# Patient Record
Sex: FEMALE | Race: WHITE | NOT HISPANIC OR LATINO | Employment: OTHER | URBAN - METROPOLITAN AREA
[De-identification: names, ages, dates, MRNs, and addresses within clinical notes are randomized per-mention and may not be internally consistent; named-entity substitution may affect disease eponyms.]

---

## 2017-01-04 DIAGNOSIS — M50.90 CERVICAL DISC DISEASE: ICD-10-CM

## 2017-01-04 DIAGNOSIS — F17.200 NICOTINE DEPENDENCE: ICD-10-CM

## 2017-01-04 RX ORDER — NICOTINE 21 MG/24H
PATCH, EXTENDED RELEASE TRANSDERMAL
Qty: 14 PATCH | Refills: 0 | Status: SHIPPED | OUTPATIENT
Start: 2017-01-04 | End: 2017-02-07 | Stop reason: SDUPTHER

## 2017-01-04 RX ORDER — DIAZEPAM 10 MG/1
TABLET ORAL
Qty: 60 TABLET | Refills: 0 | Status: SHIPPED | OUTPATIENT
Start: 2017-01-04 | End: 2017-02-07 | Stop reason: SDUPTHER

## 2017-01-20 ENCOUNTER — TELEPHONE (OUTPATIENT)
Dept: FAMILY MEDICINE | Facility: CLINIC | Age: 62
End: 2017-01-20

## 2017-01-23 ENCOUNTER — TELEPHONE (OUTPATIENT)
Dept: FAMILY MEDICINE | Facility: CLINIC | Age: 62
End: 2017-01-23

## 2017-01-23 ENCOUNTER — OFFICE VISIT (OUTPATIENT)
Dept: FAMILY MEDICINE | Facility: CLINIC | Age: 62
End: 2017-01-23
Payer: MEDICAID

## 2017-01-23 VITALS
OXYGEN SATURATION: 96 % | BODY MASS INDEX: 24.39 KG/M2 | HEART RATE: 101 BPM | TEMPERATURE: 98 F | RESPIRATION RATE: 18 BRPM | DIASTOLIC BLOOD PRESSURE: 78 MMHG | WEIGHT: 129.19 LBS | SYSTOLIC BLOOD PRESSURE: 92 MMHG | HEIGHT: 61 IN

## 2017-01-23 DIAGNOSIS — M50.30 DISC DISEASE, DEGENERATIVE, CERVICAL: ICD-10-CM

## 2017-01-23 DIAGNOSIS — F11.90 CHRONIC, CONTINUOUS USE OF OPIOIDS: ICD-10-CM

## 2017-01-23 DIAGNOSIS — M50.30 DISC DISEASE, DEGENERATIVE, CERVICAL: Primary | ICD-10-CM

## 2017-01-23 PROCEDURE — 99213 OFFICE O/P EST LOW 20 MIN: CPT | Mod: PBBFAC,PN | Performed by: INTERNAL MEDICINE

## 2017-01-23 PROCEDURE — 99999 PR PBB SHADOW E&M-EST. PATIENT-LVL III: CPT | Mod: PBBFAC,,, | Performed by: INTERNAL MEDICINE

## 2017-01-23 PROCEDURE — 99214 OFFICE O/P EST MOD 30 MIN: CPT | Mod: S$PBB,,, | Performed by: INTERNAL MEDICINE

## 2017-01-23 RX ORDER — VENLAFAXINE 75 MG/1
75 TABLET ORAL 2 TIMES DAILY
Qty: 60 TABLET | Refills: 3 | Status: SHIPPED | OUTPATIENT
Start: 2017-01-23 | End: 2017-08-31 | Stop reason: SDUPTHER

## 2017-01-23 RX ORDER — OXYCODONE AND ACETAMINOPHEN 5; 325 MG/1; MG/1
1 TABLET ORAL EVERY 4 HOURS PRN
Qty: 120 TABLET | Refills: 0 | Status: SHIPPED | OUTPATIENT
Start: 2017-02-23 | End: 2017-03-21 | Stop reason: SDUPTHER

## 2017-01-23 RX ORDER — OXYCODONE AND ACETAMINOPHEN 5; 325 MG/1; MG/1
1 TABLET ORAL EVERY 4 HOURS PRN
Qty: 120 TABLET | Refills: 0 | Status: SHIPPED | OUTPATIENT
Start: 2017-01-23 | End: 2017-01-23 | Stop reason: SDUPTHER

## 2017-01-23 RX ORDER — OXYCODONE HYDROCHLORIDE 5 MG/1
5 CAPSULE ORAL EVERY 4 HOURS PRN
Qty: 120 CAPSULE | Refills: 0 | Status: SHIPPED | OUTPATIENT
Start: 2017-02-23 | End: 2017-01-24 | Stop reason: CLARIF

## 2017-01-23 RX ORDER — OXYCODONE HYDROCHLORIDE 5 MG/1
5 CAPSULE ORAL EVERY 4 HOURS PRN
Qty: 120 CAPSULE | Refills: 0 | Status: SHIPPED | OUTPATIENT
Start: 2017-01-23 | End: 2017-01-23 | Stop reason: SDUPTHER

## 2017-01-23 NOTE — PROGRESS NOTES
"Subjective:       Patient ID: Rossi Mcneal is a 61 y.o. female.    Chief Complaint: Hypertension; Follow-up; and Medication Management (discuss Effexor )    HPI Comments: F/u chronic pain    HPI: 60 y/o with history of scoliosis surgery > 40 years ago with chronic neck and lower back pain had spine meylogram at [facility] Oct 2016 showing cervical neuronal foraminal stenosis C4-C6. Needs to schedule follow up with interventional radiology at [facility] for treatment recommendations. Primary mid neck pain no radiation to extremities. Good control with opioid four to five times per day. Moving bowels daily without straining    Review of Systems   Constitutional: Negative for activity change, appetite change, fatigue, fever and unexpected weight change.   HENT: Negative for ear pain, rhinorrhea and sore throat.    Eyes: Negative for discharge and visual disturbance.   Respiratory: Negative for chest tightness, shortness of breath and wheezing.    Cardiovascular: Negative for chest pain, palpitations and leg swelling.   Gastrointestinal: Negative for abdominal pain, constipation and diarrhea.   Endocrine: Negative for cold intolerance and heat intolerance.   Genitourinary: Negative for dysuria and hematuria.   Musculoskeletal: Negative for joint swelling and neck stiffness.   Skin: Negative for rash.   Neurological: Negative for dizziness, syncope, weakness and headaches.   Psychiatric/Behavioral: Negative for suicidal ideas.       Objective:     Vitals:    01/23/17 1521   BP: 92/78   BP Location: Left arm   Patient Position: Sitting   BP Method: Manual   Pulse: 101   Resp: 18   Temp: 98.1 °F (36.7 °C)   TempSrc: Oral   SpO2: 96%   Weight: 58.6 kg (129 lb 3 oz)   Height: 5' 1" (1.549 m)          Physical Exam   Constitutional: She is oriented to person, place, and time. She appears well-developed and well-nourished.   HENT:   Head: Normocephalic and atraumatic.   Eyes: Conjunctivae are normal. Pupils are equal, round, and reactive " to light.   Neck: Normal range of motion.   Cardiovascular: Normal rate and regular rhythm.  Exam reveals no gallop and no friction rub.    No murmur heard.  Pulmonary/Chest: Effort normal and breath sounds normal. She has no wheezes. She has no rales.   Abdominal: Soft. Bowel sounds are normal. There is no tenderness. There is no rebound and no guarding.   Musculoskeletal: Normal range of motion. She exhibits no edema or tenderness.   Neurological: She is alert and oriented to person, place, and time. No cranial nerve deficit.   Skin: Skin is warm and dry.   Psychiatric: She has a normal mood and affect.       Assessment:       1. Disc disease, degenerative, cervical    2. Chronic, continuous use of opioids        Plan:    1/2. Stable continue current medications needs follow up with [facility] neurosurgery she will call to scheduled

## 2017-01-24 RX ORDER — OXYCODONE HYDROCHLORIDE 5 MG/1
5 TABLET ORAL EVERY 4 HOURS PRN
Qty: 120 TABLET | Refills: 0 | Status: SHIPPED | OUTPATIENT
Start: 2017-01-24 | End: 2017-02-23

## 2017-01-24 RX ORDER — OXYCODONE HYDROCHLORIDE 5 MG/1
5 TABLET ORAL EVERY 4 HOURS PRN
Qty: 120 TABLET | Refills: 0 | Status: SHIPPED | OUTPATIENT
Start: 2017-02-24 | End: 2017-03-21 | Stop reason: SDUPTHER

## 2017-01-24 NOTE — TELEPHONE ENCOUNTER
Spoke with Sheela with [facility]'s pharmacy and [facility] states that patient usually gets Oxycodone (OXY-IR) 5 mg, tablet not capsule.     Patient is requesting tablets. Please resend prescription

## 2017-01-24 NOTE — TELEPHONE ENCOUNTER
----- Message from Justine Duarte sent at 1/23/2017  4:20 PM CST -----  Contact: Sheela @[facility] [phone]  Pharmacy is requesting clarification on the prescription for oxycodone (OXY-IR) 5 mg Cap Please call  at your earliest convenience.  Thanks !

## 2017-02-07 DIAGNOSIS — F11.90 CHRONIC, CONTINUOUS USE OF OPIOIDS: ICD-10-CM

## 2017-02-07 DIAGNOSIS — M50.30 DISC DISEASE, DEGENERATIVE, CERVICAL: ICD-10-CM

## 2017-02-07 DIAGNOSIS — J45.20 MILD INTERMITTENT ASTHMA WITHOUT COMPLICATION: ICD-10-CM

## 2017-02-07 DIAGNOSIS — F17.200 NICOTINE DEPENDENCE: ICD-10-CM

## 2017-02-07 DIAGNOSIS — M50.90 CERVICAL DISC DISEASE: ICD-10-CM

## 2017-02-08 RX ORDER — IBUPROFEN 200 MG
TABLET ORAL
Qty: 14 PATCH | Refills: 1 | Status: SHIPPED | OUTPATIENT
Start: 2017-02-08 | End: 2017-07-21

## 2017-02-08 RX ORDER — DIAZEPAM 10 MG/1
TABLET ORAL
Qty: 60 TABLET | Refills: 0 | Status: SHIPPED | OUTPATIENT
Start: 2017-02-08 | End: 2017-03-17 | Stop reason: SDUPTHER

## 2017-02-08 RX ORDER — OXYCODONE HYDROCHLORIDE 5 MG/1
TABLET ORAL
Qty: 120 TABLET | Refills: 0 | Status: SHIPPED | OUTPATIENT
Start: 2017-02-22 | End: 2017-03-21 | Stop reason: SDUPTHER

## 2017-02-08 RX ORDER — ALBUTEROL SULFATE 90 UG/1
AEROSOL, METERED RESPIRATORY (INHALATION)
Qty: 18 G | Refills: 2 | Status: SHIPPED | OUTPATIENT
Start: 2017-02-08 | End: 2017-05-30 | Stop reason: SDUPTHER

## 2017-02-08 RX ORDER — OXYCODONE AND ACETAMINOPHEN 5; 325 MG/1; MG/1
TABLET ORAL
Qty: 120 TABLET | Refills: 0 | Status: SHIPPED | OUTPATIENT
Start: 2017-02-22 | End: 2017-03-21 | Stop reason: SDUPTHER

## 2017-02-08 NOTE — TELEPHONE ENCOUNTER
Refill request received for percocet and oxycodone  reviewed filled 1/23 not due until 2/22. These scripts have been pre dated

## 2017-02-20 ENCOUNTER — TELEPHONE (OUTPATIENT)
Dept: FAMILY MEDICINE | Facility: CLINIC | Age: 62
End: 2017-02-20

## 2017-02-20 NOTE — TELEPHONE ENCOUNTER
----- Message from Angela Seay sent at 2/20/2017  2:50 PM CST -----  Contact: self  Refill request for Oxycodone 5-325 & Oxycodone HCL 5 MG tablets to be called in to [facility] # [phone]    LL

## 2017-03-17 DIAGNOSIS — M50.90 CERVICAL DISC DISEASE: ICD-10-CM

## 2017-03-17 RX ORDER — DIAZEPAM 10 MG/1
TABLET ORAL
Qty: 60 TABLET | Refills: 0 | Status: SHIPPED | OUTPATIENT
Start: 2017-03-17 | End: 2017-04-21 | Stop reason: SDUPTHER

## 2017-03-21 ENCOUNTER — APPOINTMENT (OUTPATIENT)
Dept: RADIOLOGY | Facility: HOSPITAL | Age: 62
End: 2017-03-21
Attending: INTERNAL MEDICINE
Payer: MEDICAID

## 2017-03-21 ENCOUNTER — TELEPHONE (OUTPATIENT)
Dept: FAMILY MEDICINE | Facility: CLINIC | Age: 62
End: 2017-03-21

## 2017-03-21 ENCOUNTER — OFFICE VISIT (OUTPATIENT)
Dept: FAMILY MEDICINE | Facility: CLINIC | Age: 62
End: 2017-03-21
Payer: MEDICAID

## 2017-03-21 VITALS
SYSTOLIC BLOOD PRESSURE: 130 MMHG | TEMPERATURE: 98 F | DIASTOLIC BLOOD PRESSURE: 84 MMHG | HEIGHT: 61 IN | WEIGHT: 131.38 LBS | HEART RATE: 88 BPM | BODY MASS INDEX: 24.8 KG/M2 | OXYGEN SATURATION: 94 % | RESPIRATION RATE: 17 BRPM

## 2017-03-21 DIAGNOSIS — F11.90 CHRONIC, CONTINUOUS USE OF OPIOIDS: ICD-10-CM

## 2017-03-21 DIAGNOSIS — M50.30 DISC DISEASE, DEGENERATIVE, CERVICAL: ICD-10-CM

## 2017-03-21 DIAGNOSIS — R09.81 NASAL CONGESTION: ICD-10-CM

## 2017-03-21 DIAGNOSIS — R13.14 PHARYNGOESOPHAGEAL DYSPHAGIA: ICD-10-CM

## 2017-03-21 DIAGNOSIS — R13.14 PHARYNGOESOPHAGEAL DYSPHAGIA: Primary | ICD-10-CM

## 2017-03-21 PROCEDURE — 99999 PR PBB SHADOW E&M-EST. PATIENT-LVL III: CPT | Mod: PBBFAC,,, | Performed by: INTERNAL MEDICINE

## 2017-03-21 PROCEDURE — 99214 OFFICE O/P EST MOD 30 MIN: CPT | Mod: S$PBB,,, | Performed by: INTERNAL MEDICINE

## 2017-03-21 PROCEDURE — 71020 XR CHEST PA AND LATERAL: CPT | Mod: 26,,, | Performed by: RADIOLOGY

## 2017-03-21 PROCEDURE — 99213 OFFICE O/P EST LOW 20 MIN: CPT | Mod: PBBFAC,PN | Performed by: INTERNAL MEDICINE

## 2017-03-21 PROCEDURE — 71020 XR CHEST PA AND LATERAL: CPT | Mod: TC,PN

## 2017-03-21 RX ORDER — OXYCODONE AND ACETAMINOPHEN 5; 325 MG/1; MG/1
1 TABLET ORAL EVERY 4 HOURS PRN
Qty: 120 TABLET | Refills: 0 | Status: SHIPPED | OUTPATIENT
Start: 2017-03-22 | End: 2017-04-21 | Stop reason: SDUPTHER

## 2017-03-21 RX ORDER — OXYCODONE HYDROCHLORIDE 5 MG/1
5 TABLET ORAL EVERY 4 HOURS PRN
Qty: 120 TABLET | Refills: 0 | Status: SHIPPED | OUTPATIENT
Start: 2017-03-22 | End: 2017-04-21 | Stop reason: SDUPTHER

## 2017-03-21 RX ORDER — FLUTICASONE PROPIONATE 50 MCG
1 SPRAY, SUSPENSION (ML) NASAL 2 TIMES DAILY
Qty: 1 BOTTLE | Refills: 1 | Status: SHIPPED | OUTPATIENT
Start: 2017-03-21 | End: 2017-06-21 | Stop reason: SDUPTHER

## 2017-03-21 NOTE — TELEPHONE ENCOUNTER
Ms. Richey was informed scripts were sent to pharmacy, but won't be filled until tomorrow.     Patient understood.

## 2017-03-21 NOTE — PROGRESS NOTES
"Subjective:       Patient ID: Rossi Mcneal is a 61 y.o. female.    Chief Complaint: Dysphagia    HPI Comments: Dysphagia    HPI: 62 y/o with chronic cervical disc disease and tobacco dependence presents with one month of solid food dysphagia. Had episode approximately two weeks ago were a piece fo meat was stuck in her throat to point she had to cough it back up. No difficulty with liquids no post pradnial coughing no nausea or vomitting weight is unchanged no fevers/chills no history of EGD or reflux in past. She does have significant cervical disc disease has not yet followed up with neurosurgery at [facility] after myelogram in Nov 2016.     Review of Systems   Constitutional: Negative for activity change, appetite change, fatigue, fever and unexpected weight change.   HENT: Negative for ear pain, rhinorrhea and sore throat.    Eyes: Negative for discharge and visual disturbance.   Respiratory: Negative for chest tightness, shortness of breath and wheezing.    Cardiovascular: Negative for chest pain, palpitations and leg swelling.   Gastrointestinal: Negative for abdominal distention, abdominal pain, anal bleeding, blood in stool, constipation and diarrhea.   Endocrine: Negative for cold intolerance and heat intolerance.   Genitourinary: Negative for dysuria and hematuria.   Musculoskeletal: Positive for back pain. Negative for joint swelling and neck stiffness.   Skin: Negative for rash.   Neurological: Negative for dizziness, syncope, weakness and headaches.   Psychiatric/Behavioral: Negative for suicidal ideas.       Objective:     Vitals:    03/21/17 1118   BP: 130/84   BP Location: Left arm   Patient Position: Sitting   BP Method: Manual   Pulse: 88   Resp: 17   Temp: 98.2 °F (36.8 °C)   TempSrc: Oral   SpO2: (!) 94%   Weight: 59.6 kg (131 lb 6.3 oz)   Height: 5' 1" (1.549 m)          Physical Exam   Constitutional: She is oriented to person, place, and time. She appears well-developed and well-nourished.   HENT: "   Head: Normocephalic and atraumatic.   Eyes: Conjunctivae are normal. Pupils are equal, round, and reactive to light.   Neck: Normal range of motion. Neck supple. No JVD present. No thyromegaly present.   Cardiovascular: Normal rate and regular rhythm.  Exam reveals no gallop and no friction rub.    No murmur heard.  Pulmonary/Chest: Effort normal and breath sounds normal. No stridor. She has no wheezes. She has no rales.   No axillary LAD   Abdominal: Soft. Bowel sounds are normal. There is no tenderness. There is no rebound and no guarding.   Non palpable spleen   Musculoskeletal: Normal range of motion. She exhibits no edema or tenderness.   Lymphadenopathy:     She has no cervical adenopathy.   Neurological: She is alert and oriented to person, place, and time. No cranial nerve deficit.   Skin: Skin is warm and dry.   Psychiatric: She has a normal mood and affect.       Assessment:       1. Pharyngoesophageal dysphagia    2. Nasal congestion    3. Chronic, continuous use of opioids    4. Disc disease, degenerative, cervical        Plan:    1. Check CXR, referral for EGD if unrevealing suspect relatedt o cervical osteophytes    2. Nasal steroid BID    3/4. Refill pain medicaitons to schedule [facility]w [facility]

## 2017-03-21 NOTE — TELEPHONE ENCOUNTER
----- Message from Angela Seay sent at 3/21/2017 12:35 PM CDT -----  Contact: self  Patient states all of her prescriptions were not sent to the pharmacy today  . It was 2 prescriptions for pain .     [phone]    LL

## 2017-04-04 ENCOUNTER — ANESTHESIA EVENT (OUTPATIENT)
Dept: ENDOSCOPY | Facility: HOSPITAL | Age: 62
End: 2017-04-04
Payer: MEDICAID

## 2017-04-05 ENCOUNTER — ANESTHESIA (OUTPATIENT)
Dept: ENDOSCOPY | Facility: HOSPITAL | Age: 62
End: 2017-04-05
Payer: MEDICAID

## 2017-04-17 ENCOUNTER — HOSPITAL ENCOUNTER (OUTPATIENT)
Facility: HOSPITAL | Age: 62
Discharge: HOME | End: 2017-04-17
Attending: INTERNAL MEDICINE | Admitting: INTERNAL MEDICINE
Payer: MEDICAID

## 2017-04-17 VITALS
RESPIRATION RATE: 18 BRPM | SYSTOLIC BLOOD PRESSURE: 113 MMHG | BODY MASS INDEX: 24.55 KG/M2 | TEMPERATURE: 98 F | HEART RATE: 68 BPM | DIASTOLIC BLOOD PRESSURE: 77 MMHG | HEIGHT: 61 IN | WEIGHT: 130 LBS | OXYGEN SATURATION: 95 %

## 2017-04-17 DIAGNOSIS — R13.14 PHARYNGOESOPHAGEAL DYSPHAGIA: ICD-10-CM

## 2017-04-17 PROCEDURE — 25000003 PHARM REV CODE 250

## 2017-04-17 PROCEDURE — 25000003 PHARM REV CODE 250: Performed by: ANESTHESIOLOGY

## 2017-04-17 PROCEDURE — 27201012 HC FORCEPS, HOT/COLD, DISP: Performed by: INTERNAL MEDICINE

## 2017-04-17 PROCEDURE — D9220A PRA ANESTHESIA: Mod: CRNA,,, | Performed by: REGISTERED NURSE

## 2017-04-17 PROCEDURE — 37000008 HC ANESTHESIA 1ST 15 MINUTES: Performed by: INTERNAL MEDICINE

## 2017-04-17 PROCEDURE — 88305 TISSUE EXAM BY PATHOLOGIST: CPT | Performed by: PATHOLOGY

## 2017-04-17 PROCEDURE — D9220A PRA ANESTHESIA: Mod: ANES,,, | Performed by: ANESTHESIOLOGY

## 2017-04-17 PROCEDURE — 37000009 HC ANESTHESIA EA ADD 15 MINS: Performed by: INTERNAL MEDICINE

## 2017-04-17 PROCEDURE — 63600175 PHARM REV CODE 636 W HCPCS

## 2017-04-17 PROCEDURE — 88305 TISSUE EXAM BY PATHOLOGIST: CPT | Mod: 26,,, | Performed by: PATHOLOGY

## 2017-04-17 PROCEDURE — 43239 EGD BIOPSY SINGLE/MULTIPLE: CPT | Performed by: INTERNAL MEDICINE

## 2017-04-17 RX ORDER — LIDOCAINE HYDROCHLORIDE 10 MG/ML
1 INJECTION, SOLUTION EPIDURAL; INFILTRATION; INTRACAUDAL; PERINEURAL ONCE AS NEEDED
Status: DISCONTINUED | OUTPATIENT
Start: 2017-04-18 | End: 2017-04-17 | Stop reason: HOSPADM

## 2017-04-17 RX ORDER — SODIUM CHLORIDE 9 MG/ML
INJECTION, SOLUTION INTRAVENOUS CONTINUOUS
Status: DISCONTINUED | OUTPATIENT
Start: 2017-04-17 | End: 2017-04-17 | Stop reason: HOSPADM

## 2017-04-17 RX ORDER — LIDOCAINE HYDROCHLORIDE 10 MG/ML
1 INJECTION, SOLUTION EPIDURAL; INFILTRATION; INTRACAUDAL; PERINEURAL ONCE
Status: DISCONTINUED | OUTPATIENT
Start: 2017-04-17 | End: 2017-04-17 | Stop reason: SDUPTHER

## 2017-04-17 RX ORDER — PROPOFOL 10 MG/ML
VIAL (ML) INTRAVENOUS
Status: COMPLETED
Start: 2017-04-17 | End: 2017-04-17

## 2017-04-17 RX ORDER — SODIUM CHLORIDE 9 MG/ML
INJECTION, SOLUTION INTRAVENOUS CONTINUOUS
Status: DISCONTINUED | OUTPATIENT
Start: 2017-04-17 | End: 2017-04-17 | Stop reason: SDUPTHER

## 2017-04-17 RX ORDER — LIDOCAINE HYDROCHLORIDE 20 MG/ML
INJECTION, SOLUTION EPIDURAL; INFILTRATION; INTRACAUDAL; PERINEURAL
Status: COMPLETED
Start: 2017-04-17 | End: 2017-04-17

## 2017-04-17 RX ADMIN — SODIUM CHLORIDE: 0.9 INJECTION, SOLUTION INTRAVENOUS at 10:04

## 2017-04-17 RX ADMIN — PROPOFOL 20 MG: 10 INJECTION, EMULSION INTRAVENOUS at 11:04

## 2017-04-17 RX ADMIN — LIDOCAINE HYDROCHLORIDE 100 MG: 20 INJECTION, SOLUTION INTRAVENOUS at 11:04

## 2017-04-17 RX ADMIN — PROPOFOL 70 MG: 10 INJECTION, EMULSION INTRAVENOUS at 11:04

## 2017-04-17 NOTE — TRANSFER OF CARE
"Anesthesia Transfer of Care Note    Patient: Rossi Mcneal    Procedure(s) Performed: Procedure(s) (LRB):  ESOPHAGOGASTRODUODENOSCOPY (EGD) (N/A)    Patient location: GI    Anesthesia Type: general    Transport from OR: Transported from OR on room air with adequate spontaneous ventilation    Post pain: adequate analgesia    Post assessment: no apparent anesthetic complications and tolerated procedure well    Post vital signs: stable    Level of consciousness: responds to stimulation    Nausea/Vomiting: no nausea/vomiting    Complications: none          Last vitals:   Visit Vitals    /70    Pulse 67    Temp 36.4 °C (97.5 °F) (Oral)    Resp 14    Ht 5' 1" (1.549 m)    Wt 59 kg (130 lb)    SpO2 (!) 93%    Breastfeeding No    BMI 24.56 kg/m2     "

## 2017-04-17 NOTE — IP AVS SNAPSHOT
[place] [address] [place] [address]  Phone: [phone]           Patient Discharge Instructions   Our goal is to set you up for success. This packet includes information on your condition, medications, and your home care.  It will help you care for yourself to prevent having to return to the hospital.     Please ask your nurse if you have any questions.      There are many details to remember when preparing to leave the hospital. Here is what you will need to do:    1. Take your medicine. If you are prescribed medications, review your Medication List on the following pages. You may have new medications to  at the pharmacy and others that you'll need to stop taking. Review the instructions for how and when to take your medications. Talk with your doctor or nurses if you are unsure of what to do.     2. Go to your follow-up appointments. Specific follow-up information is listed in the following pages. Your may be contacted by a nurse or clinical provider about future appointments. Be sure we have all of the phone numbers to reach you. Please contact your provider's office if you are unable to make an appointment.     3. Watch for warning signs. Your doctor or nurse will give you detailed warning signs to watch for and when to call for assistance. These instructions may also include educational information about your condition. If you experience any of warning signs to your health, call your doctor.               ** Verify the list of medication(s) below is accurate and up to date. Carry this with you in case of emergency. If your medications have changed, please notify your healthcare provider.             Medication List      CONTINUE taking these medications        Additional Info                      diazePAM 10 MG Tab   Commonly known as:  VALIUM   Quantity:  60 tablet   Refills:  0    Instructions:  TAKE ONE TABLET BY MOUTH THREE TIMES DAILY AS NEEDED     Begin Date     AM    Noon    PM    Bedtime       fluticasone 50 mcg/actuation nasal spray   Commonly known as:  FLONASE   Quantity:  1 Bottle   Refills:  1   Dose:  1 spray    Instructions:  1 spray by Each Nare route 2 (two) times daily.     Begin Date    AM    Noon    PM    Bedtime       lisinopril 10 MG tablet   Quantity:  90 tablet   Refills:  3   Dose:  10 mg    Instructions:  Take 1 tablet (10 mg total) by mouth once daily.     Begin Date    AM    Noon    PM    Bedtime       nicotine 21 mg/24 hr   Commonly known as:  NICODERM CQ   Quantity:  14 patch   Refills:  1    Instructions:  PLACE ONE PATCH ONTO SKIN EVERY DAY     Begin Date    AM    Noon    PM    Bedtime       oxycodone 5 MG immediate release tablet   Commonly known as:  ROXICODONE   Quantity:  120 tablet   Refills:  0   Dose:  5 mg   Indications:  cancel order for oxycodone kae    Instructions:  Take 1 tablet (5 mg total) by mouth every 4 (four) hours as needed for Pain.     Begin Date    AM    Noon    PM    Bedtime       oxycodone-acetaminophen 5-325 mg per tablet   Commonly known as:  PERCOCET   Quantity:  120 tablet   Refills:  0   Dose:  1 tablet    Instructions:  Take 1 tablet by mouth every 4 (four) hours as needed for Pain.     Begin Date    AM    Noon    PM    Bedtime       venlafaxine 75 MG tablet   Commonly known as:  EFFEXOR   Quantity:  60 tablet   Refills:  3   Dose:  75 mg    Instructions:  Take 1 tablet (75 mg total) by mouth 2 (two) times daily.     Begin Date    AM    Noon    PM    Bedtime       VENTOLIN HFA 90 mcg/actuation inhaler   Quantity:  18 g   Refills:  2   Generic drug:  albuterol    Instructions:  Inhale 2 puffs into the lungs every 6 hours as needed for Wheezing.     Begin Date    AM    Noon    PM    Bedtime                  Please bring to all follow up appointments:    1. A copy of your discharge instructions.  2. All medicines you are currently taking in their original bottles.  3. Identification and insurance card.    Please  "arrive 15 minutes ahead of scheduled appointment time.    Please call 24 hours in advance if you must reschedule your appointment and/or time.        Your Scheduled Appointments     Apr 21, 2017  9:40 AM CDT   Established Patient Visit with Leobardo Corcoran MD   [facility] ([facility])    [address] [address]   [phone]              Follow-up Information     Follow up with Leobardo Corcoran MD.    Specialty:  Internal Medicine    Why:  As needed    Contact information:    [address] [place] [address]  [phone]          Discharge Instructions     Future Orders    Diet general     Questions:    Total calories:      Fat restriction, if any:      Protein restriction, if any:      Na restriction, if any:      Fluid restriction:      Additional restrictions:          Admission Information     Date & Time Provider Department CSN    4/17/2017 10:14 AM Hesham Frazier MD [facility] [id number]      Care Providers     Provider Role Specialty Primary office phone    Hesham Frazier MD Attending Provider Gastroenterology [phone]    Hesham Frazier MD Surgeon  Gastroenterology [phone]      Your Vitals Were     BP Pulse Temp Resp Height Weight    106/70 67 97.5 °F (36.4 °C) (Oral) 14 5' 1" (1.549 m) 59 kg (130 lb)    SpO2 BMI             93% 24.56 kg/m2         Recent Lab Values     No lab values to display.      Pending Labs     Order Current Status    Specimen to Pathology - Surgery Collected (04/17/17 1130)      Allergies as of 4/17/2017     No Known Allergies      [facility]     [facility] Nurse Care Line - 24/7 Assistance  Unless otherwise directed by your provider, please contact [facility], our nurse care line that is available for 24/7 assistance.     Registered nurses in the [facility] provide clinical advisement, health education, appointment booking, and other advisory services.  Call for this free " service at [phone].        Advance Directives     An advance directive is a document which, in the event you are no longer able to make decisions for yourself, tells your healthcare team what kind of treatment you do or do not want to receive, or who you would like to make those decisions for you.  If you do not currently have an advance directive, [facility] encourages you to create one.  For more information call:  [phone]), [phone] ([phone]),  or log on to [email or web address].        Smoking Cessation     If you would like to quit smoking:   You may be eligible for free services if you are a [place] resident and started smoking cigarettes before September 1, 1988.  Call the Smoking Cessation Trust (SCT) toll free at (941) 628-0841 or (487) 232-6324.   Call 6-103-QUIT-NOW if you do not meet the above criteria.   Contact us via email: [email or web address]   View our website for more information: [email or web address]        Language Assistance Services     ATTENTION: Language assistance services are available, free of charge. Please call [phone].      ATENCIÓN: Si kaylahla kiran, tiene a farrar disposición servicios gratuitos de asistencia lingüística. Llame al [phone].     CHÚ Ý: N?u b?n nói Ti?ng Vi?t, có các d?ch v? h? tr? ngôn ng? mi?n phí dành cho b?n. G?i s? [phone].        [facility] Sign-Up     Activating your [facility] account is as easy as 1-2-3!     1) Visit [email or web address], select Sign Up Now, enter this activation code and your date of birth, then select Next.  Activation code not generated  Current Patient Portal Status: Account disabled      2) Create a username and password to use when you visit [facility] in the future and select a security question in case you lose your password and select Next.    3) Enter your e-mail address and click Sign Up!    Additional Information  If you have questions, please e-mail [email or web address] or  call [phone] to talk to our [facility] staff. Remember, [facility] is NOT to be used for urgent needs. For medical emergencies, dial 911.          [facility] complies with applicable Federal civil rights laws and does not discriminate on the basis of race, color, national origin, age, disability, or sex.

## 2017-04-17 NOTE — DISCHARGE SUMMARY
[facility]  Discharge Summary      Admit Date: 4/17/2017    Discharge Date and Time:  04/17/2017 11:31 AM    Attending Physician: Hesham Frazier MD     Reason for Admission: Dysphagia    Procedures Performed: Procedure(s) (LRB):  ESOPHAGOGASTRODUODENOSCOPY (EGD) (N/A)    Hospital Course (synopsis of major diagnoses, care, treatment, and services provided during the course of the hospital stay): Outpatient EGD     Consults: none    Significant Diagnostic Studies: EGD    Final Diagnoses:    Principal Problem: <principal problem not specified>   Secondary Diagnoses: EGD for dysphagia    Discharged Condition: good    Disposition: Home or Self Care    Follow Up/Patient Instructions: Follow-up with referring physician             Resume previous diet and activity.    Medications:  Reconciled Home Medications:   Current Discharge Medication List      CONTINUE these medications which have NOT CHANGED    Details   diazePAM (VALIUM) 10 MG Tab TAKE ONE TABLET BY MOUTH THREE TIMES DAILY AS NEEDED  Qty: 60 tablet, Refills: 0    Associated Diagnoses: Cervical disc disease      fluticasone (FLONASE) 50 mcg/actuation nasal spray 1 spray by Each Nare route 2 (two) times daily.  Qty: 1 Bottle, Refills: 1    Associated Diagnoses: Nasal congestion      lisinopril 10 MG tablet Take 1 tablet (10 mg total) by mouth once daily.  Qty: 90 tablet, Refills: 3    Associated Diagnoses: Hypertension, essential      nicotine (NICODERM CQ) 21 mg/24 hr PLACE ONE PATCH ONTO SKIN EVERY DAY  Qty: 14 patch, Refills: 1    Associated Diagnoses: Nicotine dependence      oxycodone (ROXICODONE) 5 MG immediate release tablet Take 1 tablet (5 mg total) by mouth every 4 (four) hours as needed for Pain.  Qty: 120 tablet, Refills: 0    Associated Diagnoses: Disc disease, degenerative, cervical; Chronic, continuous use of opioids      venlafaxine (EFFEXOR) 75 MG tablet Take 1 tablet (75 mg total) by mouth 2 (two) times daily.  Qty: 60 tablet,  Refills: 3    Associated Diagnoses: Disc disease, degenerative, cervical      VENTOLIN HFA 90 mcg/actuation inhaler Inhale 2 puffs into the lungs every 6 hours as needed for Wheezing.  Qty: 18 g, Refills: 2    Associated Diagnoses: Mild intermittent asthma without complication      oxycodone-acetaminophen (PERCOCET) 5-325 mg per tablet Take 1 tablet by mouth every 4 (four) hours as needed for Pain.  Qty: 120 tablet, Refills: 0    Associated Diagnoses: Disc disease, degenerative, cervical; Chronic, continuous use of opioids           No discharge procedures on file.

## 2017-04-17 NOTE — ANESTHESIA PREPROCEDURE EVALUATION
04/17/2017  Rossi Mcneal is a 61 y.o., female.    Anesthesia Evaluation    I have reviewed the Patient Summary Reports.     I have reviewed the Medications.     Review of Systems  Anesthesia Hx:  History of prior surgery of interest to airway management or planning:  Denies Personal Hx of Anesthesia complications.   Social:  Smoker    Hematology/Oncology:  Hematology Normal        Cardiovascular:   Hypertension    Pulmonary:   COPD, moderate    Hepatic/GI:   Hepatitis, C    Musculoskeletal:   Arthritis   Spine Disorders: cervical Disc disease    Neurological:   Neuromuscular Disease,        Physical Exam  General:  Well nourished    Airway/Jaw/Neck:  Airway Findings: Mouth Opening: Normal Tongue: Normal  General Airway Assessment: Adult  Mallampati: II  TM Distance: 4 - 6 cm  Jaw/Neck Findings:  Neck ROM: Normal ROM      Dental:  Dental Findings: In tact   Chest/Lungs:  Chest/Lungs Findings: Normal Respiratory Rate     Heart/Vascular:  Heart Findings: Rate: Normal        Mental Status:  Mental Status Findings:  Cooperative         Anesthesia Plan  Type of Anesthesia, risks & benefits discussed:  Anesthesia Type:  general  Patient's Preference:   Intra-op Monitoring Plan: standard ASA monitors  Intra-op Monitoring Plan Comments:   Post Op Pain Control Plan:   Post Op Pain Control Plan Comments:   Induction:   IV  Beta Blocker:  Patient is not currently on a Beta-Blocker (No further documentation required).       Informed Consent: Patient understands risks and agrees with Anesthesia plan.  Questions answered. Anesthesia consent signed with patient.  ASA Score: 3     Day of Surgery Review of History & Physical:    H&P update referred to the provider.         Ready For Surgery From Anesthesia Perspective.

## 2017-04-17 NOTE — ANESTHESIA POSTPROCEDURE EVALUATION
"Anesthesia Post Evaluation    Patient: Rossi Mcneal    Procedure(s) Performed: Procedure(s) (LRB):  ESOPHAGOGASTRODUODENOSCOPY (EGD) (N/A)    Final Anesthesia Type: general  Patient location during evaluation: GI PACU  Patient participation: Yes- Able to Participate  Level of consciousness: awake  Post-procedure vital signs: reviewed and stable  Pain management: adequate  Airway patency: patent  PONV status at discharge: No PONV  Anesthetic complications: no      Cardiovascular status: stable  Respiratory status: unassisted  Hydration status: euvolemic  Follow-up not needed.        Visit Vitals    /79 (BP Location: Left arm, Patient Position: Lying, BP Method: Automatic)    Pulse 91    Temp 36.7 °C (98.1 °F) (Oral)    Resp 18    Ht 5' 1" (1.549 m)    Wt 59 kg (130 lb)    SpO2 (!) 92%    Breastfeeding No    BMI 24.56 kg/m2       Pain/Chip Score: Pain Assessment Performed: Yes (4/17/2017 10:26 AM)  Presence of Pain: denies (4/17/2017 10:26 AM)      "

## 2017-04-17 NOTE — H&P
"Chief Complaint:  "I need an upper scope."    HPI:  The patient is a 61 year old woman with a history of cervical osteophytes presenting for an EGD.  She has solid food dysphagia and felt food get stuck in her oropharyngeal region to where she needed to cough it up.  The patient denies any abdominal pain, weight loss, nausea, emesis, diarrhea, constipation, melena, or hematochezia.  The patient also denies a family history of colon cancer.    Past Medical History:   Diagnosis Date    Emphysema     Hypertension     Neuromuscular disorder      Past Surgical History:   Procedure Laterality Date    NECK SURGERY  1990    PARTIAL HYSTERECTOMY  1995     Family History   Problem Relation Age of Onset    Hypertension Mother     Cancer Father     Parkinsonism Father      Social History     Social History    Marital status:      Spouse name: N/A    Number of children: N/A    Years of education: N/A     Occupational History    Not on file.     Social History Main Topics    Smoking status: Current Every Day Smoker    Smokeless tobacco: Not on file    Alcohol use Not on file    Drug use: Not on file    Sexual activity: Not on file     Other Topics Concern    Not on file     Social History Narrative        Review of patient's allergies indicates:  No Known Allergies    ROS:  No chest pain or dyspnea.  No dysuria.  No heartburn or dysphagia.  Otherwise as stated above.  Ten other systems negative.    Vitals:    04/17/17 1035   BP: 110/79   BP Location: Left arm   Patient Position: Lying   BP Method: Automatic   Pulse: 91   Resp: 18   Temp: 98.1 °F (36.7 °C)   TempSrc: Oral   SpO2: (!) 92%   Weight: 59 kg (130 lb)   Height: 5' 1" (1.549 m)     P.E.:  GEN: A x O x 3, NAD  SKIN: No jaundice  HEENT: EOMI, PERRL, anicteric sclera  CV: RRR, no M/R/G  Chest: CTA B  Abdomen: soft, NTND, normoactive BS  Ext: No C/C/E.  2+ dorsalis pedis pulses B  Neuro: No asterixes or tremors.  CN II-XII intact  Musculoskeletal: " 5/5 strength bilaterally    Labs:  Lab Results   Component Value Date    WBC 7.51 11/16/2015    HGB 13.3 11/16/2015    HCT 39.4 11/16/2015    MCV 93 11/16/2015     11/16/2015     CMP  Sodium   Date Value Ref Range Status   10/20/2016 137 136 - 145 mmol/L Final     Potassium   Date Value Ref Range Status   10/20/2016 3.9 3.5 - 5.1 mmol/L Final     Chloride   Date Value Ref Range Status   10/20/2016 104 95 - 110 mmol/L Final     CO2   Date Value Ref Range Status   10/20/2016 22 (L) 23 - 29 mmol/L Final     Glucose   Date Value Ref Range Status   10/20/2016 124 (H) 70 - 110 mg/dL Final     BUN, Bld   Date Value Ref Range Status   10/20/2016 15 8 - 23 mg/dL Final     Creatinine   Date Value Ref Range Status   10/20/2016 1.1 0.5 - 1.4 mg/dL Final     Calcium   Date Value Ref Range Status   10/20/2016 9.2 8.7 - 10.5 mg/dL Final     Total Protein   Date Value Ref Range Status   10/20/2016 7.5 6.0 - 8.4 g/dL Final     Albumin   Date Value Ref Range Status   10/20/2016 3.8 3.5 - 5.2 g/dL Final     Total Bilirubin   Date Value Ref Range Status   10/20/2016 0.4 0.1 - 1.0 mg/dL Final     Comment:     For infants and newborns, interpretation of results should be based  on gestational age, weight and in agreement with clinical  observations.  Premature Infant recommended reference ranges:  Up to 24 hours.............<8.0 mg/dL  Up to 48 hours............<12.0 mg/dL  3-5 days..................<15.0 mg/dL  6-29 days.................<15.0 mg/dL       Alkaline Phosphatase   Date Value Ref Range Status   10/20/2016 63 55 - 135 U/L Final     AST   Date Value Ref Range Status   10/20/2016 58 (H) 10 - 40 U/L Final     ALT   Date Value Ref Range Status   10/20/2016 43 10 - 44 U/L Final     Anion Gap   Date Value Ref Range Status   10/20/2016 11 8 - 16 mmol/L Final     eGFR if    Date Value Ref Range Status   10/20/2016 >60 >60 mL/min/1.73 m^2 Final     eGFR if non    Date Value Ref Range Status    10/20/2016 54 (A) >60 mL/min/1.73 m^2 Final     Comment:     Calculation used to obtain the estimated glomerular filtration  rate (eGFR) is the CKD-EPI equation. Since race is unknown   in our information system, the eGFR values for   -American and Non--American patients are given   for each creatinine result.         No results for input(s): INR, APTT in the last 24 hours.    Invalid input(s): PT    A/P:  The patient is a 61 year old woman presenting for an EGD.  1.  EGD - she can undergo an EGD.  I have explained the risks, benefits, and alternatives of the procedure in detail.  The patient voices understanding and all questions have been answered.  The patient agrees to proceed as planned.

## 2017-04-21 ENCOUNTER — OFFICE VISIT (OUTPATIENT)
Dept: FAMILY MEDICINE | Facility: CLINIC | Age: 62
End: 2017-04-21
Payer: MEDICAID

## 2017-04-21 VITALS
HEART RATE: 101 BPM | WEIGHT: 130.06 LBS | HEIGHT: 61 IN | BODY MASS INDEX: 24.55 KG/M2 | TEMPERATURE: 99 F | OXYGEN SATURATION: 96 % | SYSTOLIC BLOOD PRESSURE: 110 MMHG | DIASTOLIC BLOOD PRESSURE: 78 MMHG | RESPIRATION RATE: 17 BRPM

## 2017-04-21 DIAGNOSIS — M50.30 DISC DISEASE, DEGENERATIVE, CERVICAL: Primary | ICD-10-CM

## 2017-04-21 DIAGNOSIS — F11.90 CHRONIC, CONTINUOUS USE OF OPIOIDS: ICD-10-CM

## 2017-04-21 DIAGNOSIS — I10 HYPERTENSION, ESSENTIAL: ICD-10-CM

## 2017-04-21 DIAGNOSIS — M50.90 CERVICAL DISC DISEASE: ICD-10-CM

## 2017-04-21 PROCEDURE — 99214 OFFICE O/P EST MOD 30 MIN: CPT | Mod: S$PBB,,, | Performed by: INTERNAL MEDICINE

## 2017-04-21 PROCEDURE — 99213 OFFICE O/P EST LOW 20 MIN: CPT | Mod: PBBFAC,PN | Performed by: INTERNAL MEDICINE

## 2017-04-21 PROCEDURE — 99999 PR PBB SHADOW E&M-EST. PATIENT-LVL III: CPT | Mod: PBBFAC,,, | Performed by: INTERNAL MEDICINE

## 2017-04-21 RX ORDER — DIAZEPAM 10 MG/1
10 TABLET ORAL 3 TIMES DAILY PRN
Qty: 60 TABLET | Refills: 0 | Status: SHIPPED | OUTPATIENT
Start: 2017-04-21 | End: 2017-05-18 | Stop reason: SDUPTHER

## 2017-04-21 RX ORDER — OXYCODONE HYDROCHLORIDE 5 MG/1
5 TABLET ORAL EVERY 4 HOURS PRN
Qty: 120 TABLET | Refills: 0 | Status: SHIPPED | OUTPATIENT
Start: 2017-04-21 | End: 2017-05-18 | Stop reason: SDUPTHER

## 2017-04-21 RX ORDER — OXYCODONE AND ACETAMINOPHEN 5; 325 MG/1; MG/1
1 TABLET ORAL EVERY 4 HOURS PRN
Qty: 120 TABLET | Refills: 0 | Status: SHIPPED | OUTPATIENT
Start: 2017-04-21 | End: 2017-05-18 | Stop reason: SDUPTHER

## 2017-04-21 NOTE — PROGRESS NOTES
"Subjective:       Patient ID: Rossi Mcneal is a 61 y.o. female.    Chief Complaint: Hypertension and Medication Refill    HPI Comments: F/u dysphagia and chronic pain    HPI: 60 y/o with multilevel disc disease s/p both cervical and lumbar fusion surgeries on chronic opioid therapy was evaluated for solid food dysphagi recently with EGD. She reports no further episodes of dysphagia no abdominal pain no change in bowel habits. Using pain medication as prescribed with good controll.  reviewed shows not outside prescribers.    Results of EGD review along with pathology report no evidence of stricture, inflammation or esophagitis no evidence of inflammatory cells in biopsy    Review of Systems   Constitutional: Negative for activity change, appetite change, fatigue, fever and unexpected weight change.   HENT: Negative for ear pain, rhinorrhea and sore throat.    Eyes: Negative for discharge and visual disturbance.   Respiratory: Negative for chest tightness, shortness of breath and wheezing.    Cardiovascular: Negative for chest pain, palpitations and leg swelling.   Gastrointestinal: Negative for abdominal pain, constipation and diarrhea.   Endocrine: Negative for cold intolerance and heat intolerance.   Genitourinary: Negative for dysuria and hematuria.   Musculoskeletal: Positive for back pain, neck pain and neck stiffness. Negative for joint swelling.   Skin: Negative for rash.   Neurological: Negative for dizziness, syncope, weakness and headaches.   Psychiatric/Behavioral: Negative for suicidal ideas.       Objective:     Vitals:    04/21/17 0937   BP: 110/78   BP Location: Right arm   Patient Position: Sitting   BP Method: Manual   Pulse: 101   Resp: 17   Temp: 98.5 °F (36.9 °C)   TempSrc: Oral   SpO2: 96%   Weight: 59 kg (130 lb 1.1 oz)   Height: 5' 1" (1.549 m)          Physical Exam   Constitutional: She is oriented to person, place, and time. She appears well-developed and well-nourished.   HENT:   Head: " Normocephalic and atraumatic.   Eyes: Conjunctivae are normal. Pupils are equal, round, and reactive to light.   Neck: Normal range of motion.   Cardiovascular: Normal rate and regular rhythm.  Exam reveals no gallop and no friction rub.    No murmur heard.  Pulmonary/Chest: Effort normal and breath sounds normal. She has no wheezes. She has no rales.   Abdominal: Soft. Bowel sounds are normal. There is no tenderness. There is no rebound and no guarding.   Musculoskeletal: Normal range of motion. She exhibits no edema or tenderness.   Neurological: She is alert and oriented to person, place, and time. No cranial nerve deficit.   Skin: Skin is warm and dry.   Psychiatric: She has a normal mood and affect.       Assessment:       1. Disc disease, degenerative, cervical    2. Chronic, continuous use of opioids    3. Cervical disc disease    4. Hypertension, essential        Plan:    1/2/3. Continue current medications, [facility] for intervential recommendatiosn    4. At goal continue lisinopril    F/u three months

## 2017-05-03 ENCOUNTER — CLINICAL SUPPORT (OUTPATIENT)
Dept: SMOKING CESSATION | Facility: CLINIC | Age: 62
End: 2017-05-03
Payer: COMMERCIAL

## 2017-05-03 DIAGNOSIS — F17.200 NICOTINE DEPENDENCE: Primary | ICD-10-CM

## 2017-05-03 PROCEDURE — 99407 BEHAV CHNG SMOKING > 10 MIN: CPT | Mod: S$GLB,,, | Performed by: INTERNAL MEDICINE

## 2017-05-18 DIAGNOSIS — M50.90 CERVICAL DISC DISEASE: ICD-10-CM

## 2017-05-18 DIAGNOSIS — F11.90 CHRONIC, CONTINUOUS USE OF OPIOIDS: ICD-10-CM

## 2017-05-18 DIAGNOSIS — M50.30 DISC DISEASE, DEGENERATIVE, CERVICAL: ICD-10-CM

## 2017-05-18 RX ORDER — OXYCODONE AND ACETAMINOPHEN 5; 325 MG/1; MG/1
1 TABLET ORAL EVERY 4 HOURS PRN
Qty: 120 TABLET | Refills: 0 | Status: SHIPPED | OUTPATIENT
Start: 2017-05-19 | End: 2017-06-19 | Stop reason: SDUPTHER

## 2017-05-18 RX ORDER — OXYCODONE HYDROCHLORIDE 5 MG/1
5 TABLET ORAL EVERY 4 HOURS PRN
Qty: 120 TABLET | Refills: 0 | Status: SHIPPED | OUTPATIENT
Start: 2017-05-19 | End: 2017-06-19 | Stop reason: SDUPTHER

## 2017-05-18 RX ORDER — DIAZEPAM 10 MG/1
10 TABLET ORAL 3 TIMES DAILY PRN
Qty: 60 TABLET | Refills: 0 | Status: SHIPPED | OUTPATIENT
Start: 2017-05-19 | End: 2017-06-19 | Stop reason: SDUPTHER

## 2017-05-18 NOTE — TELEPHONE ENCOUNTER
----- Message from Kaye Bourne sent at 5/18/2017 11:43 AM CDT -----  Contact: self  Pt is requesting refills for oxycodone (ROXICODONE) 5 MG immediate release tablet; oxycodone-acetaminophen (PERCOCET) 5-325 mg per tablet; diazePAM (VALIUM) 10 MG Tab. Please call pt upon approval [phone]        Thanks

## 2017-05-30 DIAGNOSIS — J45.20 MILD INTERMITTENT ASTHMA WITHOUT COMPLICATION: ICD-10-CM

## 2017-05-30 RX ORDER — ALBUTEROL SULFATE 90 UG/1
AEROSOL, METERED RESPIRATORY (INHALATION)
Qty: 18 G | Refills: 2 | Status: SHIPPED | OUTPATIENT
Start: 2017-05-30 | End: 2017-12-13 | Stop reason: SDUPTHER

## 2017-06-19 DIAGNOSIS — M50.90 CERVICAL DISC DISEASE: ICD-10-CM

## 2017-06-19 DIAGNOSIS — M50.30 DISC DISEASE, DEGENERATIVE, CERVICAL: ICD-10-CM

## 2017-06-19 DIAGNOSIS — F11.90 CHRONIC, CONTINUOUS USE OF OPIOIDS: ICD-10-CM

## 2017-06-19 RX ORDER — DIAZEPAM 10 MG/1
10 TABLET ORAL 3 TIMES DAILY PRN
Qty: 60 TABLET | Refills: 0 | Status: SHIPPED | OUTPATIENT
Start: 2017-06-19 | End: 2017-07-17 | Stop reason: SDUPTHER

## 2017-06-19 RX ORDER — OXYCODONE HYDROCHLORIDE 5 MG/1
5 TABLET ORAL EVERY 4 HOURS PRN
Qty: 120 TABLET | Refills: 0 | Status: SHIPPED | OUTPATIENT
Start: 2017-06-19 | End: 2017-07-17 | Stop reason: SDUPTHER

## 2017-06-19 RX ORDER — OXYCODONE AND ACETAMINOPHEN 5; 325 MG/1; MG/1
1 TABLET ORAL EVERY 4 HOURS PRN
Qty: 120 TABLET | Refills: 0 | Status: SHIPPED | OUTPATIENT
Start: 2017-06-19 | End: 2017-07-17 | Stop reason: SDUPTHER

## 2017-06-19 NOTE — TELEPHONE ENCOUNTER
----- Message from Stephani Way sent at 6/19/2017  9:09 AM CDT -----  Refill: diazePAM (VALIUM) 10 MG Tab  Refill: oxycodone (ROXICODONE) 5 MG immediate release tablet   Refill: oxycodone-acetaminophen (PERCOCET) 5-325 mg per tablet    Please call patient at [phone] when done. Thank you!

## 2017-06-21 ENCOUNTER — OFFICE VISIT (OUTPATIENT)
Dept: FAMILY MEDICINE | Facility: CLINIC | Age: 62
End: 2017-06-21
Payer: MEDICAID

## 2017-06-21 VITALS
RESPIRATION RATE: 17 BRPM | DIASTOLIC BLOOD PRESSURE: 70 MMHG | BODY MASS INDEX: 26.06 KG/M2 | TEMPERATURE: 98 F | HEIGHT: 61 IN | SYSTOLIC BLOOD PRESSURE: 110 MMHG | HEART RATE: 76 BPM | OXYGEN SATURATION: 95 % | WEIGHT: 138 LBS

## 2017-06-21 DIAGNOSIS — R05.9 COUGH: Primary | ICD-10-CM

## 2017-06-21 DIAGNOSIS — J30.89 NON-SEASONAL ALLERGIC RHINITIS, UNSPECIFIED ALLERGIC RHINITIS TRIGGER: ICD-10-CM

## 2017-06-21 PROCEDURE — 99999 PR PBB SHADOW E&M-EST. PATIENT-LVL III: CPT | Mod: PBBFAC,,, | Performed by: INTERNAL MEDICINE

## 2017-06-21 PROCEDURE — 99214 OFFICE O/P EST MOD 30 MIN: CPT | Mod: S$PBB,,, | Performed by: INTERNAL MEDICINE

## 2017-06-21 PROCEDURE — 99213 OFFICE O/P EST LOW 20 MIN: CPT | Mod: PBBFAC,PN | Performed by: INTERNAL MEDICINE

## 2017-06-21 RX ORDER — METHYLPREDNISOLONE 4 MG/1
TABLET ORAL
Qty: 1 PACKAGE | Refills: 0 | Status: SHIPPED | OUTPATIENT
Start: 2017-06-21 | End: 2017-07-21

## 2017-06-21 RX ORDER — BENZONATATE 200 MG/1
200 CAPSULE ORAL 3 TIMES DAILY PRN
Qty: 30 CAPSULE | Refills: 0 | Status: SHIPPED | OUTPATIENT
Start: 2017-06-21 | End: 2017-07-01

## 2017-06-21 RX ORDER — FLUTICASONE PROPIONATE 50 MCG
1 SPRAY, SUSPENSION (ML) NASAL 2 TIMES DAILY
Qty: 16 G | Refills: 3 | Status: SHIPPED | OUTPATIENT
Start: 2017-06-21 | End: 2018-01-30 | Stop reason: SDUPTHER

## 2017-06-21 NOTE — PROGRESS NOTES
"Subjective:       Patient ID: Rossi Mcneal is a 61 y.o. female.    Chief Complaint: Cough    Cough x two weeks    HPI: 60 y/o with chronic cervical and lumbar back pain presents with two weeks of productive cough nasal congestion. No fevers chills. Not using any medications for symptoms (no nose sprays) endorse itchy watery eyes mild rhinorrhea no wheezing or SOB, no LE swelling. Reports seeing in urgent care for similar symptoms one month ago and prescribed zpack with relief until two weeks ago      Review of Systems   Constitutional: Negative for activity change, appetite change, fatigue, fever and unexpected weight change.   HENT: Positive for congestion, postnasal drip and rhinorrhea. Negative for ear pain and sore throat.    Eyes: Negative for discharge and visual disturbance.   Respiratory: Positive for cough. Negative for chest tightness, shortness of breath and wheezing.    Cardiovascular: Negative for chest pain, palpitations and leg swelling.   Gastrointestinal: Negative for abdominal pain, constipation and diarrhea.   Endocrine: Negative for cold intolerance and heat intolerance.   Genitourinary: Negative for dysuria and hematuria.   Musculoskeletal: Negative for joint swelling and neck stiffness.   Skin: Negative for rash.   Neurological: Negative for dizziness, syncope, weakness and headaches.   Psychiatric/Behavioral: Negative for suicidal ideas.       Objective:     Vitals:    06/21/17 0923   BP: 110/70   BP Location: Left arm   Patient Position: Sitting   BP Method: Manual   Pulse: 76   Resp: 17   Temp: 97.7 °F (36.5 °C)   TempSrc: Oral   SpO2: 95%   Weight: 62.6 kg (138 lb 0.1 oz)   Height: 5' 1" (1.549 m)          Physical Exam   Constitutional: She is oriented to person, place, and time. She appears well-developed and well-nourished.   HENT:   Head: Normocephalic and atraumatic.   Right Ear: Tympanic membrane normal.   Left Ear: Tympanic membrane normal.   Nose: Mucosal edema present. Right " sinus exhibits no maxillary sinus tenderness and no frontal sinus tenderness. Left sinus exhibits no maxillary sinus tenderness and no frontal sinus tenderness.   Mouth/Throat: Uvula is midline and mucous membranes are normal. Posterior oropharyngeal erythema present. No oropharyngeal exudate.   Eyes: Conjunctivae are normal. Pupils are equal, round, and reactive to light.   Neck: Normal range of motion.   Cardiovascular: Normal rate and regular rhythm.  Exam reveals no gallop and no friction rub.    No murmur heard.  Pulmonary/Chest: Effort normal and breath sounds normal. She has no wheezes. She has no rales.   Abdominal: Soft. Bowel sounds are normal. There is no tenderness. There is no rebound and no guarding.   Musculoskeletal: Normal range of motion. She exhibits no edema or tenderness.   Neurological: She is alert and oriented to person, place, and time. No cranial nerve deficit.   Skin: Skin is warm and dry.   Psychiatric: She has a normal mood and affect.       Assessment:       1. Cough    2. Non-seasonal allergic rhinitis, unspecified allergic rhinitis trigger        Plan:    1/2. No evidence of bacterial nidus, treat symptomatically with nasal steroid, nasal saline, antihistamine/decongestant, NSAID's and tylenol. councilled to return if developing worsening productive cough, persistant fevers or symptoms do not resolve after one week.   Systemic steroid for anti inflammatory effect tessalon for cough relief

## 2017-07-14 DIAGNOSIS — I10 HYPERTENSION, ESSENTIAL: ICD-10-CM

## 2017-07-14 RX ORDER — LISINOPRIL 10 MG/1
TABLET ORAL
Qty: 90 TABLET | Refills: 3 | Status: SHIPPED | OUTPATIENT
Start: 2017-07-14 | End: 2018-03-26 | Stop reason: SDUPTHER

## 2017-07-17 DIAGNOSIS — M50.90 CERVICAL DISC DISEASE: ICD-10-CM

## 2017-07-17 DIAGNOSIS — F11.90 CHRONIC, CONTINUOUS USE OF OPIOIDS: ICD-10-CM

## 2017-07-17 DIAGNOSIS — M50.30 DISC DISEASE, DEGENERATIVE, CERVICAL: ICD-10-CM

## 2017-07-17 RX ORDER — OXYCODONE AND ACETAMINOPHEN 5; 325 MG/1; MG/1
1 TABLET ORAL EVERY 4 HOURS PRN
Qty: 120 TABLET | Refills: 0 | Status: SHIPPED | OUTPATIENT
Start: 2017-07-17 | End: 2017-08-14 | Stop reason: SDUPTHER

## 2017-07-17 RX ORDER — OXYCODONE HYDROCHLORIDE 5 MG/1
5 TABLET ORAL EVERY 4 HOURS PRN
Qty: 120 TABLET | Refills: 0 | Status: SHIPPED | OUTPATIENT
Start: 2017-07-17 | End: 2017-08-14 | Stop reason: SDUPTHER

## 2017-07-17 RX ORDER — DIAZEPAM 10 MG/1
10 TABLET ORAL 3 TIMES DAILY PRN
Qty: 60 TABLET | Refills: 0 | Status: SHIPPED | OUTPATIENT
Start: 2017-07-17 | End: 2017-08-14 | Stop reason: SDUPTHER

## 2017-07-17 NOTE — TELEPHONE ENCOUNTER
----- Message from Louis Serna sent at 7/17/2017  1:52 PM CDT -----  Contact: Self/[phone]  Refill/PA:  diazePAM (VALIUM) 10 MG Tab                   oxycodone (ROXICODONE) 5 MG immediate release tablet                    oxycodone-acetaminophen (PERCOCET) 5-325 mg per tablet    Thank you.

## 2017-07-21 ENCOUNTER — OFFICE VISIT (OUTPATIENT)
Dept: FAMILY MEDICINE | Facility: CLINIC | Age: 62
End: 2017-07-21
Payer: MEDICAID

## 2017-07-21 ENCOUNTER — LAB VISIT (OUTPATIENT)
Dept: LAB | Facility: HOSPITAL | Age: 62
End: 2017-07-21
Attending: INTERNAL MEDICINE
Payer: MEDICAID

## 2017-07-21 VITALS
HEIGHT: 61 IN | WEIGHT: 132.25 LBS | SYSTOLIC BLOOD PRESSURE: 110 MMHG | DIASTOLIC BLOOD PRESSURE: 70 MMHG | HEART RATE: 83 BPM | BODY MASS INDEX: 24.97 KG/M2 | OXYGEN SATURATION: 95 % | TEMPERATURE: 98 F | RESPIRATION RATE: 17 BRPM

## 2017-07-21 DIAGNOSIS — R73.09 ELEVATED RANDOM BLOOD GLUCOSE LEVEL: ICD-10-CM

## 2017-07-21 DIAGNOSIS — I10 HYPERTENSION, ESSENTIAL: ICD-10-CM

## 2017-07-21 DIAGNOSIS — R76.8 HEPATITIS C ANTIBODY TEST POSITIVE: ICD-10-CM

## 2017-07-21 DIAGNOSIS — F11.90 CHRONIC, CONTINUOUS USE OF OPIOIDS: Primary | ICD-10-CM

## 2017-07-21 DIAGNOSIS — M50.30 DISC DISEASE, DEGENERATIVE, CERVICAL: ICD-10-CM

## 2017-07-21 DIAGNOSIS — F11.90 CHRONIC, CONTINUOUS USE OF OPIOIDS: ICD-10-CM

## 2017-07-21 LAB
ALBUMIN SERPL BCP-MCNC: 3.8 G/DL
ALP SERPL-CCNC: 76 U/L
ALT SERPL W/O P-5'-P-CCNC: 36 U/L
ANION GAP SERPL CALC-SCNC: 10 MMOL/L
AST SERPL-CCNC: 42 U/L
BASOPHILS # BLD AUTO: 0.04 K/UL
BASOPHILS NFR BLD: 0.4 %
BILIRUB SERPL-MCNC: 0.3 MG/DL
BUN SERPL-MCNC: 18 MG/DL
CALCIUM SERPL-MCNC: 9.1 MG/DL
CHLORIDE SERPL-SCNC: 102 MMOL/L
CO2 SERPL-SCNC: 25 MMOL/L
CREAT SERPL-MCNC: 1 MG/DL
DIFFERENTIAL METHOD: ABNORMAL
EOSINOPHIL # BLD AUTO: 0.3 K/UL
EOSINOPHIL NFR BLD: 3 %
ERYTHROCYTE [DISTWIDTH] IN BLOOD BY AUTOMATED COUNT: 13.4 %
EST. GFR  (AFRICAN AMERICAN): >60 ML/MIN/1.73 M^2
EST. GFR  (NON AFRICAN AMERICAN): >60 ML/MIN/1.73 M^2
GLUCOSE SERPL-MCNC: 72 MG/DL
HCT VFR BLD AUTO: 39.1 %
HGB BLD-MCNC: 12.9 G/DL
LYMPHOCYTES # BLD AUTO: 2.8 K/UL
LYMPHOCYTES NFR BLD: 30.9 %
MCH RBC QN AUTO: 28.9 PG
MCHC RBC AUTO-ENTMCNC: 33 G/DL
MCV RBC AUTO: 88 FL
MONOCYTES # BLD AUTO: 1 K/UL
MONOCYTES NFR BLD: 10.9 %
NEUTROPHILS # BLD AUTO: 4.9 K/UL
NEUTROPHILS NFR BLD: 54.6 %
PLATELET # BLD AUTO: 378 K/UL
PMV BLD AUTO: 10.2 FL
POTASSIUM SERPL-SCNC: 3.6 MMOL/L
PROT SERPL-MCNC: 7.8 G/DL
RBC # BLD AUTO: 4.46 M/UL
SODIUM SERPL-SCNC: 137 MMOL/L
WBC # BLD AUTO: 8.97 K/UL

## 2017-07-21 PROCEDURE — 87522 HEPATITIS C REVRS TRNSCRPJ: CPT

## 2017-07-21 PROCEDURE — 80053 COMPREHEN METABOLIC PANEL: CPT

## 2017-07-21 PROCEDURE — 83036 HEMOGLOBIN GLYCOSYLATED A1C: CPT

## 2017-07-21 PROCEDURE — 36415 COLL VENOUS BLD VENIPUNCTURE: CPT | Mod: PN

## 2017-07-21 PROCEDURE — 99999 PR PBB SHADOW E&M-EST. PATIENT-LVL III: CPT | Mod: PBBFAC,,, | Performed by: INTERNAL MEDICINE

## 2017-07-21 PROCEDURE — 99214 OFFICE O/P EST MOD 30 MIN: CPT | Mod: S$PBB,,, | Performed by: INTERNAL MEDICINE

## 2017-07-21 PROCEDURE — 85025 COMPLETE CBC W/AUTO DIFF WBC: CPT

## 2017-07-21 NOTE — PROGRESS NOTES
"Subjective:       Patient ID: Rossi Mcneal is a 61 y.o. female.    Chief Complaint: Back Pain; Hypertension; and Follow-up    F/u chronic pain    HPI: 62 y/o w/ cervical disc and lumbar disc disease s/p hardware presents for scheduled follow up. Feels well. Taking opioid pain medication three times per day regualr bowel movements. She is scheduled to see [facility] neurosurgery next month to discuss other treatment options. She has history of hep c antibody positivity never treated for hep c. Denies abodminal pain or yellowing of skin eyes      Review of Systems   Constitutional: Negative for activity change, appetite change, fatigue, fever and unexpected weight change.   HENT: Negative for ear pain, rhinorrhea and sore throat.    Eyes: Negative for discharge and visual disturbance.   Respiratory: Negative for chest tightness, shortness of breath and wheezing.    Cardiovascular: Negative for chest pain, palpitations and leg swelling.   Gastrointestinal: Negative for abdominal distention, abdominal pain, blood in stool, constipation and diarrhea.   Endocrine: Negative for cold intolerance and heat intolerance.   Genitourinary: Negative for dysuria and hematuria.   Musculoskeletal: Positive for back pain and neck pain. Negative for joint swelling and neck stiffness.   Skin: Negative for rash.   Neurological: Negative for dizziness, syncope, weakness and headaches.   Psychiatric/Behavioral: Negative for suicidal ideas.       Objective:     Vitals:    07/21/17 1004   BP: 110/70   BP Location: Left arm   Patient Position: Sitting   BP Method: Manual   Pulse: 83   Resp: 17   Temp: 97.7 °F (36.5 °C)   TempSrc: Oral   SpO2: 95%   Weight: 60 kg (132 lb 4.4 oz)   Height: 5' 1" (1.549 m)          Physical Exam   Constitutional: She is oriented to person, place, and time. She appears well-developed and well-nourished.   HENT:   Head: Normocephalic and atraumatic.   Eyes: Conjunctivae are normal. Pupils are equal, round, and reactive " to light. No scleral icterus.   Neck: Normal range of motion.   Cardiovascular: Normal rate and regular rhythm.  Exam reveals no gallop and no friction rub.    No murmur heard.  Pulmonary/Chest: Effort normal and breath sounds normal. She has no wheezes. She has no rales.   Abdominal: Soft. Bowel sounds are normal. There is no tenderness. There is no rebound and no guarding.   Musculoskeletal: Normal range of motion. She exhibits no edema or tenderness.   Neurological: She is alert and oriented to person, place, and time. No cranial nerve deficit.   Skin: Skin is warm and dry.   Psychiatric: She has a normal mood and affect.       Assessment:       1. Chronic, continuous use of opioids    2. Hypertension, essential    3. Disc disease, degenerative, cervical    4. Hepatitis C antibody test positive    5. Elevated random blood glucose level        Plan:    1/3. Stable continue current medications follow up with [facility] neurosurgery, check LFT's in light of APAP use and #4  2. At goal continue current medications check renal function and electrolytes    4. Hep c pcr if virus present needs hepatology referral, check RUQ ultrasound for HCC screenign    5. a1c today

## 2017-07-22 LAB
ESTIMATED AVG GLUCOSE: 108 MG/DL
HBA1C MFR BLD HPLC: 5.4 %

## 2017-07-25 ENCOUNTER — TELEPHONE (OUTPATIENT)
Dept: FAMILY MEDICINE | Facility: CLINIC | Age: 62
End: 2017-07-25

## 2017-07-25 DIAGNOSIS — B18.2 CHRONIC HEPATITIS C WITHOUT HEPATIC COMA: Primary | ICD-10-CM

## 2017-07-25 LAB
HCV LOG: 5.69 LOG (10) IU/ML
HCV RNA QUANT PCR: ABNORMAL IU/ML
HCV, QUALITATIVE: DETECTED IU/ML

## 2017-07-25 NOTE — TELEPHONE ENCOUNTER
lvm for patient to return call to clinic about recent tests.    Hep c pcr + needs to see hepatology to discuss therapy. Referral sent. Should keep appointment for ultrasound as scheduled Aug 2

## 2017-07-26 ENCOUNTER — DOCUMENTATION ONLY (OUTPATIENT)
Dept: TRANSPLANT | Facility: CLINIC | Age: 62
End: 2017-07-26

## 2017-07-26 ENCOUNTER — TELEPHONE (OUTPATIENT)
Dept: HEPATOLOGY | Facility: CLINIC | Age: 62
End: 2017-07-26

## 2017-07-26 DIAGNOSIS — B18.2 CHRONIC HEPATITIS C WITHOUT HEPATIC COMA: Primary | ICD-10-CM

## 2017-07-26 NOTE — LETTER
July 26, 2017    Leobardo Corcoran MD  [address] [facility] [address]      Dear Dr. Corcoran    Patient: Rossi Mcneal   MR Number: [id number]   YOB: 1955     Thank you for the referral of Rossi Mcneal to the [facility]. An initial appointment will be scheduled for your patient with one of our Hepatologists.      Thank you again for your trust in our program.  If there is anything we can do for you or your staff, please feel free to contact us.        Sincerely,        [facility]  [address] [place] [id number]  [phone]

## 2017-07-26 NOTE — LETTER
July 26, 2017    Rossi Mcneal  [address] [address] [address]  [place] [address]      Dear Rossi Mcneal:    Your doctor has referred you to the [facility]. You will be contacted by our office and an initial appointment will then be scheduled for you.    We look forward to seeing you soon. If you have any further questions, please contact us at [phone].       Sincerely,        [facility]   [facility] [place] [address]  [phone]

## 2017-07-26 NOTE — NURSING
Pt records reviewed.   Pt will be referred to Hepatitis C clinic    Initial referral received  from the [facility]que.   Referring Provider/diagnosis   Leobardo Corcoran MD   Diagnosis: Chronic hepatitis C without hepatic coma       Referral letter sent to provider and patient.

## 2017-07-28 ENCOUNTER — TELEPHONE (OUTPATIENT)
Dept: FAMILY MEDICINE | Facility: CLINIC | Age: 62
End: 2017-07-28

## 2017-07-28 NOTE — TELEPHONE ENCOUNTER
----- Message from Justine Duarte sent at 7/26/2017 11:31 AM CDT -----  Contact: [phone]  Pt has a question she wants to ask the nurse Please call pt at your earliest convenience.  Thanks !

## 2017-08-02 ENCOUNTER — HOSPITAL ENCOUNTER (OUTPATIENT)
Dept: RADIOLOGY | Facility: HOSPITAL | Age: 62
Discharge: HOME | End: 2017-08-02
Attending: INTERNAL MEDICINE
Payer: MEDICAID

## 2017-08-02 DIAGNOSIS — R76.8 HEPATITIS C ANTIBODY TEST POSITIVE: ICD-10-CM

## 2017-08-02 PROCEDURE — 76705 ECHO EXAM OF ABDOMEN: CPT | Mod: 26,,, | Performed by: INTERNAL MEDICINE

## 2017-08-02 PROCEDURE — 76705 ECHO EXAM OF ABDOMEN: CPT | Mod: TC

## 2017-08-08 ENCOUNTER — TELEPHONE (OUTPATIENT)
Dept: HEPATOLOGY | Facility: CLINIC | Age: 62
End: 2017-08-08

## 2017-08-08 NOTE — TELEPHONE ENCOUNTER
Please call pt.   Genotype 1 hep C confirmed   pls proceed w/ clinic and imaging appts   Thanks

## 2017-08-10 ENCOUNTER — TELEPHONE (OUTPATIENT)
Dept: HEPATOLOGY | Facility: CLINIC | Age: 62
End: 2017-08-10

## 2017-08-10 ENCOUNTER — LAB VISIT (OUTPATIENT)
Dept: LAB | Facility: HOSPITAL | Age: 62
End: 2017-08-10
Payer: MEDICAID

## 2017-08-10 ENCOUNTER — OFFICE VISIT (OUTPATIENT)
Dept: HEPATOLOGY | Facility: CLINIC | Age: 62
End: 2017-08-10
Payer: MEDICAID

## 2017-08-10 ENCOUNTER — PROCEDURE VISIT (OUTPATIENT)
Dept: HEPATOLOGY | Facility: CLINIC | Age: 62
End: 2017-08-10
Attending: INTERNAL MEDICINE
Payer: MEDICAID

## 2017-08-10 ENCOUNTER — EPISODE CHANGES (OUTPATIENT)
Dept: HEPATOLOGY | Facility: CLINIC | Age: 62
End: 2017-08-10

## 2017-08-10 VITALS
RESPIRATION RATE: 18 BRPM | HEART RATE: 84 BPM | SYSTOLIC BLOOD PRESSURE: 105 MMHG | OXYGEN SATURATION: 93 % | DIASTOLIC BLOOD PRESSURE: 63 MMHG | TEMPERATURE: 99 F | HEIGHT: 62 IN | BODY MASS INDEX: 23.37 KG/M2 | WEIGHT: 127 LBS

## 2017-08-10 DIAGNOSIS — B18.2 CHRONIC HEPATITIS C WITHOUT HEPATIC COMA: Primary | ICD-10-CM

## 2017-08-10 DIAGNOSIS — Z71.89 ENCOUNTER FOR MEDICATION COUNSELING: ICD-10-CM

## 2017-08-10 DIAGNOSIS — B18.2 CHRONIC HEPATITIS C WITHOUT HEPATIC COMA: ICD-10-CM

## 2017-08-10 DIAGNOSIS — K76.0 HEPATIC STEATOSIS: ICD-10-CM

## 2017-08-10 DIAGNOSIS — R93.5 ABNORMAL FINDINGS ON DIAGNOSTIC IMAGING OF ABDOMEN: ICD-10-CM

## 2017-08-10 LAB
HBV CORE AB SERPL QL IA: POSITIVE
HBV CORE AB SERPL QL IA: POSITIVE
HBV SURFACE AB SER-ACNC: POSITIVE M[IU]/ML
HBV SURFACE AB SER-ACNC: POSITIVE M[IU]/ML
HBV SURFACE AG SERPL QL IA: NEGATIVE
HBV SURFACE AG SERPL QL IA: NEGATIVE
HEPATITIS A ANTIBODY, IGG: NEGATIVE
HEPATITIS A ANTIBODY, IGG: NEGATIVE
HIV 1+2 AB+HIV1 P24 AG SERPL QL IA: NEGATIVE
HIV 1+2 AB+HIV1 P24 AG SERPL QL IA: NEGATIVE

## 2017-08-10 PROCEDURE — 99999 PR PBB SHADOW E&M-EST. PATIENT-LVL IV: CPT | Mod: PBBFAC,,, | Performed by: PHYSICIAN ASSISTANT

## 2017-08-10 PROCEDURE — 86706 HEP B SURFACE ANTIBODY: CPT

## 2017-08-10 PROCEDURE — 86703 HIV-1/HIV-2 1 RESULT ANTBDY: CPT

## 2017-08-10 PROCEDURE — 99214 OFFICE O/P EST MOD 30 MIN: CPT | Mod: S$PBB,,, | Performed by: PHYSICIAN ASSISTANT

## 2017-08-10 PROCEDURE — 3078F DIAST BP <80 MM HG: CPT | Mod: ,,, | Performed by: PHYSICIAN ASSISTANT

## 2017-08-10 PROCEDURE — 80307 DRUG TEST PRSMV CHEM ANLYZR: CPT

## 2017-08-10 PROCEDURE — 36415 COLL VENOUS BLD VENIPUNCTURE: CPT

## 2017-08-10 PROCEDURE — 3074F SYST BP LT 130 MM HG: CPT | Mod: ,,, | Performed by: PHYSICIAN ASSISTANT

## 2017-08-10 PROCEDURE — 87340 HEPATITIS B SURFACE AG IA: CPT

## 2017-08-10 PROCEDURE — 86704 HEP B CORE ANTIBODY TOTAL: CPT

## 2017-08-10 PROCEDURE — 86790 VIRUS ANTIBODY NOS: CPT

## 2017-08-10 PROCEDURE — 3008F BODY MASS INDEX DOCD: CPT | Mod: ,,, | Performed by: PHYSICIAN ASSISTANT

## 2017-08-10 PROCEDURE — 91200 LIVER ELASTOGRAPHY: CPT | Mod: 26,S$PBB,, | Performed by: PHYSICIAN ASSISTANT

## 2017-08-10 NOTE — PROCEDURES
Procedures Fibroscan Procedure     Name: Rossi Mcneal  Date of Procedure : 08/10/2017   :: Brittany Lemon PA-C  Diagnosis: HCV    Probe: M    Fibroscan readin.9 KPa    Fibrosis:F2     CAP readin dB/m    Steatosis: :S1

## 2017-08-10 NOTE — PROGRESS NOTES
HEPATOLOGY CLINIC VISIT NOTE    REFERRING PROVIDER:  Leobardo Corcoran MD    CHIEF COMPLAINT: Hepatitis C    HISTORY: Patient is a 61 y.o. WF who is new to the hepatology clinic, referred from Dr. Corcoran  for  hepatitis C.  Hepatitis C  was originally picked up about 2 years ago.  Preclinic labs confirm Genotype 1b Chronic hepatitis C w/ viral load of 1.4million IU/mL.  Positive HCV Ab noted . Recent labs show AST > ALT elevations otherwise very normal synthetic liver function. Platelets 378, INR = 1.0 & hemoglobin 12.9. Recalls icteric illness 10-20yrs.  Hepatitis C likely acquired with blood transfusions she had in  & .  Fibroscan done today suggests mild scarring at F2 w/ CAP = 342 (S1)       Abdominal Ultrasound = There is no gallstone.  The common duct is enlarged, 8 mm.  Minimal prominence of left intrahepatic ducts. Dilated common duct measuring 8 mm. Hepatomegaly with fatty infiltration of the liver.    Pt reports + extreme fatigue, + joint pains --neck, legs, shoulders.   Low appetite, some nausea ( x 1 month) no abd pain,   denies fevers, weight change,  lymphadenopathy,  rashes, dark urine, abdominal fluid accumulation, yellowing/jaundice of skin or eyes, vomiting of blood, black stool, confusion or  tremors.      Spouse , he did have HCV     Past Medical History:   Diagnosis Date    Emphysema     Hypertension     Neuromuscular disorder      Past Surgical History:   Procedure Laterality Date    NECK SURGERY      PARTIAL HYSTERECTOMY       FHX:   Father had alcoholic cirrhosis.   No fhx of liver cancer or other liver disease.     ALLERGIES AND MEDICATIONS: reviewed in epic    ROS:   General: denies fever, chills, weight change, fatigue   Skin: denies rash, itching, sores  HEENT: some mild  Headaches  hematemesis,  denies icteric illness, abdominal swelling, confusion.   : denies dark urine, dysuria    PE:  WDWN, NAD  Alert, oriented and pleasant.   There were no vitals  filed for this visit.  HEENT: ncat, eomi, no scleral icterus; normal rom of neck  Lungs: chest symmetric with respirations.   Abdomen: + bs, ntnd. No organomegaly  Neuro/psych: no asterixis, oriented x3, mood and affect appropriate.   Skin: warm and dry, no c/c/e. mild ramírez erythema.  No spider telangectasia     RECENT LABS:  No results found for: HEPAIGG    No results found for: HEPBSAG  No results found for: HEPBCAB  No results found for: HEPBSAB    There is no immunization history on file for this patient.    Results for ISAAC CUENCA (MRN [id number]) as of 8/10/2017 09:56   Ref. Range 2017 11:39   HCV Qualitative Result Latest Ref Range: Not detected  DETECTED (A)   HCV Quantitative Log Latest Ref Range: <1.08 Log (10) IU/mL 6.15 (H)   HCV Quantitative Result Latest Ref Range: <12 IU/mL 1,402,698 (H)   Hepatitis C Virus Genotype Unknown 1b (A)     Lab Results   Component Value Date    WBC 8.97 2017    HGB 12.9 2017     (H) 2017    AST 42 (H) 2017    ALT 36 2017    CREATININE 1.0 2017    BILITOT 0.3 2017    ALBUMIN 3.8 2017    INR 1.0 2017     RECENT IMAGING: as above     PROCEDURES:   Procedures Fibroscan Procedure      Name: Isaac Cuenca  Date of Procedure : 08/10/2017   :: Brittany Lemon PA-C  Diagnosis: HCV     Probe: M     Fibroscan readin.9 KPa     Fibrosis:F2      CAP readin dB/m     Steatosis: :S1          Electronically signed by Brittany Lemon PA-C at 8/10/2017 11:09 AM      Liver is enlarged, 18.9 cm, and demonstrates fatty infiltration.    The right kidney is unremarkable.   Impression      Dilated common duct measuring 8 mm.    Hepatomegaly with fatty infiltration of the liver.    epic notify      Electronically signed by: Brenna Valdez MD  Date: 17  Time: 11:28     Encounter     View Encounter            ASSESSMENT:  60 yo WF w/   1. CHRONIC HEPATITIS C, GENOTYPE 1b  hcvrna quant = 1.4million IU/mL    Evidence of extrahepatic manifestations of HCV w/ extensive  fatigue and joint pains   normal synthetic liver function  elevated transaminases (AST > ALT)     Fibroscan = F2       2.  Abnormal findings on imaging.   The common duct is enlarged, 8 mm w/  Minimal prominence of left intrahepatic ducts.  Hepatomegaly noted   Alk phos & T bili normal     3.  Evidence of fatty liver   CAP = 342, S1, also noted on imaging                   EDUCATION DISCUSSION    The natural history of Hepatitis C, including potential progression to cirrhosis was reviewed.   We discussed the increased progression of liver disease secondary to alcohol use; patient was advised to avoid alcohol completely.   Transmission of Hepatitis C was reviewed, including possible sexual transmission. Sexual contacts should be screened.   Risk of vertical transmission of Hepatitis C from mother to baby was reviewed. Children should be screened.   Patient should avoid sharing personal products such as razors, toothbrushes, etc.   We reviewed that vaccination against Hepatitis A and Hepatitis B is recommended if patient does not already have immunity.  Recommend avoiding raw seafood.  Limit acetaminophen to 2000mg daily.    Pt is interested in Hepatitis C treatment and given her extrahepatic manifestations along with moderate scarring and evidence of fatty liver disease giving her added risk of fibrosis progression , I think she is a great candidate for treatment at this time.     Discussed Zepatier (elbasvir and grazoprevir)  for Hep C treatment  Zepatier is a fixed dosed combination of elbasvir 50mg (NS5A inhibitor) and grazoprevir 100mg (NS3/4A protease inhibitor)  One tablet taken orally once daily with or without food.    The recommendation is for 12weeks  Discussed success rates in clinical trials.   Discussed Side effects: fatigue, headache and nausea.      Reviewed possibility that insurance may not approve medication, in this case, an appeal will  be made on her behalf.   If approved, pt should contact us w/ start date so that appropriate labs can be arranged.     Pt verbalized understanding     PLAN:  1. Request pharmacy to Obtain authorization to treat HCV with ZEPATIER x 12 weeks  -- Rx will be routed to [facility] & request check for DDI  -- Patient will notify me of exact treatment start date so appropriate lab f/u can be scheduled.(standing orders in)   2. Labs due per insurance =  tox screen, HIV (consented)  HAVIGG, HBsAg, HBsAb, HepBcore Ab--  Vaccines for Hepatitis A and B will be recommended if not immune, will call w/ results.   3. Will review imaging w/ Hepatology for further recommendations regarding enlarged CBD   4. Discussed low fat/ low cholesterol diet modifications for fatty liver   5. RTC in 3 months on treatment or recall in 6 months if tx deferred or declined.     Thank you for your kind consult, will keep you updated on our progress.

## 2017-08-10 NOTE — LETTER
August 10, 2017      Leobardo Corcoran MD  [address]  [place] [address]           [facility] - Hepatology  [address]  [place] [address]  Phone: [phone]  Fax: [phone]          Patient: Rossi Mcneal   MR Number: [id number]   YOB: 1955   Date of Visit: 8/10/2017       Dear Dr. Leobardo Corcoran:    Thank you for referring Rossi Mcneal to me for evaluation. Attached you will find relevant portions of my assessment and plan of care.    If you have questions, please do not hesitate to call me. I look forward to following Rossi Mcneal along with you.    Sincerely,    ANGÉLICA Hartmannosure  CC:  No Recipients    If you would like to receive this communication electronically, please contact [email or web address] or [phone] to request more information on [employer] access.    For providers and/or their staff who would like to refer a patient to [facility], please contact us through our one-stop-shop provider referral line, [facility], at [phone].    If you feel you have received this communication in error or would no longer like to receive these types of communications, please e-mail [email or web address]

## 2017-08-11 ENCOUNTER — TELEPHONE (OUTPATIENT)
Dept: HEPATOLOGY | Facility: CLINIC | Age: 62
End: 2017-08-11

## 2017-08-11 ENCOUNTER — TELEPHONE (OUTPATIENT)
Dept: PHARMACY | Facility: CLINIC | Age: 62
End: 2017-08-11

## 2017-08-11 NOTE — TELEPHONE ENCOUNTER
Attempted to reach pt to discuss recommendation.     ----- Message from Ten Anton MD sent at 8/10/2017  3:59 PM CDT -----  Please send for EUS    ----- Message -----  From: Brittany Lemon PA-C  Sent: 8/10/2017   1:30 PM  To: Ten Anton MD    pls review imaging findings in this 62yo female w/ hcv & let me know further recommendation.   Thanks   TL

## 2017-08-14 DIAGNOSIS — M50.30 DISC DISEASE, DEGENERATIVE, CERVICAL: ICD-10-CM

## 2017-08-14 DIAGNOSIS — M50.90 CERVICAL DISC DISEASE: ICD-10-CM

## 2017-08-14 DIAGNOSIS — F11.90 CHRONIC, CONTINUOUS USE OF OPIOIDS: ICD-10-CM

## 2017-08-14 LAB
AMPHETAMINES SERPL QL: NEGATIVE
BARBITURATES SERPL QL SCN: NEGATIVE
BENZODIAZ SERPL QL: POSITIVE
CANNABINOIDS SERPL QL: NEGATIVE
COCAINE, BLOOD: NEGATIVE
ETHANOL SERPL-MCNC: NEGATIVE MG/DL
METHADONE SERPL QL SCN: NEGATIVE
OPIATES SERPL QL SCN: NEGATIVE
PCP SERPL QL SCN: NEGATIVE
PROPOXYPH SERPL QL: NEGATIVE

## 2017-08-14 RX ORDER — OXYCODONE AND ACETAMINOPHEN 5; 325 MG/1; MG/1
1 TABLET ORAL EVERY 4 HOURS PRN
Qty: 120 TABLET | Refills: 0 | Status: SHIPPED | OUTPATIENT
Start: 2017-08-14 | End: 2017-09-12 | Stop reason: SDUPTHER

## 2017-08-14 RX ORDER — OXYCODONE HYDROCHLORIDE 5 MG/1
5 TABLET ORAL EVERY 4 HOURS PRN
Qty: 120 TABLET | Refills: 0 | Status: SHIPPED | OUTPATIENT
Start: 2017-08-14 | End: 2017-09-12 | Stop reason: SDUPTHER

## 2017-08-14 RX ORDER — DIAZEPAM 10 MG/1
10 TABLET ORAL 3 TIMES DAILY PRN
Qty: 60 TABLET | Refills: 0 | Status: SHIPPED | OUTPATIENT
Start: 2017-08-14 | End: 2017-09-11 | Stop reason: SDUPTHER

## 2017-08-14 NOTE — TELEPHONE ENCOUNTER
----- Message from Nafisa Forde sent at 8/14/2017  9:27 AM CDT -----  Contact: self  REFILL: diazePAM (VALIUM) 10 MG Tab                oxycodone (ROXICODONE) 5 MG immediate release tablet                oxycodone-acetaminophen (PERCOCET) 5-325 mg per tablet    PLEASE CONTACT PATIENT -[phone] WHEN READY

## 2017-08-15 ENCOUNTER — TELEPHONE (OUTPATIENT)
Dept: HEPATOLOGY | Facility: CLINIC | Age: 62
End: 2017-08-15

## 2017-08-15 DIAGNOSIS — B18.2 CHRONIC HEPATITIS C WITHOUT HEPATIC COMA: Primary | ICD-10-CM

## 2017-08-15 NOTE — TELEPHONE ENCOUNTER
Hepatitis A Antibody IgG   Date Value Ref Range Status   08/10/2017 Negative  Final   08/10/2017 Negative  Final       Hepatitis B Surface Ag   Date Value Ref Range Status   08/10/2017 Negative  Final   08/10/2017 Negative  Final     Hep B Core Total Ab   Date Value Ref Range Status   08/10/2017 Positive (A)  Final   08/10/2017 Positive (A)  Final     Hep B S Ab   Date Value Ref Range Status   08/10/2017 Positive (A)  Final   08/10/2017 Positive (A)  Final       There is no immunization history on file for this patient.    Please call pt.   She has immunity to Hep B , but lacks hep A protection.   Please arrange for series to start upon her return appt for PREPC

## 2017-08-15 NOTE — TELEPHONE ENCOUNTER
Spoke to pt. 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 She will call back with availability to schedule appt.

## 2017-08-15 NOTE — TELEPHONE ENCOUNTER
Please see if pt can return for clinic in next few weeks, as insurance requesting hep C readiness questionaire to be completed before they will review application for medication   Thanks

## 2017-08-31 DIAGNOSIS — M50.30 DISC DISEASE, DEGENERATIVE, CERVICAL: ICD-10-CM

## 2017-08-31 RX ORDER — VENLAFAXINE 75 MG/1
75 TABLET ORAL 2 TIMES DAILY
Qty: 60 TABLET | Refills: 5 | Status: SHIPPED | OUTPATIENT
Start: 2017-08-31 | End: 2018-02-19

## 2017-09-05 NOTE — TELEPHONE ENCOUNTER
Attempt made to reach patient.  LVM asking that she call us back to setup appt with KYLAH Lemon to complete Prep-C questionnaire.

## 2017-09-07 ENCOUNTER — TELEPHONE (OUTPATIENT)
Dept: HEPATOLOGY | Facility: CLINIC | Age: 62
End: 2017-09-07

## 2017-09-07 NOTE — TELEPHONE ENCOUNTER
Spoke to ptl.    She can come tomorrow   pls schedule her at 1040am Friday sept 8th to do the PREPC   thx

## 2017-09-08 ENCOUNTER — CLINICAL SUPPORT (OUTPATIENT)
Dept: INFECTIOUS DISEASES | Facility: CLINIC | Age: 62
End: 2017-09-08
Payer: MEDICAID

## 2017-09-08 ENCOUNTER — OFFICE VISIT (OUTPATIENT)
Dept: HEPATOLOGY | Facility: CLINIC | Age: 62
End: 2017-09-08
Payer: MEDICAID

## 2017-09-08 VITALS
HEIGHT: 61 IN | WEIGHT: 127 LBS | TEMPERATURE: 98 F | BODY MASS INDEX: 23.98 KG/M2 | DIASTOLIC BLOOD PRESSURE: 70 MMHG | OXYGEN SATURATION: 92 % | SYSTOLIC BLOOD PRESSURE: 116 MMHG | HEART RATE: 76 BPM

## 2017-09-08 DIAGNOSIS — B18.2 CHRONIC HEPATITIS C WITHOUT HEPATIC COMA: ICD-10-CM

## 2017-09-08 DIAGNOSIS — R93.2 ABNORMAL FINDINGS ON IMAGING OF BILIARY TRACT: Primary | ICD-10-CM

## 2017-09-08 DIAGNOSIS — Z71.89 ENCOUNTER FOR MEDICATION COUNSELING: ICD-10-CM

## 2017-09-08 PROCEDURE — 3078F DIAST BP <80 MM HG: CPT | Mod: ,,, | Performed by: PHYSICIAN ASSISTANT

## 2017-09-08 PROCEDURE — 99999 PR PBB SHADOW E&M-EST. PATIENT-LVL I: CPT | Mod: PBBFAC,,,

## 2017-09-08 PROCEDURE — 99213 OFFICE O/P EST LOW 20 MIN: CPT | Mod: PBBFAC,27,25 | Performed by: PHYSICIAN ASSISTANT

## 2017-09-08 PROCEDURE — 99211 OFF/OP EST MAY X REQ PHY/QHP: CPT | Mod: PBBFAC

## 2017-09-08 PROCEDURE — 3008F BODY MASS INDEX DOCD: CPT | Mod: ,,, | Performed by: PHYSICIAN ASSISTANT

## 2017-09-08 PROCEDURE — 90632 HEPA VACCINE ADULT IM: CPT | Mod: PBBFAC

## 2017-09-08 PROCEDURE — 99213 OFFICE O/P EST LOW 20 MIN: CPT | Mod: S$PBB,,, | Performed by: PHYSICIAN ASSISTANT

## 2017-09-08 PROCEDURE — 3074F SYST BP LT 130 MM HG: CPT | Mod: ,,, | Performed by: PHYSICIAN ASSISTANT

## 2017-09-08 PROCEDURE — 99999 PR PBB SHADOW E&M-EST. PATIENT-LVL III: CPT | Mod: PBBFAC,,, | Performed by: PHYSICIAN ASSISTANT

## 2017-09-08 NOTE — TELEPHONE ENCOUNTER
Faxed  prior authorization for Zepatier to insurance company for review along with Prep-C assessment on Fri 09/08/2017 @1:51pm JASON

## 2017-09-11 DIAGNOSIS — F11.90 CHRONIC, CONTINUOUS USE OF OPIOIDS: ICD-10-CM

## 2017-09-11 DIAGNOSIS — M50.90 CERVICAL DISC DISEASE: ICD-10-CM

## 2017-09-11 DIAGNOSIS — M50.30 DISC DISEASE, DEGENERATIVE, CERVICAL: ICD-10-CM

## 2017-09-11 RX ORDER — DIAZEPAM 10 MG/1
10 TABLET ORAL 3 TIMES DAILY PRN
Qty: 60 TABLET | Refills: 0 | Status: SHIPPED | OUTPATIENT
Start: 2017-09-11 | End: 2017-10-10 | Stop reason: SDUPTHER

## 2017-09-11 NOTE — TELEPHONE ENCOUNTER
----- Message from Emily Beltran sent at 9/11/2017 10:45 AM CDT -----  Contact: self  Refill: diazePAM (VALIUM, oxycodone (ROXICODONE, oxycodone-acetaminophen .    Patient can be reached at [phone].      Thanks,

## 2017-09-12 RX ORDER — OXYCODONE AND ACETAMINOPHEN 5; 325 MG/1; MG/1
1 TABLET ORAL EVERY 4 HOURS PRN
Qty: 120 TABLET | Refills: 0 | Status: SHIPPED | OUTPATIENT
Start: 2017-09-12 | End: 2017-10-10 | Stop reason: SDUPTHER

## 2017-09-12 RX ORDER — OXYCODONE HYDROCHLORIDE 5 MG/1
5 TABLET ORAL EVERY 4 HOURS PRN
Qty: 120 TABLET | Refills: 0 | Status: SHIPPED | OUTPATIENT
Start: 2017-09-12 | End: 2017-10-10 | Stop reason: SDUPTHER

## 2017-09-12 NOTE — TELEPHONE ENCOUNTER
----- Message from Angela Seay sent at 9/12/2017  9:15 AM CDT -----  Contact: SELF  Refill request for -- oxycodone (ROXICODONE) 5 MG immediate release tablet & oxycodone-acetaminophen (PERCOCET) 5-325 mg per tablet     [phone]   LL

## 2017-09-26 NOTE — TELEPHONE ENCOUNTER
Faxed appeal for Zepatier to [facility] for review on Tues 09/26/2017 @8:9:02am JASON

## 2017-10-05 ENCOUNTER — TELEPHONE (OUTPATIENT)
Dept: FAMILY MEDICINE | Facility: CLINIC | Age: 62
End: 2017-10-05

## 2017-10-05 ENCOUNTER — EPISODE CHANGES (OUTPATIENT)
Dept: HEPATOLOGY | Facility: CLINIC | Age: 62
End: 2017-10-05

## 2017-10-05 NOTE — TELEPHONE ENCOUNTER
----- Message from Wu Sawant sent at 10/5/2017 10:21 AM CDT -----  Contact: self  Refills: oxycodone-acetaminophen (PERCOCET) 5-325 mg per tablet              oxycodone (ROXICODONE) 5 MG immediate release tablet              diazePAM (VALIUM) 10 MG Tab    contact pt at [phone].    Thanks

## 2017-10-05 NOTE — TELEPHONE ENCOUNTER
Patient informed that she will need to come in for refills. She states she has an appt on 10/20/2017 but will be out before then. I told her

## 2017-10-06 ENCOUNTER — TELEPHONE (OUTPATIENT)
Dept: ENDOSCOPY | Facility: HOSPITAL | Age: 62
End: 2017-10-06

## 2017-10-09 ENCOUNTER — TELEPHONE (OUTPATIENT)
Dept: FAMILY MEDICINE | Facility: CLINIC | Age: 62
End: 2017-10-09

## 2017-10-09 ENCOUNTER — TELEPHONE (OUTPATIENT)
Dept: ENDOSCOPY | Facility: HOSPITAL | Age: 62
End: 2017-10-09

## 2017-10-09 NOTE — TELEPHONE ENCOUNTER
----- Message from Leslie Rashid sent at 10/9/2017 12:16 PM CDT -----  Contact: self  [phone]  Pt is requesting refill on Oxycodone 5 mg and oxycodone 325 mg and Valium, Pls call [facility] [phone]. Thanks......Varsha

## 2017-10-10 DIAGNOSIS — M50.30 DISC DISEASE, DEGENERATIVE, CERVICAL: ICD-10-CM

## 2017-10-10 DIAGNOSIS — M50.90 CERVICAL DISC DISEASE: ICD-10-CM

## 2017-10-10 DIAGNOSIS — F11.90 CHRONIC, CONTINUOUS USE OF OPIOIDS: ICD-10-CM

## 2017-10-10 RX ORDER — OXYCODONE HYDROCHLORIDE 5 MG/1
TABLET ORAL
Qty: 120 TABLET | Refills: 0 | Status: SHIPPED | OUTPATIENT
Start: 2017-10-10 | End: 2017-11-06 | Stop reason: SDUPTHER

## 2017-10-10 RX ORDER — OXYCODONE HYDROCHLORIDE 5 MG/1
5 TABLET ORAL EVERY 4 HOURS PRN
Qty: 120 TABLET | Refills: 0 | OUTPATIENT
Start: 2017-10-10 | End: 2017-11-09

## 2017-10-10 RX ORDER — DIAZEPAM 10 MG/1
TABLET ORAL
Qty: 60 TABLET | Refills: 0 | Status: SHIPPED | OUTPATIENT
Start: 2017-10-10 | End: 2017-11-06 | Stop reason: SDUPTHER

## 2017-10-10 RX ORDER — OXYCODONE AND ACETAMINOPHEN 5; 325 MG/1; MG/1
1 TABLET ORAL EVERY 4 HOURS PRN
Qty: 120 TABLET | Refills: 0 | OUTPATIENT
Start: 2017-10-10 | End: 2017-11-09

## 2017-10-10 RX ORDER — OXYCODONE AND ACETAMINOPHEN 5; 325 MG/1; MG/1
TABLET ORAL
Qty: 120 TABLET | Refills: 0 | Status: SHIPPED | OUTPATIENT
Start: 2017-10-10 | End: 2017-11-06 | Stop reason: SDUPTHER

## 2017-10-10 NOTE — TELEPHONE ENCOUNTER
----- Message from Rehana Brantley sent at 10/10/2017 11:46 AM CDT -----  Contact: Self  Refill : diazePAM (VALIUM) 10 MG Tab             oxycodone (ROXICODONE) 5 MG immediate release tablet    oxycodone-acetaminophen (PERCOCET) 5-325 mg per tablet      Pharmacy     [facility] - [place] - [address]

## 2017-10-18 DIAGNOSIS — F17.200 NICOTINE DEPENDENCE: ICD-10-CM

## 2017-10-19 RX ORDER — IBUPROFEN 200 MG
TABLET ORAL
Qty: 14 PATCH | Refills: 1 | Status: SHIPPED | OUTPATIENT
Start: 2017-10-19 | End: 2018-02-27 | Stop reason: SDUPTHER

## 2017-10-20 ENCOUNTER — EPISODE CHANGES (OUTPATIENT)
Dept: HEPATOLOGY | Facility: CLINIC | Age: 62
End: 2017-10-20

## 2017-10-20 ENCOUNTER — OFFICE VISIT (OUTPATIENT)
Dept: FAMILY MEDICINE | Facility: CLINIC | Age: 62
End: 2017-10-20
Payer: MEDICAID

## 2017-10-20 VITALS
HEIGHT: 61 IN | DIASTOLIC BLOOD PRESSURE: 64 MMHG | WEIGHT: 133.81 LBS | HEART RATE: 88 BPM | OXYGEN SATURATION: 95 % | RESPIRATION RATE: 17 BRPM | SYSTOLIC BLOOD PRESSURE: 110 MMHG | TEMPERATURE: 98 F | BODY MASS INDEX: 25.27 KG/M2

## 2017-10-20 DIAGNOSIS — Z23 IMMUNIZATION DUE: ICD-10-CM

## 2017-10-20 DIAGNOSIS — B18.2 CHRONIC HEPATITIS C WITHOUT HEPATIC COMA: Primary | ICD-10-CM

## 2017-10-20 DIAGNOSIS — F11.90 CHRONIC, CONTINUOUS USE OF OPIOIDS: ICD-10-CM

## 2017-10-20 DIAGNOSIS — Z23 NEEDS FLU SHOT: ICD-10-CM

## 2017-10-20 DIAGNOSIS — I10 HYPERTENSION, ESSENTIAL: ICD-10-CM

## 2017-10-20 DIAGNOSIS — Z23 NEED FOR PNEUMOCOCCAL VACCINATION: ICD-10-CM

## 2017-10-20 DIAGNOSIS — Z72.0 TOBACCO USE: ICD-10-CM

## 2017-10-20 PROCEDURE — 90471 IMMUNIZATION ADMIN: CPT | Mod: PBBFAC,PN

## 2017-10-20 PROCEDURE — 90472 IMMUNIZATION ADMIN EACH ADD: CPT | Mod: PBBFAC,PN

## 2017-10-20 PROCEDURE — 99999 PR PBB SHADOW E&M-EST. PATIENT-LVL III: CPT | Mod: PBBFAC,,, | Performed by: NURSE PRACTITIONER

## 2017-10-20 PROCEDURE — 90732 PPSV23 VACC 2 YRS+ SUBQ/IM: CPT | Mod: PBBFAC,PN

## 2017-10-20 PROCEDURE — 99213 OFFICE O/P EST LOW 20 MIN: CPT | Mod: S$PBB,,, | Performed by: NURSE PRACTITIONER

## 2017-10-20 PROCEDURE — 99213 OFFICE O/P EST LOW 20 MIN: CPT | Mod: PBBFAC,PN | Performed by: NURSE PRACTITIONER

## 2017-10-20 NOTE — PATIENT INSTRUCTIONS
Follow up in 3 months, sooner if needed  ER for new worse or concerning symptoms    Kicking the Smoking Habit  If you smoke, quitting is one of the best changes you can make for your heart and your overall health. Your risk of heart attack goes down within one day of putting out that last cigarette. As you go longer without smoking, your risk goes down even more. Quitting isnt easy, but millions of people have done it. You can, too. Its never too late to quit.  Getting started  Boost your chances of success by deciding on your quit plan. Your health care provider and cardiac rehab team can help you develop this plan. Even if youve already quit, its easy to slip back into smoking.  Your plan can help you avoid and recover from relapse.  In any case, start by setting a date to quit within a month, and do it.    Keys to your quit plan  · Talk to your healthcare provider about prescription medicines and nicotine replacement products that help stop the urge to smoke.   · Join a support group or quit smoking program. Talking with others about the challenges of quitting can help you get through them.  · Ask other smokers in your household to quit with you.  · Look for the cues in your life that you associate with smoking and avoid them.  Track your triggers  What gives you that I-ebvn-w-cigarette feeling? List all the situations that make you want a cigarette. Then think of other ways to deal with these situations. Here are some examples:  Situation How I'll handle it   Finishing a meal Get up from the table and take a walk   Having an argument Find a quiet place and breathe deeply   Feeling lonely or bored Call a friend to talk         Tips for quitting successfully  · List the benefits of quitting such as reducing heart risks and saving money. Keep this list and review it whenever you feel like smoking.  · Get support. Let your friends know you may call them to chat when you have an urge to smoke.  · If youve  tried to quit before without success, this time avoid the triggers that may cause the relapse.  · Make the most of slip-ups. Try to learn from them, and then get back on track.  · Be accountable to your friends and your calendar so that you stay on track.  For family and friends  · Be supportive and patient. Quitting smoking can be difficult and stressful.  · If you smoke, nows a great time to quit. Even if you dont quit, never smoke around your loved one. Secondhand smoke is dangerous to his or her heart.  · The best goals are accomplished in teams. Remember that when your loved one states he or she wants to stop smoking.  Date Last Reviewed: 7/1/2016  © 5391-8485 [employer]. [address] [place] [address], [place] [address]. All rights reserved. This information is not intended as a substitute for professional medical care. Always follow your healthcare professional's instructions.

## 2017-10-31 ENCOUNTER — TELEPHONE (OUTPATIENT)
Dept: ENDOSCOPY | Facility: HOSPITAL | Age: 62
End: 2017-10-31

## 2017-11-01 ENCOUNTER — TELEPHONE (OUTPATIENT)
Dept: ENDOSCOPY | Facility: HOSPITAL | Age: 62
End: 2017-11-01

## 2017-11-06 DIAGNOSIS — M50.30 DISC DISEASE, DEGENERATIVE, CERVICAL: ICD-10-CM

## 2017-11-06 DIAGNOSIS — F11.90 CHRONIC, CONTINUOUS USE OF OPIOIDS: ICD-10-CM

## 2017-11-06 DIAGNOSIS — M50.90 CERVICAL DISC DISEASE: ICD-10-CM

## 2017-11-06 NOTE — TELEPHONE ENCOUNTER
----- Message from Karoline Way sent at 11/6/2017  2:13 PM CST -----  Contact: Self   Patient need a refill. Please call patient at [phone].      diazePAM (VALIUM) 10 MG Tab  oxycodone (ROXICODONE) 5 MG immediate release tablet  oxycodone-acetaminophen (PERCOCET) 5-325 mg per tablet

## 2017-11-07 RX ORDER — DIAZEPAM 10 MG/1
10 TABLET ORAL 2 TIMES DAILY PRN
Qty: 60 TABLET | Refills: 0 | Status: SHIPPED | OUTPATIENT
Start: 2017-11-07 | End: 2017-12-05 | Stop reason: SDUPTHER

## 2017-11-07 RX ORDER — OXYCODONE HYDROCHLORIDE 5 MG/1
5 TABLET ORAL EVERY 4 HOURS PRN
Qty: 120 TABLET | Refills: 0 | Status: SHIPPED | OUTPATIENT
Start: 2017-11-07 | End: 2017-12-05 | Stop reason: SDUPTHER

## 2017-11-07 RX ORDER — OXYCODONE AND ACETAMINOPHEN 5; 325 MG/1; MG/1
1 TABLET ORAL EVERY 4 HOURS PRN
Qty: 120 TABLET | Refills: 0 | Status: SHIPPED | OUTPATIENT
Start: 2017-11-07 | End: 2017-12-05 | Stop reason: SDUPTHER

## 2017-11-08 ENCOUNTER — CLINICAL SUPPORT (OUTPATIENT)
Dept: SMOKING CESSATION | Facility: CLINIC | Age: 62
End: 2017-11-08
Payer: COMMERCIAL

## 2017-11-08 DIAGNOSIS — F17.200 NICOTINE DEPENDENCE: Primary | ICD-10-CM

## 2017-11-08 PROCEDURE — 99406 BEHAV CHNG SMOKING 3-10 MIN: CPT | Mod: S$GLB,,,

## 2017-11-13 ENCOUNTER — ANESTHESIA (OUTPATIENT)
Dept: ENDOSCOPY | Facility: HOSPITAL | Age: 62
End: 2017-11-13
Payer: MEDICAID

## 2017-11-13 ENCOUNTER — SURGERY (OUTPATIENT)
Age: 62
End: 2017-11-13

## 2017-11-13 ENCOUNTER — ANESTHESIA EVENT (OUTPATIENT)
Dept: ENDOSCOPY | Facility: HOSPITAL | Age: 62
End: 2017-11-13
Payer: MEDICAID

## 2017-11-13 ENCOUNTER — HOSPITAL ENCOUNTER (OUTPATIENT)
Facility: HOSPITAL | Age: 62
Discharge: HOME | End: 2017-11-13
Attending: INTERNAL MEDICINE | Admitting: INTERNAL MEDICINE
Payer: MEDICAID

## 2017-11-13 VITALS
RESPIRATION RATE: 16 BRPM | WEIGHT: 137 LBS | OXYGEN SATURATION: 96 % | SYSTOLIC BLOOD PRESSURE: 112 MMHG | DIASTOLIC BLOOD PRESSURE: 75 MMHG | HEART RATE: 79 BPM | TEMPERATURE: 98 F | BODY MASS INDEX: 25.86 KG/M2 | HEIGHT: 61 IN

## 2017-11-13 DIAGNOSIS — R93.3 ABNORMAL FINDING ON GI TRACT IMAGING: Primary | ICD-10-CM

## 2017-11-13 PROCEDURE — 63600175 PHARM REV CODE 636 W HCPCS: Performed by: NURSE ANESTHETIST, CERTIFIED REGISTERED

## 2017-11-13 PROCEDURE — D9220A PRA ANESTHESIA: Mod: CRNA,,, | Performed by: NURSE ANESTHETIST, CERTIFIED REGISTERED

## 2017-11-13 PROCEDURE — 43259 EGD US EXAM DUODENUM/JEJUNUM: CPT | Mod: ,,, | Performed by: INTERNAL MEDICINE

## 2017-11-13 PROCEDURE — 37000009 HC ANESTHESIA EA ADD 15 MINS: Performed by: INTERNAL MEDICINE

## 2017-11-13 PROCEDURE — 25000003 PHARM REV CODE 250: Performed by: INTERNAL MEDICINE

## 2017-11-13 PROCEDURE — 37000008 HC ANESTHESIA 1ST 15 MINUTES: Performed by: INTERNAL MEDICINE

## 2017-11-13 PROCEDURE — D9220A PRA ANESTHESIA: Mod: ANES,,, | Performed by: ANESTHESIOLOGY

## 2017-11-13 PROCEDURE — 43259 EGD US EXAM DUODENUM/JEJUNUM: CPT | Performed by: INTERNAL MEDICINE

## 2017-11-13 RX ORDER — LIDOCAINE HCL/PF 100 MG/5ML
SYRINGE (ML) INTRAVENOUS
Status: DISCONTINUED | OUTPATIENT
Start: 2017-11-13 | End: 2017-11-13

## 2017-11-13 RX ORDER — MIDAZOLAM HYDROCHLORIDE 1 MG/ML
INJECTION, SOLUTION INTRAMUSCULAR; INTRAVENOUS
Status: DISCONTINUED | OUTPATIENT
Start: 2017-11-13 | End: 2017-11-13

## 2017-11-13 RX ORDER — SODIUM CHLORIDE 9 MG/ML
INJECTION, SOLUTION INTRAVENOUS CONTINUOUS
Status: DISCONTINUED | OUTPATIENT
Start: 2017-11-13 | End: 2017-11-13 | Stop reason: HOSPADM

## 2017-11-13 RX ORDER — PROPOFOL 10 MG/ML
VIAL (ML) INTRAVENOUS CONTINUOUS PRN
Status: DISCONTINUED | OUTPATIENT
Start: 2017-11-13 | End: 2017-11-13

## 2017-11-13 RX ORDER — PROPOFOL 10 MG/ML
VIAL (ML) INTRAVENOUS
Status: DISCONTINUED | OUTPATIENT
Start: 2017-11-13 | End: 2017-11-13

## 2017-11-13 RX ORDER — FENTANYL CITRATE 50 UG/ML
INJECTION, SOLUTION INTRAMUSCULAR; INTRAVENOUS
Status: DISCONTINUED | OUTPATIENT
Start: 2017-11-13 | End: 2017-11-13

## 2017-11-13 RX ADMIN — PROPOFOL 125 MCG/KG/MIN: 10 INJECTION, EMULSION INTRAVENOUS at 11:11

## 2017-11-13 RX ADMIN — MIDAZOLAM HYDROCHLORIDE 2 MG: 1 INJECTION, SOLUTION INTRAMUSCULAR; INTRAVENOUS at 10:11

## 2017-11-13 RX ADMIN — SODIUM CHLORIDE: 0.9 INJECTION, SOLUTION INTRAVENOUS at 10:11

## 2017-11-13 RX ADMIN — PROPOFOL 50 MG: 10 INJECTION, EMULSION INTRAVENOUS at 11:11

## 2017-11-13 RX ADMIN — LIDOCAINE HYDROCHLORIDE 75 MG: 20 INJECTION, SOLUTION INTRAVENOUS at 11:11

## 2017-11-13 RX ADMIN — FENTANYL CITRATE 50 MCG: 50 INJECTION, SOLUTION INTRAMUSCULAR; INTRAVENOUS at 11:11

## 2017-11-13 NOTE — PLAN OF CARE
Discharge instructions given and explained to patient and family with verbalization of understanding all instructions.Patients v/s stable, denies n/v and tolerating po, rates pain level tolerable, IV removed, and friend at bedside for patient discharge home.

## 2017-11-13 NOTE — ANESTHESIA POSTPROCEDURE EVALUATION
"Anesthesia Post Evaluation    Patient: Rossi Mcneal    Procedure(s) Performed: Procedure(s) (LRB):  ULTRASOUND-ENDOSCOPIC-UPPER (N/A)    Final Anesthesia Type: general  Patient location during evaluation: PACU  Patient participation: Yes- Able to Participate  Level of consciousness: awake and alert  Post-procedure vital signs: reviewed and stable  Pain management: adequate  Airway patency: patent  PONV status at discharge: No PONV  Anesthetic complications: no      Cardiovascular status: blood pressure returned to baseline  Respiratory status: unassisted  Hydration status: euvolemic  Follow-up not needed.        Visit Vitals  BP 95/69 (BP Location: Left arm, Patient Position: Lying)   Pulse 83   Temp 36.7 °C (98.1 °F) (Temporal)   Resp 16   Ht 5' 1" (1.549 m)   Wt 62.1 kg (137 lb)   SpO2 98%   Breastfeeding? No   BMI 25.89 kg/m²       Pain/Chip Score: Pain Assessment Performed: Yes (11/13/2017 11:26 AM)  Presence of Pain: denies (11/13/2017 11:26 AM)  Chip Score: 9 (11/13/2017 11:26 AM)      "

## 2017-11-13 NOTE — PATIENT INSTRUCTIONS
Discharge Summary/Instructions after an Endoscopic Procedure  Patient Name: Rossi Mcneal  Patient MRN: [id number]  Patient YOB: 1955 Monday, November 13, 2017  Kristian Fountain MD  RESTRICTIONS:  During your procedure today, you received medications for sedation.  These   medications may affect your judgment, balance and coordination.  Therefore,   for 24 hours, you have the following restrictions:   - DO NOT drive a car, operate machinery, make legal/financial decisions,   sign important papers or drink alcohol.    ACTIVITY:  The following day: return to full activity including work, except no heavy   lifting, straining or running for 3 days if polyps were removed.  DIET:  Eat and drink normally unless instructed otherwise.     TREATMENT FOR COMMON SIDE EFFECTS:  - Mild abdominal pain, belching, bloating or excessive gas: rest, eat   lightly and use a heating pad.  - Sore Throat: treat with throat lozenges and/or gargle with warm salt   water.  SYMPTOMS TO WATCH FOR AND REPORT TO YOUR PHYSICIAN:  1. Abdominal pain or bloating, other than gas cramps.  2. Chest pain.  3. Back pain.  4. Chills or fever occurring within 24 hours after the procedure.  5. Rectal bleeding, which would show as bright red, maroon, or black stools.   (A tablespoon of blood from the rectum is not serious, especially if   hemorrhoids are present.)  6. Vomiting.  7. Weakness or dizziness.  8. Because air was used during the procedure, expelling large amounts of air   from your rectum or belching is normal.  9. If a bowel prep was taken, you may not have a bowel movement for 1-3   days.  This is normal.  GO DIRECTLY TO THE NEAREST EMERGENCY ROOM IF YOU HAVE ANY OF THE FOLLOWING:      Difficulty breathing  Chills and/or fever over 101 F   Persistent vomiting and/or vomiting blood   Severe abdominal pain   Severe chest pain   Black, tarry stools   Bleeding- more than one tablespoon   Any other symptom or condition that you may  feel needs urgent attention  Your doctor recommends these additional instructions:  If any biopsies were taken, your doctor s clinic will contact you in 1 to 2   weeks with any results.  You are being discharged to home.   Resume your previous diet.   Continue your present medications.   You have a contact number available for emergencies.  The signs and symptoms   of potential delayed complications were discussed with you.  You may return   to normal activities tomorrow.  Written discharge instructions were   provided to you.   Return to your referring physician.  For questions, problems or results please call your physician - Kristian Fountain MD at Work:  [phone].  [facility], EMERGENCY ROOM PHONE NUMBER: [phone]  IF A COMPLICATION OR EMERGENCY SITUATION ARISES AND YOU ARE UNABLE TO REACH   YOUR PHYSICIAN - GO DIRECTLY TO THE EMERGENCY ROOM.  Kristian Fountain MD  11/13/2017 11:38:32 AM  This report has been verified and signed electronically.

## 2017-11-13 NOTE — ANESTHESIA RELEASE NOTE
"Anesthesia Release from PACU Note    Patient: Rossi Mcneal    Procedure(s) Performed: Procedure(s) (LRB):  ULTRASOUND-ENDOSCOPIC-UPPER (N/A)    Final Anesthesia Type: general  Patient location during evaluation: PACU  Patient participation: Yes- Able to Participate  Level of consciousness: awake and alert  Post-procedure vital signs: reviewed and stable  Pain management: adequate  Airway patency: patent  PONV status at discharge: No PONV  Anesthetic complications: no      Cardiovascular status: blood pressure returned to baseline  Respiratory status: unassisted  Hydration status: euvolemic  Follow-up not needed.        Visit Vitals  BP 95/69 (BP Location: Left arm, Patient Position: Lying)   Pulse 83   Temp 36.7 °C (98.1 °F) (Temporal)   Resp 16   Ht 5' 1" (1.549 m)   Wt 62.1 kg (137 lb)   SpO2 98%   Breastfeeding? No   BMI 25.89 kg/m²       Pain/Chip Score: Pain Assessment Performed: Yes (11/13/2017 11:26 AM)  Presence of Pain: denies (11/13/2017 11:26 AM)  Chip Score: 9 (11/13/2017 11:26 AM)    "

## 2017-11-13 NOTE — H&P
History & Physical - Short Stay  Gastroenterology      SUBJECTIVE:     Procedure: EUS    Chief Complaint/Indication for Procedure: dilated CBD    History of Present Illness:  Patient is a 61 y.o. female with dilated CBD coming for EUS.     PTA Medications   Medication Sig    diazePAM (VALIUM) 10 MG Tab Take 1 tablet (10 mg total) by mouth 2 (two) times daily as needed.    fluticasone (FLONASE) 50 mcg/actuation nasal spray 1 spray by Each Nare route 2 (two) times daily.    lisinopril 10 MG tablet TAKE ONE TABLET BY MOUTH DAILY    nicotine (NICODERM CQ) 21 mg/24 hr PLACE ONE PATCH ONTO SKIN EVERY DAY    oxyCODONE (ROXICODONE) 5 MG immediate release tablet Take 1 tablet (5 mg total) by mouth every 4 (four) hours as needed. for pain.    oxyCODONE-acetaminophen (PERCOCET) 5-325 mg per tablet Take 1 tablet by mouth every 4 (four) hours as needed. for pain.    venlafaxine (EFFEXOR) 75 MG tablet Take 1 tablet (75 mg total) by mouth 2 (two) times daily.    VENTOLIN HFA 90 mcg/actuation inhaler Inhale 2 puffs into the lungs every 6 hours as needed for Wheezing.       Review of patient's allergies indicates:  No Known Allergies     Past Medical History:   Diagnosis Date    Chronic hepatitis C     Emphysema     Hypertension     Neuromuscular disorder      Past Surgical History:   Procedure Laterality Date    NECK SURGERY  1990    PARTIAL HYSTERECTOMY  1995     Family History   Problem Relation Age of Onset    Hypertension Mother     Cancer Father     Parkinsonism Father      Social History   Substance Use Topics    Smoking status: Current Some Day Smoker    Smokeless tobacco: Never Used    Alcohol use Not on file       Review of Systems:  Constitutional: no fever or chills  Gastrointestinal: no nausea or vomiting, no abdominal pain or change in bowel habits    OBJECTIVE:     Vital Signs (Most Recent)       Physical Exam:  General: well developed, well nourished  Abdomen: soft, non-tender non-distented; bowel  sounds normal; no masses,  no organomegaly         ASSESSMENT/PLAN:     Patient is a 61 y.o. female with dilated CBD coming for EUS.     Plan: EUS    Anesthesia Plan: Moderate Sedation    ASA Grade: ASA 2 - Patient with mild systemic disease with no functional limitations

## 2017-11-13 NOTE — ANESTHESIA PREPROCEDURE EVALUATION
Past Medical History:   Diagnosis Date    Chronic hepatitis C     Emphysema     Hypertension     Neuromuscular disorder      Past Surgical History:   Procedure Laterality Date    NECK SURGERY  1990    PARTIAL HYSTERECTOMY  1995     Patient Active Problem List   Diagnosis    Screening for colon cancer    Disc disease, degenerative, cervical    Chronic, continuous use of opioids    Hepatitis C antibody test positive    Hypertension, essential    Chronic hepatitis C without hepatic coma    Hepatic steatosis    Tobacco use    Abnormal finding on GI tract imaging     Please See ROS/PMH and Active Problem List above                                                                                      11/13/2017  Rossi Mcneal is a 61 y.o., female.    Anesthesia Evaluation    I have reviewed the Patient Summary Reports.    I have reviewed the Nursing Notes.   I have reviewed the Medications.     Review of Systems  Anesthesia Hx:  History of prior surgery of interest to airway management or planning:  Denies Personal Hx of Anesthesia complications.   Social:  Smoker    Hematology/Oncology:  Hematology Normal        Cardiovascular:   Hypertension    Pulmonary:   COPD, moderate    Hepatic/GI:   Hepatitis, C    Musculoskeletal:   Arthritis   Spine Disorders: cervical Disc disease    Neurological:   Neuromuscular Disease,        Physical Exam  General:  Well nourished    Airway/Jaw/Neck:  Airway Findings: Mouth Opening: Normal Tongue: Normal  General Airway Assessment: Adult  Mallampati: II  TM Distance: 4 - 6 cm  Jaw/Neck Findings:  Neck ROM: Normal ROM      Dental:  Dental Findings: In tact   Chest/Lungs:  Chest/Lungs Findings: Normal Respiratory Rate, Clear to auscultation     Heart/Vascular:  Heart Findings: Rate: Normal       Skin:  Skin Findings:  Extensive Back scar from spine surgery   Mental Status:  Mental Status Findings:  Cooperative         Anesthesia Plan  Type of Anesthesia, risks & benefits  discussed:  Anesthesia Type:  general  Patient's Preference:   Intra-op Monitoring Plan:   Intra-op Monitoring Plan Comments:   Post Op Pain Control Plan:   Post Op Pain Control Plan Comments:   Induction:   IV  Beta Blocker:         Informed Consent: Patient understands risks and agrees with Anesthesia plan.  Questions answered. Anesthesia consent signed with patient.  ASA Score: 3     Day of Surgery Review of History & Physical:    H&P update referred to the surgeon.     Anesthesia Plan Notes: Had sip of water about an hour ago. Ate bite of banana at 6am, then stopped when she remembered surgery. Amt equal to thumb from knuckle. As prece van is EGD with let service decide if ok.        Ready For Surgery From Anesthesia Perspective.

## 2017-11-21 ENCOUNTER — TELEPHONE (OUTPATIENT)
Dept: HEPATOLOGY | Facility: CLINIC | Age: 62
End: 2017-11-21

## 2017-11-21 NOTE — TELEPHONE ENCOUNTER
Second attempt to reach pt to review   -- insurance decision to deny HCV tx per pharmacy-- Insurance states that they do NOT have an option for second level appeal, and we can resubmit for prior authorization if patient develops fibrosis of F3 or greater.  --results of EUS     Please attempt to contact pt.   Upper scope looked good, nothing concerning noted.   Recommend clinic in 6 months to eval for treatment, unless she changed medicaid plans or insurance in general, we can reeval sooner, so let us know     hcv episode closed.

## 2017-11-21 NOTE — TELEPHONE ENCOUNTER
----- Message from Ten Anton MD sent at 11/19/2017  8:05 PM CST -----  Regarding: RE: eus results.   No need for further testing    ----- Message -----  From: Brittany Lemon PA-C  Sent: 11/16/2017  11:56 AM  To: Ten Anton MD  Subject: eus results.                                     We discussed previously, and she was referred for EUS 2/2 ultraosund findings --The common duct is enlarged, 8 mm.  Minimal prominence of left intrahepatic ducts.    EUS: There was no sign of significant pathology in the                         entire pancreas.                        - There was dilation in the common bile duct which                         measured up to 9 mm.                        - Hyperechoic material consistent with sludge was                         visualized endosonographically in the gallbladder.                        - There was no evidence of significant pathology in                         the visualized portion of the liver.    Any further eval or f/u of this? She is not cirrhotic and not on routine g surveillance     thx

## 2017-11-28 NOTE — TELEPHONE ENCOUNTER
Tried to contact pt however unable to reach pt asked pt to contact our office back asap to review this with her.

## 2017-11-29 ENCOUNTER — TELEPHONE (OUTPATIENT)
Dept: HEPATOLOGY | Facility: CLINIC | Age: 62
End: 2017-11-29

## 2017-11-29 NOTE — TELEPHONE ENCOUNTER
----- Message from Nancy Bardales sent at 11/28/2017  1:33 PM CST -----  Contact: patient   Patient returning call to [facility].         Please call        Thanks!

## 2017-11-29 NOTE — TELEPHONE ENCOUNTER
Pt informed of results in another msg and put on recall list for follow up in 6 months unless her insurance changes at that time she will notify us of any changes if sooner.

## 2017-11-29 NOTE — TELEPHONE ENCOUNTER
Pt notified of this today and pt put on recall list and will let us know if her insurance cahnges or not.

## 2017-12-05 DIAGNOSIS — M50.30 DISC DISEASE, DEGENERATIVE, CERVICAL: ICD-10-CM

## 2017-12-05 DIAGNOSIS — F11.90 CHRONIC, CONTINUOUS USE OF OPIOIDS: ICD-10-CM

## 2017-12-05 DIAGNOSIS — M50.90 CERVICAL DISC DISEASE: ICD-10-CM

## 2017-12-05 RX ORDER — DIAZEPAM 10 MG/1
10 TABLET ORAL 2 TIMES DAILY PRN
Qty: 60 TABLET | Refills: 0 | Status: SHIPPED | OUTPATIENT
Start: 2017-12-05 | End: 2017-12-28 | Stop reason: SDUPTHER

## 2017-12-05 RX ORDER — OXYCODONE AND ACETAMINOPHEN 5; 325 MG/1; MG/1
1 TABLET ORAL EVERY 4 HOURS PRN
Qty: 120 TABLET | Refills: 0 | Status: SHIPPED | OUTPATIENT
Start: 2017-12-05 | End: 2017-12-28 | Stop reason: SDUPTHER

## 2017-12-05 RX ORDER — OXYCODONE HYDROCHLORIDE 5 MG/1
5 TABLET ORAL EVERY 4 HOURS PRN
Qty: 120 TABLET | Refills: 0 | Status: SHIPPED | OUTPATIENT
Start: 2017-12-05 | End: 2017-12-28 | Stop reason: SDUPTHER

## 2017-12-05 NOTE — TELEPHONE ENCOUNTER
----- [facility] sent at 12/5/2017  9:19 AM CST -----  Contact: self  Refill: diazePAM (VALIUM) 10 MG Tab            oxyCODONE-acetaminophen (PERCOCET) 5-325 mg per tablet            oxyCODONE (ROXICODONE) 5 MG immediate release tablet  Send to [facility] [address]  [place] [address]  [phone]    Contact pt at [phone].    Thanks-

## 2017-12-13 DIAGNOSIS — J45.20 MILD INTERMITTENT ASTHMA WITHOUT COMPLICATION: ICD-10-CM

## 2017-12-13 RX ORDER — ALBUTEROL SULFATE 90 UG/1
AEROSOL, METERED RESPIRATORY (INHALATION)
Qty: 18 G | Refills: 2 | Status: SHIPPED | OUTPATIENT
Start: 2017-12-13 | End: 2018-03-26 | Stop reason: SDUPTHER

## 2017-12-28 DIAGNOSIS — F11.90 CHRONIC, CONTINUOUS USE OF OPIOIDS: ICD-10-CM

## 2017-12-28 DIAGNOSIS — M50.90 CERVICAL DISC DISEASE: ICD-10-CM

## 2017-12-28 DIAGNOSIS — M50.30 DISC DISEASE, DEGENERATIVE, CERVICAL: ICD-10-CM

## 2017-12-28 RX ORDER — OXYCODONE HYDROCHLORIDE 5 MG/1
5 TABLET ORAL EVERY 4 HOURS PRN
Qty: 120 TABLET | Refills: 0 | Status: SHIPPED | OUTPATIENT
Start: 2018-01-04 | End: 2018-01-30 | Stop reason: SDUPTHER

## 2017-12-28 RX ORDER — OXYCODONE AND ACETAMINOPHEN 5; 325 MG/1; MG/1
1 TABLET ORAL EVERY 4 HOURS PRN
Qty: 120 TABLET | Refills: 0 | Status: SHIPPED | OUTPATIENT
Start: 2018-01-04 | End: 2018-01-30 | Stop reason: SDUPTHER

## 2017-12-28 RX ORDER — DIAZEPAM 10 MG/1
10 TABLET ORAL 2 TIMES DAILY PRN
Qty: 60 TABLET | Refills: 0 | Status: SHIPPED | OUTPATIENT
Start: 2018-01-04 | End: 2018-01-30 | Stop reason: SDUPTHER

## 2017-12-28 NOTE — TELEPHONE ENCOUNTER
----- Message from Matilde Amaro sent at 12/28/2017 11:03 AM CST -----  Contact: pt  Diazepam, oxycodone, and percocet pt need refill on meds, [facility].

## 2018-01-22 ENCOUNTER — TELEPHONE (OUTPATIENT)
Dept: FAMILY MEDICINE | Facility: CLINIC | Age: 63
End: 2018-01-22

## 2018-01-22 DIAGNOSIS — R50.9 FEBRILE ILLNESS, ACUTE: Primary | ICD-10-CM

## 2018-01-22 RX ORDER — OSELTAMIVIR PHOSPHATE 75 MG/1
75 CAPSULE ORAL 2 TIMES DAILY
Qty: 10 CAPSULE | Refills: 0 | Status: SHIPPED | OUTPATIENT
Start: 2018-01-22 | End: 2018-01-27

## 2018-01-22 NOTE — TELEPHONE ENCOUNTER
[employer] patient   ( a message was left for this pt Re. Flu like symptoms)    Have you had any of the following symptoms: fever >100.4/chills,     URI symptoms, body aches, nausea, vomiting, or diarrhea?      Which symptoms have you had?  How long have you had the symptoms?     Did the symptoms start suddenly or did they come on gradually?     Have you been in contact with anyone who has the flu?     If so, how close was the contact and when was the last contact?

## 2018-01-22 NOTE — TELEPHONE ENCOUNTER
..[employer] patient    Have you had any of the following symptoms: fever >100.4/chills, 101.9     URI symptoms, body aches, nausea, vomiting, or diarrhea?  Body aches, weak, vomiting and diarrhea     Which symptoms have you had? Body aches, weak, vomiting and diarrhea , coughing, headache, not resting well    How long have you had the symptoms? This started Sunday 01/21/18    Did the symptoms start suddenly or did they come on gradually? Suddenly    Have you been in contact with anyone who has the flu? Yes, Mother    If so, how close was the contact and when was the last contact? She lives with mother    Did you receive the flu shot during this current flu season? Yes    The pt. Would like cough syrup called in as well.

## 2018-01-22 NOTE — TELEPHONE ENCOUNTER
----- Message from Kiana Alexandre sent at 1/22/2018 12:54 PM CST -----  Contact: Rossi [phone]  EDNA ALONSO...She has had fever, chills, body aches...she would like an earlier appointment access. Please call at your earliest convenience.

## 2018-01-22 NOTE — TELEPHONE ENCOUNTER
----- Message from Kisha Marinelli sent at 1/22/2018  3:09 PM CST -----  Contact: Self  Patient is returning your call. Please call back at [phone].

## 2018-01-23 NOTE — TELEPHONE ENCOUNTER
----- Message from Rehana Brantley sent at 1/23/2018 11:49 AM CST -----  Contact: self  Pt returning call. [phone]..

## 2018-01-24 NOTE — TELEPHONE ENCOUNTER
----- Message from Justine Duarte sent at 1/23/2018  4:36 PM CST -----  Contact: [phone]  Pt is returning the phone call Please call pt at your earliest convenience.  Thanks !

## 2018-01-26 ENCOUNTER — TELEPHONE (OUTPATIENT)
Dept: FAMILY MEDICINE | Facility: CLINIC | Age: 63
End: 2018-01-26

## 2018-01-26 RX ORDER — BENZONATATE 200 MG/1
200 CAPSULE ORAL 3 TIMES DAILY PRN
Qty: 30 CAPSULE | Refills: 0 | Status: SHIPPED | OUTPATIENT
Start: 2018-01-26 | End: 2018-02-05

## 2018-01-26 NOTE — TELEPHONE ENCOUNTER
----- Message from Nafisa Forde sent at 1/26/2018  2:13 PM CST -----  Contact: self  Patient called requesting cough medication. Please contact her at [phone].    Thanks!

## 2018-01-29 ENCOUNTER — TELEPHONE (OUTPATIENT)
Dept: FAMILY MEDICINE | Facility: CLINIC | Age: 63
End: 2018-01-29

## 2018-01-29 NOTE — TELEPHONE ENCOUNTER
Spoke with patient and she informed me that she was diagnosis with the flu last week and was given the Carrie flu. She said that she started feeling better when taking the medication but this Sunday she had a fever of 100.2. This morning it was 99.8. She has been taking Tylenol and BC powder. Patient is aware that she is not suppose to be taking the Tylenol due to her liver.  She said that she has stabbing pains in her left lung and she sound wet in her lungs when she cough. She has no appetite but is pushing herself to stay hydrated with water and soups.  She also has a bad headache from coughing so much. Please advise.

## 2018-01-29 NOTE — TELEPHONE ENCOUNTER
----- Message from Nafisa Forde sent at 1/29/2018  9:33 AM CST -----  Contact: self  Patient called stating that she took all medication prescribed but feels she may have Pneumonia per stabbing pain in left lung area.  Patient requesting a cough syrup to help. Please contact her at [phone].    Thanks!

## 2018-01-29 NOTE — TELEPHONE ENCOUNTER
Cough can last for up to two weeks after flu. Script sent for medicaiton to take for cough. If temp > 100.4 or becoming more short of breath she should schedule an office visit

## 2018-01-30 DIAGNOSIS — M50.30 DISC DISEASE, DEGENERATIVE, CERVICAL: ICD-10-CM

## 2018-01-30 DIAGNOSIS — M50.90 CERVICAL DISC DISEASE: ICD-10-CM

## 2018-01-30 DIAGNOSIS — F11.90 CHRONIC, CONTINUOUS USE OF OPIOIDS: ICD-10-CM

## 2018-01-30 DIAGNOSIS — R09.81 NASAL CONGESTION: ICD-10-CM

## 2018-01-30 RX ORDER — DIAZEPAM 10 MG/1
TABLET ORAL
Qty: 60 TABLET | Refills: 0 | Status: SHIPPED | OUTPATIENT
Start: 2018-02-02 | End: 2018-02-28 | Stop reason: SDUPTHER

## 2018-01-30 RX ORDER — OXYCODONE HYDROCHLORIDE 5 MG/1
TABLET ORAL
Qty: 120 TABLET | Refills: 0 | Status: SHIPPED | OUTPATIENT
Start: 2018-02-02 | End: 2018-02-27 | Stop reason: SDUPTHER

## 2018-01-30 RX ORDER — OXYCODONE AND ACETAMINOPHEN 5; 325 MG/1; MG/1
TABLET ORAL
Qty: 120 TABLET | Refills: 0 | Status: SHIPPED | OUTPATIENT
Start: 2018-02-02 | End: 2018-02-27 | Stop reason: SDUPTHER

## 2018-01-30 RX ORDER — FLUTICASONE PROPIONATE 50 MCG
SPRAY, SUSPENSION (ML) NASAL
Qty: 16 G | Refills: 3 | Status: SHIPPED | OUTPATIENT
Start: 2018-01-30 | End: 2018-06-18 | Stop reason: SDUPTHER

## 2018-02-19 ENCOUNTER — TELEPHONE (OUTPATIENT)
Dept: FAMILY MEDICINE | Facility: CLINIC | Age: 63
End: 2018-02-19

## 2018-02-19 ENCOUNTER — OFFICE VISIT (OUTPATIENT)
Dept: FAMILY MEDICINE | Facility: CLINIC | Age: 63
End: 2018-02-19
Payer: MEDICAID

## 2018-02-19 VITALS
DIASTOLIC BLOOD PRESSURE: 70 MMHG | HEIGHT: 61 IN | OXYGEN SATURATION: 92 % | TEMPERATURE: 98 F | HEART RATE: 93 BPM | SYSTOLIC BLOOD PRESSURE: 110 MMHG

## 2018-02-19 DIAGNOSIS — Z72.0 TOBACCO USE: Primary | ICD-10-CM

## 2018-02-19 DIAGNOSIS — J43.8 OTHER EMPHYSEMA: ICD-10-CM

## 2018-02-19 DIAGNOSIS — F41.9 ANXIETY: ICD-10-CM

## 2018-02-19 PROCEDURE — 99999 PR PBB SHADOW E&M-EST. PATIENT-LVL III: CPT | Mod: PBBFAC,,, | Performed by: NURSE PRACTITIONER

## 2018-02-19 PROCEDURE — 99214 OFFICE O/P EST MOD 30 MIN: CPT | Mod: S$PBB,,, | Performed by: NURSE PRACTITIONER

## 2018-02-19 PROCEDURE — 3008F BODY MASS INDEX DOCD: CPT | Mod: ,,, | Performed by: NURSE PRACTITIONER

## 2018-02-19 PROCEDURE — 99213 OFFICE O/P EST LOW 20 MIN: CPT | Mod: PBBFAC,PN | Performed by: NURSE PRACTITIONER

## 2018-02-19 NOTE — PROGRESS NOTES
Subjective:       Patient ID: Rossi Mcneal is a 62 y.o. female.    Chief Complaint: Cough and Otalgia    HPI Ms Mcneal is here for a cough and ear pain. It has been ongoing for at least a month.  She has been treated with tessalon and chlorphenarmine cough syrup.  She still has a NP cough, she is still smoking.   She would like me to write a letter so she can bring her dog on a plane as a  animal because she gets anxious.  SHe has a letter from 2013 from a psychiatric provider which allowed her to take the dog on the plane.  Review of Systems   Constitutional: Negative for fever.   HENT: Positive for ear pain.    Respiratory: Positive for cough.    Cardiovascular: Negative.        Objective:      Physical Exam   Constitutional: She is oriented to person, place, and time. She appears well-developed and well-nourished. She does not appear ill. No distress.   HENT:   Right Ear: A middle ear effusion is present.   Left Ear: A middle ear effusion is present.   Nose: Nose normal. Right sinus exhibits no maxillary sinus tenderness and no frontal sinus tenderness. Left sinus exhibits no maxillary sinus tenderness and no frontal sinus tenderness.   Cardiovascular: Normal rate, regular rhythm and normal heart sounds.  Exam reveals no friction rub.    No murmur heard.  Pulmonary/Chest: Effort normal and breath sounds normal. No respiratory distress. She has no decreased breath sounds. She has no wheezes. She has no rhonchi. She has no rales.   Musculoskeletal: Normal range of motion.   Neurological: She is alert and oriented to person, place, and time.   Skin: Skin is warm and dry.   Psychiatric: She has a normal mood and affect. Her behavior is normal.   Vitals reviewed.      Assessment:       1. Tobacco use    2. Other emphysema    3. Anxiety        Plan:       Tobacco use  Encouraged cessation    Other emphysema  Encouraged to quit smoking, her cough is probably also due to PND, encouraged flonase every day.   She did not know she had cough syrup at the pharmacy, she will pick it up and use it.    Anxiety  I have provided a letter for her to take her dog on the plane.    Follow up with primary care provider if not improved  Go to ER for new, worse or concerning symptoms

## 2018-02-19 NOTE — LETTER
[facility]  [address]  [place] [address]  Phone: [phone] February 19, 2018    Rossi Mcneal  [address] [address] [address]  [place] [address]      To Whom It May Concern:    Rossi Mcneal is is a patient under my medical care.  She has emotional diagnoses that make a  dog preferable and necessary for her continued positive mental health.  Please make note of this and allow Ms Mcneal to continue with a  dog at this time.  The need for such an animal is of indeterminate but long period of time.    If you have any questions or concerns, please feel free to call my office.    Sincerely,        SARAH TroncosoC

## 2018-02-19 NOTE — TELEPHONE ENCOUNTER
Patient return call and said that she has been having this cough and it is not being controlled with the Tessalon perle are cough syrup over the counter. She said that she has been having dry heave cough which is causing her to be incontinent of urine and now she have severe rib cage pain. She is also complaining that she was having small spot of blood coming from her ears and that her ears hurt. Patient is looking for appointment sooner to address these issue. Schedule appointment today with NP

## 2018-02-19 NOTE — TELEPHONE ENCOUNTER
----- Message from Leslie Rashid sent at 2/16/2018  1:34 PM CST -----  Contact: self [phone]  Pt is requesting to speak to you regarding she is not feeling well at all, she has a appt in March but she thinks she may need to be seen before then. Pt declined a appt with another provider. Pls call pt [phone]. Thanks............Varsha

## 2018-02-27 DIAGNOSIS — F17.218 CIGARETTE NICOTINE DEPENDENCE WITH OTHER NICOTINE-INDUCED DISORDER: ICD-10-CM

## 2018-02-27 DIAGNOSIS — F11.90 CHRONIC, CONTINUOUS USE OF OPIOIDS: ICD-10-CM

## 2018-02-27 DIAGNOSIS — M50.30 DISC DISEASE, DEGENERATIVE, CERVICAL: ICD-10-CM

## 2018-02-27 RX ORDER — OXYCODONE AND ACETAMINOPHEN 5; 325 MG/1; MG/1
1 TABLET ORAL EVERY 4 HOURS PRN
Qty: 120 TABLET | Refills: 0 | Status: SHIPPED | OUTPATIENT
Start: 2018-02-28 | End: 2018-03-26 | Stop reason: SDUPTHER

## 2018-02-27 RX ORDER — OXYCODONE HYDROCHLORIDE 5 MG/1
5 TABLET ORAL EVERY 4 HOURS PRN
Qty: 120 TABLET | Refills: 0 | Status: SHIPPED | OUTPATIENT
Start: 2018-02-28 | End: 2018-03-26 | Stop reason: SDUPTHER

## 2018-02-27 RX ORDER — VENLAFAXINE 75 MG/1
75 TABLET ORAL 2 TIMES DAILY
Refills: 5 | COMMUNITY
Start: 2017-12-13 | End: 2018-05-28 | Stop reason: SDUPTHER

## 2018-02-27 RX ORDER — IBUPROFEN 200 MG
TABLET ORAL
Qty: 14 PATCH | Refills: 1 | Status: SHIPPED | OUTPATIENT
Start: 2018-02-27 | End: 2018-06-25 | Stop reason: SDUPTHER

## 2018-02-27 RX ORDER — OSELTAMIVIR PHOSPHATE 75 MG/1
CAPSULE ORAL
COMMUNITY
Start: 2018-01-22 | End: 2018-03-06 | Stop reason: ALTCHOICE

## 2018-02-27 NOTE — TELEPHONE ENCOUNTER
----- Message from Rehana Brantley sent at 2/27/2018  1:57 PM CST -----  Contact: Self  Refill : diazePAM (VALIUM) 10 MG Tab             oxyCODONE-acetaminophen (PERCOCET) 5-325 mg per tablet             oxyCODONE (ROXICODONE) 5 MG immediate release tablet             nicotine (NICODERM CQ) 14 mg/24 hr     Pharmacy     [facility] [place] - [address]

## 2018-02-27 NOTE — TELEPHONE ENCOUNTER
LOV: 02/19/18  Pt. Notified of the items listed in overdue health Maintenance. Mrs. Mcneal states that she will be happy to have this done at her next visit.

## 2018-02-27 NOTE — TELEPHONE ENCOUNTER
----- Message from Rehana Brantley sent at 2/27/2018  1:56 PM CST -----  Contact: Self  Pt requesting to speak to  regarding health. [phone].

## 2018-02-28 DIAGNOSIS — M50.90 CERVICAL DISC DISEASE: ICD-10-CM

## 2018-02-28 RX ORDER — DIAZEPAM 10 MG/1
TABLET ORAL
Qty: 60 TABLET | Refills: 1 | Status: SHIPPED | OUTPATIENT
Start: 2018-02-28 | End: 2018-04-24 | Stop reason: SDUPTHER

## 2018-03-06 ENCOUNTER — OFFICE VISIT (OUTPATIENT)
Dept: FAMILY MEDICINE | Facility: CLINIC | Age: 63
End: 2018-03-06
Payer: MEDICAID

## 2018-03-06 VITALS
RESPIRATION RATE: 17 BRPM | BODY MASS INDEX: 23.73 KG/M2 | WEIGHT: 125.69 LBS | HEIGHT: 61 IN | HEART RATE: 83 BPM | SYSTOLIC BLOOD PRESSURE: 118 MMHG | OXYGEN SATURATION: 95 % | DIASTOLIC BLOOD PRESSURE: 80 MMHG | TEMPERATURE: 98 F

## 2018-03-06 DIAGNOSIS — F41.9 ANXIETY: ICD-10-CM

## 2018-03-06 DIAGNOSIS — F11.90 CHRONIC, CONTINUOUS USE OF OPIOIDS: ICD-10-CM

## 2018-03-06 DIAGNOSIS — M50.30 DISC DISEASE, DEGENERATIVE, CERVICAL: Primary | ICD-10-CM

## 2018-03-06 DIAGNOSIS — B18.2 CHRONIC HEPATITIS C WITHOUT HEPATIC COMA: ICD-10-CM

## 2018-03-06 PROCEDURE — 99214 OFFICE O/P EST MOD 30 MIN: CPT | Mod: PBBFAC,PN | Performed by: INTERNAL MEDICINE

## 2018-03-06 PROCEDURE — 99999 PR PBB SHADOW E&M-EST. PATIENT-LVL IV: CPT | Mod: PBBFAC,,, | Performed by: INTERNAL MEDICINE

## 2018-03-06 PROCEDURE — 99214 OFFICE O/P EST MOD 30 MIN: CPT | Mod: S$PBB,,, | Performed by: INTERNAL MEDICINE

## 2018-03-06 NOTE — PROGRESS NOTES
"Subjective:       Patient ID: Rossi Mcneal is a 62 y.o. female.    Chief Complaint: Hepatitis C; disc disease; and Follow-up    F/u chronic pain    HPI: 61 y/o with known cervical disc disease s/p multilevel fusion hepatitis C (treatment naive) and HTN here for scheduled follow up. She has been seen by neurosurgery at [facility] who recommended new fusion due to radicular symptoms she deferred and now is requesting a second opinion in the [facility] system. She has applied for medicare and is awaiting approval letter. Using oral opioid three times per day. No problems with constipation mood "good"    SH: recently moved out of home with her mother (was causing stress)      Review of Systems   Constitutional: Negative for activity change, appetite change, fatigue, fever and unexpected weight change.   HENT: Negative for ear pain, rhinorrhea and sore throat.    Eyes: Negative for discharge and visual disturbance.   Respiratory: Negative for chest tightness, shortness of breath and wheezing.    Cardiovascular: Negative for chest pain, palpitations and leg swelling.   Gastrointestinal: Negative for abdominal pain, constipation and diarrhea.   Endocrine: Negative for cold intolerance and heat intolerance.   Genitourinary: Negative for dysuria and hematuria.   Musculoskeletal: Positive for back pain and neck pain. Negative for joint swelling and neck stiffness.   Skin: Negative for rash.   Neurological: Negative for dizziness, syncope, weakness and headaches.   Psychiatric/Behavioral: Negative for suicidal ideas.       Objective:     Vitals:    03/06/18 1134   BP: 118/80   BP Location: Right arm   Patient Position: Sitting   BP Method: Medium (Manual)   Pulse: 83   Resp: 17   Temp: 98.4 °F (36.9 °C)   TempSrc: Oral   SpO2: 95%   Weight: 57 kg (125 lb 10.6 oz)   Height: 5' 1" (1.549 m)          Physical Exam   Constitutional: She is oriented to person, place, and time. She appears well-developed and well-nourished.   HENT:   Head: " Normocephalic and atraumatic.   Eyes: Conjunctivae are normal. No scleral icterus.   Neck: Normal range of motion.   Cardiovascular: Normal rate and regular rhythm.  Exam reveals no gallop and no friction rub.    No murmur heard.  Pulmonary/Chest: Effort normal and breath sounds normal. She has no wheezes. She has no rales.   Abdominal: Soft. Bowel sounds are normal. There is no tenderness. There is no rebound and no guarding.   Musculoskeletal: Normal range of motion. She exhibits no edema or tenderness.   Neurological: She is alert and oriented to person, place, and time. No cranial nerve deficit.   Skin: Skin is warm and dry.   Psychiatric: She has a normal mood and affect.       Assessment:       1. Disc disease, degenerative, cervical    2. Chronic, continuous use of opioids    3. Anxiety    4. Chronic hepatitis C without hepatic coma        Plan:        1/2. Neurosurgery referal for second opinion on surgical necessity continue current medications     3. Stable on effexor prn diazepam    4. Will plan to follow up with hepatology once medicare is approved

## 2018-03-26 DIAGNOSIS — J45.20 MILD INTERMITTENT ASTHMA WITHOUT COMPLICATION: ICD-10-CM

## 2018-03-26 DIAGNOSIS — I10 HYPERTENSION, ESSENTIAL: ICD-10-CM

## 2018-03-26 DIAGNOSIS — M50.30 DISC DISEASE, DEGENERATIVE, CERVICAL: ICD-10-CM

## 2018-03-26 DIAGNOSIS — F11.90 CHRONIC, CONTINUOUS USE OF OPIOIDS: ICD-10-CM

## 2018-03-26 DIAGNOSIS — M50.90 CERVICAL DISC DISEASE: ICD-10-CM

## 2018-03-26 RX ORDER — OXYCODONE AND ACETAMINOPHEN 5; 325 MG/1; MG/1
1 TABLET ORAL EVERY 4 HOURS PRN
Qty: 120 TABLET | Refills: 0 | Status: SHIPPED | OUTPATIENT
Start: 2018-03-26 | End: 2018-04-24 | Stop reason: SDUPTHER

## 2018-03-26 RX ORDER — OXYCODONE HYDROCHLORIDE 5 MG/1
5 TABLET ORAL EVERY 4 HOURS PRN
Qty: 120 TABLET | Refills: 0 | Status: SHIPPED | OUTPATIENT
Start: 2018-03-26 | End: 2018-04-24 | Stop reason: SDUPTHER

## 2018-03-26 RX ORDER — ALBUTEROL SULFATE 90 UG/1
AEROSOL, METERED RESPIRATORY (INHALATION)
Qty: 18 G | Refills: 2 | Status: SHIPPED | OUTPATIENT
Start: 2018-03-26 | End: 2019-06-07 | Stop reason: SDUPTHER

## 2018-03-26 RX ORDER — OXYCODONE HYDROCHLORIDE 5 MG/1
TABLET ORAL
Qty: 120 TABLET | OUTPATIENT
Start: 2018-03-26

## 2018-03-26 RX ORDER — LISINOPRIL 10 MG/1
10 TABLET ORAL DAILY
Qty: 90 TABLET | Refills: 3 | Status: SHIPPED | OUTPATIENT
Start: 2018-03-26 | End: 2018-10-15 | Stop reason: SDUPTHER

## 2018-03-26 RX ORDER — OXYCODONE AND ACETAMINOPHEN 5; 325 MG/1; MG/1
TABLET ORAL
Qty: 120 TABLET | OUTPATIENT
Start: 2018-03-26

## 2018-03-26 RX ORDER — DIAZEPAM 10 MG/1
10 TABLET ORAL 2 TIMES DAILY PRN
Qty: 60 TABLET | Refills: 1 | Status: CANCELLED | OUTPATIENT
Start: 2018-03-26

## 2018-04-24 DIAGNOSIS — M50.90 CERVICAL DISC DISEASE: ICD-10-CM

## 2018-04-24 DIAGNOSIS — F11.90 CHRONIC, CONTINUOUS USE OF OPIOIDS: ICD-10-CM

## 2018-04-24 DIAGNOSIS — M50.30 DISC DISEASE, DEGENERATIVE, CERVICAL: ICD-10-CM

## 2018-04-24 RX ORDER — DIAZEPAM 10 MG/1
10 TABLET ORAL 2 TIMES DAILY PRN
Qty: 60 TABLET | Refills: 1 | Status: SHIPPED | OUTPATIENT
Start: 2018-04-26 | End: 2018-05-28 | Stop reason: SDUPTHER

## 2018-04-24 RX ORDER — OXYCODONE HYDROCHLORIDE 5 MG/1
5 TABLET ORAL EVERY 4 HOURS PRN
Qty: 120 TABLET | Refills: 0 | Status: SHIPPED | OUTPATIENT
Start: 2018-04-24 | End: 2018-05-15 | Stop reason: SDUPTHER

## 2018-04-24 RX ORDER — OXYCODONE AND ACETAMINOPHEN 5; 325 MG/1; MG/1
1 TABLET ORAL EVERY 4 HOURS PRN
Qty: 120 TABLET | Refills: 0 | Status: SHIPPED | OUTPATIENT
Start: 2018-04-24 | End: 2018-05-15 | Stop reason: SDUPTHER

## 2018-04-24 NOTE — TELEPHONE ENCOUNTER
----- Message from Rebecca Go sent at 4/24/2018 12:19 PM CDT -----  Contact: Self  REFILL: diazePAM (VALIUM) 10 MG Tab  oxyCODONE-acetaminophen (PERCOCET) 5-325 mg per tablet  oxyCODONE (ROXICODONE) 5 MG immediate release tablet    PHARMACY: [facility]

## 2018-05-15 DIAGNOSIS — F11.90 CHRONIC, CONTINUOUS USE OF OPIOIDS: ICD-10-CM

## 2018-05-15 DIAGNOSIS — M50.30 DISC DISEASE, DEGENERATIVE, CERVICAL: ICD-10-CM

## 2018-05-15 RX ORDER — OXYCODONE HYDROCHLORIDE 5 MG/1
TABLET ORAL
Qty: 120 TABLET | Refills: 0 | Status: SHIPPED | OUTPATIENT
Start: 2018-05-22 | End: 2018-06-18 | Stop reason: SDUPTHER

## 2018-05-15 RX ORDER — OXYCODONE AND ACETAMINOPHEN 5; 325 MG/1; MG/1
TABLET ORAL
Qty: 120 TABLET | Refills: 0 | Status: SHIPPED | OUTPATIENT
Start: 2018-05-22 | End: 2018-06-18 | Stop reason: SDUPTHER

## 2018-05-28 DIAGNOSIS — M50.90 CERVICAL DISC DISEASE: ICD-10-CM

## 2018-05-28 NOTE — TELEPHONE ENCOUNTER
----- Message from Louis Serna sent at 5/28/2018  1:40 PM CDT -----  Contact: Self/[phone]  Refill:  diazePAM (VALIUM) 10 MG Tab  Refill:  venlafaxine (EFFEXOR) 75 MG tablet    .  [facility] [place] - [place] - [address]  [address]  [place] [address]  Phone: [phone] Fax: [phone]    Thank you.

## 2018-05-29 RX ORDER — DIAZEPAM 10 MG/1
10 TABLET ORAL 2 TIMES DAILY PRN
Qty: 60 TABLET | Refills: 1 | Status: SHIPPED | OUTPATIENT
Start: 2018-05-29 | End: 2018-07-24 | Stop reason: SDUPTHER

## 2018-05-29 RX ORDER — VENLAFAXINE 75 MG/1
75 TABLET ORAL 2 TIMES DAILY
Qty: 60 TABLET | Refills: 5 | Status: SHIPPED | OUTPATIENT
Start: 2018-05-29 | End: 2018-10-15 | Stop reason: SDUPTHER

## 2018-06-06 ENCOUNTER — TELEPHONE (OUTPATIENT)
Dept: NEUROSURGERY | Facility: CLINIC | Age: 63
End: 2018-06-06

## 2018-06-06 NOTE — TELEPHONE ENCOUNTER
Spoke with pt regarding clinic schedule changes, rescheduled appt to 6/28/18. Mailed appt letter today.

## 2018-06-18 DIAGNOSIS — M50.30 DISC DISEASE, DEGENERATIVE, CERVICAL: ICD-10-CM

## 2018-06-18 DIAGNOSIS — F11.90 CHRONIC, CONTINUOUS USE OF OPIOIDS: ICD-10-CM

## 2018-06-18 DIAGNOSIS — R09.81 NASAL CONGESTION: ICD-10-CM

## 2018-06-18 RX ORDER — OXYCODONE HYDROCHLORIDE 5 MG/1
5 TABLET ORAL EVERY 4 HOURS PRN
Qty: 120 TABLET | Refills: 0 | OUTPATIENT
Start: 2018-06-18

## 2018-06-18 RX ORDER — FLUTICASONE PROPIONATE 50 MCG
2 SPRAY, SUSPENSION (ML) NASAL DAILY
Qty: 1 BOTTLE | Refills: 3 | Status: SHIPPED | OUTPATIENT
Start: 2018-06-18 | End: 2018-07-24 | Stop reason: SDUPTHER

## 2018-06-18 RX ORDER — OXYCODONE AND ACETAMINOPHEN 5; 325 MG/1; MG/1
1 TABLET ORAL EVERY 4 HOURS PRN
Qty: 120 TABLET | Refills: 0 | OUTPATIENT
Start: 2018-06-18

## 2018-06-18 RX ORDER — OXYCODONE AND ACETAMINOPHEN 5; 325 MG/1; MG/1
TABLET ORAL
Qty: 120 TABLET | Refills: 0 | Status: SHIPPED | OUTPATIENT
Start: 2018-06-18 | End: 2018-07-16 | Stop reason: SDUPTHER

## 2018-06-18 RX ORDER — OXYCODONE HYDROCHLORIDE 5 MG/1
TABLET ORAL
Qty: 120 TABLET | Refills: 0 | Status: SHIPPED | OUTPATIENT
Start: 2018-06-18 | End: 2018-07-16 | Stop reason: SDUPTHER

## 2018-06-25 DIAGNOSIS — F17.218 CIGARETTE NICOTINE DEPENDENCE WITH OTHER NICOTINE-INDUCED DISORDER: ICD-10-CM

## 2018-06-25 RX ORDER — IBUPROFEN 200 MG
TABLET ORAL
Qty: 14 PATCH | Refills: 1 | Status: SHIPPED | OUTPATIENT
Start: 2018-06-25 | End: 2018-07-24 | Stop reason: SDUPTHER

## 2018-06-27 DIAGNOSIS — Z12.39 BREAST CANCER SCREENING: ICD-10-CM

## 2018-06-28 ENCOUNTER — INITIAL CONSULT (OUTPATIENT)
Dept: NEUROSURGERY | Facility: CLINIC | Age: 63
End: 2018-06-28
Payer: MEDICAID

## 2018-06-28 VITALS
BODY MASS INDEX: 22.91 KG/M2 | HEART RATE: 74 BPM | SYSTOLIC BLOOD PRESSURE: 98 MMHG | TEMPERATURE: 98 F | HEIGHT: 61 IN | DIASTOLIC BLOOD PRESSURE: 60 MMHG | WEIGHT: 121.31 LBS

## 2018-06-28 DIAGNOSIS — M48.02 CERVICAL STENOSIS OF SPINE: Chronic | ICD-10-CM

## 2018-06-28 DIAGNOSIS — M54.12 CERVICAL MYELOPATHY WITH CERVICAL RADICULOPATHY: Primary | ICD-10-CM

## 2018-06-28 DIAGNOSIS — Z98.1 HISTORY OF FUSION OF CERVICAL SPINE: ICD-10-CM

## 2018-06-28 DIAGNOSIS — M50.30 DISC DISEASE, DEGENERATIVE, CERVICAL: ICD-10-CM

## 2018-06-28 DIAGNOSIS — G95.9 CERVICAL MYELOPATHY WITH CERVICAL RADICULOPATHY: Primary | ICD-10-CM

## 2018-06-28 PROCEDURE — 99214 OFFICE O/P EST MOD 30 MIN: CPT | Mod: PBBFAC,PO | Performed by: NEUROLOGICAL SURGERY

## 2018-06-28 PROCEDURE — 99204 OFFICE O/P NEW MOD 45 MIN: CPT | Mod: S$PBB,,, | Performed by: NEUROLOGICAL SURGERY

## 2018-06-28 PROCEDURE — 99999 PR PBB SHADOW E&M-EST. PATIENT-LVL IV: CPT | Mod: PBBFAC,,, | Performed by: NEUROLOGICAL SURGERY

## 2018-06-28 RX ORDER — OXYCODONE HYDROCHLORIDE 5 MG/1
5 TABLET ORAL
COMMUNITY
Start: 2016-09-29 | End: 2018-06-28

## 2018-06-28 RX ORDER — FLUTICASONE PROPIONATE 50 MCG
1 SPRAY, SUSPENSION (ML) NASAL
COMMUNITY
Start: 2017-06-21 | End: 2018-06-28

## 2018-06-28 RX ORDER — OXYCODONE AND ACETAMINOPHEN 5; 325 MG/1; MG/1
1 TABLET ORAL
COMMUNITY
Start: 2016-09-29 | End: 2018-06-28

## 2018-06-29 ENCOUNTER — TELEPHONE (OUTPATIENT)
Dept: NEUROSURGERY | Facility: CLINIC | Age: 63
End: 2018-06-29

## 2018-06-29 PROBLEM — M48.02 CERVICAL STENOSIS OF SPINE: Chronic | Status: ACTIVE | Noted: 2018-06-29

## 2018-06-29 NOTE — PROGRESS NOTES
CHIEF COMPLAINT:  Chief Complaint   Patient presents with    Neck Pain    Extremity Pain    Extremity Weakness    Numbness     burning sensation in left/right arm    Headache     back of head        HPI:  Rossi Mcneal is a 62 y.o.  female with thoracolumbar scoliosis who underwent Sykes shaun placement at age 13 and as well as an anterior neck fusion in 1990, who presents  With increasing neck and arm pain as well as clumsiness and weakness in her arms and hands.  She has had neck pain for several years but she is noticing that  She is having pain more days of the week that she used to.  The pain radiates into her shoulders and down the outer aspects of her arms to her finger tips.  She also notes that she has numbness in both arms and tingling especially in the 1st 3 digits of both hands.  She notes that her hands have become weak and that she is dropping stuff from both arms and she feels overall clumsy.    She notes that she is prone to trippingShe was seen previously by Dr. [facility] who recommended that neck, back, and rotator cuff surgery.  She had declined at that time but is progressively getting worse and seeking a 2nd opinion.    She did PT several years ago and underwent epidural steroid injections in the past which provided relief for only a few days.      Review of patient's allergies indicates:  No Known Allergies    Past Medical History:   Diagnosis Date    Chronic hepatitis C     Emphysema     Hypertension     Neuromuscular disorder      Past Surgical History:   Procedure Laterality Date    NECK SURGERY  1990    PARTIAL HYSTERECTOMY  1995     Family History   Problem Relation Age of Onset    Hypertension Mother     Cancer Father     Parkinsonism Father      Social History   Substance Use Topics    Smoking status: Current Every Day Smoker     Types: Cigarettes    Smokeless tobacco: Never Used    Alcohol use Not on file        Review of Systems   Constitutional: Negative.     Respiratory: Negative for cough and shortness of breath.    Cardiovascular: Negative for chest pain, palpitations, claudication and leg swelling.   Gastrointestinal: Negative for abdominal pain, constipation and diarrhea.   Genitourinary: Negative for flank pain, frequency and urgency.   Musculoskeletal: Positive for back pain and neck pain. Negative for falls, joint pain and myalgias.   Skin: Negative.    Neurological: Positive for tingling, sensory change and focal weakness. Negative for dizziness, tremors, speech change, seizures, loss of consciousness and headaches.   Psychiatric/Behavioral: Negative.        OBJECTIVE:   Vital Signs:  Temp: 98 °F (36.7 °C) (06/28/18 1520)  Pulse: 74 (06/28/18 1520)  BP: 98/60 (06/28/18 1520)    Physical Exam:  Constitutional: Patient sitting comfortably in chair. Appears well developed and well nourished.  Skin: Exposed areas are intact without abnormal markings, rashes or other lesions.  HEENT: Normocephalic. Normal conjunctivae.  Cardiovascular: Normal rate and regular rhythm.  Respiratory: Chest wall rises and falls symmetrically, without signs of respiratory distress.  Abdomen: Soft and non-tender.  Extremities: Warm and without edema. Calves supple, non-tender.  Psych/Behavior: Normal affect.    Neurological:    Mental status: Alert and oriented. Conversational and appropriate.       Cranial Nerves: VFF to confrontation. PERRL. EOMI without nystagmus. Facial STLT normal and symmetric. Strong, symmetric muscles of mastication. Facial strength full and symmetric. Hearing equal bilaterally to finger rub. Palate and uvula rise and fall normally in midline. Shoulder shrug 5/5 strength. Tongue midline.     Motor:    Upper:  Deltoids Triceps Biceps WE WF  FA    R 5/5 4+/5 5/5 5/5 5/5 5/5 5/5    L 5/5 5/5 5/5 5/5 4/5 5/5 5/5      Lower:  HF KE KF DF PF EHL    R 5/5 5/5 5/5 5/5 5/5 5/5    L 5/5 5/5 5/5 5/5 5/5 5/5     Sensory: Diminished lght touch and pinprick R thumb and L  3+5 digits    Reflexes:      DTR: 2++ knees, biceps, brachio symmetrically.     Feliciano's: Positive bilat     Babinski's: Negative.     Clonus: Negative.    Cerebellar: Finger-to-nose and rapid alternating movements normal.     Gait:  Stable, antalgic    Spine:    Posture: Head well aligned over pelvis and shoulders in front and side views.  No focal or global spinal deformity visible on inspection.     Cervical:      ROM: Full with flexion, extension, lateral rotation and ear-to-shoulder bend.      Midline TTP: Negative.     Spurling's test: Negative.     Lhermitte's: Negative.    Thoracic:     Midline TTP: Negative    Lumbar:     Midline TTP: Negative     Straight Leg Test: Negative     Crossed Straight Leg Test: Negative    Other:     SI joint TTP: Negative.     Tenderness with external/internal hip rotation: Negative.    Diagnostic Results:  All imaging was independently reviewed by me.    MRI C spine, dated 5/11/16:  1. C5-6 fusion  2. Adjacent level degen T C4-5 and C6-7 causing moderate-severe stenosis  3. Focal kyphotic deformity at C4-5    MRI T+L spine, dated 5/11/16:  1. Signifcant T+L dextro-scoliosis with double curve  2. Significant artifact cause by singh rods   3. Moderate stenosis at L4-5  4. Moderate-severe stenosis at L5-S1    ASSESSMENT/PLAN:     Problem List Items Addressed This Visit        Neuro    History of fusion of cervical spine    Relevant Orders    Ambulatory Referral to Physical/Occupational Therapy    Ambulatory Referral to Pain Clinic    Cervical myelopathy with cervical radiculopathy - Primary    Relevant Orders    Ambulatory Referral to Physical/Occupational Therapy    Ambulatory Referral to Pain Clinic          VISIT SUMMARY:   1.    Cervical myelopathy due to cervical stenosis at C4-5 and C6-7 due to adjacent level degeneration status post C5-6 anterior fusion.  She presents with progressively worsening signs and symptoms of cervical myelopathy most likely due to the  adjacent level stenosis.  She also has a significant double curve thoracolumbar scoliosis for which she underwent Sykes shaun placement as an adolescent.  This likely contributes to her chronic back pain.  Given her signs and symptoms of cervical myelopathy including upper extremity weakness, numbness, and radicular pain, I would recommend cervical decompression either a C4-5 and C6-7 ACDF verses a posterior cervical fusion.  I explained the details of the surgery and the risks benefits and alternatives.  She is reluctant to undergo surgery at this time and would like to try conservative measures 1st.  I explained that that is a reasonable request and will refer her to physical therapy and for possible injections.  I cautioned her about the risks of waiting too long for surgery as her symptoms may become permanent.  I will see her back in several weeks after a trial of conservative therapy.    PATIENT EDUCATION:  More than half the clinic visit was spent showing with patient the pertinent findings on imaging and educating the patient about natural history of the pathology.   We discussed options for treatment as well as the risks and benefits of each option.  All questions were answered.     The patient understands and agrees with the following plan of care.    - Referral to pain medicine to be evaluated for injections  - Referral to PT  - Ordered updated [facility] MRI as the last one was in 2016                  .

## 2018-07-01 DIAGNOSIS — G95.9 CERVICAL MYELOPATHY WITH CERVICAL RADICULOPATHY: Primary | ICD-10-CM

## 2018-07-01 DIAGNOSIS — M54.12 CERVICAL MYELOPATHY WITH CERVICAL RADICULOPATHY: Primary | ICD-10-CM

## 2018-07-01 DIAGNOSIS — Z98.1 HISTORY OF FUSION OF CERVICAL SPINE: ICD-10-CM

## 2018-07-01 DIAGNOSIS — M50.30 DISC DISEASE, DEGENERATIVE, CERVICAL: ICD-10-CM

## 2018-07-01 DIAGNOSIS — M48.02 CERVICAL STENOSIS OF SPINE: Chronic | ICD-10-CM

## 2018-07-05 ENCOUNTER — HOSPITAL ENCOUNTER (OUTPATIENT)
Dept: RADIOLOGY | Facility: HOSPITAL | Age: 63
Discharge: HOME | End: 2018-07-05
Attending: NEUROLOGICAL SURGERY
Payer: MEDICAID

## 2018-07-05 DIAGNOSIS — M50.30 DISC DISEASE, DEGENERATIVE, CERVICAL: ICD-10-CM

## 2018-07-05 DIAGNOSIS — G95.9 CERVICAL MYELOPATHY WITH CERVICAL RADICULOPATHY: ICD-10-CM

## 2018-07-05 DIAGNOSIS — M48.02 CERVICAL STENOSIS OF SPINE: Chronic | ICD-10-CM

## 2018-07-05 DIAGNOSIS — M54.12 CERVICAL MYELOPATHY WITH CERVICAL RADICULOPATHY: ICD-10-CM

## 2018-07-05 DIAGNOSIS — Z98.1 HISTORY OF FUSION OF CERVICAL SPINE: ICD-10-CM

## 2018-07-05 PROCEDURE — 72141 MRI NECK SPINE W/O DYE: CPT | Mod: 26,,, | Performed by: RADIOLOGY

## 2018-07-05 PROCEDURE — 72141 MRI NECK SPINE W/O DYE: CPT | Mod: TC

## 2018-07-16 ENCOUNTER — OFFICE VISIT (OUTPATIENT)
Dept: FAMILY MEDICINE | Facility: CLINIC | Age: 63
End: 2018-07-16
Payer: MEDICAID

## 2018-07-16 VITALS
DIASTOLIC BLOOD PRESSURE: 80 MMHG | RESPIRATION RATE: 17 BRPM | HEART RATE: 82 BPM | HEIGHT: 61 IN | BODY MASS INDEX: 23.3 KG/M2 | WEIGHT: 123.44 LBS | TEMPERATURE: 98 F | SYSTOLIC BLOOD PRESSURE: 110 MMHG | OXYGEN SATURATION: 96 %

## 2018-07-16 DIAGNOSIS — F11.90 CHRONIC, CONTINUOUS USE OF OPIOIDS: ICD-10-CM

## 2018-07-16 DIAGNOSIS — M50.30 DISC DISEASE, DEGENERATIVE, CERVICAL: Primary | ICD-10-CM

## 2018-07-16 DIAGNOSIS — B18.2 CHRONIC HEPATITIS C WITHOUT HEPATIC COMA: ICD-10-CM

## 2018-07-16 DIAGNOSIS — G95.9 CERVICAL MYELOPATHY WITH CERVICAL RADICULOPATHY: ICD-10-CM

## 2018-07-16 DIAGNOSIS — M54.12 CERVICAL MYELOPATHY WITH CERVICAL RADICULOPATHY: ICD-10-CM

## 2018-07-16 PROCEDURE — 99213 OFFICE O/P EST LOW 20 MIN: CPT | Mod: PBBFAC,PN | Performed by: INTERNAL MEDICINE

## 2018-07-16 PROCEDURE — 99214 OFFICE O/P EST MOD 30 MIN: CPT | Mod: S$PBB,,, | Performed by: INTERNAL MEDICINE

## 2018-07-16 PROCEDURE — 99999 PR PBB SHADOW E&M-EST. PATIENT-LVL III: CPT | Mod: PBBFAC,,, | Performed by: INTERNAL MEDICINE

## 2018-07-16 RX ORDER — OXYCODONE HYDROCHLORIDE 5 MG/1
5 TABLET ORAL EVERY 4 HOURS PRN
Qty: 120 TABLET | Refills: 0 | Status: SHIPPED | OUTPATIENT
Start: 2018-07-16 | End: 2018-08-10 | Stop reason: SDUPTHER

## 2018-07-16 RX ORDER — OXYCODONE AND ACETAMINOPHEN 5; 325 MG/1; MG/1
1 TABLET ORAL EVERY 4 HOURS PRN
Qty: 120 TABLET | Refills: 0 | Status: SHIPPED | OUTPATIENT
Start: 2018-07-16 | End: 2018-08-10 | Stop reason: SDUPTHER

## 2018-07-16 NOTE — PROGRESS NOTES
"Subjective:       Patient ID: Rossi Mcneal is a 62 y.o. female.    Chief Complaint: Medication Refill    F/u chronic neck pain    HPI: 63 y/o with known cervical disc disease s/p fusion > 20 years ago presents for scheduled follow up. Since our last visit she was seen by neurosurgery. She would like to put off further spinal fusion surgries and has been referred to interventional pain management at [facility] as well as physical therapy (has not yet scheduled these). She reports no change in nature or severity of her pain she does endorse difficulty with bilateral hands fine motor activities. She does get relief with pain meidcation review of  shows no outside prescribers she denies constipation or oversedation      Review of Systems   Constitutional: Negative for activity change, appetite change, fatigue, fever and unexpected weight change.   HENT: Negative for ear pain, rhinorrhea and sore throat.    Eyes: Negative for discharge and visual disturbance.   Respiratory: Negative for chest tightness, shortness of breath and wheezing.    Cardiovascular: Negative for chest pain, palpitations and leg swelling.   Gastrointestinal: Negative for abdominal pain, constipation and diarrhea.   Endocrine: Negative for cold intolerance and heat intolerance.   Genitourinary: Negative for dysuria and hematuria.   Musculoskeletal: Positive for neck pain and neck stiffness. Negative for joint swelling.   Skin: Negative for rash.   Neurological: Negative for dizziness, syncope, weakness and headaches.   Psychiatric/Behavioral: Negative for suicidal ideas.       Objective:     Vitals:    07/16/18 1414   BP: 110/80   BP Location: Right arm   Patient Position: Sitting   BP Method: Medium (Manual)   Pulse: 82   Resp: 17   Temp: 98.2 °F (36.8 °C)   TempSrc: Oral   SpO2: 96%   Weight: 56 kg (123 lb 7.3 oz)   Height: 5' 1" (1.549 m)          Physical Exam   Constitutional: She is oriented to person, place, and time. She appears " well-developed and well-nourished.   HENT:   Head: Normocephalic and atraumatic.   Eyes: Conjunctivae are normal. No scleral icterus.   Neck: Normal range of motion.   Cardiovascular: Normal rate and regular rhythm.  Exam reveals no gallop and no friction rub.    No murmur heard.  Pulmonary/Chest: Effort normal and breath sounds normal. She has no wheezes. She has no rales.   Abdominal: Soft. Bowel sounds are normal. There is no tenderness. There is no rebound and no guarding.   Musculoskeletal: She exhibits no edema or tenderness.   No palpable spinous process deformity or tenderness to palpation   Neurological: She is alert and oriented to person, place, and time. No cranial nerve deficit.   Skin: Skin is warm and dry.   Psychiatric: She has a normal mood and affect.       Assessment and Plan   1. Disc disease, degenerative, cervical    - oxyCODONE-acetaminophen (PERCOCET) 5-325 mg per tablet; Take 1 tablet by mouth every 4 (four) hours as needed. for pain.  Dispense: 120 tablet; Refill: 0  - oxyCODONE (ROXICODONE) 5 MG immediate release tablet; Take 1 tablet (5 mg total) by mouth every 4 (four) hours as needed. for pain.  Dispense: 120 tablet; Refill: 0    2. Chronic, continuous use of opioids  contionue current medications refill sent to follow up with outside pain management and PT   - oxyCODONE-acetaminophen (PERCOCET) 5-325 mg per tablet; Take 1 tablet by mouth every 4 (four) hours as needed. for pain.  Dispense: 120 tablet; Refill: 0  - oxyCODONE (ROXICODONE) 5 MG immediate release tablet; Take 1 tablet (5 mg total) by mouth every 4 (four) hours as needed. for pain.  Dispense: 120 tablet; Refill: 0    3. Chronic hepatitis C without hepatic coma  Patient awaiting start of her medicare insurance before starting therapy no evidence of decompensation reviewed no more than 3 grams APAP per day    4. Cervical myelopathy with cervical radiculopathy  She would like to put of surgery will keep follow up with  neurosurgeon for repeat evaluatino in two more months

## 2018-07-24 DIAGNOSIS — R09.81 NASAL CONGESTION: ICD-10-CM

## 2018-07-24 DIAGNOSIS — F17.218 CIGARETTE NICOTINE DEPENDENCE WITH OTHER NICOTINE-INDUCED DISORDER: ICD-10-CM

## 2018-07-24 DIAGNOSIS — M50.90 CERVICAL DISC DISEASE: ICD-10-CM

## 2018-07-24 NOTE — TELEPHONE ENCOUNTER
----- Message from Marcelina Agarwal sent at 7/24/2018  2:48 PM CDT -----  Contact: self  Refill request for--- nicotine (NICODERM CQ) 21 mg/24 hr-- fluticasone (FLONASE) 50 mcg/actuation nasal spray---diazePAM (VALIUM) 10 MG Tab--- and--lisinopril 10 MG tablet      ..  [facility] [place] - [address]  [address]  [place] [address]  Phone: [phone] Fax: [phone]    Pt call back is [phone].

## 2018-07-25 RX ORDER — DIAZEPAM 10 MG/1
10 TABLET ORAL 2 TIMES DAILY PRN
Qty: 60 TABLET | Refills: 1 | Status: SHIPPED | OUTPATIENT
Start: 2018-07-25 | End: 2018-10-15 | Stop reason: SDUPTHER

## 2018-07-25 RX ORDER — IBUPROFEN 200 MG
TABLET ORAL
Qty: 14 PATCH | Refills: 1 | Status: SHIPPED | OUTPATIENT
Start: 2018-07-25 | End: 2020-06-22 | Stop reason: SDUPTHER

## 2018-07-25 RX ORDER — FLUTICASONE PROPIONATE 50 MCG
2 SPRAY, SUSPENSION (ML) NASAL DAILY
Qty: 1 BOTTLE | Refills: 3 | Status: SHIPPED | OUTPATIENT
Start: 2018-07-25 | End: 2019-03-04 | Stop reason: SDUPTHER

## 2018-07-27 ENCOUNTER — CLINICAL SUPPORT (OUTPATIENT)
Dept: REHABILITATION | Facility: HOSPITAL | Age: 63
End: 2018-07-27
Attending: NEUROLOGICAL SURGERY
Payer: MEDICAID

## 2018-07-27 DIAGNOSIS — R53.1 DECREASED STRENGTH, ENDURANCE, AND MOBILITY: ICD-10-CM

## 2018-07-27 DIAGNOSIS — M54.2 PAINFUL CERVICAL ROM: ICD-10-CM

## 2018-07-27 DIAGNOSIS — Z78.9 IMPAIRED MOTOR CONTROL: ICD-10-CM

## 2018-07-27 DIAGNOSIS — Z74.09 DECREASED STRENGTH, ENDURANCE, AND MOBILITY: ICD-10-CM

## 2018-07-27 DIAGNOSIS — R68.89 DECREASED STRENGTH, ENDURANCE, AND MOBILITY: ICD-10-CM

## 2018-07-27 PROCEDURE — 97162 PT EVAL MOD COMPLEX 30 MIN: CPT | Mod: PN

## 2018-07-27 PROCEDURE — 97110 THERAPEUTIC EXERCISES: CPT | Mod: PN
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
## 2018-07-27 NOTE — PROGRESS NOTES
See full Physical Therapy Evaluation in POC     Precautions: History of spinal fusion and shaun placement     Evaluation Date: 7/27/2018   Visit # authorized: 20  Authorization period: 12/31/2018    TREATMENT:  Rossi received therapeutic exercises to develop strength and endurance, flexibility for 15 minutes including:  Cervical isometrics c yellow TB (extension, lateral flexion)  Cervical rotations 20 reps ea   Cervical AROM extensions 20 reps    Prognosis: Fair    Anticipated barriers to physical therapy: Chronic condition     Medical necessity is demonstrated by the following IMPAIRMENTS/PROBLEM LIST:   1) Increase in pain level limiting function   2) Decreased cervical ROM    3) Decreased strength   4) Poor posture, cervical lateral deviation    5) Lack of HEP    GOALS: Short Term Goals:  6 weeks  1. Patient will report a decrease in cervical pain  <   / =  3/10 to increase tolerance for daily.   2. Patient will be able to increase MMT to 5/5 in shoulder to increase tolerance for daily activities.  3. Patient will be able to demonstrate an increase of lateral flexion left 5  degrees of pain free motion in cervical region to improve mobility.   4. Patient will be able to tolerate HEP to improve ROM and independence with ADL's.  5. Patient will be able to demonstrate proper resting and activity posture to improve shoulder mechanics with activity.     Long Term Goals: 12 weeks  1. Patient will report a decrease in cervical pain  <   / =  2/10 to increase tolerance for daily.   2. Patient will be able to demonstrate an increase of right rotation 10 degrees of pain free motion in cervical region to improve mobility.    3. Patient will be able to increase MMT to 5/5 in cervical region to increase tolerance for work and golfing activities.  4. Patient will be Independent with HEP to improve ROM and independence with ADL's        Plan     Cont PT 1-2 times a week for 12 weeks during the certification period (7/27/2018 -  10/19/2018) PN Due: (8/27/2018)

## 2018-07-31 DIAGNOSIS — F17.218 CIGARETTE NICOTINE DEPENDENCE WITH OTHER NICOTINE-INDUCED DISORDER: ICD-10-CM

## 2018-07-31 RX ORDER — IBUPROFEN 200 MG
TABLET ORAL
Qty: 14 PATCH | Refills: 1 | Status: SHIPPED | OUTPATIENT
Start: 2018-07-31 | End: 2021-02-08

## 2018-08-08 ENCOUNTER — TELEPHONE (OUTPATIENT)
Dept: NEUROSURGERY | Facility: CLINIC | Age: 63
End: 2018-08-08

## 2018-08-08 ENCOUNTER — DOCUMENTATION ONLY (OUTPATIENT)
Dept: REHABILITATION | Facility: HOSPITAL | Age: 63
End: 2018-08-08

## 2018-08-08 NOTE — PROGRESS NOTES
Patient cancelled today's PT appointment. Reason for cancellation: have to take care of mother. Patient's next appointment is 8/14/2018.    Cancel: 2  No show: 0    Therapist: Liana Mccullough PTA  08/08/2018

## 2018-08-10 DIAGNOSIS — F11.90 CHRONIC, CONTINUOUS USE OF OPIOIDS: ICD-10-CM

## 2018-08-10 DIAGNOSIS — M50.30 DISC DISEASE, DEGENERATIVE, CERVICAL: ICD-10-CM

## 2018-08-10 RX ORDER — OXYCODONE AND ACETAMINOPHEN 5; 325 MG/1; MG/1
TABLET ORAL
Qty: 120 TABLET | Refills: 0 | Status: SHIPPED | OUTPATIENT
Start: 2018-08-14 | End: 2018-09-10 | Stop reason: SDUPTHER

## 2018-08-10 RX ORDER — OXYCODONE HYDROCHLORIDE 5 MG/1
5 TABLET ORAL EVERY 4 HOURS PRN
Qty: 120 TABLET | Refills: 0 | OUTPATIENT
Start: 2018-08-14

## 2018-08-10 RX ORDER — OXYCODONE HYDROCHLORIDE 5 MG/1
TABLET ORAL
Qty: 120 TABLET | Refills: 0 | Status: SHIPPED | OUTPATIENT
Start: 2018-08-14 | End: 2018-09-10 | Stop reason: SDUPTHER

## 2018-08-14 ENCOUNTER — CLINICAL SUPPORT (OUTPATIENT)
Dept: REHABILITATION | Facility: HOSPITAL | Age: 63
End: 2018-08-14
Attending: NEUROLOGICAL SURGERY
Payer: MEDICAID

## 2018-08-14 DIAGNOSIS — R68.89 DECREASED STRENGTH, ENDURANCE, AND MOBILITY: ICD-10-CM

## 2018-08-14 DIAGNOSIS — Z78.9 IMPAIRED MOTOR CONTROL: ICD-10-CM

## 2018-08-14 DIAGNOSIS — Z74.09 DECREASED STRENGTH, ENDURANCE, AND MOBILITY: ICD-10-CM

## 2018-08-14 DIAGNOSIS — M54.2 PAINFUL CERVICAL ROM: ICD-10-CM

## 2018-08-14 DIAGNOSIS — R53.1 DECREASED STRENGTH, ENDURANCE, AND MOBILITY: ICD-10-CM

## 2018-08-14 PROCEDURE — 97110 THERAPEUTIC EXERCISES: CPT | Mod: PN

## 2018-08-14 NOTE — PROGRESS NOTES
"                                                    Physical Therapy Daily Note     Name: Rossi Mcneal  Clinic Number: [id number]  Diagnosis:   Encounter Diagnoses   Name Primary?    Painful cervical ROM     Impaired motor control     Decreased strength, endurance, and mobility      Physician: Cole Rider,   Precautions: History of spinal fusion and shaun placement   Visit #: 2/20  PTA Visit #: 1/6  Time In: 1430  Time Out: 1530    Subjective     Pt reports: Feels they pushed themselves outside of therapy session with HEP. This led to increase in cervical pain. Patient educated on completing all exercises in pain free range.     Pain Scale: Rossi rates pain on a scale of 0-10 to be 4 currently.    Objective     Rossi received individual therapeutic exercises to develop strength, flexibility and posture for 60 minutes including:    Cervical isometrics c yellow TB (extension, lateral flexion)  Cervical rotations 20 reps ea   Cervical AROM extensions 20 reps  +Supine Head nod flexion x 20  +Supine shoulder flexion dowel to 90 degrees x 15  +Supine shoulder abd, arms at side 90 x 15  +Prone shoulder extension "I" x 15  +Prone W x 15  +Prone T x15  +Scap retraction 15 x 2      Rossi received the following manual therapy techniques: Soft tissue Mobilization were applied to the for 0 minutes including:      Written Home Exercises Provided: Scap retraction and Shoulder flexion dowel assist added 8/14  Pt demo good understanding of the education provided. Rossi demonstrated good return demonstration of activities.     Education provided re:  Rossi verbalized good understanding of education provided.   No spiritual or educational barriers to learning provided    Assessment     Patient tolerated todays treatment session well. Pain reduced to 3/10 at end of session. Patient reminded to complete all HEP activities within tolerance. Patient agreeable to this. Declines ice or heat at end of session.     This is a 62 y.o. female " referred to outpatient physical therapy and presents with a medical diagnosis of painful cervical ROM, impaired motor control, and decreased strength and demonstrates limitations as described in the problem list. Pt prognosis is Good. Pt will continue to benefit from skilled outpatient physical therapy to address the deficits listed in the problem list, provide pt/family education and to maximize pt's level of independence in the home and community environment.     Goals as follows:    GOALS: Short Term Goals:  6 weeks  1. Patient will report a decrease in cervical pain  <   / =  3/10 to increase tolerance for daily.   2. Patient will be able to increase MMT to 5/5 in shoulder to increase tolerance for daily activities.  3. Patient will be able to demonstrate an increase of lateral flexion left 5  degrees of pain free motion in cervical region to improve mobility.   4. Patient will be able to tolerate HEP to improve ROM and independence with ADL's.  5. Patient will be able to demonstrate proper resting and activity posture to improve shoulder mechanics with activity.      Long Term Goals: 12 weeks  1. Patient will report a decrease in cervical pain  <   / =  2/10 to increase tolerance for daily.   2. Patient will be able to demonstrate an increase of right rotation 10 degrees of pain free motion in cervical region to improve mobility.    3. Patient will be able to increase MMT to 5/5 in cervical region to increase tolerance for work and golfing activities.  4. Patient will be Independent with HEP to improve ROM and independence with ADL's     Plan     Continue with established Plan of Care towards PT goals.    Therapist: Frederick Maier, PTA  8/14/2018

## 2018-08-21 ENCOUNTER — DOCUMENTATION ONLY (OUTPATIENT)
Dept: REHABILITATION | Facility: HOSPITAL | Age: 63
End: 2018-08-21

## 2018-08-22 ENCOUNTER — TELEPHONE (OUTPATIENT)
Dept: FAMILY MEDICINE | Facility: CLINIC | Age: 63
End: 2018-08-22

## 2018-08-22 NOTE — TELEPHONE ENCOUNTER
----- Message from Karoline Way sent at 8/21/2018  1:53 PM CDT -----  Contact: Self   Patient says she need a refill on her medication. Please call patient at [phone].      diazePAM (VALIUM) 10 MG Tab      [facility] [place] [address]

## 2018-09-10 DIAGNOSIS — M50.30 DISC DISEASE, DEGENERATIVE, CERVICAL: ICD-10-CM

## 2018-09-10 DIAGNOSIS — F11.90 CHRONIC, CONTINUOUS USE OF OPIOIDS: ICD-10-CM

## 2018-09-10 RX ORDER — OXYCODONE AND ACETAMINOPHEN 5; 325 MG/1; MG/1
TABLET ORAL
Qty: 120 TABLET | Refills: 0 | Status: SHIPPED | OUTPATIENT
Start: 2018-09-10 | End: 2018-10-08 | Stop reason: SDUPTHER

## 2018-09-10 RX ORDER — OXYCODONE HYDROCHLORIDE 5 MG/1
TABLET ORAL
Qty: 120 TABLET | Refills: 0 | Status: SHIPPED | OUTPATIENT
Start: 2018-09-10 | End: 2018-10-08 | Stop reason: SDUPTHER

## 2018-09-10 NOTE — TELEPHONE ENCOUNTER
----- Message from Stephanie Nava sent at 9/10/2018 11:06 AM CDT -----  Contact: self  Pt refill request    oxyCODONE-acetaminophen (PERCOCET) 5-325 mg per tablet    oxyCODONE (ROXICODONE) 5 MG immediate release tablet    [facility] [place]  [place] - [address] [phone] (Phone)  [phone] (Fax)

## 2018-09-24 ENCOUNTER — OFFICE VISIT (OUTPATIENT)
Dept: NEUROSURGERY | Facility: CLINIC | Age: 63
End: 2018-09-24
Payer: MEDICAID

## 2018-09-24 VITALS
HEART RATE: 88 BPM | WEIGHT: 132.94 LBS | BODY MASS INDEX: 25.1 KG/M2 | DIASTOLIC BLOOD PRESSURE: 76 MMHG | SYSTOLIC BLOOD PRESSURE: 122 MMHG | RESPIRATION RATE: 18 BRPM | HEIGHT: 61 IN

## 2018-09-24 DIAGNOSIS — M54.12 CERVICAL MYELOPATHY WITH CERVICAL RADICULOPATHY: ICD-10-CM

## 2018-09-24 DIAGNOSIS — M50.30 DISC DISEASE, DEGENERATIVE, CERVICAL: ICD-10-CM

## 2018-09-24 DIAGNOSIS — Z98.1 HISTORY OF FUSION OF CERVICAL SPINE: Primary | ICD-10-CM

## 2018-09-24 DIAGNOSIS — M25.512 LEFT SHOULDER PAIN, UNSPECIFIED CHRONICITY: ICD-10-CM

## 2018-09-24 DIAGNOSIS — G95.9 CERVICAL MYELOPATHY WITH CERVICAL RADICULOPATHY: ICD-10-CM

## 2018-09-24 DIAGNOSIS — M54.2 PAINFUL CERVICAL ROM: ICD-10-CM

## 2018-09-24 PROCEDURE — 99213 OFFICE O/P EST LOW 20 MIN: CPT | Mod: S$PBB,,, | Performed by: PHYSICIAN ASSISTANT

## 2018-09-24 PROCEDURE — 99999 PR PBB SHADOW E&M-EST. PATIENT-LVL IV: CPT | Mod: PBBFAC,,, | Performed by: PHYSICIAN ASSISTANT

## 2018-09-24 PROCEDURE — 99214 OFFICE O/P EST MOD 30 MIN: CPT | Mod: PBBFAC | Performed by: PHYSICIAN ASSISTANT

## 2018-09-24 NOTE — PATIENT INSTRUCTIONS
-Xray of your shoulder today. Someone from the office will call you with the results.   -Referral placed to Dr. Fowler, orthopedics for evaluation of your left shoulder  -Return to clinic in January to see Dr. iRder. At that time, he can review Dr. Fowler's evaluation of your shoulder and discuss neck surgery. Assuming you want to move forward with surgery in February, you will be able to book the date and review surgical consents in clinic at this appointment.       Please call with any questions or concerns prior to your next appointment.

## 2018-09-24 NOTE — PROGRESS NOTES
Interval History 9/24/18:  Pt returns to clinic today for follow-up of cervical myelopathy after being referred to PT and pain management by Dr. Rider. When she last saw Dr. Rider, he recommended cervical fusion, but the patient was unable to proceed due to family constraints involving her mother who is on hospice care. She has attended a few PT sessions, but has largely been doing the exercises at home, again due to time constraints. She has seen pain management at an outside facility who recommended cervical and lumbar NOEMI, to be completed in October.     Today she reports continued 5/10 left shoulder pain that would be worse if not for taking her pain medicine this morning. She still endorses dropping items from her hands and a change in her handwriting. She would like to be evaluated by ortho for her left shoulder pain.     HPI (6/2818):  Rossi Mcneal is a 62 y.o.  female with thoracolumbar scoliosis who underwent Sykes shaun placement at age 13 and as well as an anterior neck fusion in 1990, who presents  With increasing neck and arm pain as well as clumsiness and weakness in her arms and hands.  She has had neck pain for several years but she is noticing that  She is having pain more days of the week that she used to.  The pain radiates into her shoulders and down the outer aspects of her arms to her finger tips.  She also notes that she has numbness in both arms and tingling especially in the 1st 3 digits of both hands.  She notes that her hands have become weak and that she is dropping stuff from both arms and she feels overall clumsy.    She notes that she is prone to trippingShe was seen previously by Dr. Saldaña at [facility] who recommended that neck, back, and rotator cuff surgery.  She had declined at that time but is progressively getting worse and seeking a 2nd opinion.    She did PT several years ago and underwent epidural steroid injections in the past which provided relief for only a few  days.      Review of patient's allergies indicates:  No Known Allergies    Past Medical History:   Diagnosis Date    Chronic hepatitis C     Emphysema     Hypertension     Neuromuscular disorder      Past Surgical History:   Procedure Laterality Date    COLONOSCOPY N/A 7/14/2015    [facility]    ESOPHAGOGASTRODUODENOSCOPY (EGD) N/A 4/17/2017    [facility] at [facility]    NECK SURGERY  1990    PARTIAL HYSTERECTOMY  1995    ULTRASOUND-ENDOSCOPIC-UPPER N/A 11/13/2017    Performed by [facility] at [facility] (2ND FLR)     Family History   Problem Relation Age of Onset    Hypertension Mother     Cancer Father     Parkinsonism Father      Social History     Tobacco Use    Smoking status: Current Every Day Smoker     Types: Cigarettes    Smokeless tobacco: Never Used   Substance Use Topics    Alcohol use: Not on file    Drug use: Not on file        Review of Systems   Constitutional: Negative.    Respiratory: Negative for cough and shortness of breath.    Cardiovascular: Negative for chest pain, palpitations, claudication and leg swelling.   Gastrointestinal: Negative for abdominal pain, constipation and diarrhea.   Genitourinary: Negative for flank pain, frequency and urgency.   Musculoskeletal: Positive for shoulder and neck pain. Negative for falls, joint pain and myalgias.   Skin: Negative.    Neurological: Positive for tingling, sensory change and focal weakness. Negative for dizziness, tremors, speech change, seizures, loss of consciousness and headaches.   Psychiatric/Behavioral: Negative.        OBJECTIVE:   Vital Signs:  Pulse: 88 (09/24/18 1018)  Resp: 18 (09/24/18 1018)  BP: 122/76 (09/24/18 1018)    Physical Exam:  General: well developed, well nourished, no distress  Neurologic: Alert and oriented. Thought content appropriate.  GCS: Motor: 6/Verbal: 5/Eyes: 4 GCS Total: 15  Cranial nerves: II-XII grossly intact  Neck: supple,  without obvious masses or lesions  Skin: grossly intact in all 4 extremities without obvious rashes or lesions  Gait - normal  Tandem Gait - mild difficulty        Cervical ROM - full  Lumbar ROM - full  Motor Strength: No focal numbness or weakness  Strength  Deltoids Triceps Biceps Wrist Extension Wrist Flexion Hand    Upper: R 5/5 5/5 5/5 5/5 5/5 5/5    L 5/5 5/5 5/5 5/5 5/5 5/5     Iliopsoas Quadriceps Knee  Flexion Tibialis  anterior Gastro- cnemius EHL   Lower: R 5/5 5/5 5/5 5/5 5/5 5/5    L 5/5 5/5 5/5 5/5 5/5 5/5   Sensory: intact to light touch B/L UE and LE with decreased sensation along left shoulder  DTR's - 2 ++ and symmetric in UE and LE  Feliciano's - Positive bilaterally      Ankle Clonus - Negative         Babinski  - Negative         Diagnostic Results:  All imaging was independently reviewed by me.    MRI C spine, dated 5/11/16:  1. C5-6 fusion  2. Adjacent level degen T C4-5 and C6-7 causing moderate-severe stenosis  3. Focal kyphotic deformity at C4-5    MRI T+L spine, dated 5/11/16:  1. Signifcant T+L dextro-scoliosis with double curve  2. Significant artifact cause by singh rods   3. Moderate stenosis at L4-5  4. Moderate-severe stenosis at L5-S1    ASSESSMENT/PLAN:     Problem List Items Addressed This Visit        Neuro    Disc disease, degenerative, cervical    History of fusion of cervical spine - Primary    Cervical myelopathy with cervical radiculopathy       Orthopedic    Painful cervical ROM      Other Visit Diagnoses     Left shoulder pain, unspecified chronicity        Relevant Orders    X-Ray Shoulder 2 or More Views Left    Ambulatory Referral to Orthopedics          Assessment and Plan:  Rossi Mcneal is a 62 y.o. female who presented today for follow-up of cervical myelopathy following PT and pain mgmt referrals. She has done some physical therapy, but largely has been doing exercises at home. She is scheduled to undergo NOEMI in October with an outside pain management  physician. She has continued sxs of myelopathy including clumsiness in her hands and changes in handwriting. Strength was improved on my exam today, which may be attributed to new exercise program.     Pt is aware of and in agreement with the following plan:  -Xray of your shoulder today. Someone from the office will call you with the results.   -Referral placed to Dr. Fowler, orthopedics for evaluation of your left shoulder  -Return to clinic in January to see Dr. Rider. At that time, he can review Dr. Fowler's evaluation of your shoulder and discuss neck surgery. Assuming you want to move forward with surgery in February, you will be able to book the date and review surgical consents in clinic at this appointment.     Please call with any questions or concerns prior to your next appointment.

## 2018-10-08 DIAGNOSIS — F11.90 CHRONIC, CONTINUOUS USE OF OPIOIDS: ICD-10-CM

## 2018-10-08 DIAGNOSIS — M50.30 DISC DISEASE, DEGENERATIVE, CERVICAL: ICD-10-CM

## 2018-10-08 RX ORDER — OXYCODONE HYDROCHLORIDE 5 MG/1
TABLET ORAL
Qty: 120 TABLET | Refills: 0 | Status: SHIPPED | OUTPATIENT
Start: 2018-10-08 | End: 2018-11-05 | Stop reason: SDUPTHER

## 2018-10-08 RX ORDER — OXYCODONE HYDROCHLORIDE 5 MG/1
TABLET ORAL
Qty: 120 TABLET | OUTPATIENT
Start: 2018-10-08

## 2018-10-08 RX ORDER — OXYCODONE AND ACETAMINOPHEN 5; 325 MG/1; MG/1
TABLET ORAL
Qty: 120 TABLET | Refills: 0 | Status: SHIPPED | OUTPATIENT
Start: 2018-10-08 | End: 2018-11-05 | Stop reason: SDUPTHER

## 2018-10-08 RX ORDER — OXYCODONE AND ACETAMINOPHEN 5; 325 MG/1; MG/1
TABLET ORAL
Qty: 120 TABLET | OUTPATIENT
Start: 2018-10-08

## 2018-10-08 NOTE — TELEPHONE ENCOUNTER
----- Message from Porsha Montesinos sent at 10/8/2018  9:28 AM CDT -----  Contact: [phone]/ Self  Refills:    [oxyCODONE-acetaminophen (PERCOCET) 5-325 mg per tablet]  [oxyCODONE (ROXICODONE) 5 MG immediate release tablet]    Thank you.    [facility] - [place] - [address]  [address]  [place] [address]  Phone: [phone] Fax: [phone]

## 2018-10-15 DIAGNOSIS — M50.90 CERVICAL DISC DISEASE: ICD-10-CM

## 2018-10-15 DIAGNOSIS — I10 HYPERTENSION, ESSENTIAL: ICD-10-CM

## 2018-10-15 RX ORDER — LISINOPRIL 10 MG/1
10 TABLET ORAL DAILY
Qty: 90 TABLET | Refills: 3 | Status: SHIPPED | OUTPATIENT
Start: 2018-10-15 | End: 2019-11-04 | Stop reason: SDUPTHER

## 2018-10-15 RX ORDER — DIAZEPAM 10 MG/1
10 TABLET ORAL 2 TIMES DAILY PRN
Qty: 60 TABLET | Refills: 1 | Status: SHIPPED | OUTPATIENT
Start: 2018-10-15 | End: 2018-11-13 | Stop reason: SDUPTHER

## 2018-10-15 RX ORDER — VENLAFAXINE 75 MG/1
75 TABLET ORAL 2 TIMES DAILY
Qty: 60 TABLET | Refills: 5 | Status: SHIPPED | OUTPATIENT
Start: 2018-10-15 | End: 2018-11-13 | Stop reason: SDUPTHER

## 2018-10-15 NOTE — TELEPHONE ENCOUNTER
----- Message from Dany Carballo sent at 10/15/2018 10:44 AM CDT -----  Contact: Self/[phone]  Refill:lisinopril 10 MG tablet  Refill:diazePAM (VALIUM) 10 MG Tab  Refillvenlafaxine (EFFEXOR) 75 MG tablet      [facility] - [place] - [place], [place] - [address] [phone] (Phone)  [phone] (Fax)    Thank you

## 2018-11-05 DIAGNOSIS — M50.30 DISC DISEASE, DEGENERATIVE, CERVICAL: ICD-10-CM

## 2018-11-05 DIAGNOSIS — F11.90 CHRONIC, CONTINUOUS USE OF OPIOIDS: ICD-10-CM

## 2018-11-05 RX ORDER — OXYCODONE AND ACETAMINOPHEN 5; 325 MG/1; MG/1
TABLET ORAL
Qty: 120 TABLET | Refills: 0 | Status: SHIPPED | OUTPATIENT
Start: 2018-11-05 | End: 2018-11-30 | Stop reason: SDUPTHER

## 2018-11-05 RX ORDER — OXYCODONE HYDROCHLORIDE 5 MG/1
TABLET ORAL
Qty: 120 TABLET | Refills: 0 | Status: SHIPPED | OUTPATIENT
Start: 2018-11-05 | End: 2018-11-30 | Stop reason: SDUPTHER

## 2018-11-05 NOTE — TELEPHONE ENCOUNTER
----- Message from Briana Bhatt sent at 11/5/2018 11:23 AM CST -----  Contact: Self  Patient called requesting medication refill. Please call [phone]          oxyCODONE (ROXICODONE) 5 MG immediate release tablet  oxyCODONE-acetaminophen (PERCOCET) 5-325 mg per tablet          [facility] - [place] - [place] - [address]

## 2018-11-08 ENCOUNTER — TELEPHONE (OUTPATIENT)
Dept: HEPATOLOGY | Facility: CLINIC | Age: 63
End: 2018-11-08

## 2018-11-08 NOTE — TELEPHONE ENCOUNTER
----- Message from Lindsay Allen sent at 11/8/2018 10:54 AM CST -----  Contact: Patient  Needs Advice    Reason for call: Pt states her liver is swelling and has been tired constantly    Pt would like to start back taking her hep c pill        Communication Preference: [phone]     Additional Information: On 12/01/18 pt will have medicare and would like to be seen by a doctor

## 2018-11-13 DIAGNOSIS — M50.90 CERVICAL DISC DISEASE: ICD-10-CM

## 2018-11-13 RX ORDER — VENLAFAXINE 75 MG/1
75 TABLET ORAL 2 TIMES DAILY
Qty: 60 TABLET | Refills: 5 | Status: ON HOLD | OUTPATIENT
Start: 2018-11-13 | End: 2020-06-18 | Stop reason: HOSPADM

## 2018-11-13 RX ORDER — DIAZEPAM 10 MG/1
10 TABLET ORAL 2 TIMES DAILY PRN
Qty: 60 TABLET | Refills: 1 | Status: SHIPPED | OUTPATIENT
Start: 2018-11-13 | End: 2018-12-10 | Stop reason: SDUPTHER

## 2018-11-14 ENCOUNTER — HOSPITAL ENCOUNTER (OUTPATIENT)
Dept: RADIOLOGY | Facility: HOSPITAL | Age: 63
Discharge: HOME | End: 2018-11-14
Attending: INTERNAL MEDICINE
Payer: MEDICAID

## 2018-11-14 VITALS — WEIGHT: 130 LBS | HEIGHT: 61 IN | BODY MASS INDEX: 24.55 KG/M2

## 2018-11-14 DIAGNOSIS — Z12.39 BREAST CANCER SCREENING: ICD-10-CM

## 2018-11-14 PROCEDURE — 77067 SCR MAMMO BI INCL CAD: CPT | Mod: 26,,, | Performed by: RADIOLOGY

## 2018-11-14 PROCEDURE — 77063 BREAST TOMOSYNTHESIS BI: CPT | Mod: TC

## 2018-11-14 PROCEDURE — 77063 BREAST TOMOSYNTHESIS BI: CPT | Mod: 26,,, | Performed by: RADIOLOGY

## 2018-11-14 PROCEDURE — 77067 SCR MAMMO BI INCL CAD: CPT | Mod: TC

## 2018-11-30 DIAGNOSIS — M50.30 DISC DISEASE, DEGENERATIVE, CERVICAL: ICD-10-CM

## 2018-11-30 DIAGNOSIS — F11.90 CHRONIC, CONTINUOUS USE OF OPIOIDS: ICD-10-CM

## 2018-11-30 RX ORDER — OXYCODONE HYDROCHLORIDE 5 MG/1
TABLET ORAL
Qty: 120 TABLET | OUTPATIENT
Start: 2018-11-30

## 2018-11-30 RX ORDER — OXYCODONE AND ACETAMINOPHEN 5; 325 MG/1; MG/1
TABLET ORAL
Qty: 120 TABLET | Refills: 0 | Status: SHIPPED | OUTPATIENT
Start: 2018-12-03 | End: 2018-12-19 | Stop reason: SDUPTHER

## 2018-11-30 RX ORDER — OXYCODONE HYDROCHLORIDE 5 MG/1
TABLET ORAL
Qty: 120 TABLET | Refills: 0 | Status: SHIPPED | OUTPATIENT
Start: 2018-12-03 | End: 2018-12-19 | Stop reason: SDUPTHER

## 2018-11-30 RX ORDER — OXYCODONE AND ACETAMINOPHEN 5; 325 MG/1; MG/1
TABLET ORAL
Qty: 120 TABLET | OUTPATIENT
Start: 2018-11-30

## 2018-11-30 NOTE — TELEPHONE ENCOUNTER
----- Message from Justina Pricila sent at 11/30/2018 10:01 AM CST -----  Contact: self [phone]  Requesting refills on valsartan (DIOVAN) 40 MG tablet, oxyCODONE (ROXICODONE) 5 MG immediate release tablet and oxyCODONE-acetaminophen (PERCOCET) 5-325 mg per tablet  .  [facility] - [place] - [place] - [address]  [address]  [place] [address]  Phone: [phone] Fax: [phone]

## 2018-12-04 ENCOUNTER — OFFICE VISIT (OUTPATIENT)
Dept: HEPATOLOGY | Facility: CLINIC | Age: 63
End: 2018-12-04
Payer: MEDICARE

## 2018-12-04 VITALS
OXYGEN SATURATION: 93 % | RESPIRATION RATE: 18 BRPM | HEIGHT: 61 IN | WEIGHT: 130.31 LBS | TEMPERATURE: 98 F | BODY MASS INDEX: 24.6 KG/M2 | SYSTOLIC BLOOD PRESSURE: 114 MMHG | HEART RATE: 77 BPM | DIASTOLIC BLOOD PRESSURE: 77 MMHG

## 2018-12-04 DIAGNOSIS — R06.00 DYSPNEA, UNSPECIFIED TYPE: ICD-10-CM

## 2018-12-04 DIAGNOSIS — B18.2 CHRONIC HEPATITIS C WITHOUT HEPATIC COMA: Primary | ICD-10-CM

## 2018-12-04 DIAGNOSIS — R10.9 ABDOMINAL PAIN, UNSPECIFIED ABDOMINAL LOCATION: ICD-10-CM

## 2018-12-04 DIAGNOSIS — R53.83 FATIGUE, UNSPECIFIED TYPE: ICD-10-CM

## 2018-12-04 PROCEDURE — 99215 OFFICE O/P EST HI 40 MIN: CPT | Mod: PBBFAC | Performed by: PHYSICIAN ASSISTANT

## 2018-12-04 PROCEDURE — 99999 PR PBB SHADOW E&M-EST. PATIENT-LVL V: CPT | Mod: PBBFAC,,, | Performed by: PHYSICIAN ASSISTANT

## 2018-12-04 PROCEDURE — 99214 OFFICE O/P EST MOD 30 MIN: CPT | Mod: S$PBB,,, | Performed by: PHYSICIAN ASSISTANT

## 2018-12-04 NOTE — PROGRESS NOTES
"HEPATOLOGY CLINIC VISIT NOTE - HCV clinic    REFERRING PROVIDER:No ref. provider found  OUTSIDE REFERRING PROVIDER:    CHIEF COMPLAINT: Hepatitis C - discuss treatment    HISTORY: This is a 62 y.o. White female with chronic hepatitis C here for follow up. She was last seen by KYLAH Sullivan-C 2017.     Hepatitis C  was originally picked up about 2 years ago.  Preclinic labs confirm Genotype 1b Chronic hepatitis C w/ viral load of 1.4million IU/mL.  Positive HCV Ab noted . Recent labs show AST > ALT elevations otherwise very normal synthetic liver function. Platelets 378, INR = 1.0 & hemoglobin 12.9. Recalls icteric illness 10-20yrs.  Hepatitis C likely acquired with blood transfusions she had in  & .  Fibroscan done today suggests mild scarring at F2 w/ CAP = 342 (S3)        On her last abdominal Ultrasound the common duct is enlarged, 8 mm.  Minimal prominence of left intrahepatic ducts. Dilated common duct measuring 8 mm. Hepatomegaly with fatty infiltration of the liver. She had further eval with EUS and mild dilation of CBD with GB sludge, no significant pathology in visualized portion of the liver.     Pt reports + extreme fatigue, + joint pains --neck, legs, shoulders.   Low appetite, some nausea ( x 1 month) no abd pain,   denies fevers, weight change,  lymphadenopathy,  rashes, dark urine, abdominal fluid accumulation, yellowing/jaundice of skin or eyes, vomiting of blood, black stool, confusion or  tremors.      Spouse , he did have HCV     HCV treatment denied by insurance 2017, plan was to f/u in 6 months, overdue for f/u.    Since last visit, there have been no significant changes in her PMH. She does report frequent HA that she takes BC or goody powder, multiple times per week.     She reports that "her liver is swollen" but points to her left side. She doesn't feel pain today or "swelling." Her last U/S was 1 year ago.     She reports chronic fatigue. Pulse ox " consistently <95, daily smoker, recommended f/u with PCP.      Pt denies signs of hepatic decompensation including: jaundice, dark urine, abdominal distention, hematemesis, melena, slowed mentation.    Past Medical History:   Diagnosis Date    Chronic hepatitis C     Emphysema     Hypertension     Neuromuscular disorder          Past Surgical History:   Procedure Laterality Date    COLONOSCOPY N/A 7/14/2015    [facility]    ESOPHAGOGASTRODUODENOSCOPY (EGD) N/A 4/17/2017    [facility]    HYSTERECTOMY      NECK SURGERY  1990    PARTIAL HYSTERECTOMY  1995    ULTRASOUND-ENDOSCOPIC-UPPER N/A 11/13/2017    Performed by [facility]ian [facility] at [facility] (2ND FLR)         FAMILY HISTORY: Negative for liver disease  Father with cirrhosis, alcohol    SOCIAL HISTORY:   Social History     Tobacco Use   Smoking Status Current Every Day Smoker    Types: Cigarettes   Smokeless Tobacco Never Used       Social History     Substance and Sexual Activity   Alcohol Use Not on file   in 20s, heavy use      Social History     Substance and Sexual Activity   Drug Use Not on file           ROS:   No fever, chills, (+)weight gain, (+)fatigue, occasional night sweats   No chest pain, (+)dyspnea, (+)cough  No abdominal pain,  nausea, vomiting  No headaches, visual changes  No lower extremity edema  No depression or anxiety      PHYSICAL EXAM:  Friendly White female, in no acute distress; alert and oriented to person, place and time  VITALS: reviewed  HEENT: Sclerae anicteric.   NECK: Supple  CVS: Regular rate and rhythm. No murmurs  LUNGS: Normal respiratory effort. Clear bilaterally  ABDOMEN: Flat, soft, nontender. No organomegaly or masses. No ascites or hernias. Good bowel sounds.    SKIN: Warm and dry. No jaundice, No obvious rashes.   EXTREMITIES: No lower extremity edema  NEURO/PSYCH: Normal gate. Memory intact. Thought and speech pattern appropriate.  Behavior normal. No depression or anxiety noted.    RECENT LABS:  Lab Results   Component Value Date    WBC 8.97 07/21/2017    HGB 12.9 07/21/2017     (H) 07/21/2017     Lab Results   Component Value Date    INR 1.0 08/02/2017     Lab Results   Component Value Date    AST 42 (H) 07/21/2017    ALT 36 07/21/2017    BILITOT 0.3 07/21/2017    ALBUMIN 3.8 07/21/2017    ALKPHOS 76 07/21/2017    CREATININE 1.0 07/21/2017    BUN 18 07/21/2017     07/21/2017    K 3.6 07/21/2017         RECENT IMAGING:  U/S abdomen    ASSESSMENT  62 y.o. White female with:  1. CHRONIC HEPATITIS C, GENOTYPE 1b  hcvrna quant = 1.4million IU/mL   Recommend vaccines for Hepatitis A, HepB core pos, HBsAb pos, HBsAg neg    Evidence of extrahepatic manifestations of HCV w/ extensive  fatigue and joint pains   normal synthetic liver function  elevated transaminases (AST > ALT)     Fibroscan = F2     2. ABDOMINAL PAIN  -- update U/S    3. FATIGUE  -- likely multifactorial, ongoing fatigue, recommended seeing PCP   -- baseline chronic hypoxemia, reports ?h/o emphysema, may be playing a role, again recommended PCP    4. DYSPNEA  -- long term smoker  -- reports trouble blowing out air  -- occasional intermittent night sweats  -- encouraged smoking cessation and f/u with PCP       EDUCATION:   Discussed goal of HCV eradication to prevent progression of liver disease.  Discussed use of Mavyret x 8 weeks w/ potential side effects of fatigue,tiredness, and nausea    Discussed concern about Drug Drug Interactions with other products while on Mavyret. Pt instructed to contact me if other medications are prescribed during course of HCV therapy so potential DDIs can be assessed. Current medlist reviewed; no interactions noted  Herbal / alternative therapies must be avoided  Importance of medication compliance to prevent resistance was reviewed.     PLAN:  1. Labs today  2. Fibroscan  3. HCV treatment pending work up       Dany Stout PA-C

## 2018-12-10 DIAGNOSIS — M50.90 CERVICAL DISC DISEASE: ICD-10-CM

## 2018-12-10 RX ORDER — DIAZEPAM 10 MG/1
10 TABLET ORAL 2 TIMES DAILY PRN
Qty: 60 TABLET | Refills: 1 | Status: SHIPPED | OUTPATIENT
Start: 2018-12-10 | End: 2019-04-09 | Stop reason: SDUPTHER

## 2018-12-10 NOTE — TELEPHONE ENCOUNTER
----- Message from Kristin Guillen sent at 12/10/2018 12:54 PM CST -----  Contact: Self  Pt needs a refill on medication  diazePAM (VALIUM) 10 MG Tab. Please call pt at [phone]      [facility] - [place] - [place] - [address]  [address]  [place] [address]  Phone: [phone] Fax: [phone]

## 2018-12-12 ENCOUNTER — OFFICE VISIT (OUTPATIENT)
Dept: ORTHOPEDICS | Facility: CLINIC | Age: 63
End: 2018-12-12
Payer: MEDICARE

## 2018-12-12 VITALS
SYSTOLIC BLOOD PRESSURE: 100 MMHG | WEIGHT: 132.06 LBS | BODY MASS INDEX: 24.93 KG/M2 | HEART RATE: 77 BPM | HEIGHT: 61 IN | DIASTOLIC BLOOD PRESSURE: 68 MMHG

## 2018-12-12 DIAGNOSIS — M75.82 ROTATOR CUFF TENDINITIS, LEFT: ICD-10-CM

## 2018-12-12 DIAGNOSIS — M25.519 SHOULDER PAIN, UNSPECIFIED CHRONICITY, UNSPECIFIED LATERALITY: Primary | ICD-10-CM

## 2018-12-12 PROCEDURE — 99213 OFFICE O/P EST LOW 20 MIN: CPT | Mod: PBBFAC,25,PN | Performed by: ORTHOPAEDIC SURGERY

## 2018-12-12 PROCEDURE — 20610 DRAIN/INJ JOINT/BURSA W/O US: CPT | Mod: PBBFAC,PN | Performed by: ORTHOPAEDIC SURGERY

## 2018-12-12 PROCEDURE — 99999 PR PBB SHADOW E&M-EST. PATIENT-LVL III: CPT | Mod: PBBFAC,,, | Performed by: ORTHOPAEDIC SURGERY

## 2018-12-12 PROCEDURE — 99204 OFFICE O/P NEW MOD 45 MIN: CPT | Mod: 25,S$PBB,, | Performed by: ORTHOPAEDIC SURGERY

## 2018-12-12 RX ORDER — MELOXICAM 7.5 MG/1
7.5 TABLET ORAL DAILY
Qty: 30 TABLET | Refills: 0 | Status: SHIPPED | OUTPATIENT
Start: 2018-12-12 | End: 2019-07-11

## 2018-12-12 RX ADMIN — TRIAMCINOLONE ACETONIDE 40 MG: 40 INJECTION, SUSPENSION INTRA-ARTICULAR; INTRAMUSCULAR at 01:12

## 2018-12-12 NOTE — PROGRESS NOTES
CC: left shoulder pain    This patient was seen in consultation at the request of Alycia Contreras     HPI: Rossi Mcneal is a 62 y.o. female who presents today complaining of left shoulder pain   Duration of symptoms:  6- 7 months  Trauma or new activity: yes -- slipped on outdoor stairs to her house and landed on the left shoulder, hurt for a few days and then resolved. The pain returned and now hurts daily   Pain is intermittent   2/10 at best and 10/10 at its worst  Aggravating factors: motion, reaching overhead, nighttime   Relieving factors: ice, heat, topical creams, on oxycodone 5 chronically   Radicular symptoms: yes numbness, paresthesias and radiation of pain to the arm and fingertips   Associated symptoms:  limited range of motion.    Pain does interfere with sleep and activities of daily living .    This is the extent of the patient's complaints at this time.     Hand dominance: Right     Occupation: Retired -- Worked in home health    Review of Systems   Constitutional: Negative.    HENT: Negative.    Eyes: Negative.    Respiratory: Negative.    Cardiovascular: Negative.    Gastrointestinal: Negative.    Genitourinary: Negative.    Musculoskeletal: Positive for joint pain and neck pain.   Skin: Negative.    Neurological: Negative.    Psychiatric/Behavioral: Negative.          Review of patient's allergies indicates:  No Known Allergies      Current Outpatient Medications:     albuterol (VENTOLIN HFA) 90 mcg/actuation inhaler, Inhale 2 puffs into the lungs every 6 hours as needed for Wheezing., Disp: 18 g, Rfl: 2    diazePAM (VALIUM) 10 MG Tab, Take 1 tablet (10 mg total) by mouth 2 (two) times daily as needed., Disp: 60 tablet, Rfl: 1    fluticasone (FLONASE) 50 mcg/actuation nasal spray, 2 sprays (100 mcg total) by Each Nare route once daily., Disp: 1 Bottle, Rfl: 3    lisinopril 10 MG tablet, Take 1 tablet (10 mg total) by mouth once daily., Disp: 90 tablet, Rfl: 3    nicotine (NICODERM CQ)  21 mg/24 hr, PLACE ONE PATCH ONTO SKIN EVERY DAY, Disp: 14 patch, Rfl: 1    nicotine (NICODERM CQ) 21 mg/24 hr, place ONE PATCH onto the skin EVERY DAY, Disp: 14 patch, Rfl: 1    oxyCODONE (ROXICODONE) 5 MG immediate release tablet, TAKE ONE TABLET BY MOUTH EVERY 4 HOURS AS NEEDED FOR PAIN *MAY CAUSE DROWSINESS*, Disp: 120 tablet, Rfl: 0    oxyCODONE-acetaminophen (PERCOCET) 5-325 mg per tablet, TAKE ONE TABLET BY MOUTH EVERY 4 HOURS AS NEEDED FOR PAIN *MAY CAUSE DROWSINESS*, Disp: 120 tablet, Rfl: 0    venlafaxine (EFFEXOR) 75 MG tablet, Take 1 tablet (75 mg total) by mouth 2 (two) times daily., Disp: 60 tablet, Rfl: 5    Past Medical History:   Diagnosis Date    Chronic hepatitis C     Emphysema     Hypertension     Neuromuscular disorder        Patient Active Problem List   Diagnosis    Screening for colon cancer    Disc disease, degenerative, cervical    Chronic, continuous use of opioids    Hepatitis C antibody test positive    Hypertension, essential    Chronic hepatitis C without hepatic coma    Hepatic steatosis    Tobacco use    Abnormal finding on GI tract imaging    Other emphysema    Anxiety    History of fusion of cervical spine    Cervical myelopathy with cervical radiculopathy    Cervical stenosis of spine    Painful cervical ROM    Impaired motor control    Decreased strength, endurance, and mobility       Past Surgical History:   Procedure Laterality Date    COLONOSCOPY N/A 7/14/2015    Performed by [facility]yman [facility] at [facility]    ESOPHAGOGASTRODUODENOSCOPY (EGD) N/A 4/17/2017    Performed by Hesham Frazier [facility] at [facility] END[facility]    HYSTERECTOMY      NECK SURGERY  1990    PARTIAL HYSTERECTOMY  1995    ULTRASOUND-ENDOSCOPIC-UPPER N/A 11/13/2017    Performed by Kristian Fountain MD at [facility] ENDO (2ND FLR)       Social History     Tobacco Use    Smoking status: Current Every Day Smoker     Packs/day: 0.50     Years: 18.00     Pack years: 9.00     Types: Cigarettes     Smokeless tobacco: Never Used   Substance Use Topics    Alcohol use: No     Frequency: Never    Drug use: No       Family History   Problem Relation Age of Onset    Hypertension Mother     Cancer Father     Parkinsonism Father     Breast cancer Maternal Aunt      Physical Exam:     Vitals:    12/12/18 1334   BP: 100/68   Pulse: 77        General: Weight: 59.9 kg (132 lb 0.9 oz) Body mass index is 24.95 kg/m².  Patient is alert, awake and oriented to time, place and person. Mood and affect are appropriate.  Patient does not appear to be in any distress, denies any constitutional symptoms and appears stated age.   HEENT: Pupils are equal and round, sclera are not injected. External examination of ears and nose reveals no abnormalities. Cranial nerves II-X are grossly intact  Neck: examination demonstrates painless limited active range of motion. Spurling's sign is negative  Skin: no rashes, abrasions or open wounds on the affected extremity   Resp: No respiratory distress or audible wheezing   CV: 2+  pulses, all extremities warm and well perfused     Left Shoulder     Shoulder Range of Motion    Right     Left   (Active/Passive)       Forward Elevation     160/160            150/160  External rotation (arm at side)  50/50             50/50    Internal rotation behind the back  L5             L5     Range of motion is painful     Scapular winging no  Scapular dyskinesia no    Examination of the back shows no atrophy    Acromioclavicular joint is not tender  Crossbody test: negative    Neer's positive  Hawkin's positive    Karlee's positive - pain  Drop arm negative  Belly press negative  External rotation lag sign negative  Internal rotation lag sign negative    Cuff Strength     Right     Left   Supraspinatus        5/5    5/5  Infraspinatus     5/5    5/5  Subscapularis     5/5    5/5    Deltoid testing            5/5    5/5  Speeds negative  Yergasons negative    Elbow examination demonstrates no tenderness to  palpation and has normal range of motion.     ltsi C5-T1  + epl, io, fds, fdp   2+ RP        Imaging: 3 views of the left shoulder:  positive for degenerative changes of the AC joint. The humeral head is well centered on the AP and axillary views.  There is calcification at the rotator cuff insertion on the greater tuberosity. There is not significant degenerative change of the glenohumeral joint or posterior subluxation of the humeral head. No acute changes or fracture.      Assessment: 62 y.o. female with left shoulder pain, subacromial bursitis, rotator cuff tendinitis     I explained my diagnostic impression and the reasoning behind it in detail, using layman's terms.  Models and/or pictures were used to help in the explanation.    Plan:   - Mobic 7.5 mg PO QD x 2 weeks then PRN. The patient was advised that NSAID-type medications have important potential side effects: gastrointestinal irritation, GI bleeding, cardiac effects and renal injuries. Take the medication with food and to stop and call the office for any GI upset, vomiting, abdominal pain or black/bloody stools. The patient expresses understanding of these issues and questions were answered.  - Injection left shoulder please see note for details.  I instructed the patient in risks and benefits and asked her to pay attention to what percentage pain relief she gets in the next 2 hours   - Return to clinic in 3 months. Return sooner if symptoms worsen or fail to improve.    All questions were answered in detail. The patient is in full agreement with the treatment plan and will proceed accordingly.    A note notifying Alycia Contreras of my findings was sent via the electronic medical record

## 2018-12-12 NOTE — LETTER
December 15, 2018      Alycia Contreras PA-C  [address] [facility] [address]  [address]  [facility] [address]           [facility]  [address]  [place] [address]  Phone: [phone]          Patient: Rossi Mcneal   MR Number: [id number]   YOB: 1955   Date of Visit: 12/12/2018       Dear Alycia Contreras:    Thank you for referring Rossi Mcneal to me for evaluation. Attached you will find relevant portions of my assessment and plan of care.    If you have questions, please do not hesitate to call me. I look forward to following Rossi Mcneal along with you.    Sincerely,    Malinda Fowler MD    Enclosure  CC:  No Recipients    If you would like to receive this communication electronically, please contact [email or web address] or [phone] to request more information on Epic[employer] Link access.    For providers and/or their staff who would like to refer a patient to [facility], please contact us through our one-stop-shop provider referral line, [facility], at [phone].    If you feel you have received this communication in error or would no longer like to receive these types of communications, please e-mail [email or web address]

## 2018-12-13 ENCOUNTER — PROCEDURE VISIT (OUTPATIENT)
Dept: HEPATOLOGY | Facility: CLINIC | Age: 63
End: 2018-12-13
Attending: PHYSICIAN ASSISTANT
Payer: MEDICARE

## 2018-12-13 ENCOUNTER — HOSPITAL ENCOUNTER (OUTPATIENT)
Dept: RADIOLOGY | Facility: HOSPITAL | Age: 63
Discharge: HOME | End: 2018-12-13
Attending: PHYSICIAN ASSISTANT
Payer: MEDICARE

## 2018-12-13 DIAGNOSIS — B18.2 CHRONIC HEPATITIS C WITHOUT HEPATIC COMA: ICD-10-CM

## 2018-12-13 PROCEDURE — 91200 LIVER ELASTOGRAPHY: CPT | Mod: 26,S$PBB,, | Performed by: PHYSICIAN ASSISTANT

## 2018-12-13 PROCEDURE — 91200 LIVER ELASTOGRAPHY: CPT | Mod: PBBFAC | Performed by: PHYSICIAN ASSISTANT

## 2018-12-13 PROCEDURE — 76700 US EXAM ABDOM COMPLETE: CPT | Mod: 26,,, | Performed by: RADIOLOGY

## 2018-12-13 PROCEDURE — 76700 US EXAM ABDOM COMPLETE: CPT | Mod: TC

## 2018-12-14 ENCOUNTER — TELEPHONE (OUTPATIENT)
Dept: HEPATOLOGY | Facility: CLINIC | Age: 63
End: 2018-12-14

## 2018-12-14 ENCOUNTER — TELEPHONE (OUTPATIENT)
Dept: PHARMACY | Facility: CLINIC | Age: 63
End: 2018-12-14

## 2018-12-14 NOTE — PROCEDURES
Fibroscan Procedure     Name: Rossi Mcneal  Date of Procedure : 2018   :: Dany Stout PA-C  Diagnosis: HCV    Probe: XL    Fibroscan readin. KPa    Fibrosis:F4     CAP readin dB/m    Steatosis: :S3

## 2018-12-14 NOTE — TELEPHONE ENCOUNTER
LVM for this pt with this information on it today asked pt to call us back with any questions or concerns.

## 2018-12-14 NOTE — TELEPHONE ENCOUNTER
----- Message from Dany Stout PA-C sent at 12/14/2018 11:21 AM CST -----  Please let her know that these labs are stable  Thanks

## 2018-12-14 NOTE — TELEPHONE ENCOUNTER
----- Message from Dany Stout PA-C sent at 12/14/2018 11:22 AM CST -----  Please let her know U/S is stable.

## 2018-12-14 NOTE — TELEPHONE ENCOUNTER
Please let her know that the fibroscan showed a significant increase in her scar tissue to cirrhosis. It would be unexpected for this to happen in just a years time. She had a large amount of fatty tissue in the liver so that may be interfering the results.     I'm not overly concerned by this reading at this time and will investigate after HCV is treated. Her liver function is stable.     We will treat HCV with mavyret x 12.

## 2018-12-15 PROBLEM — M75.82 ROTATOR CUFF TENDINITIS, LEFT: Status: ACTIVE | Noted: 2018-12-15

## 2018-12-15 RX ORDER — TRIAMCINOLONE ACETONIDE 40 MG/ML
40 INJECTION, SUSPENSION INTRA-ARTICULAR; INTRAMUSCULAR
Status: DISCONTINUED | OUTPATIENT
Start: 2018-12-12 | End: 2018-12-12 | Stop reason: HOSPADM

## 2018-12-15 NOTE — PROCEDURES
Large Joint Aspiration/Injection: L subacromial bursa  Date/Time: 12/12/2018 1:40 PM  Performed by: Malinda Fowler MD  Authorized by: Malinda Fowler MD     Consent Done?:  Yes (Verbal)  Indications:  Pain  Timeout: Prior to procedure the correct patient, procedure, and site was verified      Location:  Shoulder  Site:  L subacromial bursa  Prep: Patient was prepped and draped in usual sterile fashion    Ultrasonic Guidance for needle placement: No  Needle size:  22 G  Approach:  Posterior  Medications:  40 mg triamcinolone acetonide 40 mg/mL  Patient tolerance:  Patient tolerated the procedure well with no immediate complications

## 2018-12-17 ENCOUNTER — TELEPHONE (OUTPATIENT)
Dept: PHARMACY | Facility: CLINIC | Age: 63
End: 2018-12-17

## 2018-12-17 NOTE — TELEPHONE ENCOUNTER
DOCUMENTATION ONLY:  Prior authorization for Mavyret approved from 11/17/2018 to 12/17/2019 x 12 weeks of treatment.   Case ID# [id number]     Co-pay: $3.36    Patient Assistance IS NOT required.     Forward to clinical pharmacist for consult & shipment.

## 2018-12-19 ENCOUNTER — TELEPHONE (OUTPATIENT)
Dept: FAMILY MEDICINE | Facility: CLINIC | Age: 63
End: 2018-12-19

## 2018-12-19 DIAGNOSIS — F11.90 CHRONIC, CONTINUOUS USE OF OPIOIDS: ICD-10-CM

## 2018-12-19 DIAGNOSIS — M50.30 DISC DISEASE, DEGENERATIVE, CERVICAL: ICD-10-CM

## 2018-12-19 RX ORDER — OXYCODONE AND ACETAMINOPHEN 5; 325 MG/1; MG/1
TABLET ORAL
Qty: 120 TABLET | Refills: 0 | Status: SHIPPED | OUTPATIENT
Start: 2019-01-02 | End: 2018-12-20 | Stop reason: SDUPTHER

## 2018-12-19 RX ORDER — OXYCODONE HYDROCHLORIDE 5 MG/1
TABLET ORAL
Qty: 120 TABLET | Refills: 0 | Status: SHIPPED | OUTPATIENT
Start: 2019-01-02 | End: 2018-12-20 | Stop reason: SDUPTHER

## 2018-12-19 NOTE — TELEPHONE ENCOUNTER
----- Message from Louis Serna sent at 12/19/2018 11:02 AM CST -----  Contact: Self/[phone]  Refill:  oxyCODONE (ROXICODONE) 5 MG immediate release tablet    oxyCODONE-acetaminophen (PERCOCET) 5-325 mg per tablet    .  [facility] [place] - [address]  [address]  [place] [address]  Phone: [phone] Fax: [phone]    Thank you.

## 2018-12-20 ENCOUNTER — EPISODE CHANGES (OUTPATIENT)
Dept: HEPATOLOGY | Facility: CLINIC | Age: 63
End: 2018-12-20

## 2018-12-20 DIAGNOSIS — M50.30 DISC DISEASE, DEGENERATIVE, CERVICAL: ICD-10-CM

## 2018-12-20 DIAGNOSIS — F11.90 CHRONIC, CONTINUOUS USE OF OPIOIDS: ICD-10-CM

## 2018-12-20 RX ORDER — OXYCODONE HYDROCHLORIDE 5 MG/1
TABLET ORAL
Qty: 120 TABLET | Refills: 0 | Status: SHIPPED | OUTPATIENT
Start: 2018-12-21 | End: 2019-01-14

## 2018-12-20 RX ORDER — OXYCODONE AND ACETAMINOPHEN 5; 325 MG/1; MG/1
TABLET ORAL
Qty: 120 TABLET | Refills: 0 | Status: SHIPPED | OUTPATIENT
Start: 2018-12-21 | End: 2019-01-14 | Stop reason: SDUPTHER

## 2018-12-20 NOTE — TELEPHONE ENCOUNTER
----- Message from Justina Mcnulty sent at 12/20/2018 11:36 AM CST -----  Contact: self [phone]  Pt returned a missed call to stall

## 2018-12-20 NOTE — TELEPHONE ENCOUNTER
----- Message from Briana Bhatt sent at 12/20/2018 11:16 AM CST -----  Contact: Self  Patient called because she needs her refills before 01/02. Please call at [phone]        oxycodone (OXY-IR) 5 mg Cap  oxycodone-acetaminophen (PERCOCET) 5-325 mg per tablet            [facility] [place] [place] [place] - [address]

## 2018-12-20 NOTE — TELEPHONE ENCOUNTER
Pt. States she is in a lot of pain and has been needing to take Oxycodone (Roxicodone) and Oxycodone-Acetaminophen q 4hrs as needed for pain.     Alvin told her Dr. Corcoran can change next refill date to tomorrow and patient will be covered.

## 2018-12-26 ENCOUNTER — TELEPHONE (OUTPATIENT)
Dept: HEPATOLOGY | Facility: CLINIC | Age: 63
End: 2018-12-26

## 2018-12-26 DIAGNOSIS — K74.00 LIVER FIBROSIS: ICD-10-CM

## 2018-12-26 DIAGNOSIS — B18.2 CHRONIC HEPATITIS C WITHOUT HEPATIC COMA: Primary | ICD-10-CM

## 2018-12-26 NOTE — TELEPHONE ENCOUNTER
Pt beginning 12 weeks of Mavyret on 12/19/18  F2-F4?    Pls schedule:  - CMP, HCV RNA, AFP at week 6 - 01/29/18  - CMP, HCV RNA at week 12 - end of treatment - 03/12/18    thanks

## 2018-12-27 ENCOUNTER — TELEPHONE (OUTPATIENT)
Dept: PHARMACY | Facility: CLINIC | Age: 63
End: 2018-12-27

## 2018-12-27 NOTE — TELEPHONE ENCOUNTER
Initial clinical follow-up conducted for Mavyret.  Name/ confirmed.  No missed doses; no new medications; no side effects noted.  Patient understands to report any medication changes to [facility] and provider.  All questions answered and addressed to patients satisfaction.

## 2019-01-09 ENCOUNTER — TELEPHONE (OUTPATIENT)
Dept: PHARMACY | Facility: CLINIC | Age: 64
End: 2019-01-09

## 2019-01-09 NOTE — TELEPHONE ENCOUNTER
Mavyret (2 of 3)  refill confirmed. We will ship Mavyret refill on  via fedex to arrive on 1/15. $3.80 copay- 004. Confirmed 2 patient identifiers - name and .     Patient has 7 doses of Mavyret remaining and takes it around dinner daily.  Pt reports they are not having any side effects so far. No missed doses, no new medications, no new allergies or health conditions reported at this time.. All questions answered and addressed to patients satisfaction. Pt verbalized understanding.

## 2019-01-14 ENCOUNTER — OFFICE VISIT (OUTPATIENT)
Dept: FAMILY MEDICINE | Facility: CLINIC | Age: 64
End: 2019-01-14
Payer: MEDICARE

## 2019-01-14 VITALS
TEMPERATURE: 99 F | HEIGHT: 61 IN | RESPIRATION RATE: 17 BRPM | OXYGEN SATURATION: 97 % | HEART RATE: 93 BPM | BODY MASS INDEX: 25.23 KG/M2 | SYSTOLIC BLOOD PRESSURE: 130 MMHG | DIASTOLIC BLOOD PRESSURE: 82 MMHG | WEIGHT: 133.63 LBS

## 2019-01-14 DIAGNOSIS — M50.30 DISC DISEASE, DEGENERATIVE, CERVICAL: ICD-10-CM

## 2019-01-14 DIAGNOSIS — F11.90 CHRONIC, CONTINUOUS USE OF OPIOIDS: ICD-10-CM

## 2019-01-14 DIAGNOSIS — B18.2 CHRONIC HEPATITIS C WITHOUT HEPATIC COMA: Primary | ICD-10-CM

## 2019-01-14 DIAGNOSIS — M54.12 CERVICAL MYELOPATHY WITH CERVICAL RADICULOPATHY: ICD-10-CM

## 2019-01-14 DIAGNOSIS — G95.9 CERVICAL MYELOPATHY WITH CERVICAL RADICULOPATHY: ICD-10-CM

## 2019-01-14 PROCEDURE — 99214 OFFICE O/P EST MOD 30 MIN: CPT | Mod: S$PBB,,, | Performed by: INTERNAL MEDICINE

## 2019-01-14 PROCEDURE — 99213 OFFICE O/P EST LOW 20 MIN: CPT | Mod: PBBFAC,PN | Performed by: INTERNAL MEDICINE

## 2019-01-14 PROCEDURE — 99999 PR PBB SHADOW E&M-EST. PATIENT-LVL III: ICD-10-PCS | Mod: PBBFAC,,, | Performed by: INTERNAL MEDICINE

## 2019-01-14 PROCEDURE — 99214 PR OFFICE/OUTPT VISIT, EST, LEVL IV, 30-39 MIN: ICD-10-PCS | Mod: S$PBB,,, | Performed by: INTERNAL MEDICINE

## 2019-01-14 PROCEDURE — 99999 PR PBB SHADOW E&M-EST. PATIENT-LVL III: CPT | Mod: PBBFAC,,, | Performed by: INTERNAL MEDICINE

## 2019-01-14 RX ORDER — OXYCODONE HYDROCHLORIDE 10 MG/1
10 TABLET ORAL EVERY 6 HOURS PRN
Qty: 100 TABLET | Refills: 0 | Status: SHIPPED | OUTPATIENT
Start: 2019-01-14 | End: 2019-02-08 | Stop reason: SDUPTHER

## 2019-01-14 RX ORDER — OXYCODONE AND ACETAMINOPHEN 5; 325 MG/1; MG/1
TABLET ORAL
Qty: 120 TABLET | Refills: 0 | Status: SHIPPED | OUTPATIENT
Start: 2019-01-14 | End: 2019-04-15 | Stop reason: DRUGHIGH

## 2019-01-14 RX ORDER — OXYCODONE HYDROCHLORIDE 10 MG/1
10 TABLET ORAL EVERY 6 HOURS PRN
Qty: 100 TABLET | Refills: 0 | Status: SHIPPED | OUTPATIENT
Start: 2019-01-14 | End: 2019-01-14

## 2019-01-14 NOTE — PROGRESS NOTES
"Subjective:       Patient ID: Rossi Mcneal is a 63 y.o. female.    Chief Complaint: Follow-up and Establish Care    F/u chronic pain    HPI: 64 y/o w/ cervical and lumbar disc disease s/p multi level fusion and HCV with cirrhosis now on therapy presetns fro scheduled follow up. Pain of posterior neck has been wrose over last two months no constipation no LE swelling. She is limited in amount of APAP she can take due to cirrhosis and no NSAID's in light of bleeding risk. No parathesia. Recent left shoulder injection was helpful for improvement in ROM      Review of Systems   Constitutional: Negative for activity change, appetite change, fatigue, fever and unexpected weight change.   HENT: Negative for ear pain, rhinorrhea and sore throat.    Eyes: Negative for discharge and visual disturbance.   Respiratory: Negative for chest tightness, shortness of breath and wheezing.    Cardiovascular: Negative for chest pain, palpitations and leg swelling.   Gastrointestinal: Negative for abdominal pain, constipation and diarrhea.   Endocrine: Negative for cold intolerance and heat intolerance.   Genitourinary: Negative for dysuria and hematuria.   Musculoskeletal: Positive for back pain, neck pain and neck stiffness. Negative for joint swelling.   Skin: Negative for rash.   Neurological: Negative for dizziness, syncope, weakness and headaches.   Psychiatric/Behavioral: Negative for suicidal ideas.       Objective:     Vitals:    01/14/19 1550   BP: 130/82   BP Location: Right arm   Patient Position: Sitting   Pulse: 93   Resp: 17   Temp: 98.9 °F (37.2 °C)   TempSrc: Oral   SpO2: 97%   Weight: 60.6 kg (133 lb 9.6 oz)   Height: 5' 1" (1.549 m)          Physical Exam   Constitutional: She is oriented to person, place, and time. She appears well-developed and well-nourished.   HENT:   Head: Normocephalic and atraumatic.   Eyes: Conjunctivae are normal. No scleral icterus.   Neck: Normal range of motion.   Cardiovascular: Normal " rate and regular rhythm. Exam reveals no gallop and no friction rub.   No murmur heard.  No LE edema   Pulmonary/Chest: Effort normal and breath sounds normal. She has no wheezes. She has no rales.   Abdominal: Soft. Bowel sounds are normal. There is no tenderness. There is no rebound and no guarding.   Musculoskeletal: Normal range of motion. She exhibits no edema or tenderness.   Neurological: She is alert and oriented to person, place, and time. No cranial nerve deficit.   5/5  strength bilaterally   Skin: Skin is warm and dry.   Psychiatric: She has a normal mood and affect.       Assessment and Plan   1. Chronic hepatitis C without hepatic coma  Continue anti viral and management per hepatolgoy    2. Cervical myelopathy with cervical radiculopathy  Stable     3. Disc disease, degenerative, cervical  Increase short acting opioid to total daily dose of 60mme  - oxyCODONE-acetaminophen (PERCOCET) 5-325 mg per tablet; TAKE ONE TABLET BY MOUTH EVERY 4 HOURS AS NEEDED FOR PAIN *fill 12/3/18  Dispense: 120 tablet; Refill: 0  - oxyCODONE (ROXICODONE) 10 mg Tab immediate release tablet; Take 1 tablet (10 mg total) by mouth every 6 (six) hours as needed for Pain. TAKE ONE TABLET BY MOUTH EVERY 4 HOURS AS NEEDED FOR PAIN *fill 12/3/18  Dispense: 100 tablet; Refill: 0    4. Chronic, continuous use of opioids   as abvoe  reviewed no outside prescribers  - oxyCODONE-acetaminophen (PERCOCET) 5-325 mg per tablet; TAKE ONE TABLET BY MOUTH EVERY 4 HOURS AS NEEDED FOR PAIN *fill 12/3/18  Dispense: 120 tablet; Refill: 0  - oxyCODONE (ROXICODONE) 10 mg Tab immediate release tablet; Take 1 tablet (10 mg total) by mouth every 6 (six) hours as needed for Pain. TAKE ONE TABLET BY MOUTH EVERY 4 HOURS AS NEEDED FOR PAIN *fill 12/3/18  Dispense: 100 tablet; Refill: 0

## 2019-01-23 ENCOUNTER — TELEPHONE (OUTPATIENT)
Dept: HEPATOLOGY | Facility: CLINIC | Age: 64
End: 2019-01-23

## 2019-01-23 ENCOUNTER — TELEPHONE (OUTPATIENT)
Dept: PHARMACY | Facility: CLINIC | Age: 64
End: 2019-01-23

## 2019-01-23 DIAGNOSIS — R04.0 EPISTAXIS: Primary | ICD-10-CM

## 2019-01-23 NOTE — TELEPHONE ENCOUNTER
I spoke with patient and msg from KYLAH Stout relayed.  Patient transferred to ENT for scheduling.

## 2019-01-23 NOTE — TELEPHONE ENCOUNTER
"----- Message from Araceli Espino PharmD sent at 1/23/2019  8:53 AM CST -----  Regarding: Mavyret - complaints/SEs  Leonphillip MOON Saenz:   Incoming call stating that she is on Mavyret x 5 weeks now and experienced nose bleeds, headaches, and "eyes are on fire". When further assessing these symptoms, she clarifies that the nose bleeds are pretty bad, filling up washcloths at a time and occurring at least 3 times a day for the last 3 days. The last occurrence was 1/22/19 morning. The headaches feel like a "high blood pressure" headache which she said she took her BP and initially got a reading of 170/80 but when clarified, she corrected herself and said it was 110/70. She states that headaches have dissipated and BP is normal at 110/70 when she checked 10 minutes ago. Her "eyes on fire" has occured x 2 days and were further classified as a severe dryness, which she has suffered with for the last 2 years. She was advised to use some artificial tears or rewetting drops OTC to help with that. I expressed my concern if the nosebleeds continue and she loses too much blood or if her headaches worsen and she begins to experience eyesight changes, she MUST go to the urgent care/ER. She states that symptoms have dissipated but verbalized understanding.     Thanks,  Araceli  "

## 2019-01-23 NOTE — TELEPHONE ENCOUNTER
"Incoming call stating that she is on Mavyret x 5 weeks now and experienced nose bleeds, headaches, and "eyes are on fire". When further assessing these symptoms, she clarifies that the nose bleeds are pretty bad, filling up washcloths at a time and occurring at least 3 times a day for the last 3 days. The last occurrence was 1/22/19 morning. The headaches feel like a "high blood pressure" headache which she said she took her BP and initially got a reading of 170/80 but when clarified, she corrected herself and said it was 110/70. She states that headaches have dissipated and BP is normal at 110/70 when she checked 10 minutes ago. Her "eyes on fire" has occured x 2 days and were further classified as a severe dryness, which she has suffered with for the last 2 years. She was advised to use some artificial tears or rewetting drops OTC to help with that. I expressed my concern if the nosebleeds continue and she loses too much blood or if her headaches worsen and she begins to experience eyesight changes, she MUST go to the urgent care/ER. She states that symptoms have dissipated but verbalized understanding. Routing provider to inform. TTN  "

## 2019-01-29 ENCOUNTER — LAB VISIT (OUTPATIENT)
Dept: LAB | Facility: HOSPITAL | Age: 64
End: 2019-01-29
Attending: PHYSICIAN ASSISTANT
Payer: MEDICARE

## 2019-01-29 DIAGNOSIS — B18.2 CHRONIC HEPATITIS C WITHOUT HEPATIC COMA: ICD-10-CM

## 2019-01-29 DIAGNOSIS — K74.00 LIVER FIBROSIS: ICD-10-CM

## 2019-01-29 LAB
AFP SERPL-MCNC: 2.6 NG/ML
ALBUMIN SERPL BCP-MCNC: 3.8 G/DL
ALP SERPL-CCNC: 84 U/L
ALT SERPL W/O P-5'-P-CCNC: 21 U/L
ANION GAP SERPL CALC-SCNC: 9 MMOL/L
AST SERPL-CCNC: 33 U/L
BILIRUB SERPL-MCNC: 0.3 MG/DL
BUN SERPL-MCNC: 16 MG/DL
CALCIUM SERPL-MCNC: 9.3 MG/DL
CHLORIDE SERPL-SCNC: 103 MMOL/L
CO2 SERPL-SCNC: 24 MMOL/L
CREAT SERPL-MCNC: 0.9 MG/DL
EST. GFR  (AFRICAN AMERICAN): >60 ML/MIN/1.73 M^2
EST. GFR  (NON AFRICAN AMERICAN): >60 ML/MIN/1.73 M^2
GLUCOSE SERPL-MCNC: 106 MG/DL
POTASSIUM SERPL-SCNC: 3.8 MMOL/L
PROT SERPL-MCNC: 7.4 G/DL
SODIUM SERPL-SCNC: 136 MMOL/L

## 2019-01-29 PROCEDURE — 36415 COLL VENOUS BLD VENIPUNCTURE: CPT

## 2019-01-29 PROCEDURE — 80053 COMPREHEN METABOLIC PANEL: CPT

## 2019-01-29 PROCEDURE — 82105 ALPHA-FETOPROTEIN SERUM: CPT

## 2019-01-29 PROCEDURE — 87522 HEPATITIS C REVRS TRNSCRPJ: CPT

## 2019-02-01 LAB
HCV RNA SERPL NAA+PROBE-LOG IU: 1.18 LOG (10) IU/ML
HCV RNA SERPL QL NAA+PROBE: DETECTED IU/ML
HCV RNA SPEC NAA+PROBE-ACNC: 15 IU/ML

## 2019-02-04 ENCOUNTER — TELEPHONE (OUTPATIENT)
Dept: HEPATOLOGY | Facility: CLINIC | Age: 64
End: 2019-02-04

## 2019-02-04 NOTE — TELEPHONE ENCOUNTER
HCV LAB REVIEW  Week 6 of Mavyret, planning on 12 weeks treatment    Pertinent labs:  CMP: stable  HCV RNA: 15  AFP: WNL    pls call pt:  Labs look good. Liver enzymes now normal. HCV was previously over 1 million and is now down to 15. Mavyret is working!   - Continue Mavyret - 3 pills daily - don't miss any doses.    Next labs due / pls schedule:  Pls schedule:  - CMP, HCV RNA at week 12 - end of treatment - 03/12/19

## 2019-02-05 ENCOUNTER — EPISODE CHANGES (OUTPATIENT)
Dept: HEPATOLOGY | Facility: CLINIC | Age: 64
End: 2019-02-05

## 2019-02-06 ENCOUNTER — TELEPHONE (OUTPATIENT)
Dept: PHARMACY | Facility: CLINIC | Age: 64
End: 2019-02-06

## 2019-02-06 NOTE — TELEPHONE ENCOUNTER
Mavyret (3 of 3)  refill confirmed. We will ship Mavyret refill on  via fedex to arrive on . $0.00 copay- 004. Confirmed 2 patient identifiers - name and .     Patient has 10 doses of Mavyret remaining and takes it around 6pm daily.  Pt reports no issues or side effects from Mavyret.  No missed doses, no new medications, no new allergies or health conditions reported at this time.  All questions answered and addressed to patients satisfaction.  Pt verbalized understanding.

## 2019-02-08 DIAGNOSIS — M50.30 DISC DISEASE, DEGENERATIVE, CERVICAL: ICD-10-CM

## 2019-02-08 DIAGNOSIS — F11.90 CHRONIC, CONTINUOUS USE OF OPIOIDS: ICD-10-CM

## 2019-02-08 RX ORDER — OXYCODONE HYDROCHLORIDE 10 MG/1
TABLET ORAL
Qty: 100 TABLET | Refills: 0 | Status: SHIPPED | OUTPATIENT
Start: 2019-02-08 | End: 2019-03-06 | Stop reason: SDUPTHER

## 2019-02-08 RX ORDER — OXYCODONE AND ACETAMINOPHEN 5; 325 MG/1; MG/1
TABLET ORAL
Qty: 120 TABLET | Refills: 0 | Status: SHIPPED | OUTPATIENT
Start: 2019-02-08 | End: 2019-04-15 | Stop reason: DRUGHIGH

## 2019-02-08 NOTE — TELEPHONE ENCOUNTER
----- Message from Dany Carballo sent at 2/8/2019 11:38 AM CST -----  Contact: Self/[phone]  Refill: oxyCODONE (ROXICODONE) 10 mg Tab immediate release tablet   Refill:oxyCODONE-acetaminophen (PERCOCET) 5-325 mg per tablet      [facility] [place] - [place] - [address] [phone] (Phone)  [phone] (Fax)      Thank you

## 2019-03-04 DIAGNOSIS — M50.30 DISC DISEASE, DEGENERATIVE, CERVICAL: ICD-10-CM

## 2019-03-04 DIAGNOSIS — R09.81 NASAL CONGESTION: ICD-10-CM

## 2019-03-04 DIAGNOSIS — M50.90 CERVICAL DISC DISEASE: ICD-10-CM

## 2019-03-04 DIAGNOSIS — F11.90 CHRONIC, CONTINUOUS USE OF OPIOIDS: ICD-10-CM

## 2019-03-04 NOTE — TELEPHONE ENCOUNTER
----- Message from Karoline Way sent at 3/4/2019  9:59 AM CST -----  Contact: [facility]   Geovany with [facility] states they can no longer filled both of patient's medications. They can only fill one. He would like the doctor to maybe chose a long acting medication for patient. Please call Geovany [facility]  at [phone].    oxyCODONE (ROXICODONE) 10 mg Tab immediate release tablet  oxyCODONE-acetaminophen (PERCOCET) 5-325 mg per tablet

## 2019-03-04 NOTE — TELEPHONE ENCOUNTER
----- Message from Porsha Cosme sent at 3/4/2019  9:39 AM CST -----  Contact: pt  Can the clinic reply in [facility]:       Please refill the medication(s) listed below. The patient can be reached at this phone number ([phone]___) once it is called into the pharmacy.      Medication #1fluticasone (FLONASE) 50 mcg/actuation nasal spray       Medication #2diazePAM (VALIUM) 10 MG Tab     oxyCODONE-acetaminophen (PERCOCET) 5-325 mg per tablet     oxyCODONE (ROXICODONE) 10 mg Tab immediate release tablet       Preferred Pharmacy:[address] [place]

## 2019-03-06 ENCOUNTER — NURSE TRIAGE (OUTPATIENT)
Dept: ADMINISTRATIVE | Facility: CLINIC | Age: 64
End: 2019-03-06

## 2019-03-06 ENCOUNTER — TELEPHONE (OUTPATIENT)
Dept: FAMILY MEDICINE | Facility: CLINIC | Age: 64
End: 2019-03-06

## 2019-03-06 DIAGNOSIS — F11.90 CHRONIC, CONTINUOUS USE OF OPIOIDS: ICD-10-CM

## 2019-03-06 DIAGNOSIS — M50.30 DISC DISEASE, DEGENERATIVE, CERVICAL: ICD-10-CM

## 2019-03-06 RX ORDER — FLUTICASONE PROPIONATE 50 MCG
2 SPRAY, SUSPENSION (ML) NASAL DAILY
Qty: 1 BOTTLE | Refills: 0 | Status: SHIPPED | OUTPATIENT
Start: 2019-03-06 | End: 2019-06-07 | Stop reason: SDUPTHER

## 2019-03-06 RX ORDER — DIAZEPAM 10 MG/1
10 TABLET ORAL 2 TIMES DAILY PRN
Qty: 60 TABLET | Refills: 1 | OUTPATIENT
Start: 2019-03-06

## 2019-03-06 RX ORDER — OXYCODONE HYDROCHLORIDE 10 MG/1
TABLET ORAL
Qty: 100 TABLET | OUTPATIENT
Start: 2019-03-06

## 2019-03-06 NOTE — TELEPHONE ENCOUNTER
I spoke to the pharmacist regarding pt pain medications. Pt is taking Oxycodone 10mg and Percocet 5mg.  He is suggesting giving pt an extended release medication and  an immediate release medication for break through pain. They are getting push back from the [facility] with pts on multiple immediate release pain medications. Please advise.

## 2019-03-06 NOTE — TELEPHONE ENCOUNTER
----- Message from Rehana Brantley sent at 3/6/2019  9:35 AM CST -----  Contact: Gilberto stewart  pharmacist calling to speak to a nurse regarding meds.         [facility] [place] - [address]  [address]  [place] [address]  Phone: [phone] Fax: [phone]

## 2019-03-06 NOTE — TELEPHONE ENCOUNTER
Reason for Disposition   Caller requesting a NON-URGENT new prescription or refill and triager unable to refill per unit policy    Answer Assessment - Initial Assessment Questions  Pt calling for refills on pain meds -    Protocols used: ST MEDICATION QUESTION CALL-A-AH

## 2019-03-07 RX ORDER — OXYCODONE HYDROCHLORIDE 10 MG/1
10 TABLET ORAL EVERY 6 HOURS PRN
Qty: 100 TABLET | Refills: 0 | Status: SHIPPED | OUTPATIENT
Start: 2019-03-07 | End: 2019-04-15

## 2019-03-18 ENCOUNTER — LAB VISIT (OUTPATIENT)
Dept: LAB | Facility: HOSPITAL | Age: 64
End: 2019-03-18
Attending: PHYSICIAN ASSISTANT
Payer: MEDICARE

## 2019-03-18 DIAGNOSIS — K74.00 LIVER FIBROSIS: ICD-10-CM

## 2019-03-18 DIAGNOSIS — B18.2 CHRONIC HEPATITIS C WITHOUT HEPATIC COMA: ICD-10-CM

## 2019-03-18 LAB
ALBUMIN SERPL BCP-MCNC: 3.9 G/DL
ALP SERPL-CCNC: 103 U/L
ALT SERPL W/O P-5'-P-CCNC: 18 U/L
ANION GAP SERPL CALC-SCNC: 9 MMOL/L
AST SERPL-CCNC: 27 U/L
BILIRUB SERPL-MCNC: 0.3 MG/DL
BUN SERPL-MCNC: 13 MG/DL
CALCIUM SERPL-MCNC: 9.9 MG/DL
CHLORIDE SERPL-SCNC: 106 MMOL/L
CO2 SERPL-SCNC: 23 MMOL/L
CREAT SERPL-MCNC: 0.9 MG/DL
EST. GFR  (AFRICAN AMERICAN): >60 ML/MIN/1.73 M^2
EST. GFR  (NON AFRICAN AMERICAN): >60 ML/MIN/1.73 M^2
GLUCOSE SERPL-MCNC: 89 MG/DL
POTASSIUM SERPL-SCNC: 3.9 MMOL/L
PROT SERPL-MCNC: 8.2 G/DL
SODIUM SERPL-SCNC: 138 MMOL/L

## 2019-03-18 PROCEDURE — 80053 COMPREHEN METABOLIC PANEL: CPT

## 2019-03-18 PROCEDURE — 36415 COLL VENOUS BLD VENIPUNCTURE: CPT

## 2019-03-18 PROCEDURE — 87522 HEPATITIS C REVRS TRNSCRPJ: CPT

## 2019-03-20 LAB
HCV RNA SERPL NAA+PROBE-LOG IU: <1.08 LOG (10) IU/ML
HCV RNA SERPL QL NAA+PROBE: NOT DETECTED IU/ML
HCV RNA SPEC NAA+PROBE-ACNC: <12 IU/ML

## 2019-03-21 ENCOUNTER — TELEPHONE (OUTPATIENT)
Dept: HEPATOLOGY | Facility: CLINIC | Age: 64
End: 2019-03-21

## 2019-03-21 ENCOUNTER — TELEPHONE (OUTPATIENT)
Dept: PHARMACY | Facility: CLINIC | Age: 64
End: 2019-03-21

## 2019-03-21 DIAGNOSIS — B18.2 CHRONIC HEPATITIS C WITHOUT HEPATIC COMA: Primary | ICD-10-CM

## 2019-03-21 DIAGNOSIS — K74.00 LIVER FIBROSIS: ICD-10-CM

## 2019-03-21 NOTE — TELEPHONE ENCOUNTER
HCV LAB REVIEW  Week 12 of Mavyret, EOT  F2-F4?    Pertinent labs:  CMP: stable  HCV RNA: 15  AFP: WNL    pls call pt:  Labs look good. Liver enzymes now normal. HCV was previously over 1 million and is now down to 15. Mavyret is working!   - Continue Mavyret - 3 pills daily - don't miss any doses.    Next labs due/Please schedule:  HCV RNA in 6 weeks-SVR 6: 04/23/19  U/S due 06/2019  CBC, CMP, PT/INR, AFP and HCV RNA in 12 weeks- SVR 12:07/04/19  F/u 1 week post SVR

## 2019-03-21 NOTE — TELEPHONE ENCOUNTER
Patient called for QOL assessment at EOT Mavyret x 12 weeks.  Patient states that they are feeling well although they did have some fatigue and headaches during tx, this has stopped.   Patient reminded of the importance and significance of SVR 12 testing and counseled to keep all appts, labs, and follow-ups through SVR 12 and beyond.     GAUEDA Cleary.Ph., AAHIVP  Clinical Pharmacist, HIV/HCV  [facility]  Phone: [phone]

## 2019-03-26 ENCOUNTER — TELEPHONE (OUTPATIENT)
Dept: FAMILY MEDICINE | Facility: CLINIC | Age: 64
End: 2019-03-26

## 2019-03-26 DIAGNOSIS — M50.30 DISC DISEASE, DEGENERATIVE, CERVICAL: ICD-10-CM

## 2019-03-26 DIAGNOSIS — F11.90 CHRONIC, CONTINUOUS USE OF OPIOIDS: ICD-10-CM

## 2019-03-26 RX ORDER — OXYCODONE AND ACETAMINOPHEN 5; 325 MG/1; MG/1
TABLET ORAL
Qty: 120 TABLET | OUTPATIENT
Start: 2019-03-26

## 2019-03-26 NOTE — TELEPHONE ENCOUNTER
----- Message from Karoline Way sent at 3/26/2019  3:59 PM CDT -----  Contact: [facility]   Patient's pharmacy says he need to speak with the doctor because he can not refill both medications for patient and will hold the script written by Dr. More until he can speak with Dr. Corcoran. Please call moises with [facility] .      oxyCODONE (ROXICODONE) 10 mg Tab immediate release tablet  oxyCODONE-acetaminophen (PERCOCET) 5-325 mg per tablet    [facility] [place]  [place], [place] - [address]  [phone] (Phone)  [phone] (Fax)              [phone]

## 2019-03-26 NOTE — TELEPHONE ENCOUNTER
----- Message from Calvin Choudhury sent at 3/26/2019  4:08 PM CDT -----  Contact: Self: [phone]  Patient called regarding #oxyCODONE-acetaminophen (PERCOCET) 5-325 mg per tablet, Please call to advise on what's going on. She stated her pharmacy is not filling the medication.    Thank you    ..  [facility] [place] - [place] - [address]  [address]  [place] [address]  Phone: [phone] Fax: [phone]

## 2019-03-26 NOTE — TELEPHONE ENCOUNTER
I spoke to Geovany and he reports that he is unable to fill both of the Pt prescriptions together. They are getting push back from the [facility]. The pt gets #120 Oxycodone 10mg and #120 Percocet 5mg monthly. They would like to increase the Oxycodone to 15mg and eliminate the Percocet or have you prescribe a long acting and a short acting alternative. Geovany at [facility] is asking for a call back. Please advise.

## 2019-04-09 DIAGNOSIS — M50.90 CERVICAL DISC DISEASE: ICD-10-CM

## 2019-04-09 RX ORDER — DIAZEPAM 10 MG/1
TABLET ORAL
Qty: 60 TABLET | Refills: 0 | Status: SHIPPED | OUTPATIENT
Start: 2019-04-09 | End: 2019-05-06 | Stop reason: SDUPTHER

## 2019-04-15 ENCOUNTER — OFFICE VISIT (OUTPATIENT)
Dept: FAMILY MEDICINE | Facility: CLINIC | Age: 64
End: 2019-04-15
Payer: MEDICARE

## 2019-04-15 ENCOUNTER — TELEPHONE (OUTPATIENT)
Dept: FAMILY MEDICINE | Facility: CLINIC | Age: 64
End: 2019-04-15

## 2019-04-15 VITALS
TEMPERATURE: 98 F | WEIGHT: 120.13 LBS | RESPIRATION RATE: 17 BRPM | BODY MASS INDEX: 22.68 KG/M2 | HEIGHT: 61 IN | HEART RATE: 88 BPM | DIASTOLIC BLOOD PRESSURE: 80 MMHG | OXYGEN SATURATION: 98 % | SYSTOLIC BLOOD PRESSURE: 118 MMHG

## 2019-04-15 DIAGNOSIS — M54.12 CERVICAL MYELOPATHY WITH CERVICAL RADICULOPATHY: Primary | ICD-10-CM

## 2019-04-15 DIAGNOSIS — B18.2 CHRONIC HEPATITIS C WITHOUT HEPATIC COMA: ICD-10-CM

## 2019-04-15 DIAGNOSIS — F11.90 CHRONIC, CONTINUOUS USE OF OPIOIDS: ICD-10-CM

## 2019-04-15 DIAGNOSIS — M50.30 DISC DISEASE, DEGENERATIVE, CERVICAL: ICD-10-CM

## 2019-04-15 DIAGNOSIS — G95.9 CERVICAL MYELOPATHY WITH CERVICAL RADICULOPATHY: Primary | ICD-10-CM

## 2019-04-15 PROCEDURE — 99214 OFFICE O/P EST MOD 30 MIN: CPT | Mod: S$PBB,,, | Performed by: INTERNAL MEDICINE

## 2019-04-15 PROCEDURE — 99214 PR OFFICE/OUTPT VISIT, EST, LEVL IV, 30-39 MIN: ICD-10-PCS | Mod: S$PBB,,, | Performed by: INTERNAL MEDICINE

## 2019-04-15 PROCEDURE — 99999 PR PBB SHADOW E&M-EST. PATIENT-LVL IV: CPT | Mod: PBBFAC,,, | Performed by: INTERNAL MEDICINE

## 2019-04-15 PROCEDURE — 99214 OFFICE O/P EST MOD 30 MIN: CPT | Mod: PBBFAC,PN | Performed by: INTERNAL MEDICINE

## 2019-04-15 PROCEDURE — 99999 PR PBB SHADOW E&M-EST. PATIENT-LVL IV: ICD-10-PCS | Mod: PBBFAC,,, | Performed by: INTERNAL MEDICINE

## 2019-04-15 RX ORDER — OXYCODONE HYDROCHLORIDE 10 MG/1
10 TABLET ORAL EVERY 6 HOURS PRN
Qty: 100 TABLET | Refills: 0 | Status: ON HOLD | OUTPATIENT
Start: 2019-04-22 | End: 2020-06-18 | Stop reason: HOSPADM

## 2019-04-15 NOTE — PROGRESS NOTES
"Subjective:       Patient ID: Rossi Mcneal is a 63 y.o. female.    Chief Complaint: Medication Problem (discuss medication ); Establish Care; and Medication Refill    F/u chronic pain    HPI: 62 y/o w/ Hep C on therapy (current viral load undetected) history of cervical spinal fusion with chronic pain and anxiety presents for scheduled follow up. Neck pain still an issue no relief with NOEMI from October, taking roxicodone 10mg four times per day no constipation    Review of Systems   Constitutional: Negative for activity change, appetite change, fatigue, fever and unexpected weight change.   HENT: Negative for ear pain, rhinorrhea and sore throat.    Eyes: Negative for discharge and visual disturbance.   Respiratory: Negative for chest tightness, shortness of breath and wheezing.    Cardiovascular: Negative for chest pain, palpitations and leg swelling.   Gastrointestinal: Negative for abdominal pain, constipation and diarrhea.   Endocrine: Negative for cold intolerance and heat intolerance.   Genitourinary: Negative for dysuria and hematuria.   Musculoskeletal: Positive for neck pain and neck stiffness. Negative for joint swelling.   Skin: Negative for rash.   Neurological: Negative for dizziness, syncope, weakness and headaches.   Psychiatric/Behavioral: Negative for suicidal ideas.       Objective:     Vitals:    04/15/19 1127   BP: 118/80   BP Location: Left arm   Patient Position: Sitting   Pulse: 88   Resp: 17   Temp: 97.9 °F (36.6 °C)   TempSrc: Oral   SpO2: 98%   Weight: 54.5 kg (120 lb 2.4 oz)   Height: 5' 1" (1.549 m)          Physical Exam   Constitutional: She is oriented to person, place, and time. She appears well-developed and well-nourished.   HENT:   Head: Normocephalic and atraumatic.   Eyes: Conjunctivae are normal. No scleral icterus.   Neck: Normal range of motion.   Cardiovascular: Normal rate and regular rhythm. Exam reveals no gallop and no friction rub.   No murmur heard.  No LE edema "   Pulmonary/Chest: Effort normal and breath sounds normal. She has no wheezes. She has no rales.   Abdominal: Soft. Bowel sounds are normal. There is no tenderness. There is no rebound and no guarding.   Musculoskeletal: Normal range of motion. She exhibits no edema or tenderness.   Neurological: She is alert and oriented to person, place, and time. No cranial nerve deficit.   Skin: Skin is warm and dry.   Psychiatric: She has a normal mood and affect.       Assessment and Plan   1. Cervical myelopathy with cervical radiculopathy  Referral to pain management to discuss possible long acting therapies  - Ambulatory referral to Pain Clinic    2. Disc disease, degenerative, cervical  Failed NOEMI on ssri completed outpatient therapy  - Ambulatory referral to Pain Clinic  - oxyCODONE (ROXICODONE) 10 mg Tab immediate release tablet; Take 1 tablet (10 mg total) by mouth every 6 (six) hours as needed.  Dispense: 100 tablet; Refill: 0    3. Chronic, continuous use of opioids  As above referral to pain management for consideration of long acting opioid therapy  - Ambulatory referral to Pain Clinic  - oxyCODONE (ROXICODONE) 10 mg Tab immediate release tablet; Take 1 tablet (10 mg total) by mouth every 6 (six) hours as needed.  Dispense: 100 tablet; Refill: 0    4. Chronic hepatitis C without hepatic coma  Improved continue management per hepatology

## 2019-04-15 NOTE — TELEPHONE ENCOUNTER
----- Message from Kiana Alexandre sent at 4/15/2019 12:24 PM CDT -----  Contact: Rossi [phone]  Type: Patient Call Back    Who called:Rossi    What is the request in detail: The patient is calling to notify the staff that Dr. Camilo would need a referral put in, before he can see the patient.    Can the clinic reply by [facility]?no    Would the patient rather a call back or a response via [facility]? Call back    Best call back number:[phone]

## 2019-04-23 ENCOUNTER — LAB VISIT (OUTPATIENT)
Dept: LAB | Facility: HOSPITAL | Age: 64
End: 2019-04-23
Attending: PHYSICIAN ASSISTANT
Payer: MEDICARE

## 2019-04-23 DIAGNOSIS — K74.00 LIVER FIBROSIS: ICD-10-CM

## 2019-04-23 DIAGNOSIS — B18.2 CHRONIC HEPATITIS C WITHOUT HEPATIC COMA: ICD-10-CM

## 2019-04-23 PROCEDURE — 87522 HEPATITIS C REVRS TRNSCRPJ: CPT

## 2019-04-23 PROCEDURE — 36415 COLL VENOUS BLD VENIPUNCTURE: CPT

## 2019-04-24 ENCOUNTER — TELEPHONE (OUTPATIENT)
Dept: FAMILY MEDICINE | Facility: CLINIC | Age: 64
End: 2019-04-24

## 2019-04-24 NOTE — TELEPHONE ENCOUNTER
----- Message from Elza Martinez sent at 4/24/2019  2:45 PM CDT -----  Contact: pt  Name of Who is Calling: ISAAC CUENCA [[id number]]      What is the request in detail: pt calling in regards to referral not going to [facility] Camilo fax: [phone].. Please advise      Can the clinic reply by [facility]: no    What [facility]: [phone]

## 2019-04-24 NOTE — TELEPHONE ENCOUNTER
I left a voicemail for the pt that the referral was faxed on 4/15/2019 and I will send it again now.

## 2019-04-25 ENCOUNTER — TELEPHONE (OUTPATIENT)
Dept: HEPATOLOGY | Facility: CLINIC | Age: 64
End: 2019-04-25

## 2019-04-25 ENCOUNTER — EPISODE CHANGES (OUTPATIENT)
Dept: HEPATOLOGY | Facility: CLINIC | Age: 64
End: 2019-04-25

## 2019-04-25 NOTE — TELEPHONE ENCOUNTER
HCV LAB REVIEW  SVR 6  F2-F4?    Pertinent labs:  HCV RNA: <12    pls call pt:  Labs look good. HCV was too low for the lab to count.     Next labs due/Please schedule:  U/S due 06/2019  CBC, CMP, PT/INR, AFP and HCV RNA in 12 weeks- SVR 12:07/04/19  F/u 1 week post SVR

## 2019-05-01 ENCOUNTER — TELEPHONE (OUTPATIENT)
Dept: FAMILY MEDICINE | Facility: CLINIC | Age: 64
End: 2019-05-01

## 2019-05-01 NOTE — TELEPHONE ENCOUNTER
Referral was faxed for the third time to Dr. Camilo. I called his office and confirmed the fax number. I left a message to notify the pt.

## 2019-05-01 NOTE — TELEPHONE ENCOUNTER
----- Message from Karoline Way sent at 5/1/2019  1:09 PM CDT -----  Type: Patient Call Back    Who called[facility]     What is the request in detail: patient's referral was never received at [facility]     Can the clinic reply by [facility]?no    Would the patient rather a call back or a response via [facility]? Call

## 2019-05-06 DIAGNOSIS — M50.90 CERVICAL DISC DISEASE: ICD-10-CM

## 2019-05-07 RX ORDER — DIAZEPAM 10 MG/1
TABLET ORAL
Qty: 60 TABLET | Refills: 0 | Status: SHIPPED | OUTPATIENT
Start: 2019-05-07 | End: 2019-06-07 | Stop reason: SDUPTHER

## 2019-06-07 DIAGNOSIS — J45.20 MILD INTERMITTENT ASTHMA WITHOUT COMPLICATION: ICD-10-CM

## 2019-06-07 DIAGNOSIS — R09.81 NASAL CONGESTION: ICD-10-CM

## 2019-06-07 DIAGNOSIS — M50.90 CERVICAL DISC DISEASE: ICD-10-CM

## 2019-06-07 RX ORDER — DIAZEPAM 10 MG/1
10 TABLET ORAL 2 TIMES DAILY PRN
Qty: 60 TABLET | Refills: 0 | Status: SHIPPED | OUTPATIENT
Start: 2019-06-07 | End: 2019-08-01 | Stop reason: SDUPTHER

## 2019-06-07 RX ORDER — ALBUTEROL SULFATE 90 UG/1
AEROSOL, METERED RESPIRATORY (INHALATION)
Qty: 18 G | Refills: 2 | Status: SHIPPED | OUTPATIENT
Start: 2019-06-07 | End: 2019-09-13 | Stop reason: SDUPTHER

## 2019-06-07 RX ORDER — FLUTICASONE PROPIONATE 50 MCG
2 SPRAY, SUSPENSION (ML) NASAL DAILY
Qty: 1 BOTTLE | Refills: 0 | Status: SHIPPED | OUTPATIENT
Start: 2019-06-07 | End: 2020-12-21

## 2019-06-07 NOTE — TELEPHONE ENCOUNTER
----- Message from Taylor Freeman sent at 6/7/2019 11:12 AM CDT -----  Contact: self  [phone]  Type: RX Refill Request    Who Called: self   Refill or New Rx: refill     RX Name and Strength: albuterol (VENTOLIN HFA) 90 mcg/actuation inhaler, fluticasone (FLONASE) 50 mcg/actuation nasal spray, and diazePAM (VALIUM) 10 MG Tab.    How is the patient currently taking it? (ex. 1XDay): Patient would like to know if she can detox on the diazePAM (VALIUM) 10 MG Tab, would like to drop down to 1 a day.     Is this a 30 day or 90 day RX: 90 day    Preferred Pharmacy with phone number: .  [facility] [place] - [place] - [address]  [address]  [place] [address]  Phone: [phone] Fax: [phone]    Local or Mail Order: Local    Would the patient rather a call back or a response via [facility]? Call back    Best Call Back Number: [phone]    Additional Information: Patient states she spoke to Dr. Ordoñez and was told to try to cut herself down. Please call pt in regards to this.

## 2019-07-03 ENCOUNTER — LAB VISIT (OUTPATIENT)
Dept: LAB | Facility: HOSPITAL | Age: 64
End: 2019-07-03
Attending: PHYSICIAN ASSISTANT
Payer: MEDICARE

## 2019-07-03 DIAGNOSIS — K74.00 LIVER FIBROSIS: ICD-10-CM

## 2019-07-03 DIAGNOSIS — B18.2 CHRONIC HEPATITIS C WITHOUT HEPATIC COMA: ICD-10-CM

## 2019-07-03 LAB
AFP SERPL-MCNC: 1.9 NG/ML (ref 0–8.4)
ALBUMIN SERPL BCP-MCNC: 3.9 G/DL (ref 3.5–5.2)
ALP SERPL-CCNC: 76 U/L (ref 55–135)
ALT SERPL W/O P-5'-P-CCNC: 10 U/L (ref 10–44)
ANION GAP SERPL CALC-SCNC: 6 MMOL/L (ref 8–16)
AST SERPL-CCNC: 31 U/L (ref 10–40)
BASOPHILS # BLD AUTO: 0.04 K/UL (ref 0–0.2)
BASOPHILS NFR BLD: 0.5 % (ref 0–1.9)
BILIRUB SERPL-MCNC: 0.2 MG/DL (ref 0.1–1)
BUN SERPL-MCNC: 18 MG/DL (ref 8–23)
CALCIUM SERPL-MCNC: 9 MG/DL (ref 8.7–10.5)
CHLORIDE SERPL-SCNC: 101 MMOL/L (ref 95–110)
CO2 SERPL-SCNC: 28 MMOL/L (ref 23–29)
CREAT SERPL-MCNC: 0.8 MG/DL (ref 0.5–1.4)
DIFFERENTIAL METHOD: ABNORMAL
EOSINOPHIL # BLD AUTO: 0.3 K/UL (ref 0–0.5)
EOSINOPHIL NFR BLD: 3.6 % (ref 0–8)
ERYTHROCYTE [DISTWIDTH] IN BLOOD BY AUTOMATED COUNT: 15.3 % (ref 11.5–14.5)
EST. GFR  (AFRICAN AMERICAN): >60 ML/MIN/1.73 M^2
EST. GFR  (NON AFRICAN AMERICAN): >60 ML/MIN/1.73 M^2
GLUCOSE SERPL-MCNC: 80 MG/DL (ref 70–110)
HCT VFR BLD AUTO: 32.7 % (ref 37–48.5)
HGB BLD-MCNC: 10.4 G/DL (ref 12–16)
INR PPP: 1 (ref 0.8–1.2)
LYMPHOCYTES # BLD AUTO: 2.4 K/UL (ref 1–4.8)
LYMPHOCYTES NFR BLD: 29.6 % (ref 18–48)
MCH RBC QN AUTO: 26.6 PG (ref 27–31)
MCHC RBC AUTO-ENTMCNC: 31.8 G/DL (ref 32–36)
MCV RBC AUTO: 84 FL (ref 82–98)
MONOCYTES # BLD AUTO: 0.8 K/UL (ref 0.3–1)
MONOCYTES NFR BLD: 10.3 % (ref 4–15)
NEUTROPHILS # BLD AUTO: 4.5 K/UL (ref 1.8–7.7)
NEUTROPHILS NFR BLD: 56.2 % (ref 38–73)
PLATELET # BLD AUTO: 311 K/UL (ref 150–350)
PMV BLD AUTO: 9.2 FL (ref 9.2–12.9)
POTASSIUM SERPL-SCNC: 4.4 MMOL/L (ref 3.5–5.1)
PROT SERPL-MCNC: 7.6 G/DL (ref 6–8.4)
PROTHROMBIN TIME: 10.2 SEC (ref 9–12.5)
RBC # BLD AUTO: 3.91 M/UL (ref 4–5.4)
SODIUM SERPL-SCNC: 135 MMOL/L (ref 136–145)
WBC # BLD AUTO: 8.03 K/UL (ref 3.9–12.7)

## 2019-07-03 PROCEDURE — 80053 COMPREHEN METABOLIC PANEL: CPT

## 2019-07-03 PROCEDURE — 36415 COLL VENOUS BLD VENIPUNCTURE: CPT

## 2019-07-03 PROCEDURE — 85025 COMPLETE CBC W/AUTO DIFF WBC: CPT

## 2019-07-03 PROCEDURE — 85610 PROTHROMBIN TIME: CPT

## 2019-07-03 PROCEDURE — 87522 HEPATITIS C REVRS TRNSCRPJ: CPT

## 2019-07-03 PROCEDURE — 82105 ALPHA-FETOPROTEIN SERUM: CPT

## 2019-07-05 ENCOUNTER — HOSPITAL ENCOUNTER (OUTPATIENT)
Dept: RADIOLOGY | Facility: HOSPITAL | Age: 64
Discharge: HOME | End: 2019-07-05
Attending: PHYSICIAN ASSISTANT
Payer: MEDICARE

## 2019-07-05 DIAGNOSIS — K74.00 LIVER FIBROSIS: ICD-10-CM

## 2019-07-05 DIAGNOSIS — B18.2 CHRONIC HEPATITIS C WITHOUT HEPATIC COMA: ICD-10-CM

## 2019-07-05 PROCEDURE — 76705 ECHO EXAM OF ABDOMEN: CPT | Mod: TC

## 2019-07-05 PROCEDURE — 76705 US ABDOMEN LIMITED: ICD-10-PCS | Mod: 26,,, | Performed by: RADIOLOGY

## 2019-07-05 PROCEDURE — 76705 ECHO EXAM OF ABDOMEN: CPT | Mod: 26,,, | Performed by: RADIOLOGY

## 2019-07-09 ENCOUNTER — TELEPHONE (OUTPATIENT)
Dept: HEPATOLOGY | Facility: CLINIC | Age: 64
End: 2019-07-09

## 2019-07-09 ENCOUNTER — EPISODE CHANGES (OUTPATIENT)
Dept: HEPATOLOGY | Facility: CLINIC | Age: 64
End: 2019-07-09

## 2019-07-09 DIAGNOSIS — K74.00 LIVER FIBROSIS: ICD-10-CM

## 2019-07-09 DIAGNOSIS — Z86.19 HISTORY OF HEPATITIS C: Primary | ICD-10-CM

## 2019-07-09 PROBLEM — B18.2 CHRONIC HEPATITIS C WITHOUT HEPATIC COMA: Status: RESOLVED | Noted: 2017-08-10 | Resolved: 2019-07-09

## 2019-07-09 NOTE — TELEPHONE ENCOUNTER
Attempt made to reach patient.  LVM asking that she give us acall back to discuss lab results.  Testing scheduled as ordered; episode updated and appt reminder notices mailed.

## 2019-07-09 NOTE — TELEPHONE ENCOUNTER
I spoke with patient and msg from KYLAH Stout relayed.  I told her that appt reminder notices would be mailed to her for scheduled testing.     Patient denied seeing blood in stool, blood in urine, or having black tarry stool.  She reports have SOB because of smoking, abd cramping because of constipation and OTC med use to control symptom, headaches, and fatigue.  She has been blowing her nose intermittently and noting streaks of blood in mucous and coughing up phelm with small amounts blood for about 4 wks.   Patient states that her pain medication was decreased and so she's has been taking aspirin and BC powder regularly.  It was stressed that she f/u with PCP regarding anemia and reported symptoms.

## 2019-07-09 NOTE — TELEPHONE ENCOUNTER
HCV LAB REVIEW  SVR 12  F2-F4?    Pertinent labs:  CBC: stable, new naemia   CMP: stable  PT/INR: WNL  AFP: WNL  HCV RNA: <12    pls call pt:  Your latest labs show that the hepatitis C was again not detected after you have been off treatment for 12 weeks meaning your hepatitis C has been cured! Congratulations! We will check it 2 more times, for reassurance only, we don't expect anything to change at this point. We do like to remind you that being treated for hepatitis C doesn't give you any protection, and you could become reinfected if ever exposed, but this virus is gone.     Her labs show new anemia, I would recommend seeing PCP for eval. Does she have any GIB symptoms?    Next labs due/Please schedule:  HCV RNA in 12 weeks- SVR 24  HCV RNA in 1 year:07/04/20    HCC screening 01/2020

## 2019-07-10 NOTE — PROGRESS NOTES
HEPATOLOGY CLINIC VISIT NOTE - HCV clinic    CHIEF COMPLAINT: follow up     HISTORY: This is a 63 y.o. White female with a history of HCV. She is s/p treqtment with Mavyret and had negative HCV RNA 12 weeks post treatment indicating SVR 12.     At last visit, pt had last been seen in 2017, fibroscan updated and concerning for cirrhosis at 12.0 kPa. We have continued q6 month HCC screening. Her U/S was unremarkable. Labs note new onset anemia. Pt last had EGD in 2017 and colonoscopy in 2015. She hasn't had iron studies, but doesn't wish to do labs today. She notes severe fatigue. She has a h/o sleep apnea and COPD as well.     She has no GIB symptoms but has been taking BC powder frequently.     Pt denies signs of hepatic decompensation including: jaundice, dark urine, abdominal distention, hematemesis, melena, slowed mentation.    Past Medical History:   Diagnosis Date    Emphysema     Hypertension     Neuromuscular disorder      Past Surgical History:   Procedure Laterality Date    COLONOSCOPY N/A 7/14/2015    [facility]    ESOPHAGOGASTRODUODENOSCOPY (EGD) N/A 4/17/2017    Per[facility] by Hesham Frazier [facility] at [facility]    HYSTERECTOMY      NECK SURGERY  1990    PARTIAL HYSTERECTOMY  1995    ULTRASOUND-ENDOSCOPIC-UPPER N/A 11/13/2017    Performed by Kristian Fountain [facility] (2ND FLR)     FAMILY HISTORY: Negative for liver disease  Father with cirrhosis, alcohol    SOCIAL HISTORY:   Social History     Tobacco Use   Smoking Status Current Every Day Smoker    Packs/day: 0.50    Years: 18.00    Pack years: 9.00    Types: Cigarettes   Smokeless Tobacco Never Used     Social History     Substance and Sexual Activity   Alcohol Use No    Frequency: Never   in 20s, heavy use      Social History     Substance and Sexual Activity   Drug Use No     ROS:   No fever, chills, (+)weight gain, (+)fatigue  No chest pain, (+)dyspnea, (+)cough  No abdominal pain,  nausea,  vomiting, (+) constipation   No headaches, visual changes  No lower extremity edema  No depression or anxiety      PHYSICAL EXAM:  Friendly White female, in no acute distress; alert and oriented to person, place and time  VITALS: reviewed  HEENT: Sclerae anicteric.   NECK: Supple  LUNGS: Normal respiratory effort.   ABDOMEN: Flat, soft, nontender.  SKIN: Warm and dry. No jaundice, No obvious rashes.   EXTREMITIES: No lower extremity edema  NEURO/PSYCH: Normal gate. Memory intact. Thought and speech pattern appropriate. Behavior normal. No depression or anxiety noted.    RECENT LABS:  Lab Results   Component Value Date    WBC 8.03 07/03/2019    HGB 10.4 (L) 07/03/2019     07/03/2019     Lab Results   Component Value Date    INR 1.0 07/03/2019     Lab Results   Component Value Date    AST 31 07/03/2019    ALT 10 07/03/2019    BILITOT 0.2 07/03/2019    ALBUMIN 3.9 07/03/2019    ALKPHOS 76 07/03/2019    CREATININE 0.8 07/03/2019    BUN 18 07/03/2019     (L) 07/03/2019    K 4.4 07/03/2019    AFP 1.9 07/03/2019     RECENT IMAGING:  US Abdomen Limited   Order: [id number]   Status:  Final result   Visible to patient:  No (Not Released) Next appt:  09/25/2019 at 11:05 AM in Lab (LAB, [facility]) Dx:  Liver fibrosis; Chronic hepatitis C w...   Details     Reading Physician Reading Date Result Priority   Arslan Yi MD 7/5/2019       Narrative     EXAMINATION:  US ABDOMEN LIMITED    CLINICAL HISTORY:  HCC screening; Chronic viral hepatitis C    TECHNIQUE:  Limited ultrasound of the right upper quadrant of the abdomen (including pancreas, liver, gallbladder, common bile duct, and spleen) was performed.    COMPARISON:  Ultrasound 12/13/2018    FINDINGS:  Liver: Normal in size, measuring 11.7 cm. There is diffusely increased echogenicity of the liver.  No focal hepatic lesions.    Gallbladder: No evidence of pericholecystic wall thickening or a distended gallbladder.  There is sludge seen within the lumen of  the gallbladder.    Biliary system: The common duct is not dilated, measuring 4 mm.  No intrahepatic ductal dilatation.    Spleen: Normal in size and echotexture, measuring 9 cm.    Miscellaneous: No upper abdominal ascites.      Impression       Diffusely increased echogenicity of the liver likely representing fatty liver           ASSESSMENT  63 y.o. White female with:  1. HISTORY OF HCV  -- s/o RX with SVR 12   -- schedule SVR 24 and 1 year  -- (+) immunity to HAV and HBV     2. WELL COMPENSATED CIRRHOSIS DUE TO HCV  MELD-Na score: 6 at 7/3/2019 11:22 AM  MELD score: 6 at 7/3/2019 11:22 AM  Calculated from:  Serum Creatinine: 0.8 mg/dL (Rounded to 1 mg/dL) at 7/3/2019 11:22 AM  Serum Sodium: 135 mmol/L at 7/3/2019 11:22 AM  Total Bilirubin: 0.2 mg/dL (Rounded to 1 mg/dL) at 7/3/2019 11:22 AM  INR(ratio): 1.0 at 7/3/2019 11:22 AM  Age: 63 years  - HCC screening:   - U/S: next due 01/2020   - AFP: WNL    (-)Portal HTN:   - EGD: last done 2017, PLTs >150 and fibroscan kPa 12.0    3. NEW ONSET ANEMIA  -- iron, folate, b12 studies  -- pt didn't wish to do today due to fatigue  -- likely will need repeat EGD/colon if MATT    4. FATIGUE  -- likely multifactorial, ongoing fatigue, recommended seeing PCP   -- baseline chronic hypoxemia, reports ?h/o emphysema, may be playing a role, again recommended PCP    5. DYSPNEA  -- long term smoker  -- reports trouble blowing out air  -- occasional intermittent night sweats  -- encouraged smoking cessation and f/u with PCP        PLAN:  1. Labs next avail  2. Schedule anemia labs, SVR 24 and SVR 1 year  3. HCC screening in 6 months, will review by phone  4. HCC screening with clinic visit in 1 year  5. Pt to see PCP for 3-5       Dany Stout PA-C

## 2019-07-11 ENCOUNTER — OFFICE VISIT (OUTPATIENT)
Dept: HEPATOLOGY | Facility: CLINIC | Age: 64
End: 2019-07-11
Payer: MEDICARE

## 2019-07-11 VITALS
SYSTOLIC BLOOD PRESSURE: 113 MMHG | BODY MASS INDEX: 23.55 KG/M2 | OXYGEN SATURATION: 94 % | DIASTOLIC BLOOD PRESSURE: 66 MMHG | WEIGHT: 128 LBS | RESPIRATION RATE: 16 BRPM | HEART RATE: 92 BPM | HEIGHT: 62 IN

## 2019-07-11 DIAGNOSIS — R53.83 OTHER FATIGUE: ICD-10-CM

## 2019-07-11 DIAGNOSIS — Z86.39 PERSONAL HISTORY OF OTHER ENDOCRINE, NUTRITIONAL AND METABOLIC DISEASE: ICD-10-CM

## 2019-07-11 DIAGNOSIS — D64.9 ANEMIA, UNSPECIFIED TYPE: Primary | ICD-10-CM

## 2019-07-11 DIAGNOSIS — R53.83 FATIGUE, UNSPECIFIED TYPE: ICD-10-CM

## 2019-07-11 DIAGNOSIS — Z86.19 HISTORY OF HEPATITIS C: ICD-10-CM

## 2019-07-11 DIAGNOSIS — K74.60 CIRRHOSIS OF LIVER WITHOUT ASCITES, UNSPECIFIED HEPATIC CIRRHOSIS TYPE: ICD-10-CM

## 2019-07-11 PROCEDURE — 99999 PR PBB SHADOW E&M-EST. PATIENT-LVL III: CPT | Mod: PBBFAC,,, | Performed by: PHYSICIAN ASSISTANT

## 2019-07-11 PROCEDURE — 99999 PR PBB SHADOW E&M-EST. PATIENT-LVL III: ICD-10-PCS | Mod: PBBFAC,,, | Performed by: PHYSICIAN ASSISTANT

## 2019-07-11 PROCEDURE — 99213 OFFICE O/P EST LOW 20 MIN: CPT | Mod: PBBFAC | Performed by: PHYSICIAN ASSISTANT

## 2019-07-11 PROCEDURE — 99214 OFFICE O/P EST MOD 30 MIN: CPT | Mod: S$GLB,,, | Performed by: PHYSICIAN ASSISTANT

## 2019-07-11 PROCEDURE — 99214 PR OFFICE/OUTPT VISIT, EST, LEVL IV, 30-39 MIN: ICD-10-PCS | Mod: S$GLB,,, | Performed by: PHYSICIAN ASSISTANT

## 2019-08-01 DIAGNOSIS — M50.90 CERVICAL DISC DISEASE: ICD-10-CM

## 2019-08-05 RX ORDER — DIAZEPAM 10 MG/1
TABLET ORAL
Qty: 30 TABLET | Refills: 0 | Status: SHIPPED | OUTPATIENT
Start: 2019-08-05 | End: 2019-09-05 | Stop reason: SDUPTHER

## 2019-08-28 ENCOUNTER — OFFICE VISIT (OUTPATIENT)
Dept: FAMILY MEDICINE | Facility: CLINIC | Age: 64
End: 2019-08-28
Payer: MEDICARE

## 2019-08-28 VITALS
TEMPERATURE: 99 F | OXYGEN SATURATION: 95 % | SYSTOLIC BLOOD PRESSURE: 114 MMHG | DIASTOLIC BLOOD PRESSURE: 79 MMHG | HEART RATE: 107 BPM | HEIGHT: 62 IN | BODY MASS INDEX: 22.6 KG/M2 | RESPIRATION RATE: 17 BRPM | WEIGHT: 122.81 LBS

## 2019-08-28 DIAGNOSIS — F41.9 ANXIETY: ICD-10-CM

## 2019-08-28 DIAGNOSIS — D50.9 MICROCYTIC ANEMIA: Primary | ICD-10-CM

## 2019-08-28 PROCEDURE — 99999 PR PBB SHADOW E&M-EST. PATIENT-LVL III: ICD-10-PCS | Mod: PBBFAC,HCNC,, | Performed by: INTERNAL MEDICINE

## 2019-08-28 PROCEDURE — 99214 OFFICE O/P EST MOD 30 MIN: CPT | Mod: S$PBB,HCNC,, | Performed by: INTERNAL MEDICINE

## 2019-08-28 PROCEDURE — 99214 PR OFFICE/OUTPT VISIT, EST, LEVL IV, 30-39 MIN: ICD-10-PCS | Mod: S$PBB,HCNC,, | Performed by: INTERNAL MEDICINE

## 2019-08-28 PROCEDURE — 99999 PR PBB SHADOW E&M-EST. PATIENT-LVL III: CPT | Mod: PBBFAC,HCNC,, | Performed by: INTERNAL MEDICINE

## 2019-08-28 NOTE — H&P (VIEW-ONLY)
"Subjective:       Patient ID: Rossi Mcneal is a 63 y.o. female.    Chief Complaint: Establish Care and Follow-up    Discuss anemia    HPI: 64 y/o with history of spinal fusion on daily opioid therapy(Dr. Camilo pain management) HCV s/p therapy presents to discuss anemia. Feels well had been takign BC powder daily until one month ago when hepatolgoy labs showed she was anemic moving bowels daily to every other day no melena or BRBPR. She has been walking more for exercise withotu wheezing or shortness of breath no LE swelling she does admit to feeling more anxious primarily at night. Taking diazepam intermittently for this    Review of Systems   Constitutional: Negative for activity change, appetite change, fatigue, fever and unexpected weight change.   HENT: Negative for ear pain, rhinorrhea and sore throat.    Eyes: Negative for discharge and visual disturbance.   Respiratory: Negative for chest tightness, shortness of breath and wheezing.    Cardiovascular: Negative for chest pain, palpitations and leg swelling.   Gastrointestinal: Negative for abdominal pain, constipation and diarrhea.   Endocrine: Negative for cold intolerance and heat intolerance.   Genitourinary: Negative for dysuria and hematuria.   Musculoskeletal: Negative for joint swelling and neck stiffness.   Skin: Negative for rash.   Neurological: Negative for dizziness, syncope, weakness and headaches.   Psychiatric/Behavioral: Negative for suicidal ideas.       Objective:     Vitals:    08/28/19 1354   BP: 114/79   BP Location: Right arm   Patient Position: Sitting   Pulse: 107   Resp: 17   Temp: 98.7 °F (37.1 °C)   TempSrc: Oral   SpO2: 95%   Weight: 55.7 kg (122 lb 12.7 oz)   Height: 5' 2" (1.575 m)          Physical Exam   Constitutional: She is oriented to person, place, and time. She appears well-developed and well-nourished.   HENT:   Head: Normocephalic and atraumatic.   Eyes: Conjunctivae are normal. No scleral icterus.   Neck: Normal range " of motion. No thyromegaly present.   Cardiovascular: Normal rate and regular rhythm. Exam reveals no gallop and no friction rub.   No murmur heard.  No LE edema   Pulmonary/Chest: Effort normal and breath sounds normal. She has no wheezes. She has no rales.   Abdominal: Soft. Bowel sounds are normal. There is no tenderness. There is no rebound and no guarding.   Musculoskeletal: Normal range of motion. She exhibits no edema or tenderness.   Lymphadenopathy:     She has no cervical adenopathy.   Neurological: She is alert and oriented to person, place, and time. No cranial nerve deficit.   Skin: Skin is warm and dry.   Psychiatric: She has a normal mood and affect.       Assessment and Plan   1. Microcytic anemia  Iron, b12, folate repeat cbc (consider NSAID cause) check EGD for occult source of blood loss  - Case request GI: ESOPHAGOGASTRODUODENOSCOPY (EGD)    2. Anxiety  tsh today  - TSH; Future

## 2019-08-28 NOTE — PROGRESS NOTES
"Subjective:       Patient ID: Rossi Mcneal is a 63 y.o. female.    Chief Complaint: Establish Care and Follow-up    Discuss anemia    HPI: 62 y/o with history of spinal fusion on daily opioid therapy(Dr. Camilo pain management) HCV s/p therapy presents to discuss anemia. Feels well had been takign BC powder daily until one month ago when hepatolgoy labs showed she was anemic moving bowels daily to every other day no melena or BRBPR. She has been walking more for exercise withotu wheezing or shortness of breath no LE swelling she does admit to feeling more anxious primarily at night. Taking diazepam intermittently for this    Review of Systems   Constitutional: Negative for activity change, appetite change, fatigue, fever and unexpected weight change.   HENT: Negative for ear pain, rhinorrhea and sore throat.    Eyes: Negative for discharge and visual disturbance.   Respiratory: Negative for chest tightness, shortness of breath and wheezing.    Cardiovascular: Negative for chest pain, palpitations and leg swelling.   Gastrointestinal: Negative for abdominal pain, constipation and diarrhea.   Endocrine: Negative for cold intolerance and heat intolerance.   Genitourinary: Negative for dysuria and hematuria.   Musculoskeletal: Negative for joint swelling and neck stiffness.   Skin: Negative for rash.   Neurological: Negative for dizziness, syncope, weakness and headaches.   Psychiatric/Behavioral: Negative for suicidal ideas.       Objective:     Vitals:    08/28/19 1354   BP: 114/79   BP Location: Right arm   Patient Position: Sitting   Pulse: 107   Resp: 17   Temp: 98.7 °F (37.1 °C)   TempSrc: Oral   SpO2: 95%   Weight: 55.7 kg (122 lb 12.7 oz)   Height: 5' 2" (1.575 m)          Physical Exam   Constitutional: She is oriented to person, place, and time. She appears well-developed and well-nourished.   HENT:   Head: Normocephalic and atraumatic.   Eyes: Conjunctivae are normal. No scleral icterus.   Neck: Normal range " of motion. No thyromegaly present.   Cardiovascular: Normal rate and regular rhythm. Exam reveals no gallop and no friction rub.   No murmur heard.  No LE edema   Pulmonary/Chest: Effort normal and breath sounds normal. She has no wheezes. She has no rales.   Abdominal: Soft. Bowel sounds are normal. There is no tenderness. There is no rebound and no guarding.   Musculoskeletal: Normal range of motion. She exhibits no edema or tenderness.   Lymphadenopathy:     She has no cervical adenopathy.   Neurological: She is alert and oriented to person, place, and time. No cranial nerve deficit.   Skin: Skin is warm and dry.   Psychiatric: She has a normal mood and affect.       Assessment and Plan   1. Microcytic anemia  Iron, b12, folate repeat cbc (consider NSAID cause) check EGD for occult source of blood loss  - Case request GI: ESOPHAGOGASTRODUODENOSCOPY (EGD)    2. Anxiety  tsh today  - TSH; Future

## 2019-08-30 ENCOUNTER — TELEPHONE (OUTPATIENT)
Dept: FAMILY MEDICINE | Facility: CLINIC | Age: 64
End: 2019-08-30

## 2019-08-30 NOTE — TELEPHONE ENCOUNTER
----- Message from Jaimie Zhou sent at 8/30/2019 12:35 PM CDT -----  Contact: Patient  Type: RX Refill Request    Who Called: Patient     Refill or New Rx:New    RX Name and Strength:  Antibiotic     How is the patient currently taking it? (ex. 1XDay):    Is this a 30 day or 90 day RX:    Preferred Pharmacy with phone number: [facility] [place]  [place], [place] - [address] [phone] (Phone)  [phone] (Fax)        Local or Mail Order: Local     Ordering Provider: Dr. Corcoran    Would the patient rather a call back or a response via [facility]? Call back     Best Call Back Number:[phone]    Additional Information: Pt states that she has a sinus infection

## 2019-09-03 ENCOUNTER — TELEPHONE (OUTPATIENT)
Dept: HEPATOLOGY | Facility: CLINIC | Age: 64
End: 2019-09-03

## 2019-09-03 NOTE — TELEPHONE ENCOUNTER
----- Message from Dany Stout PA-C sent at 9/3/2019 11:03 AM CDT -----  Labs stable. She does have iron deficiency. I would recommend keeping EGD as scheduled  Thanks

## 2019-09-03 NOTE — TELEPHONE ENCOUNTER
HCV LAB REVIEW  SVR 24  F2-F4?    Pertinent labs:  HCV RNA: <12    pls call pt:  HCV was again not detected. Please keep HCC screening 01/2020 as scheduled  Thanks     Next labs due/Please schedule:  HCV RNA in 1 year:07/04/20    HCC screening 01/2020

## 2019-09-05 DIAGNOSIS — M50.90 CERVICAL DISC DISEASE: ICD-10-CM

## 2019-09-05 RX ORDER — DIAZEPAM 10 MG/1
10 TABLET ORAL 2 TIMES DAILY PRN
Qty: 30 TABLET | Refills: 0 | Status: SHIPPED | OUTPATIENT
Start: 2019-09-05 | End: 2019-10-04 | Stop reason: SDUPTHER

## 2019-09-05 NOTE — TELEPHONE ENCOUNTER
----- Message from Kiana Alexandre sent at 9/5/2019  1:28 PM CDT -----  Contact: Rossi [phone]  Type:  Test Results    Who Called: Rossi     Name of Test (Lab/Mammo/Etc): lab     Date of Test: August 28    Ordering Provider: Dr. Corcoran    Where the test was performed: [facility]     Would the patient rather a call back or a response via My [facility]? Call back    Best Call Back Number: [phone]

## 2019-09-05 NOTE — TELEPHONE ENCOUNTER
Pt also inquired about TSH result. I advised it was WNL. Please advise any other recommendation for the pt?

## 2019-09-05 NOTE — TELEPHONE ENCOUNTER
I spoke to the pt and she reports productive cough and sinus congestion since before her last visit 8/28/2019. Pt is using Inhaler and Flonase as prescribed. She is requesting prescription for antibiotics.

## 2019-09-05 NOTE — TELEPHONE ENCOUNTER
----- Message from Kiana Alexandre sent at 9/5/2019  1:26 PM CDT -----  Contact: Rossi [phone]  Type: RX Refill Request    Who Called: Rossi    Refill or New Rx:refill and new medication    RX Name and Strength: diazePAM (VALIUM) 10 MG Tab and an antibiotic    Is this a 30 day or 90 day RX: 30 day     Preferred Pharmacy with phone number[place] [place] [address]    Would the patient rather a call back or a response via My [employer]? Call back    Best Call Back Number: [phone]    Additional Information: (# the patient is asking for an antibiotic to assist with her cough. The patient states that she has been coughing up phlegm#)

## 2019-09-13 DIAGNOSIS — J45.20 MILD INTERMITTENT ASTHMA WITHOUT COMPLICATION: ICD-10-CM

## 2019-09-13 RX ORDER — ALBUTEROL SULFATE 90 UG/1
AEROSOL, METERED RESPIRATORY (INHALATION)
Qty: 18 G | Refills: 2 | Status: SHIPPED | OUTPATIENT
Start: 2019-09-13 | End: 2020-12-21

## 2019-09-19 ENCOUNTER — ANESTHESIA (OUTPATIENT)
Dept: ENDOSCOPY | Facility: HOSPITAL | Age: 64
End: 2019-09-19
Payer: MEDICARE

## 2019-09-19 ENCOUNTER — HOSPITAL ENCOUNTER (OUTPATIENT)
Facility: HOSPITAL | Age: 64
Discharge: HOME | End: 2019-09-19
Attending: INTERNAL MEDICINE | Admitting: INTERNAL MEDICINE
Payer: MEDICARE

## 2019-09-19 ENCOUNTER — ANESTHESIA EVENT (OUTPATIENT)
Dept: ENDOSCOPY | Facility: HOSPITAL | Age: 64
End: 2019-09-19
Payer: MEDICARE

## 2019-09-19 VITALS
RESPIRATION RATE: 20 BRPM | DIASTOLIC BLOOD PRESSURE: 78 MMHG | HEIGHT: 62 IN | BODY MASS INDEX: 23.92 KG/M2 | WEIGHT: 130 LBS | HEART RATE: 65 BPM | SYSTOLIC BLOOD PRESSURE: 108 MMHG | TEMPERATURE: 98 F | OXYGEN SATURATION: 96 %

## 2019-09-19 DIAGNOSIS — K21.9 GERD (GASTROESOPHAGEAL REFLUX DISEASE): ICD-10-CM

## 2019-09-19 PROCEDURE — 37000008 HC ANESTHESIA 1ST 15 MINUTES: Mod: HCNC | Performed by: INTERNAL MEDICINE

## 2019-09-19 PROCEDURE — 00731 ANES UPR GI NDSC PX NOS: CPT | Mod: HCNC | Performed by: INTERNAL MEDICINE

## 2019-09-19 PROCEDURE — 43239 EGD BIOPSY SINGLE/MULTIPLE: CPT | Mod: HCNC,,, | Performed by: INTERNAL MEDICINE

## 2019-09-19 PROCEDURE — 88305 TISSUE SPECIMEN TO PATHOLOGY - SURGERY: ICD-10-PCS | Mod: 26,HCNC,, | Performed by: PATHOLOGY

## 2019-09-19 PROCEDURE — 43239 EGD BIOPSY SINGLE/MULTIPLE: CPT | Mod: HCNC | Performed by: INTERNAL MEDICINE

## 2019-09-19 PROCEDURE — 37000009 HC ANESTHESIA EA ADD 15 MINS: Mod: HCNC | Performed by: INTERNAL MEDICINE

## 2019-09-19 PROCEDURE — 63600175 PHARM REV CODE 636 W HCPCS: Mod: HCNC | Performed by: NURSE ANESTHETIST, CERTIFIED REGISTERED

## 2019-09-19 PROCEDURE — D9220A PRA ANESTHESIA: ICD-10-PCS | Mod: HCNC,ANES,, | Performed by: ANESTHESIOLOGY

## 2019-09-19 PROCEDURE — 43239 PR EGD, FLEX, W/BIOPSY, SGL/MULTI: ICD-10-PCS | Mod: HCNC,,, | Performed by: INTERNAL MEDICINE

## 2019-09-19 PROCEDURE — 88305 TISSUE EXAM BY PATHOLOGIST: CPT | Mod: 26,HCNC,, | Performed by: PATHOLOGY

## 2019-09-19 PROCEDURE — 88305 TISSUE EXAM BY PATHOLOGIST: CPT | Mod: HCNC | Performed by: PATHOLOGY

## 2019-09-19 PROCEDURE — D9220A PRA ANESTHESIA: Mod: HCNC,ANES,, | Performed by: ANESTHESIOLOGY

## 2019-09-19 PROCEDURE — 27201012 HC FORCEPS, HOT/COLD, DISP: Mod: HCNC | Performed by: INTERNAL MEDICINE

## 2019-09-19 RX ORDER — PANTOPRAZOLE SODIUM 40 MG/1
40 TABLET, DELAYED RELEASE ORAL DAILY
Qty: 30 TABLET | Refills: 5 | Status: SHIPPED | OUTPATIENT
Start: 2019-09-19 | End: 2020-09-28

## 2019-09-19 RX ORDER — LIDOCAINE HYDROCHLORIDE 20 MG/ML
INJECTION, SOLUTION EPIDURAL; INFILTRATION; INTRACAUDAL; PERINEURAL
Status: DISCONTINUED
Start: 2019-09-19 | End: 2019-09-19 | Stop reason: HOSPADM

## 2019-09-19 RX ORDER — LIDOCAINE HCL/PF 100 MG/5ML
SYRINGE (ML) INTRAVENOUS
Status: DISCONTINUED | OUTPATIENT
Start: 2019-09-19 | End: 2019-09-19

## 2019-09-19 RX ORDER — PROPOFOL 10 MG/ML
VIAL (ML) INTRAVENOUS
Status: DISCONTINUED | OUTPATIENT
Start: 2019-09-19 | End: 2019-09-19

## 2019-09-19 RX ORDER — PHENYLEPHRINE HCL IN 0.9% NACL 1 MG/10 ML
SYRINGE (ML) INTRAVENOUS
Status: DISCONTINUED
Start: 2019-09-19 | End: 2019-09-19 | Stop reason: HOSPADM

## 2019-09-19 RX ORDER — SODIUM CHLORIDE 9 MG/ML
INJECTION, SOLUTION INTRAVENOUS CONTINUOUS PRN
Status: DISCONTINUED | OUTPATIENT
Start: 2019-09-19 | End: 2019-09-19

## 2019-09-19 RX ORDER — PROPOFOL 10 MG/ML
VIAL (ML) INTRAVENOUS
Status: DISCONTINUED
Start: 2019-09-19 | End: 2019-09-19 | Stop reason: HOSPADM

## 2019-09-19 RX ORDER — PHENYLEPHRINE HYDROCHLORIDE 10 MG/ML
INJECTION INTRAVENOUS
Status: DISCONTINUED | OUTPATIENT
Start: 2019-09-19 | End: 2019-09-19

## 2019-09-19 RX ADMIN — SODIUM CHLORIDE: 0.9 INJECTION, SOLUTION INTRAVENOUS at 08:09

## 2019-09-19 RX ADMIN — LIDOCAINE HYDROCHLORIDE 100 MG: 20 INJECTION, SOLUTION INTRAVENOUS at 09:09

## 2019-09-19 RX ADMIN — PROPOFOL 20 MG: 10 INJECTION, EMULSION INTRAVENOUS at 09:09

## 2019-09-19 RX ADMIN — PHENYLEPHRINE HYDROCHLORIDE 100 MCG: 10 INJECTION INTRAVENOUS at 09:09

## 2019-09-19 RX ADMIN — PROPOFOL 100 MG: 10 INJECTION, EMULSION INTRAVENOUS at 09:09

## 2019-09-19 NOTE — TRANSFER OF CARE
"Anesthesia Transfer of Care Note    Patient: Rossi Mcneal    Procedure(s) Performed: Procedure(s) (LRB):  ESOPHAGOGASTRODUODENOSCOPY (EGD) (N/A)    Patient location: GI    Anesthesia Type: general    Transport from OR: Transported from OR on room air with adequate spontaneous ventilation    Post pain: adequate analgesia    Post assessment: no apparent anesthetic complications and tolerated procedure well    Post vital signs: stable    Level of consciousness: sedated and responds to stimulation    Nausea/Vomiting: no nausea/vomiting    Complications: none    Transfer of care protocol was followed      Last vitals:   Visit Vitals  /71 (BP Location: Left arm, Patient Position: Lying)   Pulse (!) 59   Temp 36.4 °C (97.5 °F) (Oral)   Resp 16   Ht 5' 2" (1.575 m)   Wt 59 kg (130 lb)   SpO2 98%   Breastfeeding? No   BMI 23.78 kg/m²     "

## 2019-09-19 NOTE — DISCHARGE INSTRUCTIONS
Understanding Gastric Ulcers    A gastric ulcer is an open sore in the stomach lining. It is sometimes called a peptic ulcer. This is a more general term for ulcers that may be in the stomach or the upper part of the small intestine. Ulcers can cause pain. But they may also have no symptoms for a long time.  What causes gastric ulcers?  Gastric ulcers have a few common causes. To find the cause of your ulcer, your healthcare provider will give you an exam and take your health history. He or she may also order some tests. The main causes of gastric ulcers include:  · Infection with the H. pylori (Helicobacter pylori) bacteria. This damages the stomach lining. Digestive juices can then harm the digestive tract.  · Long-term use of some over-the-counter pain medicines. This reduces the bodys ability to protect the stomach from damage.  Other causes of gastric ulcers include:  · Heavy alcohol use  · Having a family history of ulcers  · In rare cases, a tumor in the digestive tract may cause an ulcer  Symptoms of a gastric ulcer  Ulcer symptoms may appear and then go away for a time. Symptoms of a gastric ulcer may include:  · Stomach pain, often a dull or burning feeling toward the top of your belly  · Feeling full or bloated  · Heartburn or acid reflux  · Upset stomach (nausea) or vomiting  · Vomiting blood  · Lack of appetite  · Weight loss  · Black stool  · Red blood in the stool  Treatment for a gastric ulcer  Treatment for gastric ulcers may depend on what is causing them. Treatment may include:  · Not using over-the-counter medicines. You will likely need to stop taking these medicines. But in some cases these medicines cant be safely stopped. Check with your healthcare provider to see what is best for you.   · Taking medicines to ease symptoms. These medicines may help to reduce the amount of acid your stomach makes. They also may help coat your stomach lining.  · Taking antibiotics. If your ulcer was caused  by H. pylori, your provider will likely prescribe antibiotics to get rid of the infection.  · Having an endoscopy. This is often done to check the stomach and diagnose the ulcer. In some cases it can also treat the ulcer. It involves passing a flexible tube through your mouth into your stomach and small intestine.  · Using a tube (catheter) to stop the bleeding. A thin tube is passed into one of your blood vessels. Special tools are used to help stop the bleeding.  · Having surgery. You often may need this if the ulcer has caused severe symptoms.  Making some lifestyle changes can reduce ulcer symptoms. It may also prevent more damage to your digestive tract. These changes include:  · Not taking over-the-counter pain medicines. Talk with your provider about using another type of pain reliever.  · Not taking aspirin unless your provider has advised it  · Limiting the amount of alcohol you drink  · Quitting smoking  Possible complications of a gastric ulcer  Gastric ulcers can have serious complications. These can include:  · Bleeding into the stomach  · A hole (perforation) in the stomach  · A blockage that interferes with food moving from your stomach to the small intestine  An ongoing infection with H. pylori may be a risk factor for stomach cancer. This is one reason it is important to get rid of this bacteria.  When to call your healthcare provider  Call your healthcare provider right away if you have any of these:  · Vomiting blood, or vomit that looks like coffee grounds  · Bloody, black, or tarry-looking stools  · Fever of 100.4°F (38°C) or higher, or as directed  · Pain that gets worse  · Symptoms that dont get better with treatment, or symptoms that get worse  · New symptoms   Date Last Reviewed: 5/1/2016  © 4064-2848 [employer]. [address] [place] [address], [place] [address]. All rights reserved. This information is not intended as a substitute for professional medical care. Always follow your  healthcare professional's instructions.

## 2019-09-19 NOTE — ANESTHESIA POSTPROCEDURE EVALUATION
Anesthesia Post Evaluation    Patient: Rossi Mcneal    Procedure(s) Performed: Procedure(s) (LRB):  ESOPHAGOGASTRODUODENOSCOPY (EGD) (N/A)    Final Anesthesia Type: general  Patient location during evaluation: GI PACU  Patient participation: Yes- Able to Participate  Level of consciousness: awake and alert and oriented  Post-procedure vital signs: reviewed and stable  Pain management: adequate  Airway patency: patent  PONV status at discharge: No PONV  Anesthetic complications: no      Cardiovascular status: hemodynamically stable and blood pressure returned to baseline  Respiratory status: spontaneous ventilation, room air and unassisted  Hydration status: euvolemic  Follow-up not needed.          Vitals Value Taken Time   /78 9/19/2019  9:54 AM   Temp 36.4 °C (97.5 °F) 9/19/2019  9:24 AM   Pulse 65 9/19/2019  9:54 AM   Resp 20 9/19/2019  9:54 AM   SpO2 96 % 9/19/2019  9:54 AM         Event Time     Out of Recovery 09:57:24          Pain/Chip Score: Chip Score: 10 (9/19/2019  9:54 AM)  Modified Chip Score: 20 (9/19/2019  9:54 AM)

## 2019-09-19 NOTE — ANESTHESIA PREPROCEDURE EVALUATION
09/19/2019  Rossi Mcneal is a 63 y.o., female.    Pre-operative evaluation for Procedure(s) (LRB):  ESOPHAGOGASTRODUODENOSCOPY (EGD) (N/A)    Rossi Mcneal is a 63 y.o. female     Denies CP/SOB/GERD/MI/CVA/URI symptoms.  METS > 4  NPO > 8    Patient Active Problem List   Diagnosis    Screening for colon cancer    Disc disease, degenerative, cervical    Chronic, continuous use of opioids    Hepatitis C antibody test positive    Hypertension, essential    Hepatic steatosis    Tobacco use    Abnormal finding on GI tract imaging    Other emphysema    Anxiety    History of fusion of cervical spine    Cervical myelopathy with cervical radiculopathy    Cervical stenosis of spine    Painful cervical ROM    Impaired motor control    Decreased strength, endurance, and mobility    Rotator cuff tendinitis, left    Liver fibrosis    History of hepatitis C; S/p RX with SVR 12 07/2019       Review of patient's allergies indicates:  No Known Allergies    No current facility-administered medications on file prior to encounter.      Current Outpatient Medications on File Prior to Encounter   Medication Sig Dispense Refill    fluticasone propionate (FLONASE) 50 mcg/actuation nasal spray 2 sprays (100 mcg total) by Each Nare route once daily. 1 Bottle 0    lisinopril 10 MG tablet Take 1 tablet (10 mg total) by mouth once daily. 90 tablet 3    nicotine (NICODERM CQ) 21 mg/24 hr PLACE ONE PATCH ONTO SKIN EVERY DAY 14 patch 1    nicotine (NICODERM CQ) 21 mg/24 hr place ONE PATCH onto the skin EVERY DAY 14 patch 1    oxyCODONE (ROXICODONE) 10 mg Tab immediate release tablet Take 1 tablet (10 mg total) by mouth every 6 (six) hours as needed. 100 tablet 0    venlafaxine (EFFEXOR) 75 MG tablet Take 1 tablet (75 mg total) by mouth 2 (two) times daily. 60 tablet 5       Past Surgical History:   Procedure  Laterality Date    COLONOSCOPY N/A 7/14/2015    Performed by [facility] Luis [facility]yman [facility]    ESOPHAGOGASTRODUODENOSCOPY (EGD) N/A 4/17/2017    Per[facility] by Hesham Frazier [facility] at [facility]    HYSTERECTOMY      NECK SURGERY  1990    PARTIAL HYSTERECTOMY  1995    ULTRASOUND-ENDOSCOPIC-UPPER N/A 11/13/2017    Performed by Kristian Fountain [facility] at [facility] (2ND FLR)       Social History     Socioeconomic History    Marital status:      Spouse name: Not on file    Number of children: Not on file    Years of education: Not on file    Highest education level: Not on file   Occupational History    Not on file   Social Needs    Financial resource strain: Not on file    Food insecurity:     Worry: Not on file     Inability: Not on file    Transportation needs:     Medical: Not on file     Non-medical: Not on file   Tobacco Use    Smoking status: Current Every Day Smoker     Packs/day: 0.50     Years: 18.00     Pack years: 9.00     Types: Cigarettes    Smokeless tobacco: Never Used   Substance and Sexual Activity    Alcohol use: No     Frequency: Never    Drug use: No    Sexual activity: Not on file   Lifestyle    Physical activity:     Days per week: Not on file     Minutes per session: Not on file    Stress: Not on file   Relationships    Social connections:     Talks on phone: Not on file     Gets together: Not on file     Attends Sikh service: Not on file     Active member of club or organization: Not on file     Attends meetings of clubs or organizations: Not on file     Relationship status: Not on file   Other Topics Concern    Not on file   Social History Narrative    Not on file         CBC: No results for input(s): WBC, RBC, HGB, HCT, PLT, MCV, MCH, MCHC in the last 72 hours.    CMP: No results for input(s): NA, K, CL, CO2, BUN, CREATININE, GLU, MG, PHOS, CALCIUM, ALBUMIN, PROT, ALKPHOS, ALT, AST, BILITOT in the last 72 hours.    INR  No results for input(s): PT, INR,  PROTIME, APTT in the last 72 hours.      There were no vitals filed for this visit.  See nursing charting for additional vital signs      Diagnostic Studies:  Results for ISAAC CUENCA (MRN [id number]) as of 9/19/2019 08:16   Ref. Range 8/28/2019 14:30   WBC Latest Ref Range: 3.90 - 12.70 K/uL 10.81   RBC Latest Ref Range: 4.00 - 5.40 M/uL 5.03   Hemoglobin Latest Ref Range: 12.0 - 16.0 g/dL 12.5   Hematocrit Latest Ref Range: 37.0 - 48.5 % 40.8   MCV Latest Ref Range: 82 - 98 fL 81 (L)   MCH Latest Ref Range: 27.0 - 31.0 pg 24.9 (L)   MCHC Latest Ref Range: 32.0 - 36.0 g/dL 30.6 (L)   RDW Latest Ref Range: 11.5 - 14.5 % 17.4 (H)   Platelets Latest Ref Range: 150 - 350 K/uL 461 (H)   MPV Latest Ref Range: 9.2 - 12.9 fL 9.7   Gran% Latest Ref Range: 38.0 - 73.0 % 63.5   Gran # (ANC) Latest Ref Range: 1.8 - 7.7 K/uL 6.8   Lymph% Latest Ref Range: 18.0 - 48.0 % 27.8   Lymph # Latest Ref Range: 1.0 - 4.8 K/uL 3.0   Mono% Latest Ref Range: 4.0 - 15.0 % 7.5   Mono # Latest Ref Range: 0.3 - 1.0 K/uL 0.8   Eosinophil% Latest Ref Range: 0.0 - 8.0 % 1.0   Eos # Latest Ref Range: 0.0 - 0.5 K/uL 0.1   Basophil% Latest Ref Range: 0.0 - 1.9 % 0.2   Baso # Latest Ref Range: 0.00 - 0.20 K/uL 0.02   Differential Method Unknown Automated     Results for ISAAC CUENCA (MRN [id number]) as of 9/19/2019 08:16   Ref. Range 8/28/2019 14:30   Sodium Latest Ref Range: 136 - 145 mmol/L 137   Potassium Latest Ref Range: 3.5 - 5.1 mmol/L 3.9   Chloride Latest Ref Range: 95 - 110 mmol/L 102   CO2 Latest Ref Range: 23 - 29 mmol/L 22 (L)   Anion Gap Latest Ref Range: 8 - 16 mmol/L 13   BUN, Bld Latest Ref Range: 8 - 23 mg/dL 11   Creatinine Latest Ref Range: 0.5 - 1.4 mg/dL 1.0   eGFR if non African American Latest Ref Range: >60 mL/min/1.73 m^2 59 (A)   eGFR if African American Latest Ref Range: >60 mL/min/1.73 m^2 >60   Glucose Latest Ref Range: 70 - 110 mg/dL 99   Calcium Latest Ref Range: 8.7 - 10.5 mg/dL 9.6   Alkaline Phosphatase Latest Ref  Range: 55 - 135 U/L 81   PROTEIN TOTAL Latest Ref Range: 6.0 - 8.4 g/dL 8.1   Albumin Latest Ref Range: 3.5 - 5.2 g/dL 4.0   BILIRUBIN TOTAL Latest Ref Range: 0.1 - 1.0 mg/dL 0.4   AST Latest Ref Range: 10 - 40 U/L 37   ALT Latest Ref Range: 10 - 44 U/L 21     Anesthesia Evaluation    I have reviewed the Patient Summary Reports.    I have reviewed the Nursing Notes.      Review of Systems  Anesthesia Hx:   Denies Personal Hx of Anesthesia complications.   Social:  Smoker, No Alcohol Use    Cardiovascular:   Exercise tolerance: good Hypertension    Pulmonary:   COPD, mild Uses albuterol 2x a week   Hepatic/GI:   Liver Disease, Hepatitis, C    Musculoskeletal:   Residual numbness to BUE Spine Disorders: cervical Degenerative disease and Disc disease    Psych:   anxiety          Physical Exam  General:  Well nourished    Airway/Jaw/Neck:   MP3, TMD >3FB, Teeth intact     Chest/Lungs:  Chest/Lungs Clear    Heart/Vascular:  Heart Findings: Normal            Anesthesia Plan  Type of Anesthesia, risks & benefits discussed:  Anesthesia Type:  general, MAC  Patient's Preference:   Intra-op Monitoring Plan: standard ASA monitors  Intra-op Monitoring Plan Comments:   Post Op Pain Control Plan: multimodal analgesia  Post Op Pain Control Plan Comments:   Induction:   IV  Beta Blocker:  Patient is not currently on a Beta-Blocker (No further documentation required).       Informed Consent: Patient understands risks and agrees with Anesthesia plan.  Questions answered. Anesthesia consent signed with patient.  ASA Score: 3     Day of Surgery Review of History & Physical: I have interviewed and examined the patient. I have reviewed the patient's H&P dated:  There are no significant changes.          Ready For Surgery From Anesthesia Perspective.

## 2019-09-19 NOTE — [employer] PATIENT INSTRUCTIONS
Discharge Summary/Instructions after an Endoscopic Procedure  Patient Name: Rossi Mcneal  Patient MRN: [id number]  Patient YOB: 1955 Thursday, September 19, 2019  Jose Luis Hyman MD  RESTRICTIONS:  During your procedure today, you received medications for sedation.  These   medications may affect your judgment, balance and coordination.  Therefore,   for 24 hours, you have the following restrictions:   - DO NOT drive a car, operate machinery, make legal/financial decisions,   sign important papers or drink alcohol.    ACTIVITY:  Today: no heavy lifting, straining or running due to procedural   sedation/anesthesia.  The following day: return to full activity including work.  DIET:  Eat and drink normally unless instructed otherwise.     TREATMENT FOR COMMON SIDE EFFECTS:  - Mild abdominal pain, nausea, belching, bloating or excessive gas:  rest,   eat lightly and use a heating pad.  - Sore Throat: treat with throat lozenges and/or gargle with warm salt   water.  - Because air was used during the procedure, expelling large amounts of air   from your rectum or belching is normal.  - If a bowel prep was taken, you may not have a bowel movement for 1-3 days.    This is normal.  SYMPTOMS TO WATCH FOR AND REPORT TO YOUR PHYSICIAN:  1. Abdominal pain or bloating, other than gas cramps.  2. Chest pain.  3. Back pain.  4. Signs of infection such as: chills or fever occurring within 24 hours   after the procedure.  5. Rectal bleeding, which would show as bright red, maroon, or black stools.   (A tablespoon of blood from the rectum is not serious, especially if   hemorrhoids are present.)  6. Vomiting.  7. Weakness or dizziness.  GO DIRECTLY TO THE NEAREST EMERGENCY ROOM IF YOU HAVE ANY OF THE FOLLOWING:      Difficulty breathing              Chills and/or fever over 101 F   Persistent vomiting and/or vomiting blood   Severe abdominal pain   Severe chest pain   Black, tarry stools   Bleeding- more than one  tablespoon   Any other symptom or condition that you feel may need urgent attention  Your doctor recommends these additional instructions:  If any biopsies were taken, your doctors clinic will contact you in 1 to 2   weeks with any results.  - Discharge patient to home.   - Resume previous diet.   - Use a proton pump inhibitor PO daily.   - No ibuprofen, naproxen, or other non-steroidal anti-inflammatory drugs.   - Await pathology results.   - Return to primary care physician as previously scheduled.   - Return to GI clinic in 3 weeks.   - The findings and recommendations were discussed with the patient's family.     - Patient has a contact number available for emergencies.  The signs and   symptoms of potential delayed complications were discussed with the   patient.  Return to normal activities tomorrow.  Written discharge   instructions were provided to the patient.  For questions, problems or results please call your physician - Jose Luis Hyman MD at Work:  [phone].  [facility] can be reached at [phone]     IF A COMPLICATION OR EMERGENCY SITUATION ARISES AND YOU ARE UNABLE TO REACH   YOUR PHYSICIAN - GO DIRECTLY TO THE EMERGENCY ROOM.  Jose Luis Hyman MD  9/19/2019 9:21:44 AM  This report has been verified and signed electronically.  [employer]

## 2019-10-04 DIAGNOSIS — M50.90 CERVICAL DISC DISEASE: ICD-10-CM

## 2019-10-04 RX ORDER — DIAZEPAM 10 MG/1
TABLET ORAL
Qty: 30 TABLET | Refills: 0 | Status: SHIPPED | OUTPATIENT
Start: 2019-10-04 | End: 2019-11-04 | Stop reason: SDUPTHER

## 2019-10-04 NOTE — TELEPHONE ENCOUNTER
----- Message from Karoline Way sent at 10/4/2019  1:23 PM CDT -----  Contact: Self   Type: RX Refill Request    Who Called:  Self    Have you contacted your pharmacy: no     Refill or New Rx: Refill     RX Name and Strength:diazePAM (VALIUM) 10 MG Tab        Preferred Pharmacy with phone number:[facility] - [place]  [place], [place] - [address] [phone] (Phone)  [phone] (Fax)    Ordering Provider: Dr. Corcoran     Would the patient rather a call back or a response via [facility]? Call     Best Call Back Number:[phone]

## 2019-11-04 DIAGNOSIS — M50.90 CERVICAL DISC DISEASE: ICD-10-CM

## 2019-11-04 DIAGNOSIS — I10 HYPERTENSION, ESSENTIAL: ICD-10-CM

## 2019-11-04 RX ORDER — VENLAFAXINE 75 MG/1
TABLET ORAL
Qty: 60 TABLET | Refills: 5 | Status: ON HOLD | OUTPATIENT
Start: 2019-11-04 | End: 2020-06-18 | Stop reason: HOSPADM

## 2019-11-04 RX ORDER — DIAZEPAM 10 MG/1
TABLET ORAL
Qty: 30 TABLET | Refills: 1 | Status: SHIPPED | OUTPATIENT
Start: 2019-11-04 | End: 2020-01-07

## 2019-11-04 RX ORDER — LISINOPRIL 10 MG/1
TABLET ORAL
Qty: 90 TABLET | Refills: 3 | Status: SHIPPED | OUTPATIENT
Start: 2019-11-04 | End: 2020-11-12

## 2019-12-02 ENCOUNTER — TELEPHONE (OUTPATIENT)
Dept: FAMILY MEDICINE | Facility: CLINIC | Age: 64
End: 2019-12-02

## 2019-12-02 NOTE — TELEPHONE ENCOUNTER
I spoke to the pt and she is requesting a letter from PCP stating that she uses her Valium for anxiety, muscle spasms, nerve damage and her Bi-polar disorder for Dr. Camilo. Per pt they need the letter to allow her to continue her medication. Please advise.

## 2019-12-02 NOTE — LETTER
2019    Rossi Mcneal  [address]  [address]  [place] [address]             [facility]  [address]  [place] [address]  Phone: [phone] To Whom It May Concern:    Ms. Rossi Mcneal (: 1955) is followed by me for bipolar disorder and generalized anxiety. She uses diazepam for acute anxiety/robb symptoms. She is followed every three months in my primary care clinic.    Very Respectfully      Deion Corcoran M.D.

## 2019-12-02 NOTE — TELEPHONE ENCOUNTER
----- Message from Angie Tracey sent at 12/2/2019 11:50 AM CST -----  Contact: self  Type: Patient Call Back    Who called:self    What is the request in detail:pt needs to speak to nurse     Can the clinic reply by [facility]?no    Would the patient rather a call back or a response via [facility]? call    Best call back number:[phone]

## 2019-12-03 ENCOUNTER — TELEPHONE (OUTPATIENT)
Dept: FAMILY MEDICINE | Facility: CLINIC | Age: 64
End: 2019-12-03

## 2019-12-03 NOTE — TELEPHONE ENCOUNTER
Call pt regarding letter requested pt states will pick letter up today / letter now at  with staff

## 2020-01-07 DIAGNOSIS — M50.90 CERVICAL DISC DISEASE: ICD-10-CM

## 2020-01-07 RX ORDER — DIAZEPAM 10 MG/1
TABLET ORAL
Qty: 30 TABLET | Refills: 0 | Status: SHIPPED | OUTPATIENT
Start: 2020-01-07 | End: 2020-02-04

## 2020-01-17 NOTE — PROGRESS NOTES
HEPATOLOGY CLINIC VISIT NOTE    REFERRING PROVIDER:  Leobardo Corcoran MD    CHIEF COMPLAINT: Hepatitis C    HISTORY: Patient is a 61 y.o. WF who is was new to the hepatology clinic at last visit, was referred from Dr. Corcoran  for  hepatitis C.  Hepatitis C  was originally picked up about 2 years ago.  Preclinic labs confirm Genotype 1b Chronic hepatitis C w/ viral load of 1.4million IU/mL.  Positive HCV Ab noted . Recent labs show AST > ALT elevations otherwise very normal synthetic liver function. Platelets 378, INR = 1.0 & hemoglobin 12.9. Recalls icteric illness 10-20yrs.  Hepatitis C likely acquired with blood transfusions she had in  & .  Fibroscan done today suggests mild scarring at F2 w/ CAP = 342 (S3)       Recent Abdominal Ultrasound = There is no gallstone.  The common duct is enlarged, 8 mm.  Minimal prominence of left intrahepatic ducts. Dilated common duct measuring 8 mm. Hepatomegaly with fatty infiltration of the liver. This has been reviewed with Dr. Hannah and recommend for pt to have EUS for further evaluation.     She is here today to complete her PREP C in efforts to move forward w/ Gregg     Pt reports + extreme fatigue, + joint pains --neck, legs, shoulders.   Low appetite, some nausea ( x 1 month) no abd pain,   denies fevers, weight change,  lymphadenopathy,  rashes, dark urine, abdominal fluid accumulation, yellowing/jaundice of skin or eyes, vomiting of blood, black stool, confusion or  tremors.      Spouse , he did have HCV     Past Medical History:   Diagnosis Date    Emphysema     Hypertension     Neuromuscular disorder      Past Surgical History:   Procedure Laterality Date    NECK SURGERY      PARTIAL HYSTERECTOMY       FHX:   Father had alcoholic cirrhosis.   No fhx of liver cancer or other liver disease.     ALLERGIES AND MEDICATIONS: reviewed in epic    ROS:   General: denies fever, chills, weight change, fatigue   Skin: denies rash,  itching, sores  HEENT: some mild  Headaches  hematemesis,  denies icteric illness, abdominal swelling, confusion.   : denies dark urine, dysuria    PE:  WDWN, NAD  Alert, oriented and pleasant.   There were no vitals filed for this visit.  HEENT: ncat, eomi, no scleral icterus; normal rom of neck  Lungs: chest symmetric with respirations.   Abdomen: + bs, ntnd. No organomegaly  Neuro/psych: no asterixis, oriented x3, mood and affect appropriate.   Skin: warm and dry, no c/c/e. mild ramírez erythema.  No spider telangectasia     RECENT LABS:  Hepatitis A Antibody IgG   Date Value Ref Range Status   08/10/2017 Negative  Final   08/10/2017 Negative  Final       Hepatitis B Surface Ag   Date Value Ref Range Status   08/10/2017 Negative  Final   08/10/2017 Negative  Final     Hep B Core Total Ab   Date Value Ref Range Status   08/10/2017 Positive (A)  Final   08/10/2017 Positive (A)  Final     Hep B S Ab   Date Value Ref Range Status   08/10/2017 Positive (A)  Final   08/10/2017 Positive (A)  Final       There is no immunization history on file for this patient.    Results for ISAAC CUENCA (MRN [id number]) as of 8/10/2017 09:56   Ref. Range 2017 11:39   HCV Qualitative Result Latest Ref Range: Not detected  DETECTED (A)   HCV Quantitative Log Latest Ref Range: <1.08 Log (10) IU/mL 6.15 (H)   HCV Quantitative Result Latest Ref Range: <12 IU/mL 1,402,698 (H)   Hepatitis C Virus Genotype Unknown 1b (A)     Lab Results   Component Value Date    WBC 8.97 2017    HGB 12.9 2017     (H) 2017    AST 42 (H) 2017    ALT 36 2017    CREATININE 1.0 2017    BILITOT 0.3 2017    ALBUMIN 3.8 2017    INR 1.0 2017     RECENT IMAGING: as above     PROCEDURES:   Procedures Fibroscan Procedure      Name: Isaac Cuenca  Date of Procedure : 08/10/2017   :: Brittany Lemon PA-C  Diagnosis: HCV     Probe: M     Fibroscan readin.9 KPa     Fibrosis:F2      CAP reading:  342 dB/m     Steatosis: :S1          Electronically signed by Brittany Lemon PA-C at 8/10/2017 11:09 AM      Liver is enlarged, 18.9 cm, and demonstrates fatty infiltration.    The right kidney is unremarkable.   Impression      Dilated common duct measuring 8 mm.    Hepatomegaly with fatty infiltration of the liver.    epic notify      Electronically signed by: Brenna Valdez MD  Date: 08/02/17  Time: 11:28     Encounter     View Encounter            ASSESSMENT:  60 yo WF w/   1. CHRONIC HEPATITIS C, GENOTYPE 1b  hcvrna quant = 1.4million IU/mL   Recommend vaccines for Hepatitis A, HepB core pos, HBsAb pos, HBsAg neg    Evidence of extrahepatic manifestations of HCV w/ extensive  fatigue and joint pains   normal synthetic liver function  elevated transaminases (AST > ALT)     Fibroscan = F2       2.  Abnormal findings on imaging.   The common duct is enlarged, 8 mm w/  Minimal prominence of left intrahepatic ducts.  Hepatomegaly noted   Alk phos & T bili normal     3.  Evidence of fatty liver   CAP = 342, S3, also noted on imaging                   EDUCATION DISCUSSION    We completed the Psychosocial Readiness Evaluation and Preparation for Hepatitis C treatment online today in clinic.(30minute evaluation)      Pt demonstrates treatment readiness and I strongly believe she will  adhere to the treatment regimen.  Her co morbid condition of hepatic steatosis places her at increased risk for development of scar tissue in the liver so I highly recommend treatment of her Hepatitis C.       In my opinion, i have no reservations regarding moving forward with treatment and I am highly confident that she will go on to achieve sustained virologic response.     pt verbalized understanding     PLAN:  1.  Start Hep A vaccines today.   2.  Will submit PREP C to pharmacy and proceed w/ application for Zepatier   3.  EUS for further evaluation of CBD prominence   4. Discussed low fat/ low cholesterol diet modifications for  fatty liver   5. RTC in 4-6 months     Thank you for your kind consult, will keep you updated on our progress.      No

## 2020-02-04 ENCOUNTER — TELEPHONE (OUTPATIENT)
Dept: FAMILY MEDICINE | Facility: CLINIC | Age: 65
End: 2020-02-04

## 2020-02-04 DIAGNOSIS — M50.90 CERVICAL DISC DISEASE: ICD-10-CM

## 2020-02-04 RX ORDER — DIAZEPAM 10 MG/1
TABLET ORAL
Qty: 30 TABLET | Refills: 0 | Status: SHIPPED | OUTPATIENT
Start: 2020-02-04 | End: 2020-02-27

## 2020-02-04 NOTE — TELEPHONE ENCOUNTER
Called pt and informed her of medication refill being sent to pharmacy . Explained that Dr. Corcoran refilled medication at this time but an apt is needed . Pt agreed and was able to schedule first opening apt 3/11/2020 at 1:20 pm. Made aware she'd be added to wait list if any cancellations occur , agreed

## 2020-02-18 ENCOUNTER — TELEPHONE (OUTPATIENT)
Dept: FAMILY MEDICINE | Facility: CLINIC | Age: 65
End: 2020-02-18

## 2020-02-18 NOTE — TELEPHONE ENCOUNTER
----- Message from Maria R Lovett sent at 2/17/2020  7:35 PM CST -----  Contact: Pt   Pt was calling to get a sooner appt.    Pt would like a call back at [phone]    Thank You

## 2020-02-18 NOTE — TELEPHONE ENCOUNTER
Rt pt call and offered first available 2/19/2020 at 2:20 pm. Pt confirmed that date and time. Canceled 3/11/2020 apt

## 2020-02-19 ENCOUNTER — OFFICE VISIT (OUTPATIENT)
Dept: FAMILY MEDICINE | Facility: CLINIC | Age: 65
End: 2020-02-19
Payer: MEDICARE

## 2020-02-19 VITALS
SYSTOLIC BLOOD PRESSURE: 95 MMHG | HEART RATE: 110 BPM | OXYGEN SATURATION: 95 % | DIASTOLIC BLOOD PRESSURE: 68 MMHG | BODY MASS INDEX: 25.88 KG/M2 | TEMPERATURE: 98 F | HEIGHT: 62 IN | RESPIRATION RATE: 17 BRPM | WEIGHT: 140.63 LBS

## 2020-02-19 DIAGNOSIS — D50.0 IRON DEFICIENCY ANEMIA DUE TO CHRONIC BLOOD LOSS: ICD-10-CM

## 2020-02-19 DIAGNOSIS — I10 HYPERTENSION, ESSENTIAL: ICD-10-CM

## 2020-02-19 DIAGNOSIS — M50.30 DISC DISEASE, DEGENERATIVE, CERVICAL: ICD-10-CM

## 2020-02-19 DIAGNOSIS — Z98.1 HISTORY OF FUSION OF CERVICAL SPINE: Primary | ICD-10-CM

## 2020-02-19 DIAGNOSIS — J44.1 COPD EXACERBATION: ICD-10-CM

## 2020-02-19 PROCEDURE — 1126F AMNT PAIN NOTED NONE PRSNT: CPT | Mod: HCNC,S$GLB,, | Performed by: INTERNAL MEDICINE

## 2020-02-19 PROCEDURE — 1126F PR PAIN SEVERITY QUANTIFIED, NO PAIN PRESENT: ICD-10-PCS | Mod: HCNC,S$GLB,, | Performed by: INTERNAL MEDICINE

## 2020-02-19 PROCEDURE — 99214 PR OFFICE/OUTPT VISIT, EST, LEVL IV, 30-39 MIN: ICD-10-PCS | Mod: HCNC,S$GLB,, | Performed by: INTERNAL MEDICINE

## 2020-02-19 PROCEDURE — 3074F PR MOST RECENT SYSTOLIC BLOOD PRESSURE < 130 MM HG: ICD-10-PCS | Mod: HCNC,CPTII,S$GLB, | Performed by: INTERNAL MEDICINE

## 2020-02-19 PROCEDURE — 1101F PR PT FALLS ASSESS DOC 0-1 FALLS W/OUT INJ PAST YR: ICD-10-PCS | Mod: HCNC,CPTII,S$GLB, | Performed by: INTERNAL MEDICINE

## 2020-02-19 PROCEDURE — 99999 PR PBB SHADOW E&M-EST. PATIENT-LVL IV: CPT | Mod: PBBFAC,HCNC,, | Performed by: INTERNAL MEDICINE

## 2020-02-19 PROCEDURE — 99214 OFFICE O/P EST MOD 30 MIN: CPT | Mod: HCNC,S$GLB,, | Performed by: INTERNAL MEDICINE

## 2020-02-19 PROCEDURE — 3074F SYST BP LT 130 MM HG: CPT | Mod: HCNC,CPTII,S$GLB, | Performed by: INTERNAL MEDICINE

## 2020-02-19 PROCEDURE — 1159F PR MEDICATION LIST DOCUMENTED IN MEDICAL RECORD: ICD-10-PCS | Mod: HCNC,S$GLB,, | Performed by: INTERNAL MEDICINE

## 2020-02-19 PROCEDURE — 99999 PR PBB SHADOW E&M-EST. PATIENT-LVL IV: ICD-10-PCS | Mod: PBBFAC,HCNC,, | Performed by: INTERNAL MEDICINE

## 2020-02-19 PROCEDURE — 1101F PT FALLS ASSESS-DOCD LE1/YR: CPT | Mod: HCNC,CPTII,S$GLB, | Performed by: INTERNAL MEDICINE

## 2020-02-19 PROCEDURE — 1159F MED LIST DOCD IN RCRD: CPT | Mod: HCNC,S$GLB,, | Performed by: INTERNAL MEDICINE

## 2020-02-19 PROCEDURE — 3078F DIAST BP <80 MM HG: CPT | Mod: HCNC,CPTII,S$GLB, | Performed by: INTERNAL MEDICINE

## 2020-02-19 PROCEDURE — 99499 UNLISTED E&M SERVICE: CPT | Mod: HCNC,S$GLB,, | Performed by: INTERNAL MEDICINE

## 2020-02-19 PROCEDURE — 99499 RISK ADDL DX/OHS AUDIT: ICD-10-PCS | Mod: HCNC,S$GLB,, | Performed by: INTERNAL MEDICINE

## 2020-02-19 PROCEDURE — 3078F PR MOST RECENT DIASTOLIC BLOOD PRESSURE < 80 MM HG: ICD-10-PCS | Mod: HCNC,CPTII,S$GLB, | Performed by: INTERNAL MEDICINE

## 2020-02-19 RX ORDER — PROMETHAZINE HYDROCHLORIDE AND CODEINE PHOSPHATE 6.25; 1 MG/5ML; MG/5ML
5 SOLUTION ORAL EVERY 8 HOURS PRN
Qty: 118 ML | Refills: 0 | Status: SHIPPED | OUTPATIENT
Start: 2020-02-19 | End: 2020-02-29

## 2020-02-19 RX ORDER — PREDNISONE 20 MG/1
40 TABLET ORAL DAILY
Qty: 10 TABLET | Refills: 0 | Status: SHIPPED | OUTPATIENT
Start: 2020-02-19 | End: 2020-03-09 | Stop reason: ALTCHOICE

## 2020-02-20 NOTE — PROGRESS NOTES
Subjective:       Patient ID: Rossi Mcneal is a 64 y.o. female.    Chief Complaint: Establish Care; Follow-up; and Cough    F/u chronic conditions    HPI: 63 y/o with history of cervical and spinal fusion chronic daily opioid pain medication (formerly with [facility] for [facility] management) cirrhosis and anemia presents for follow up after break in care. She tells me she has stopped going to pain management clinic because of issues with not getting enough short acting pain medication she is requesting opioid fills today. She reports chronic posterior neck and lwoer back pain. No change in motor function. She reports occasional abdominal bloating moving bowels one to two times daily using miralx PRN for constipation. No LE swelling no dyspnea. No melena or BRBPR     Her primary complaint is one week of cough minimal sputum production she continues to smoke cough same all day. No relief with OTC suppressants    Review of Systems   Constitutional: Negative for activity change, appetite change, fatigue, fever and unexpected weight change.   HENT: Negative for ear pain, rhinorrhea and sore throat.    Eyes: Negative for discharge and visual disturbance.   Respiratory: Negative for chest tightness, shortness of breath and wheezing.    Cardiovascular: Negative for chest pain, palpitations and leg swelling.   Gastrointestinal: Positive for abdominal distention. Negative for abdominal pain, constipation and diarrhea.   Endocrine: Negative for cold intolerance and heat intolerance.   Genitourinary: Negative for dysuria and hematuria.   Musculoskeletal: Positive for arthralgias, back pain and neck pain. Negative for joint swelling and neck stiffness.   Skin: Negative for rash.   Neurological: Negative for dizziness, syncope, weakness and headaches.   Psychiatric/Behavioral: Negative for suicidal ideas.       Objective:     Vitals:    02/19/20 1448   BP: 95/68   BP Location: Right arm   Patient Position: Sitting   Pulse: 110  "  Resp: 17   Temp: 98.2 °F (36.8 °C)   TempSrc: Oral   SpO2: 95%   Weight: 63.8 kg (140 lb 10.5 oz)   Height: 5' 2" (1.575 m)          Physical Exam   Constitutional: She is oriented to person, place, and time. She appears well-developed and well-nourished.   HENT:   Head: Normocephalic and atraumatic.   Eyes: Conjunctivae are normal. No scleral icterus.   Neck: Normal range of motion.   Cardiovascular: Normal rate and regular rhythm. Exam reveals no gallop and no friction rub.   No murmur heard.  No LE edema   Pulmonary/Chest: Effort normal. She has wheezes. She has no rales.   Bilateral end expiratory wheezing   Abdominal: Soft. Bowel sounds are normal. There is no tenderness. There is no rebound and no guarding.   Soft abdomen no appreciable fluid wave   Musculoskeletal: Normal range of motion. She exhibits no edema or tenderness.   Neurological: She is alert and oriented to person, place, and time. No cranial nerve deficit.   Skin: Skin is warm and dry.   Psychiatric: She has a normal mood and affect.       Assessment and Plan   1. History of fusion of cervical spine  Needs follow up with new pain management given her requirement for increasing doses of opioids I am concerned about tolerance. Last fill > 1 month ago she will contact insurance company to find choice of in network pain specialist    2. Disc disease, degenerative, cervical  As abvoe    3. Hypertension, essential  controll on low dose acei check renal function and electrolytes  - CBC auto differential; Future  - Comprehensive metabolic panel; Future    4. Iron deficiency anemia due to chronic blood loss  Intermittent iron supplemenation had EGD no evidence of active bleeding suspect some component of AOCD as well  - Ferritin; Future  - Iron and TIBC; Future    5. COPD exacerbation  With worsening cough today short steroid course   - predniSONE (DELTASONE) 20 MG tablet; Take 2 tablets (40 mg total) by mouth once daily.  Dispense: 10 tablet; Refill: " 0  - promethazine-codeine 6.25-10 mg/5 ml (PHENERGAN WITH CODEINE) 6.25-10 mg/5 mL syrup; Take 5 mLs by mouth every 8 (eight) hours as needed for Cough.  Dispense: 118 mL; Refill: 0

## 2020-02-24 ENCOUNTER — TELEPHONE (OUTPATIENT)
Dept: HEPATOLOGY | Facility: CLINIC | Age: 65
End: 2020-02-24

## 2020-02-24 DIAGNOSIS — K74.00 LIVER FIBROSIS: Primary | ICD-10-CM

## 2020-02-24 NOTE — TELEPHONE ENCOUNTER
Previous pt of SHON Martinez, last seen 2019, chart reviewed  Brittany contacted per message below, faxed information for ok from liver standpoint for steroid injection     Overdue for f/u, please contact pt to schedule the followin. US and labs (CMP, INR, AFP) now   2. F/u with me in 6 months (August) with labs and US a few days before visit

## 2020-02-24 NOTE — LETTER
February 24, 2020    Mazin Ray MD  [address] [place] [address] [address]vd  [address]  [place] [address]         [facility] - Hepatology  [address]  [place] [address]  Phone: [phone]  Fax: [phone]   Patient: Rossi Mcneal   MR Number: [id number]   YOB: 1955   Date of Visit: 2/24/2020     Dear Dr. Ray,     Ms. 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 There are no contraindications for steroid use from a liver/cirrhosis standpoint.     Please let me know if you have any further questions    Sincerely,          Jolynn Berg, [place]

## 2020-02-24 NOTE — TELEPHONE ENCOUNTER
----- Message from Jaimie Monroy MA sent at 2/21/2020 10:12 AM CST -----  Contact: Brittany valencia/Dr. Leslie [phone]      ----- Message -----  From: Judith Hilliard  Sent: 2/21/2020   9:22 AM CST  To: Results Argelia CHEUNG    Calling to get clearance for pt to have epidural steroid shot  after  ([id number])    Procedure scheduled on 2/26    pls contact Brittany

## 2020-02-27 ENCOUNTER — TELEPHONE (OUTPATIENT)
Dept: HEPATOLOGY | Facility: CLINIC | Age: 65
End: 2020-02-27

## 2020-02-27 DIAGNOSIS — M50.90 CERVICAL DISC DISEASE: ICD-10-CM

## 2020-02-27 RX ORDER — DIAZEPAM 10 MG/1
TABLET ORAL
Qty: 30 TABLET | Refills: 1 | Status: SHIPPED | OUTPATIENT
Start: 2020-03-03 | End: 2020-04-13

## 2020-02-27 NOTE — TELEPHONE ENCOUNTER
Contacted patient and scheduled all appointments as instructed. Patient agreed to time and date of appointment. Sent reminders in the mail.

## 2020-03-04 ENCOUNTER — TELEPHONE (OUTPATIENT)
Dept: HEPATOLOGY | Facility: CLINIC | Age: 65
End: 2020-03-04

## 2020-03-05 ENCOUNTER — HOSPITAL ENCOUNTER (OUTPATIENT)
Dept: RADIOLOGY | Facility: HOSPITAL | Age: 65
Discharge: HOME | End: 2020-03-05
Attending: NURSE PRACTITIONER
Payer: MEDICARE

## 2020-03-05 DIAGNOSIS — K74.00 LIVER FIBROSIS: ICD-10-CM

## 2020-03-05 PROCEDURE — 76700 US EXAM ABDOM COMPLETE: CPT | Mod: TC,HCNC

## 2020-03-05 PROCEDURE — 76700 US EXAM ABDOM COMPLETE: CPT | Mod: 26,HCNC,, | Performed by: RADIOLOGY

## 2020-03-05 PROCEDURE — 76700 US ABDOMEN COMPLETE: ICD-10-PCS | Mod: 26,HCNC,, | Performed by: RADIOLOGY

## 2020-03-08 ENCOUNTER — TELEPHONE (OUTPATIENT)
Dept: HEPATOLOGY | Facility: CLINIC | Age: 65
End: 2020-03-08

## 2020-03-08 DIAGNOSIS — K74.60 HEPATIC CIRRHOSIS, UNSPECIFIED HEPATIC CIRRHOSIS TYPE, UNSPECIFIED WHETHER ASCITES PRESENT: Primary | ICD-10-CM

## 2020-03-09 ENCOUNTER — TELEPHONE (OUTPATIENT)
Dept: FAMILY MEDICINE | Facility: CLINIC | Age: 65
End: 2020-03-09

## 2020-03-09 ENCOUNTER — TELEPHONE (OUTPATIENT)
Dept: HEPATOLOGY | Facility: CLINIC | Age: 65
End: 2020-03-09

## 2020-03-09 DIAGNOSIS — R05.9 COUGH: Primary | ICD-10-CM

## 2020-03-09 RX ORDER — PROMETHAZINE HYDROCHLORIDE AND CODEINE PHOSPHATE 6.25; 1 MG/5ML; MG/5ML
5 SOLUTION ORAL EVERY 12 HOURS PRN
Qty: 75 ML | Refills: 0 | Status: SHIPPED | OUTPATIENT
Start: 2020-03-09 | End: 2020-03-19

## 2020-03-09 NOTE — TELEPHONE ENCOUNTER
Contacted patient and informed her of the results of her testing and scheduled all her appointments as instructed.

## 2020-03-09 NOTE — TELEPHONE ENCOUNTER
Patient requesting a refill of cough syrup - promethazine-codeine 6.25-10 mg/5 ml.  Stated it along with the prednisone helped because now the phlegm is a light green, not as dark as it was previously but the cough is still continuous.  Requesting the cough syrup because it helps to lessen the cough and would allow her to do the MRI in the morning.  Patient stated she would appreciate it if a refill is granted.  Please advise.

## 2020-03-09 NOTE — TELEPHONE ENCOUNTER
----- Message from Marcelina Agarwal sent at 3/9/2020  9:05 AM CDT -----  Type: Patient Call Back    Who called: pt     What is the request in detail: pt states she still has a continuous cough and her sinuses are blocked. She states she has a MRI in the morning and would like a call back from staff regarding.    Can the clinic reply by [facility]? No     Would the patient rather a call back or a response via [facility]? Call back     Best call back number: [phone]    Additional Information:

## 2020-03-09 NOTE — TELEPHONE ENCOUNTER
Attempted to contact patient and give results from recent test and schedule follow up but patient did not answer. Left patient a voicemail stating the purpose for the call. Awaiting a call back.

## 2020-03-09 NOTE — TELEPHONE ENCOUNTER
(Previously seen by Dany. Will be seeing Jolynn in August)    3/5/20 labs and u/s reviewed     Please notify patient:  · I have reviewed the recent labs and ultrasound.  · Liver is working well.  · Liver cancer screening looked fine. There are no tumors in the liver. Liver cancer screening blood test was normal  · Next liver monitoring is due in 6 months.  · Her white blood cell count is elevated. This is not related to the liver but could be related to an infection such as a cold or respiratory infection or sinus infection. Is she having any symptoms?? We will need to follow up on this soon w/ repeat blood work    Please schedule:   · CBC in 7-10 days.   · Cancel the August appt w/ Jolynn  · CBC, CMP, INR, AFP, U/S in 9/2020  · EP VISIT with Jolynn in 9/2020 - few days after the labs and u/s    Thanks

## 2020-03-12 ENCOUNTER — TELEPHONE (OUTPATIENT)
Dept: FAMILY MEDICINE | Facility: CLINIC | Age: 65
End: 2020-03-12

## 2020-03-12 DIAGNOSIS — J31.0 CHRONIC RHINITIS: Primary | ICD-10-CM

## 2020-03-12 RX ORDER — AZELASTINE 1 MG/ML
1 SPRAY, METERED NASAL 2 TIMES DAILY
Qty: 30 ML | Refills: 1 | Status: ON HOLD | OUTPATIENT
Start: 2020-03-12 | End: 2020-06-18 | Stop reason: HOSPADM

## 2020-03-12 NOTE — TELEPHONE ENCOUNTER
lvm for patient. Sent new script for second nose spray to be used twice per day with flonase (astelin) also can use over thcounter saline flushes three to four times per day but not within 20 minutes of using prescription sprays

## 2020-03-12 NOTE — TELEPHONE ENCOUNTER
----- Message from Paz Bolden MA sent at 3/12/2020  8:30 AM CDT -----  Pt is inquiring that something be sent over for a sinus infection  ----- Message -----  From: Leobardo Corcoran MD  Sent: 3/12/2020   8:23 AM CDT  To: Paz Bolden MA    No appointment necessary. Elevated white count is from steroid (prednisone)  ----- Message -----  From: Paz Bolden MA  Sent: 3/12/2020   8:10 AM CDT  To: Leobardo Corcoran MD    Please Advise  ----- Message -----  From: Marcelina Agarwal  Sent: 3/11/2020   4:12 PM CDT  To: Jewell Booth Staff    Type: Patient Call Back    Who called: pt     What is the request in detail: pt would like to know if she needs an appt. She states she has a cough and her liver doctor called her yesterday and said her white blood cell count is high.  - pt states she has not traveled internationally in the last 30 days.    Can the clinic reply by [facility]? No     Would the patient rather a call back or a response via [facility]? Call back     Best call back number: [phone]    Additional Information:

## 2020-03-12 NOTE — TELEPHONE ENCOUNTER
Pt called back via the phone room and was notified about PCP recommendations(see note by ) Pt expressed understanding verbally with no further questions or concerns.

## 2020-03-12 NOTE — TELEPHONE ENCOUNTER
----- Message from Yasmin Ortez sent at 3/11/2020  5:25 PM CDT -----  Pt can be reached at [phone]    Pt called to request to speak with the nurse for medication for sinus infection.  Please give pt a call back    Thank you!

## 2020-04-09 ENCOUNTER — TELEPHONE (OUTPATIENT)
Dept: FAMILY MEDICINE | Facility: CLINIC | Age: 65
End: 2020-04-09

## 2020-04-09 DIAGNOSIS — M25.512 LEFT SHOULDER PAIN, UNSPECIFIED CHRONICITY: Primary | ICD-10-CM

## 2020-04-09 NOTE — TELEPHONE ENCOUNTER
lvm for patient given high dosage of her opioid medication I am not able to be the prescriber for this and she will need to establish with pain specialist

## 2020-04-09 NOTE — TELEPHONE ENCOUNTER
----- Message from Calvin Choudhury sent at 4/9/2020  2:02 PM CDT -----  Contact: Self [phone]  Type: Patient Call Back    Who called:Self    What is the request in detail:Returning missed call to office    Can the clinic reply by [facility]? NO    Would the patient rather a call back or a response via [facility]?     Best call back number:[phone]

## 2020-04-09 NOTE — TELEPHONE ENCOUNTER
I called and spoke to the pt and she is inquiring if PCP can assist temporarily with her pain management? She is switching because the doctor she was seeing wants her to switch to injections and gabapentin only. She is actively searching for a new pain management. She does not need medications until 04/25/2020. Please advise.

## 2020-04-10 DIAGNOSIS — M50.90 CERVICAL DISC DISEASE: ICD-10-CM

## 2020-04-13 RX ORDER — DIAZEPAM 10 MG/1
TABLET ORAL
Qty: 30 TABLET | Refills: 1 | Status: SHIPPED | OUTPATIENT
Start: 2020-04-27 | End: 2020-06-01

## 2020-04-22 DIAGNOSIS — M50.30 DISC DISEASE, DEGENERATIVE, CERVICAL: ICD-10-CM

## 2020-04-22 DIAGNOSIS — F11.90 CHRONIC, CONTINUOUS USE OF OPIOIDS: ICD-10-CM

## 2020-04-23 ENCOUNTER — TELEPHONE (OUTPATIENT)
Dept: FAMILY MEDICINE | Facility: CLINIC | Age: 65
End: 2020-04-23

## 2020-04-23 DIAGNOSIS — F11.90 CHRONIC, CONTINUOUS USE OF OPIOIDS: ICD-10-CM

## 2020-04-23 DIAGNOSIS — M50.30 DISC DISEASE, DEGENERATIVE, CERVICAL: ICD-10-CM

## 2020-04-23 RX ORDER — OXYCODONE HYDROCHLORIDE 10 MG/1
TABLET ORAL
Qty: 90 TABLET | Refills: 0 | OUTPATIENT
Start: 2020-04-23

## 2020-04-23 NOTE — TELEPHONE ENCOUNTER
----- Message from Marcelina Agarwal sent at 4/23/2020 10:15 AM CDT -----  Type: Patient Call Back    Who called: pt     What is the request in detail: pt requesting to speak to nurse     Can the clinic reply by [facility]? No     Would the patient rather a call back or a response via [facility]? Call back     Best call back number: [phone]    Additional Information: pt states she is in between pain management doctor's and asking for pain meds to be filled until she finds another provider.

## 2020-04-23 NOTE — TELEPHONE ENCOUNTER
I called and spoke to the pt and she reports that she is in between pain management doctors and she is requesting a one time refill of her pain medication until she finds a new provider. Please advise.

## 2020-04-24 NOTE — TELEPHONE ENCOUNTER
Patient said that she's out of her pain med. as of today and she's in the process of finding another Pain Management Dr.  Patient is requesting a one time refill of the pain medication.  Please advise.

## 2020-04-24 NOTE — TELEPHONE ENCOUNTER
----- Message from Rajat Martinez sent at 4/24/2020  4:09 PM CDT -----  Contact: Self  Type: Patient Call Back    Who called: self    What is the request in detail:Patient calling to check the status of her request  For refill.    Can the clinic reply by [facility]? No     Would the patient rather a call back or a response via [facility]? call    Best call back number: [phone]

## 2020-04-27 RX ORDER — OXYCODONE HYDROCHLORIDE 10 MG/1
10 TABLET ORAL EVERY 6 HOURS PRN
Qty: 100 TABLET | Refills: 0 | OUTPATIENT
Start: 2020-04-27

## 2020-04-27 NOTE — TELEPHONE ENCOUNTER
Called Patient.  No answer.  Left voice message informing Patient that requested Rx. refill was denied and requesting a call back if any questions.

## 2020-05-08 ENCOUNTER — PATIENT OUTREACH (OUTPATIENT)
Dept: ADMINISTRATIVE | Facility: OTHER | Age: 65
End: 2020-05-08

## 2020-05-11 ENCOUNTER — OFFICE VISIT (OUTPATIENT)
Dept: ORTHOPEDICS | Facility: CLINIC | Age: 65
End: 2020-05-11
Payer: MEDICARE

## 2020-05-11 VITALS
DIASTOLIC BLOOD PRESSURE: 80 MMHG | WEIGHT: 147.94 LBS | HEART RATE: 86 BPM | BODY MASS INDEX: 27.22 KG/M2 | SYSTOLIC BLOOD PRESSURE: 100 MMHG | OXYGEN SATURATION: 95 % | RESPIRATION RATE: 18 BRPM | HEIGHT: 62 IN

## 2020-05-11 DIAGNOSIS — M25.512 LEFT SHOULDER PAIN, UNSPECIFIED CHRONICITY: Primary | ICD-10-CM

## 2020-05-11 PROCEDURE — 1125F AMNT PAIN NOTED PAIN PRSNT: CPT | Mod: HCNC,S$GLB,, | Performed by: ORTHOPAEDIC SURGERY

## 2020-05-11 PROCEDURE — 99999 PR PBB SHADOW E&M-EST. PATIENT-LVL III: CPT | Mod: PBBFAC,HCNC,, | Performed by: ORTHOPAEDIC SURGERY

## 2020-05-11 PROCEDURE — 99999 PR PBB SHADOW E&M-EST. PATIENT-LVL III: ICD-10-PCS | Mod: PBBFAC,HCNC,, | Performed by: ORTHOPAEDIC SURGERY

## 2020-05-11 PROCEDURE — 20610 LARGE JOINT ASPIRATION/INJECTION: L SUBACROMIAL BURSA: ICD-10-PCS | Mod: HCNC,LT,S$GLB, | Performed by: ORTHOPAEDIC SURGERY

## 2020-05-11 PROCEDURE — 3079F PR MOST RECENT DIASTOLIC BLOOD PRESSURE 80-89 MM HG: ICD-10-PCS | Mod: HCNC,CPTII,S$GLB, | Performed by: ORTHOPAEDIC SURGERY

## 2020-05-11 PROCEDURE — 1159F MED LIST DOCD IN RCRD: CPT | Mod: HCNC,S$GLB,, | Performed by: ORTHOPAEDIC SURGERY

## 2020-05-11 PROCEDURE — 3074F PR MOST RECENT SYSTOLIC BLOOD PRESSURE < 130 MM HG: ICD-10-PCS | Mod: HCNC,CPTII,S$GLB, | Performed by: ORTHOPAEDIC SURGERY

## 2020-05-11 PROCEDURE — 99213 PR OFFICE/OUTPT VISIT, EST, LEVL III, 20-29 MIN: ICD-10-PCS | Mod: 25,HCNC,S$GLB, | Performed by: ORTHOPAEDIC SURGERY

## 2020-05-11 PROCEDURE — 3074F SYST BP LT 130 MM HG: CPT | Mod: HCNC,CPTII,S$GLB, | Performed by: ORTHOPAEDIC SURGERY

## 2020-05-11 PROCEDURE — 1125F PR PAIN SEVERITY QUANTIFIED, PAIN PRESENT: ICD-10-PCS | Mod: HCNC,S$GLB,, | Performed by: ORTHOPAEDIC SURGERY

## 2020-05-11 PROCEDURE — 99213 OFFICE O/P EST LOW 20 MIN: CPT | Mod: 25,HCNC,S$GLB, | Performed by: ORTHOPAEDIC SURGERY

## 2020-05-11 PROCEDURE — 1159F PR MEDICATION LIST DOCUMENTED IN MEDICAL RECORD: ICD-10-PCS | Mod: HCNC,S$GLB,, | Performed by: ORTHOPAEDIC SURGERY

## 2020-05-11 PROCEDURE — 3079F DIAST BP 80-89 MM HG: CPT | Mod: HCNC,CPTII,S$GLB, | Performed by: ORTHOPAEDIC SURGERY

## 2020-05-11 PROCEDURE — 20610 DRAIN/INJ JOINT/BURSA W/O US: CPT | Mod: HCNC,LT,S$GLB, | Performed by: ORTHOPAEDIC SURGERY

## 2020-05-11 PROCEDURE — 1101F PT FALLS ASSESS-DOCD LE1/YR: CPT | Mod: HCNC,CPTII,S$GLB, | Performed by: ORTHOPAEDIC SURGERY

## 2020-05-11 PROCEDURE — 1101F PR PT FALLS ASSESS DOC 0-1 FALLS W/OUT INJ PAST YR: ICD-10-PCS | Mod: HCNC,CPTII,S$GLB, | Performed by: ORTHOPAEDIC SURGERY

## 2020-05-11 RX ORDER — TRIAMCINOLONE ACETONIDE 40 MG/ML
40 INJECTION, SUSPENSION INTRA-ARTICULAR; INTRAMUSCULAR
Status: DISCONTINUED | OUTPATIENT
Start: 2020-05-11 | End: 2020-05-11 | Stop reason: HOSPADM

## 2020-05-11 RX ORDER — LIDOCAINE HYDROCHLORIDE 10 MG/ML
5 INJECTION, SOLUTION EPIDURAL; INFILTRATION; INTRACAUDAL; PERINEURAL
Status: DISCONTINUED | OUTPATIENT
Start: 2020-05-11 | End: 2020-05-11 | Stop reason: HOSPADM

## 2020-05-11 RX ADMIN — LIDOCAINE HYDROCHLORIDE 5 ML: 10 INJECTION, SOLUTION EPIDURAL; INFILTRATION; INTRACAUDAL; PERINEURAL at 09:05

## 2020-05-11 RX ADMIN — TRIAMCINOLONE ACETONIDE 40 MG: 40 INJECTION, SUSPENSION INTRA-ARTICULAR; INTRAMUSCULAR at 09:05

## 2020-05-11 NOTE — PROGRESS NOTES
"Follow up visit    Interval history (05/11/2020): continues to complain of L shoulder pain - limits ADLS and sleep   Cannot move shoulder or lift overhead because of pain   RHD    Got "great" relief from the injection in December - only lasted 5 -7 days but then she states she is unsure about this -- maybe it lasted longer. She was instructed to write down how much relief and for what duration she got relief after her last injection.  She  did not write down how much relief she got and seems to be confusing this with her spine injections.   Does not want to do PT  She states she does PT at home but she is talking about generally staying active.     Asking for percocet until she sees pain management on 5/21 (Dr Mccormick at )      HPI: Rossi Mcneal is a 62 y.o. female who presents today complaining of left shoulder pain   Duration of symptoms:  6- 7 months  Trauma or new activity: yes -- slipped on outdoor stairs to her house and landed on the left shoulder, hurt for a few days and then resolved. The pain returned and now hurts daily   Pain is intermittent   2/10 at best and 10/10 at its worst  Aggravating factors: motion, reaching overhead, nighttime   Relieving factors: ice, heat, topical creams, on oxycodone 5 chronically   Radicular symptoms: yes numbness, paresthesias and radiation of pain to the arm and fingertips   Associated symptoms:  limited range of motion.    Pain does interfere with sleep and activities of daily living .     This is the extent of the patient's complaints at this time.      Hand dominance: Right     Occupation: Retired -- Worked in home health    Review of Systems   Constitutional: Negative.    HENT: Negative.    Eyes: Negative.    Respiratory: Negative.    Cardiovascular: Negative.    Gastrointestinal: Negative.    Genitourinary: Negative.    Musculoskeletal: Positive for joint pain and neck pain.   Skin: Negative.    Neurological: Negative.    Psychiatric/Behavioral: Negative.     "      Review of patient's allergies indicates:  No Known Allergies        Current Outpatient Medications:     albuterol (VENTOLIN HFA) 90 mcg/actuation inhaler, Inhale 2 puffs into the lungs every 6 hours as needed for Wheezing., Disp: 18 g, Rfl: 2    diazePAM (VALIUM) 10 MG Tab, Take 1 tablet (10 mg total) by mouth 2 (two) times daily as needed., Disp: 60 tablet, Rfl: 1    fluticasone (FLONASE) 50 mcg/actuation nasal spray, 2 sprays (100 mcg total) by Each Nare route once daily., Disp: 1 Bottle, Rfl: 3    lisinopril 10 MG tablet, Take 1 tablet (10 mg total) by mouth once daily., Disp: 90 tablet, Rfl: 3    nicotine (NICODERM CQ) 21 mg/24 hr, PLACE ONE PATCH ONTO SKIN EVERY DAY, Disp: 14 patch, Rfl: 1    nicotine (NICODERM CQ) 21 mg/24 hr, place ONE PATCH onto the skin EVERY DAY, Disp: 14 patch, Rfl: 1    oxyCODONE (ROXICODONE) 5 MG immediate release tablet, TAKE ONE TABLET BY MOUTH EVERY 4 HOURS AS NEEDED FOR PAIN *MAY CAUSE DROWSINESS*, Disp: 120 tablet, Rfl: 0    oxyCODONE-acetaminophen (PERCOCET) 5-325 mg per tablet, TAKE ONE TABLET BY MOUTH EVERY 4 HOURS AS NEEDED FOR PAIN *MAY CAUSE DROWSINESS*, Disp: 120 tablet, Rfl: 0    venlafaxine (EFFEXOR) 75 MG tablet, Take 1 tablet (75 mg total) by mouth 2 (two) times daily., Disp: 60 tablet, Rfl: 5          Past Medical History:   Diagnosis Date    Chronic hepatitis C      Emphysema      Hypertension      Neuromuscular disorder               Patient Active Problem List   Diagnosis    Screening for colon cancer    Disc disease, degenerative, cervical    Chronic, continuous use of opioids    Hepatitis C antibody test positive    Hypertension, essential    Chronic hepatitis C without hepatic coma    Hepatic steatosis    Tobacco use    Abnormal finding on GI tract imaging    Other emphysema    Anxiety    History of fusion of cervical spine    Cervical myelopathy with cervical radiculopathy    Cervical stenosis of spine    Painful cervical ROM     "Impaired motor control    Decreased strength, endurance, and mobility               Past Surgical History:   Procedure Laterality Date    COLONOSCOPY N/A 7/14/2015     Performed by Jose Luis Hyman [facility] at [facility]    ESOPHAGOGASTRODUODENOSCOPY (EGD) N/A 4/17/2017     Performed by Hesham Frazier MD at [facility] END[facility]    HYSTERECTOMY        NECK SURGERY   1990    PARTIAL HYSTERECTOMY   1995    ULTRASOUND-ENDOSCOPIC-UPPER N/A 11/13/2017     Performed by Kristian Fountain [facility] at [facility] (2ND FLR)         Social History            Tobacco Use    Smoking status: Current Every Day Smoker       Packs/day: 0.50       Years: 18.00       Pack years: 9.00       Types: Cigarettes    Smokeless tobacco: Never Used   Substance Use Topics    Alcohol use: No       Frequency: Never    Drug use: No               Family History   Problem Relation Age of Onset    Hypertension Mother      Cancer Father      Parkinsonism Father      Breast cancer Maternal Aunt        Physical Exam:      Vitals:    05/11/20 0921   BP: 100/80   Pulse: 86   Resp: 18   SpO2: 95%   Weight: 67.1 kg (147 lb 14.9 oz)   Height: 5' 2" (1.575 m)   PainSc:   8   PainLoc: Shoulder         General: Weight: 59.9 kg (132 lb 0.9 oz) Body mass index is 24.95 kg/m².  Patient is alert, awake and oriented to time, place and person. Mood and affect are appropriate.  Patient does not appear to be in any distress, denies any constitutional symptoms and appears stated age.   HEENT: Pupils are equal and round, sclera are not injected. External examination of ears and nose reveals no abnormalities. Cranial nerves II-X are grossly intact  Neck: examination demonstrates painless limited active range of motion. Spurling's sign is negative  Skin: no rashes, abrasions or open wounds on the affected extremity   Resp: No respiratory distress or audible wheezing   CV: 2+  pulses, all extremities warm and well perfused      Left Shoulder      Shoulder Range of Motion           "                 Right                                       Left   (Active/Passive)                                        Forward Elevation                                           160/160                                 150/160  External rotation (arm at side)                        50/50                                     50/50                   Internal rotation behind the back                    L5                                          L5               Range of motion is painful      Scapular winging no  Scapular dyskinesia no     Examination of the back shows no atrophy     Acromioclavicular joint is not tender  Crossbody test: negative     Neer's positive  Hawkin's positive     Karlee's positive - pain  Drop arm negative  Belly press negative  External rotation lag sign negative  Internal rotation lag sign negative     Cuff Strength                                                 Right                                       Left   Supraspinatus                                                 5/5                                           5/5  Infraspinatus                                                   5/5                                           5/5  Subscapularis                                                 5/5                                           5/5     Deltoid testing                                                 5/5                                           5/5  Speeds negative  Yergasons negative     Elbow examination demonstrates no tenderness to palpation and has normal range of motion.      ltsi C5-T1  + epl, io, fds, fdp   2+ RP         Imaging: 3 views of the left shoulder:  positive for degenerative changes of the AC joint. The humeral head is well centered on the AP and axillary views.  There is calcification at the rotator cuff insertion on the greater tuberosity. There is not significant degenerative change of the glenohumeral joint or posterior subluxation of the humeral head.  No acute changes or fracture.      Assessment: 62 y.o. female with left shoulder pain, subacromial bursitis, rotator cuff tendinitis      I explained my diagnostic impression and the reasoning behind it in detail, using layman's terms.  Models and/or pictures were used to help in the explanation.     Plan:   - PT referral today - she is resistant to this saying she is exhausted ans had multiple medical issues.  I will not continue doing injections after today without further participation. Would not consider her to be a surgical candidate if she will not participate with PT  - Injection left shoulder please see note for details. Injection of the left subacromial space  performed, please see procedure note for more details.  Prior to the injection risks and benefits of corticosteroid injection were discussed with the patient including pain, infection, bleeding, skin color changes, swelling, steroid flare. We discussed that over time injections can result in chondral damage, acceleration of arthritis formation, damage to tendons and damage to joints.  The patient consented for the procedure.  Post-injection instructions were given to the patient in writing.  She MUST write down how much relief she gets and for how long. This is likley her last injection.  - No narcotics  - Diclofenac 50 mg PO BID x 2 weeks then PRN. Take with food. The patient was advised that NSAID-type medications have multiple very important potential side effects: gastrointestinal irritation including Gi bleed, cardiac effects and renal injuries. Instructions were given to take the medication with food and to stop for any GI upset. Call for vomiting, abdominal pain or black/bloody stools. Do not take with other NSAIDs such as ibuprofen or naproxen.  The patient expresses understanding of these issues and questions were answered.  - Return to clinic PRN     All questions were answered in detail. The patient is in full agreement with the treatment  plan and will proceed accordingly.

## 2020-05-11 NOTE — PROCEDURES
Large Joint Aspiration/Injection: L subacromial bursa  Date/Time: 5/11/2020 9:30 AM  Performed by: Malinda Fowler MD  Authorized by: Malinda Fowler MD     Consent Done?:  Yes (Verbal)  Indications:  Pain  Site marked: the procedure site was marked    Prep: patient was prepped and draped in usual sterile fashion      Local anesthesia used?: Yes    Local anesthetic:  Topical anesthetic    Details:  Needle Size:  22 G  Approach:  Posterior  Location:  Shoulder  Site:  L subacromial bursa  Medications:  5 mL lidocaine (PF) 10 mg/ml (1%) 10 mg/mL (1 %); 40 mg triamcinolone acetonide 40 mg/mL  Patient tolerance:  Patient tolerated the procedure well with no immediate complications

## 2020-06-01 DIAGNOSIS — M50.90 CERVICAL DISC DISEASE: ICD-10-CM

## 2020-06-01 RX ORDER — DIAZEPAM 10 MG/1
TABLET ORAL
Qty: 30 TABLET | Refills: 1 | Status: ON HOLD | OUTPATIENT
Start: 2020-06-01 | End: 2020-06-18 | Stop reason: HOSPADM

## 2020-06-11 ENCOUNTER — TELEPHONE (OUTPATIENT)
Dept: FAMILY MEDICINE | Facility: CLINIC | Age: 65
End: 2020-06-11

## 2020-06-11 NOTE — TELEPHONE ENCOUNTER
----- Message from EleuterioDaynematt Way sent at 6/11/2020 11:11 AM CDT -----  Contact: Patient  Type: Patient Call Back    Who called: Patient    What is the request in detail: Patient states she cannot wait until 06/30 due to sharp pains in breast and abdomen.  She also states she needs blood work. Please advise.    Can the clinic reply by [facility]? No    Would the patient rather a call back or a response via [facility]? Call    Best call back number: [phone] (home)     Additional Information:n/a

## 2020-06-11 NOTE — TELEPHONE ENCOUNTER
I called and spoke to the pt and scheduled her for an OV with the NP on 06/16/2020. I advised her if the pain returns she should go to UC for evaluation. She verbalizes understanding.

## 2020-06-15 ENCOUNTER — PES CALL (OUTPATIENT)
Dept: ADMINISTRATIVE | Facility: CLINIC | Age: 65
End: 2020-06-15

## 2020-06-16 ENCOUNTER — HOSPITAL ENCOUNTER (INPATIENT)
Facility: HOSPITAL | Age: 65
LOS: 2 days | Discharge: HOME | DRG: 189 | End: 2020-06-18
Attending: EMERGENCY MEDICINE | Admitting: FAMILY MEDICINE
Payer: MEDICARE

## 2020-06-16 ENCOUNTER — OFFICE VISIT (OUTPATIENT)
Dept: FAMILY MEDICINE | Facility: CLINIC | Age: 65
DRG: 189 | End: 2020-06-16
Payer: MEDICARE

## 2020-06-16 VITALS
OXYGEN SATURATION: 85 % | TEMPERATURE: 97 F | HEART RATE: 122 BPM | SYSTOLIC BLOOD PRESSURE: 128 MMHG | RESPIRATION RATE: 25 BRPM | DIASTOLIC BLOOD PRESSURE: 86 MMHG

## 2020-06-16 DIAGNOSIS — Z09 HOSPITAL DISCHARGE FOLLOW-UP: ICD-10-CM

## 2020-06-16 DIAGNOSIS — R06.02 SOB (SHORTNESS OF BREATH): ICD-10-CM

## 2020-06-16 DIAGNOSIS — J18.9 ATYPICAL PNEUMONIA: ICD-10-CM

## 2020-06-16 DIAGNOSIS — J96.01 ACUTE RESPIRATORY FAILURE WITH HYPOXIA: ICD-10-CM

## 2020-06-16 DIAGNOSIS — R09.02 HYPOXEMIA: ICD-10-CM

## 2020-06-16 DIAGNOSIS — F30.9 MANIC EPISODE: Primary | ICD-10-CM

## 2020-06-16 DIAGNOSIS — J44.1 COPD EXACERBATION: ICD-10-CM

## 2020-06-16 DIAGNOSIS — J96.01 ACUTE HYPOXEMIC RESPIRATORY FAILURE: Primary | ICD-10-CM

## 2020-06-16 LAB
ALBUMIN SERPL BCP-MCNC: 3.6 G/DL (ref 3.5–5.2)
ALLENS TEST: ABNORMAL
ALP SERPL-CCNC: 78 U/L (ref 55–135)
ALT SERPL W/O P-5'-P-CCNC: 36 U/L (ref 10–44)
ANION GAP SERPL CALC-SCNC: 8 MMOL/L (ref 8–16)
AST SERPL-CCNC: 53 U/L (ref 10–40)
BACTERIA #/AREA URNS HPF: ABNORMAL /HPF
BASOPHILS # BLD AUTO: 0.01 K/UL (ref 0–0.2)
BASOPHILS NFR BLD: 0.1 % (ref 0–1.9)
BILIRUB SERPL-MCNC: 0.3 MG/DL (ref 0.1–1)
BILIRUB UR QL STRIP: NEGATIVE
BNP SERPL-MCNC: 67 PG/ML (ref 0–99)
BUN SERPL-MCNC: 26 MG/DL (ref 8–23)
CALCIUM SERPL-MCNC: 8.3 MG/DL (ref 8.7–10.5)
CHLORIDE SERPL-SCNC: 109 MMOL/L (ref 95–110)
CLARITY UR: CLEAR
CO2 SERPL-SCNC: 23 MMOL/L (ref 23–29)
COLOR UR: YELLOW
CREAT SERPL-MCNC: 1.2 MG/DL (ref 0.5–1.4)
DELSYS: ABNORMAL
DIFFERENTIAL METHOD: ABNORMAL
EOSINOPHIL # BLD AUTO: 0 K/UL (ref 0–0.5)
EOSINOPHIL NFR BLD: 0.1 % (ref 0–8)
ERYTHROCYTE [DISTWIDTH] IN BLOOD BY AUTOMATED COUNT: 17.2 % (ref 11.5–14.5)
EST. GFR  (AFRICAN AMERICAN): 54 ML/MIN/1.73 M^2
EST. GFR  (NON AFRICAN AMERICAN): 47 ML/MIN/1.73 M^2
FLOW: 3
GLUCOSE SERPL-MCNC: 123 MG/DL (ref 70–110)
GLUCOSE UR QL STRIP: NEGATIVE
GRAN CASTS #/AREA URNS LPF: 1 /LPF
HCO3 UR-SCNC: 22.1 MMOL/L (ref 24–28)
HCT VFR BLD AUTO: 34.9 % (ref 37–48.5)
HGB BLD-MCNC: 11.1 G/DL (ref 12–16)
HGB UR QL STRIP: NEGATIVE
HYALINE CASTS #/AREA URNS LPF: 4 /LPF
IMM GRANULOCYTES # BLD AUTO: 0.07 K/UL (ref 0–0.04)
IMM GRANULOCYTES NFR BLD AUTO: 0.6 % (ref 0–0.5)
KETONES UR QL STRIP: NEGATIVE
LACTATE SERPL-SCNC: 1 MMOL/L (ref 0.5–2.2)
LEUKOCYTE ESTERASE UR QL STRIP: NEGATIVE
LYMPHOCYTES # BLD AUTO: 0.8 K/UL (ref 1–4.8)
LYMPHOCYTES NFR BLD: 6.6 % (ref 18–48)
MCH RBC QN AUTO: 26.7 PG (ref 27–31)
MCHC RBC AUTO-ENTMCNC: 31.8 G/DL (ref 32–36)
MCV RBC AUTO: 84 FL (ref 82–98)
MICROSCOPIC COMMENT: ABNORMAL
MODE: ABNORMAL
MONOCYTES # BLD AUTO: 0.4 K/UL (ref 0.3–1)
MONOCYTES NFR BLD: 3 % (ref 4–15)
NEUTROPHILS # BLD AUTO: 10.9 K/UL (ref 1.8–7.7)
NEUTROPHILS NFR BLD: 89.6 % (ref 38–73)
NITRITE UR QL STRIP: NEGATIVE
NON-SQ EPI CELLS #/AREA URNS HPF: 1 /HPF
NRBC BLD-RTO: 0 /100 WBC
PCO2 BLDA: 40.8 MMHG (ref 35–45)
PH SMN: 7.34 [PH] (ref 7.35–7.45)
PH UR STRIP: 5 [PH] (ref 5–8)
PLATELET # BLD AUTO: 327 K/UL (ref 150–350)
PMV BLD AUTO: 9.4 FL (ref 9.2–12.9)
PO2 BLDA: 54 MMHG (ref 40–60)
POC BE: -3 MMOL/L
POC SATURATED O2: 86 % (ref 95–100)
POC TCO2: 23 MMOL/L (ref 24–29)
POTASSIUM SERPL-SCNC: 4 MMOL/L (ref 3.5–5.1)
PROT SERPL-MCNC: 7.1 G/DL (ref 6–8.4)
PROT UR QL STRIP: ABNORMAL
RBC # BLD AUTO: 4.16 M/UL (ref 4–5.4)
RBC #/AREA URNS HPF: 2 /HPF (ref 0–4)
SAMPLE: ABNORMAL
SARS-COV-2 RDRP RESP QL NAA+PROBE: NEGATIVE
SITE: ABNORMAL
SODIUM SERPL-SCNC: 140 MMOL/L (ref 136–145)
SP GR UR STRIP: 1.02 (ref 1–1.03)
SP02: 90
SQUAMOUS #/AREA URNS HPF: 3 /HPF
TROPONIN I SERPL DL<=0.01 NG/ML-MCNC: <0.006 NG/ML (ref 0–0.03)
URN SPEC COLLECT METH UR: ABNORMAL
UROBILINOGEN UR STRIP-ACNC: NEGATIVE EU/DL
WBC # BLD AUTO: 12.16 K/UL (ref 3.9–12.7)
WBC #/AREA URNS HPF: 3 /HPF (ref 0–5)

## 2020-06-16 PROCEDURE — 99900035 HC TECH TIME PER 15 MIN (STAT): Mod: HCNC

## 2020-06-16 PROCEDURE — 1125F AMNT PAIN NOTED PAIN PRSNT: CPT | Mod: HCNC,S$GLB,, | Performed by: NURSE PRACTITIONER

## 2020-06-16 PROCEDURE — 99999 PR PBB SHADOW E&M-EST. PATIENT-LVL III: ICD-10-PCS | Mod: PBBFAC,HCNC,, | Performed by: NURSE PRACTITIONER

## 2020-06-16 PROCEDURE — 63600175 PHARM REV CODE 636 W HCPCS: Mod: HCNC | Performed by: EMERGENCY MEDICINE

## 2020-06-16 PROCEDURE — 93010 ELECTROCARDIOGRAM REPORT: CPT | Mod: HCNC,,, | Performed by: INTERNAL MEDICINE

## 2020-06-16 PROCEDURE — 96374 THER/PROPH/DIAG INJ IV PUSH: CPT | Mod: HCNC

## 2020-06-16 PROCEDURE — 84484 ASSAY OF TROPONIN QUANT: CPT | Mod: HCNC

## 2020-06-16 PROCEDURE — 1125F PR PAIN SEVERITY QUANTIFIED, PAIN PRESENT: ICD-10-PCS | Mod: HCNC,S$GLB,, | Performed by: NURSE PRACTITIONER

## 2020-06-16 PROCEDURE — U0002 COVID-19 LAB TEST NON-CDC: HCPCS | Mod: HCNC

## 2020-06-16 PROCEDURE — 1101F PR PT FALLS ASSESS DOC 0-1 FALLS W/OUT INJ PAST YR: ICD-10-PCS | Mod: HCNC,CPTII,S$GLB, | Performed by: NURSE PRACTITIONER

## 2020-06-16 PROCEDURE — 83605 ASSAY OF LACTIC ACID: CPT | Mod: HCNC

## 2020-06-16 PROCEDURE — 99285 EMERGENCY DEPT VISIT HI MDM: CPT | Mod: 25,HCNC

## 2020-06-16 PROCEDURE — 99215 OFFICE O/P EST HI 40 MIN: CPT | Mod: HCNC,S$GLB,, | Performed by: NURSE PRACTITIONER

## 2020-06-16 PROCEDURE — 99215 PR OFFICE/OUTPT VISIT, EST, LEVL V, 40-54 MIN: ICD-10-PCS | Mod: HCNC,S$GLB,, | Performed by: NURSE PRACTITIONER

## 2020-06-16 PROCEDURE — 12000002 HC ACUTE/MED SURGE SEMI-PRIVATE ROOM: Mod: HCNC

## 2020-06-16 PROCEDURE — 87040 BLOOD CULTURE FOR BACTERIA: CPT | Mod: HCNC

## 2020-06-16 PROCEDURE — 81000 URINALYSIS NONAUTO W/SCOPE: CPT | Mod: HCNC

## 2020-06-16 PROCEDURE — 25000003 PHARM REV CODE 250: Mod: HCNC | Performed by: EMERGENCY MEDICINE

## 2020-06-16 PROCEDURE — 83880 ASSAY OF NATRIURETIC PEPTIDE: CPT | Mod: HCNC

## 2020-06-16 PROCEDURE — 80053 COMPREHEN METABOLIC PANEL: CPT | Mod: HCNC

## 2020-06-16 PROCEDURE — 3079F DIAST BP 80-89 MM HG: CPT | Mod: HCNC,CPTII,S$GLB, | Performed by: NURSE PRACTITIONER

## 2020-06-16 PROCEDURE — 1101F PT FALLS ASSESS-DOCD LE1/YR: CPT | Mod: HCNC,CPTII,S$GLB, | Performed by: NURSE PRACTITIONER

## 2020-06-16 PROCEDURE — 25000242 PHARM REV CODE 250 ALT 637 W/ HCPCS: Mod: HCNC | Performed by: EMERGENCY MEDICINE

## 2020-06-16 PROCEDURE — 1159F MED LIST DOCD IN RCRD: CPT | Mod: HCNC,S$GLB,, | Performed by: NURSE PRACTITIONER

## 2020-06-16 PROCEDURE — 94640 AIRWAY INHALATION TREATMENT: CPT | Mod: HCNC

## 2020-06-16 PROCEDURE — 93010 EKG 12-LEAD: ICD-10-PCS | Mod: HCNC,,, | Performed by: INTERNAL MEDICINE

## 2020-06-16 PROCEDURE — 85025 COMPLETE CBC W/AUTO DIFF WBC: CPT | Mod: HCNC

## 2020-06-16 PROCEDURE — 1159F PR MEDICATION LIST DOCUMENTED IN MEDICAL RECORD: ICD-10-PCS | Mod: HCNC,S$GLB,, | Performed by: NURSE PRACTITIONER

## 2020-06-16 PROCEDURE — 3074F SYST BP LT 130 MM HG: CPT | Mod: HCNC,CPTII,S$GLB, | Performed by: NURSE PRACTITIONER

## 2020-06-16 PROCEDURE — 3074F PR MOST RECENT SYSTOLIC BLOOD PRESSURE < 130 MM HG: ICD-10-PCS | Mod: HCNC,CPTII,S$GLB, | Performed by: NURSE PRACTITIONER

## 2020-06-16 PROCEDURE — 93005 ELECTROCARDIOGRAM TRACING: CPT | Mod: HCNC

## 2020-06-16 PROCEDURE — 3079F PR MOST RECENT DIASTOLIC BLOOD PRESSURE 80-89 MM HG: ICD-10-PCS | Mod: HCNC,CPTII,S$GLB, | Performed by: NURSE PRACTITIONER

## 2020-06-16 PROCEDURE — 99999 PR PBB SHADOW E&M-EST. PATIENT-LVL III: CPT | Mod: PBBFAC,HCNC,, | Performed by: NURSE PRACTITIONER

## 2020-06-16 RX ORDER — FUROSEMIDE 10 MG/ML
40 INJECTION INTRAMUSCULAR; INTRAVENOUS ONCE
Status: COMPLETED | OUTPATIENT
Start: 2020-06-17 | End: 2020-06-17

## 2020-06-16 RX ORDER — MORPHINE SULFATE 10 MG/ML
2 INJECTION INTRAMUSCULAR; INTRAVENOUS; SUBCUTANEOUS EVERY 4 HOURS PRN
Status: DISCONTINUED | OUTPATIENT
Start: 2020-06-16 | End: 2020-06-17

## 2020-06-16 RX ORDER — OXYCODONE HYDROCHLORIDE 5 MG/1
5 TABLET ORAL EVERY 4 HOURS PRN
Status: DISCONTINUED | OUTPATIENT
Start: 2020-06-16 | End: 2020-06-18 | Stop reason: HOSPADM

## 2020-06-16 RX ORDER — ALBUTEROL SULFATE 90 UG/1
2 AEROSOL, METERED RESPIRATORY (INHALATION) EVERY 4 HOURS PRN
Status: DISCONTINUED | OUTPATIENT
Start: 2020-06-16 | End: 2020-06-18 | Stop reason: HOSPADM

## 2020-06-16 RX ORDER — DIAZEPAM 5 MG/1
10 TABLET ORAL 2 TIMES DAILY PRN
Status: DISCONTINUED | OUTPATIENT
Start: 2020-06-16 | End: 2020-06-17

## 2020-06-16 RX ORDER — IBUPROFEN 200 MG
1 TABLET ORAL DAILY
Status: DISCONTINUED | OUTPATIENT
Start: 2020-06-17 | End: 2020-06-16

## 2020-06-16 RX ORDER — GABAPENTIN 300 MG/1
300 CAPSULE ORAL 2 TIMES DAILY
Status: DISCONTINUED | OUTPATIENT
Start: 2020-06-16 | End: 2020-06-18 | Stop reason: HOSPADM

## 2020-06-16 RX ORDER — VENLAFAXINE 37.5 MG/1
75 TABLET ORAL 2 TIMES DAILY
Status: DISCONTINUED | OUTPATIENT
Start: 2020-06-16 | End: 2020-06-17

## 2020-06-16 RX ORDER — PANTOPRAZOLE SODIUM 40 MG/1
40 TABLET, DELAYED RELEASE ORAL DAILY
Status: DISCONTINUED | OUTPATIENT
Start: 2020-06-17 | End: 2020-06-18 | Stop reason: HOSPADM

## 2020-06-16 RX ORDER — LISINOPRIL 5 MG/1
10 TABLET ORAL DAILY
Status: DISCONTINUED | OUTPATIENT
Start: 2020-06-17 | End: 2020-06-18 | Stop reason: HOSPADM

## 2020-06-16 RX ORDER — SODIUM CHLORIDE 0.9 % (FLUSH) 0.9 %
10 SYRINGE (ML) INJECTION
Status: DISCONTINUED | OUTPATIENT
Start: 2020-06-16 | End: 2020-06-18 | Stop reason: HOSPADM

## 2020-06-16 RX ORDER — IBUPROFEN 200 MG
1 TABLET ORAL DAILY
Status: DISCONTINUED | OUTPATIENT
Start: 2020-06-16 | End: 2020-06-18 | Stop reason: HOSPADM

## 2020-06-16 RX ORDER — OXYCODONE AND ACETAMINOPHEN 5; 325 MG/1; MG/1
1 TABLET ORAL
Status: COMPLETED | OUTPATIENT
Start: 2020-06-16 | End: 2020-06-16

## 2020-06-16 RX ORDER — IPRATROPIUM BROMIDE AND ALBUTEROL SULFATE 2.5; .5 MG/3ML; MG/3ML
3 SOLUTION RESPIRATORY (INHALATION)
Status: COMPLETED | OUTPATIENT
Start: 2020-06-16 | End: 2020-06-16

## 2020-06-16 RX ORDER — METHYLPREDNISOLONE SOD SUCC 125 MG
80 VIAL (EA) INJECTION
Status: COMPLETED | OUTPATIENT
Start: 2020-06-16 | End: 2020-06-16

## 2020-06-16 RX ORDER — ONDANSETRON 2 MG/ML
4 INJECTION INTRAMUSCULAR; INTRAVENOUS EVERY 8 HOURS PRN
Status: DISCONTINUED | OUTPATIENT
Start: 2020-06-16 | End: 2020-06-18 | Stop reason: HOSPADM

## 2020-06-16 RX ADMIN — METHYLPREDNISOLONE SODIUM SUCCINATE 80 MG: 125 INJECTION, POWDER, FOR SOLUTION INTRAMUSCULAR; INTRAVENOUS at 09:06

## 2020-06-16 RX ADMIN — OXYCODONE HYDROCHLORIDE AND ACETAMINOPHEN 1 TABLET: 5; 325 TABLET ORAL at 08:06

## 2020-06-16 RX ADMIN — AZITHROMYCIN MONOHYDRATE 500 MG: 500 INJECTION, POWDER, LYOPHILIZED, FOR SOLUTION INTRAVENOUS at 10:06

## 2020-06-16 RX ADMIN — OXYCODONE HYDROCHLORIDE AND ACETAMINOPHEN 1 TABLET: 5; 325 TABLET ORAL at 05:06

## 2020-06-16 RX ADMIN — IPRATROPIUM BROMIDE AND ALBUTEROL SULFATE 3 ML: .5; 3 SOLUTION RESPIRATORY (INHALATION) at 08:06

## 2020-06-16 RX ADMIN — CEFTRIAXONE 1 G: 1 INJECTION, SOLUTION INTRAVENOUS at 09:06

## 2020-06-16 NOTE — PROGRESS NOTES
Routine Office Visit    Patient Name: Rossi Mcneal    : 1955  MRN: [id number]    Chief Complaint:  Pneumonia follow-up    Subjective:  Rossi is a 64 y.o. female who presents today for:    1.  Pneumonia follow-up - patient presents today 40 min late for follow-up of pneumonia.  When she entered the clinic she appeared to be in respiratory distress and her O2 sats in the lobby was 77%.  She seemed as though she was stumbling in the lobby she has she was placed in a wheelchair.  She was brought back to the examination room and her vital signs were taken.  She states that she feels like she is in a manic state right now.  She states that she drove here with her emergency blinkers on and she states that her mood is highly elevated right now.  She states that she started the prednisone from the emergency room and felt her robb go through the roof so she started taking double the dose of her Effexor now she feels her mood is highly elevated.  She states that her breathing is better however.  She is intermittently tearful and crying and states that she is very thankful she is alive.  She has been taking the prednisone and the azithromycin both of which were prescribed to her in the emergency room.  She states that her breathing is better and her shortness of breath/coughing have improved, however she still has some slight wheezes.  Denies any chest pain or palpitations.    Past Medical History  Past Medical History:   Diagnosis Date    Emphysema     Hepatitis C     Hypertension     Neuromuscular disorder        Past Surgical History  Past Surgical History:   Procedure Laterality Date    ESOPHAGOGASTRODUODENOSCOPY N/A 2019    Procedure: ESOPHAGOGASTRODUODENOSCOPY (EGD);  Surgeon: Jose Luis Hyman MD;  Location: [facility];  Service: Endoscopy;  Laterality: N/A;    HYSTERECTOMY      NECK SURGERY      PARTIAL HYSTERECTOMY         Family History  Family History   Problem Relation Age of Onset     Hypertension Mother     Cancer Father     Parkinsonism Father     Breast cancer Maternal Aunt        Social History  Social History     Socioeconomic History    Marital status:      Spouse name: Not on file    Number of children: Not on file    Years of education: Not on file    Highest education level: Not on file   Occupational History    Not on file   Social Needs    Financial resource strain: Not on file    Food insecurity     Worry: Not on file     Inability: Not on file    Transportation needs     Medical: Not on file     Non-medical: Not on file   Tobacco Use    Smoking status: Current Every Day Smoker     Packs/day: 0.50     Years: 18.00     Pack years: 9.00     Types: Cigarettes    Smokeless tobacco: Never Used   Substance and Sexual Activity    Alcohol use: No     Frequency: Never    Drug use: No    Sexual activity: Not on file   Lifestyle    Physical activity     Days per week: Not on file     Minutes per session: Not on file    Stress: Not on file   Relationships    Social connections     Talks on phone: Not on file     Gets together: Not on file     Attends Anabaptism service: Not on file     Active member of club or organization: Not on file     Attends meetings of clubs or organizations: Not on file     Relationship status: Not on file   Other Topics Concern    Not on file   Social History Narrative    Not on file       Current Medications  Current Outpatient Medications on File Prior to Visit   Medication Sig Dispense Refill    albuterol (VENTOLIN HFA) 90 mcg/actuation inhaler inhale TWO puffs into the lungs EVERY 6 HOURS AS NEEDED FOR WHEEZING 18 g 2    azelastine (ASTELIN) 137 mcg (0.1 %) nasal spray 1 spray (137 mcg total) by Nasal route 2 (two) times daily. 30 mL 1    azithromycin (ZITHROMAX Z-GEORGE) 250 MG tablet Take 2 tablets (500 mg total) by mouth once daily for 1 day, THEN 1 tablet (250 mg total) once daily for 4 days. 6 tablet 0    diazePAM (VALIUM) 10 MG Tab  TAKE ONE TABLET BY MOUTH TWICE DAILY AS NEEDED 30 tablet 1    fluticasone propionate (FLONASE) 50 mcg/actuation nasal spray 2 sprays (100 mcg total) by Each Nare route once daily. 1 Bottle 0    gabapentin (NEURONTIN) 300 MG capsule       nicotine (NICODERM CQ) 21 mg/24 hr PLACE ONE PATCH ONTO SKIN EVERY DAY 14 patch 1    nicotine (NICODERM CQ) 21 mg/24 hr place ONE PATCH onto the skin EVERY DAY 14 patch 1    oxyCODONE (ROXICODONE) 10 mg Tab immediate release tablet Take 1 tablet (10 mg total) by mouth every 6 (six) hours as needed. 100 tablet 0    oxyCODONE (ROXICODONE) 5 MG immediate release tablet Take 1 tablet (5 mg total) by mouth every 6 (six) hours as needed for Pain. 8 tablet 0    pantoprazole (PROTONIX) 40 MG tablet Take 1 tablet (40 mg total) by mouth once daily. 30 tablet 5    predniSONE (DELTASONE) 50 MG Tab Take 1 tablet (50 mg total) by mouth once daily. for 4 days 10 tablet 0    venlafaxine (EFFEXOR) 75 MG tablet Take 1 tablet (75 mg total) by mouth 2 (two) times daily. 60 tablet 5    venlafaxine (EFFEXOR) 75 MG tablet TAKE ONE TABLET BY MOUTH TWICE DAILY 60 tablet 5    lisinopril 10 MG tablet TAKE ONE TABLET BY MOUTH DAILY 90 tablet 3     No current facility-administered medications on file prior to visit.        Allergies   Review of patient's allergies indicates:  No Known Allergies    Review of Systems (Pertinent positives)  Review of Systems   Constitutional: Negative for chills and fever.   HENT: Negative.    Eyes: Negative.    Respiratory: Positive for cough, shortness of breath and wheezing. Negative for hemoptysis and sputum production.    Cardiovascular: Negative.    Gastrointestinal: Negative.    Genitourinary: Negative.    Musculoskeletal: Negative.    Skin: Negative.    Neurological: Negative.    Endo/Heme/Allergies: Negative.    Psychiatric/Behavioral: Negative for depression, hallucinations, memory loss, substance abuse and suicidal ideas. The patient is nervous/anxious and  has insomnia.        /86 (BP Location: Right arm, Patient Position: Sitting, BP Method: Medium (Manual))   Pulse (!) 122   Temp 97 °F (36.1 °C) (Oral)   Resp (!) 25   SpO2 (!) 85%     Physical Exam  Vitals signs reviewed.   Constitutional:       Appearance: She is not ill-appearing, toxic-appearing or diaphoretic.   Neck:      Musculoskeletal: Normal range of motion.   Cardiovascular:      Rate and Rhythm: Tachycardia present.      Heart sounds: No murmur. No friction rub. No gallop.    Pulmonary:      Effort: Tachypnea present. No accessory muscle usage or respiratory distress.      Breath sounds: No stridor. Examination of the right-lower field reveals wheezing. Examination of the left-lower field reveals wheezing. Wheezing present. No decreased breath sounds, rhonchi or rales.      Comments: Expiratory wheezes noted to bilateral lung fields  Abdominal:      General: Abdomen is flat. Bowel sounds are normal.      Palpations: Abdomen is soft.   Neurological:      General: No focal deficit present.      Mental Status: She is alert and oriented to person, place, and time. Mental status is at baseline.      Cranial Nerves: No cranial nerve deficit.      Sensory: No sensory deficit.      Motor: No weakness.      Coordination: Coordination normal.      Gait: Gait normal.      Deep Tendon Reflexes: Reflexes normal.   Psychiatric:         Attention and Perception: Attention normal.         Mood and Affect: Mood is anxious. Mood is not elated. Affect is labile and tearful. Affect is not blunt.         Speech: Speech is rapid and pressured.         Behavior: Behavior is hyperactive.         Thought Content: Thought content is not paranoid or delusional. Thought content does not include homicidal or suicidal ideation. Thought content does not include homicidal or suicidal plan.         Judgment: Judgment is not inappropriate.      Comments: Flight of speech noted  Patient is intermittently tearful throughout exam             Assessment/Plan:  Rossi Mcneal is a 64 y.o. female who presents today for :    Rossi was seen today for follow-up.    Diagnoses and all orders for this visit:    Manic episode    Hospital discharge follow-up     EMS was initially called to the scene, however after discussion with them her sats did improve and patient felt like she did not need to go to the emergency room, so EMS left.  I examined the patient with her PCP who informed me that this is not the patient's usual state.  She seems to be in a manic state right now with hyperactivity, rapid speech, and flight of thoughts/speech.  She admits that her mood is highly elevated.  I did not feel comfortable sending the patient home at this time.  Patient agreed to go to the hospital.  EMS was called back to the scene and patient left via EMS.  Report was called to LALITHA Miles at [facility].        This office note has been dictated.  This dictation has been generated using M-Modal Fluency Direct dictation; some phonetic errors may occur.   My collaborating physician is Dr. Dayday Paige.

## 2020-06-16 NOTE — ED TRIAGE NOTES
Pt arrives to er via ems on stretcher from clinic. Pt anxious appearing. Pt seen recently here and dx with pna. Today while at followup at clinic pt stated to she was taking double of her effexor for the past 2 days and is feeling anxious. Pt. States took effexor per she  Was feeling manic

## 2020-06-16 NOTE — ED PROVIDER NOTES
Encounter Date: 6/16/2020    SCRIBE #1 NOTE: I, Martha Church, am scribing for, and in the presence of,  Aiden Montero MD. I have scribed the following portions of the note - Other sections scribed: HPI, ROS.       History     Chief Complaint   Patient presents with    Anxiety     pt. arrived to the ED via EMS. pt. was at PCP for a f/u, dx pneumonia. pt. states she double up on her effexor for the past 2 days and is feeling anxious. pt. is noted to be friendly and very talkative.      This is a 64 y.o. female with a PMHx of Hepatitis C (treated) and Emphysema who presents to the ED complaining of an anxiety attack that occurred today. She states that she came to this ED on 6/13/2020 with similar symptoms and was diagnosed with Pneumonia. She adds that she was prescribed 50 mg Prednisone and Azithromycin.  She notes that she had a follow up appointment today with Pratik Todd NP and had an anxiety attack while at the clinic. She states that she was having worsening SOB and gasping for air, but it has now resolved. She adds having stabbing abdominal pain and chronic back pain. She notes that Dr. Corcoran called EMS due to her condition. She denies using at home oxygen, but notes using her inhaler and nebulizer for treatment. She notes that she is trying to quit smoking and has been using nicotine patches. She denies any alcohol or drug use. No other associated symptoms.    The history is provided by the patient. No  was used.     Review of patient's allergies indicates:  No Known Allergies  Past Medical History:   Diagnosis Date    Bipolar 1 disorder     Emphysema     Hepatitis C     Hypertension     Neuromuscular disorder      Past Surgical History:   Procedure Laterality Date    ESOPHAGOGASTRODUODENOSCOPY N/A 9/19/2019    Procedure: ESOPHAGOGASTRODUODENOSCOPY (EGD);  Surgeon: Jose Luis Hyman MD;  Location: [facility];  Service: Endoscopy;  Laterality: N/A;    HYSTERECTOMY      NECK  SURGERY  1990    PARTIAL HYSTERECTOMY  1995     Family History   Problem Relation Age of Onset    Hypertension Mother     Cancer Father     Parkinsonism Father     Breast cancer Maternal Aunt      Social History     Tobacco Use    Smoking status: Current Every Day Smoker     Packs/day: 0.50     Years: 18.00     Pack years: 9.00     Types: Cigarettes    Smokeless tobacco: Never Used   Substance Use Topics    Alcohol use: No     Frequency: Never    Drug use: No     Review of Systems   Constitutional: Negative.    HENT: Negative.    Eyes: Negative.    Respiratory: Positive for shortness of breath (resolved).    Cardiovascular: Negative.    Gastrointestinal: Positive for abdominal pain.   Endocrine: Negative.    Genitourinary: Negative.    Musculoskeletal: Positive for back pain (chronic).   Skin: Negative.    Allergic/Immunologic: Negative.    Neurological: Negative.    Hematological: Negative.    Psychiatric/Behavioral: The patient is nervous/anxious.        Physical Exam     Initial Vitals [06/16/20 1624]   BP Pulse Resp Temp SpO2   137/88 107 (!) 22 98.3 °F (36.8 °C) (!) 94 %      MAP       --         Physical Exam    Nursing note and vitals reviewed.  Constitutional: She is not diaphoretic. She appears distressed (Mildly distressed, talkative).   HENT:   Head: Normocephalic and atraumatic.   Nose: Nose normal.   Eyes: EOM are normal. Pupils are equal, round, and reactive to light. Right eye exhibits no discharge. Left eye exhibits no discharge.   Neck: Normal range of motion. No JVD present.   Cardiovascular: Regular rhythm and normal heart sounds.   No murmur heard.  Tachycardic   Pulmonary/Chest: No stridor. She is in respiratory distress ( mild with prolonged expiratory phase). She has no rhonchi. She has no rales.   Abdominal: Soft. Bowel sounds are normal. She exhibits no distension. There is no abdominal tenderness.   Musculoskeletal: Normal range of motion. No tenderness or edema.   Neurological:  She is alert and oriented to person, place, and time. She has normal strength. No cranial nerve deficit.   Skin: Skin is warm and dry. Capillary refill takes less than 2 seconds. No rash noted. No erythema.               ED Course   Procedures  Labs Reviewed   CBC W/ AUTO DIFFERENTIAL - Abnormal; Notable for the following components:       Result Value    Hemoglobin 11.1 (*)     Hematocrit 34.9 (*)     Mean Corpuscular Hemoglobin 26.7 (*)     Mean Corpuscular Hemoglobin Conc 31.8 (*)     RDW 17.2 (*)     Immature Granulocytes 0.6 (*)     Gran # (ANC) 10.9 (*)     Immature Grans (Abs) 0.07 (*)     Lymph # 0.8 (*)     Gran% 89.6 (*)     Lymph% 6.6 (*)     Mono% 3.0 (*)     All other components within normal limits   COMPREHENSIVE METABOLIC PANEL - Abnormal; Notable for the following components:    Glucose 123 (*)     BUN, Bld 26 (*)     Calcium 8.3 (*)     AST 53 (*)     eGFR if  54 (*)     eGFR if non  47 (*)     All other components within normal limits   URINALYSIS, REFLEX TO URINE CULTURE - Abnormal; Notable for the following components:    Protein, UA 2+ (*)     All other components within normal limits    Narrative:     Preferred Collection Type->Urine, Clean Catch  Specimen Source->Urine   URINALYSIS MICROSCOPIC - Abnormal; Notable for the following components:    Non-Squam Epith 1 (*)     Hyaline Casts, UA 4 (*)     Granular Casts, UA 1 (*)     All other components within normal limits    Narrative:     Preferred Collection Type->Urine, Clean Catch  Specimen Source->Urine   ISTAT PROCEDURE - Abnormal; Notable for the following components:    POC PH 7.341 (*)     POC HCO3 22.1 (*)     POC SATURATED O2 86 (*)     POC TCO2 23 (*)     All other components within normal limits   TROPONIN I   B-TYPE NATRIURETIC PEPTIDE   SARS-COV-2 RNA AMPLIFICATION, QUAL   LACTIC ACID, PLASMA   PROCALCITONIN   TROPONIN I        ECG Results          EKG 12-lead (In process)  Result time 06/17/20  06:00:37    In process by Interface, Lab In [facility] (06/17/20 06:00:37)                 Narrative:    Test Reason : R06.02,    Vent. Rate : 091 BPM     Atrial Rate : 091 BPM     P-R Int : 138 ms          QRS Dur : 088 ms      QT Int : 348 ms       P-R-T Axes : 069 081 074 degrees     QTc Int : 428 ms    Normal sinus rhythm with sinus arrhythmia  Normal ECG  When compared with ECG of 13-JUN-2020 20:10,  No significant change was found    Referred By: [email or web address]   SELF           Confirmed By:                   In process by Interface, Lab In [facility] (06/17/20 05:54:46)                 Narrative:    Test Reason : R06.02,    Vent. Rate : 091 BPM     Atrial Rate : 091 BPM     P-R Int : 138 ms          QRS Dur : 088 ms      QT Int : 348 ms       P-R-T Axes : 069 081 074 degrees     QTc Int : 428 ms    Normal sinus rhythm with sinus arrhythmia  Normal ECG  When compared with ECG of 13-JUN-2020 20:10,  No significant change was found    Referred By: [email or web address]   SELF           Confirmed By:                             Imaging Results          X-Ray Chest AP Portable (Final result)  Result time 06/16/20 17:51:48    Final result by Mazin Cota MD (06/16/20 17:51:48)                 Impression:      As above.    Electronically signed by resident: Dutch Garcia  Date:    06/16/2020  Time:    17:47    Electronically signed by: Mazin Cota MD  Date:    06/16/2020  Time:    17:51             Narrative:    EXAMINATION:  XR CHEST AP PORTABLE    CLINICAL HISTORY:  Shortness of breath    TECHNIQUE:  Single frontal view of the chest was performed.    COMPARISON:  06/13/2020    FINDINGS:  Vertical shaun for scoliosis fixation present.  Severe S-type scoliosis.    Left greater than right basilar increased interstitial attenuation suggestive of mild pulmonary edema.  Possible atypical viral pneumonia.  No large pleural effusion or pneumothorax.    The cardiac silhouette is normal in size. The hilar and mediastinal contours are  unchanged.    No acute osseous process seen.                                 Medical Decision Making:   Clinical Tests:   Lab Tests: Ordered and Reviewed  Radiological Study: Ordered and Reviewed  Medical Tests: Ordered and Reviewed            Scribe Attestation:   Scribe #1: I performed the above scribed service and the documentation accurately describes the services I performed. I attest to the accuracy of the note.      MDM:    64 y.o.female with PMHx as noted above, presents with SOB. Physical exam remarkable for mildly distressed appearing, thin female, conversing with ease, however talkative and having to take breaks after longer sentences, prolonged exp phase with diminished breath sounds noted, no peripheral edema present.  ED workup remarkable for EKG - nsr, rate 91bpm, no ischemic pattern noted, no STEMI, trop - neg, BNP - 67, pH/pCO2 - 7.341/40.8, CBC/CMP - baseline, ua unremarkable, CXR - L>R basilar infiltrates noted. Pt presentation consistent with mild COPD exacerbation with small deviation in pH and new hypoxemia likely exacerbated by e/o atypical pneumonia on CXR.  COvered for CAP with rocephin/azithro, given multiple nebs at home pta, will continue q4 nebs, started solumedrol while in the ED.  At this time given patient's history, physical exam, and ED workup do not suspect arrhythmia, CHF exacerbation, COPD exacerbation, cardiac tamponade, MI/ACS, PE, PTX, aortic dissection, pericarditis, PNA, or any further malignant cause.  Discussed diagnosis and further treatment with patient, return precautions given and all questions answered.  Patient in understanding of plan.  Pt discharged to home improved and stable.                          Clinical Impression:       ICD-10-CM ICD-9-CM   1. COPD exacerbation  J44.1 491.21   2. SOB (shortness of breath)  R06.02 786.05   3. Atypical pneumonia  J18.9 486   4. Hypoxemia  R09.02 799.02             ED Disposition Condition    Admit            Scribe  Attestation: I, Aiden Montero M.D., personally performed the services described in this documentation. All medical record entries made by the scribe were at my direction and in my presence. I have reviewed the chart and agree that the record reflects my personal performance and is accurate and complete.               Aiden Montero MD  06/17/20 0811

## 2020-06-17 PROBLEM — J96.01 ACUTE HYPOXEMIC RESPIRATORY FAILURE: Status: ACTIVE | Noted: 2020-06-16

## 2020-06-17 PROBLEM — F19.94 SUBSTANCE INDUCED MOOD DISORDER: Status: ACTIVE | Noted: 2020-06-17

## 2020-06-17 LAB
AMPHET+METHAMPHET UR QL: NEGATIVE
ANION GAP SERPL CALC-SCNC: 10 MMOL/L (ref 8–16)
AORTIC ROOT ANNULUS: 2.82 CM
AORTIC VALVE CUSP SEPERATION: 2.03 CM
ASCENDING AORTA: 2.87 CM
AV INDEX (PROSTH): 0.83
AV MEAN GRADIENT: 8 MMHG
AV PEAK GRADIENT: 13 MMHG
AV VALVE AREA: 3.38 CM2
AV VELOCITY RATIO: 0.75
BARBITURATES UR QL SCN>200 NG/ML: NEGATIVE
BASOPHILS # BLD AUTO: 0.01 K/UL (ref 0–0.2)
BASOPHILS NFR BLD: 0.1 % (ref 0–1.9)
BENZODIAZ UR QL SCN>200 NG/ML: NORMAL
BSA FOR ECHO PROCEDURE: 1.69 M2
BUN SERPL-MCNC: 21 MG/DL (ref 8–23)
BZE UR QL SCN: NEGATIVE
CALCIUM SERPL-MCNC: 8.4 MG/DL (ref 8.7–10.5)
CANNABINOIDS UR QL SCN: NEGATIVE
CHLORIDE SERPL-SCNC: 103 MMOL/L (ref 95–110)
CO2 SERPL-SCNC: 26 MMOL/L (ref 23–29)
CREAT SERPL-MCNC: 0.9 MG/DL (ref 0.5–1.4)
CREAT UR-MCNC: 29 MG/DL (ref 15–325)
CV ECHO LV RWT: 0.36 CM
DIFFERENTIAL METHOD: ABNORMAL
DOP CALC AO PEAK VEL: 1.77 M/S
DOP CALC AO VTI: 30.38 CM
DOP CALC LVOT AREA: 4.1 CM2
DOP CALC LVOT DIAMETER: 2.28 CM
DOP CALC LVOT PEAK VEL: 1.33 M/S
DOP CALC LVOT STROKE VOLUME: 102.71 CM3
DOP CALCLVOT PEAK VEL VTI: 25.17 CM
E WAVE DECELERATION TIME: 157.6 MSEC
E/A RATIO: 1.1
E/E' RATIO: 9.05 M/S
ECHO LV POSTERIOR WALL: 0.8 CM (ref 0.6–1.1)
EOSINOPHIL # BLD AUTO: 0 K/UL (ref 0–0.5)
EOSINOPHIL NFR BLD: 0 % (ref 0–8)
ERYTHROCYTE [DISTWIDTH] IN BLOOD BY AUTOMATED COUNT: 17.2 % (ref 11.5–14.5)
EST. GFR  (AFRICAN AMERICAN): >60 ML/MIN/1.73 M^2
EST. GFR  (NON AFRICAN AMERICAN): >60 ML/MIN/1.73 M^2
FRACTIONAL SHORTENING: 47 % (ref 28–44)
GLUCOSE SERPL-MCNC: 92 MG/DL (ref 70–110)
HCT VFR BLD AUTO: 35.4 % (ref 37–48.5)
HGB BLD-MCNC: 11.2 G/DL (ref 12–16)
IMM GRANULOCYTES # BLD AUTO: 0.04 K/UL (ref 0–0.04)
IMM GRANULOCYTES NFR BLD AUTO: 0.4 % (ref 0–0.5)
INTERVENTRICULAR SEPTUM: 0.93 CM (ref 0.6–1.1)
IVRT: 79.58 MSEC
LA MAJOR: 3.8 CM
LA MINOR: 4.7 CM
LA WIDTH: 4.28 CM
LEFT ATRIUM SIZE: 3.77 CM
LEFT ATRIUM VOLUME INDEX: 34.7 ML/M2
LEFT ATRIUM VOLUME: 57.64 CM3
LEFT INTERNAL DIMENSION IN SYSTOLE: 2.35 CM (ref 2.1–4)
LEFT VENTRICLE DIASTOLIC VOLUME INDEX: 54.54 ML/M2
LEFT VENTRICLE DIASTOLIC VOLUME: 90.64 ML
LEFT VENTRICLE MASS INDEX: 75 G/M2
LEFT VENTRICLE SYSTOLIC VOLUME INDEX: 11.6 ML/M2
LEFT VENTRICLE SYSTOLIC VOLUME: 19.2 ML
LEFT VENTRICULAR INTERNAL DIMENSION IN DIASTOLE: 4.46 CM (ref 3.5–6)
LEFT VENTRICULAR MASS: 124.12 G
LV LATERAL E/E' RATIO: 7.31 M/S
LV SEPTAL E/E' RATIO: 11.88 M/S
LYMPHOCYTES # BLD AUTO: 0.9 K/UL (ref 1–4.8)
LYMPHOCYTES NFR BLD: 8 % (ref 18–48)
MCH RBC QN AUTO: 26.5 PG (ref 27–31)
MCHC RBC AUTO-ENTMCNC: 31.6 G/DL (ref 32–36)
MCV RBC AUTO: 84 FL (ref 82–98)
METHADONE UR QL SCN>300 NG/ML: NEGATIVE
MONOCYTES # BLD AUTO: 0.4 K/UL (ref 0.3–1)
MONOCYTES NFR BLD: 4 % (ref 4–15)
MV PEAK A VEL: 0.86 M/S
MV PEAK E VEL: 0.95 M/S
MV STENOSIS PRESSURE HALF TIME: 55.02 MS
MV VALVE AREA P 1/2 METHOD: 4 CM2
NEUTROPHILS # BLD AUTO: 9.5 K/UL (ref 1.8–7.7)
NEUTROPHILS NFR BLD: 87.5 % (ref 38–73)
NRBC BLD-RTO: 0 /100 WBC
OPIATES UR QL SCN: NORMAL
PCP UR QL SCN>25 NG/ML: NEGATIVE
PISA TR MAX VEL: 2.77 M/S
PLATELET # BLD AUTO: 373 K/UL (ref 150–350)
PMV BLD AUTO: 9.9 FL (ref 9.2–12.9)
POTASSIUM SERPL-SCNC: 3.9 MMOL/L (ref 3.5–5.1)
PROCALCITONIN SERPL IA-MCNC: 0.02 NG/ML
PV PEAK VELOCITY: 1.27 CM/S
RA MAJOR: 4.07 CM
RA PRESSURE: 8 MMHG
RA WIDTH: 3.32 CM
RBC # BLD AUTO: 4.22 M/UL (ref 4–5.4)
RIGHT VENTRICULAR END-DIASTOLIC DIMENSION: 2.81 CM
RV TISSUE DOPPLER FREE WALL SYSTOLIC VELOCITY 1 (APICAL 4 CHAMBER VIEW): 14.68 CM/S
SINUS: 2.64 CM
SODIUM SERPL-SCNC: 139 MMOL/L (ref 136–145)
STJ: 2.42 CM
T4 FREE SERPL-MCNC: 0.93 NG/DL (ref 0.71–1.51)
TDI LATERAL: 0.13 M/S
TDI SEPTAL: 0.08 M/S
TDI: 0.11 M/S
TOXICOLOGY INFORMATION: NORMAL
TR MAX PG: 31 MMHG
TRICUSPID ANNULAR PLANE SYSTOLIC EXCURSION: 2.31 CM
TROPONIN I SERPL DL<=0.01 NG/ML-MCNC: <0.006 NG/ML (ref 0–0.03)
TROPONIN I SERPL DL<=0.01 NG/ML-MCNC: <0.006 NG/ML (ref 0–0.03)
TSH SERPL DL<=0.005 MIU/L-ACNC: 0.22 UIU/ML (ref 0.4–4)
TV REST PULMONARY ARTERY PRESSURE: 39 MMHG
WBC # BLD AUTO: 10.81 K/UL (ref 3.9–12.7)

## 2020-06-17 PROCEDURE — 63600175 PHARM REV CODE 636 W HCPCS: Mod: HCNC | Performed by: PHYSICIAN ASSISTANT

## 2020-06-17 PROCEDURE — 63600175 PHARM REV CODE 636 W HCPCS: Mod: HCNC | Performed by: HOSPITALIST

## 2020-06-17 PROCEDURE — 84484 ASSAY OF TROPONIN QUANT: CPT | Mod: HCNC

## 2020-06-17 PROCEDURE — 99900035 HC TECH TIME PER 15 MIN (STAT): Mod: HCNC

## 2020-06-17 PROCEDURE — 80307 DRUG TEST PRSMV CHEM ANLYZR: CPT | Mod: HCNC

## 2020-06-17 PROCEDURE — 25000003 PHARM REV CODE 250: Mod: HCNC | Performed by: HOSPITALIST

## 2020-06-17 PROCEDURE — 84145 PROCALCITONIN (PCT): CPT | Mod: HCNC

## 2020-06-17 PROCEDURE — 11000001 HC ACUTE MED/SURG PRIVATE ROOM: Mod: HCNC

## 2020-06-17 PROCEDURE — 94761 N-INVAS EAR/PLS OXIMETRY MLT: CPT | Mod: HCNC

## 2020-06-17 PROCEDURE — 84439 ASSAY OF FREE THYROXINE: CPT | Mod: HCNC

## 2020-06-17 PROCEDURE — 25000003 PHARM REV CODE 250: Mod: HCNC | Performed by: PHYSICIAN ASSISTANT

## 2020-06-17 PROCEDURE — 84484 ASSAY OF TROPONIN QUANT: CPT | Mod: 91,HCNC

## 2020-06-17 PROCEDURE — 63600175 PHARM REV CODE 636 W HCPCS: Mod: HCNC | Performed by: FAMILY MEDICINE

## 2020-06-17 PROCEDURE — 90792 PR PSYCHIATRIC DIAGNOSTIC EVALUATION W/MEDICAL SERVICES: ICD-10-PCS | Mod: HCNC,,, | Performed by: PSYCHIATRY & NEUROLOGY

## 2020-06-17 PROCEDURE — 36415 COLL VENOUS BLD VENIPUNCTURE: CPT | Mod: HCNC

## 2020-06-17 PROCEDURE — S4991 NICOTINE PATCH NONLEGEND: HCPCS | Mod: HCNC | Performed by: PHYSICIAN ASSISTANT

## 2020-06-17 PROCEDURE — 90792 PSYCH DIAG EVAL W/MED SRVCS: CPT | Mod: HCNC,,, | Performed by: PSYCHIATRY & NEUROLOGY

## 2020-06-17 PROCEDURE — 80048 BASIC METABOLIC PNL TOTAL CA: CPT | Mod: HCNC

## 2020-06-17 PROCEDURE — 84443 ASSAY THYROID STIM HORMONE: CPT | Mod: HCNC

## 2020-06-17 PROCEDURE — 94799 UNLISTED PULMONARY SVC/PX: CPT | Mod: HCNC

## 2020-06-17 PROCEDURE — 85025 COMPLETE CBC W/AUTO DIFF WBC: CPT | Mod: HCNC

## 2020-06-17 PROCEDURE — S0166 INJ OLANZAPINE 2.5MG: HCPCS | Mod: HCNC | Performed by: HOSPITALIST

## 2020-06-17 RX ORDER — FUROSEMIDE 10 MG/ML
40 INJECTION INTRAMUSCULAR; INTRAVENOUS DAILY
Status: DISCONTINUED | OUTPATIENT
Start: 2020-06-17 | End: 2020-06-17

## 2020-06-17 RX ORDER — OLANZAPINE 10 MG/2ML
10 INJECTION, POWDER, FOR SOLUTION INTRAMUSCULAR ONCE AS NEEDED
Status: COMPLETED | OUTPATIENT
Start: 2020-06-17 | End: 2020-06-17

## 2020-06-17 RX ORDER — LORAZEPAM 2 MG/ML
0.5 INJECTION INTRAMUSCULAR ONCE
Status: COMPLETED | OUTPATIENT
Start: 2020-06-17 | End: 2020-06-17

## 2020-06-17 RX ORDER — DIAZEPAM 5 MG/1
5 TABLET ORAL 2 TIMES DAILY PRN
Status: DISCONTINUED | OUTPATIENT
Start: 2020-06-17 | End: 2020-06-17

## 2020-06-17 RX ORDER — ENOXAPARIN SODIUM 100 MG/ML
40 INJECTION SUBCUTANEOUS EVERY 24 HOURS
Status: DISCONTINUED | OUTPATIENT
Start: 2020-06-17 | End: 2020-06-18 | Stop reason: HOSPADM

## 2020-06-17 RX ORDER — DIAZEPAM 5 MG/1
5 TABLET ORAL ONCE
Status: COMPLETED | OUTPATIENT
Start: 2020-06-17 | End: 2020-06-17

## 2020-06-17 RX ORDER — OXCARBAZEPINE 150 MG/1
150 TABLET, FILM COATED ORAL 2 TIMES DAILY
Status: DISCONTINUED | OUTPATIENT
Start: 2020-06-17 | End: 2020-06-18 | Stop reason: HOSPADM

## 2020-06-17 RX ORDER — OLANZAPINE 10 MG/2ML
10 INJECTION, POWDER, FOR SOLUTION INTRAMUSCULAR
Status: DISCONTINUED | OUTPATIENT
Start: 2020-06-17 | End: 2020-06-18

## 2020-06-17 RX ORDER — VENLAFAXINE 37.5 MG/1
75 TABLET ORAL DAILY
Status: DISCONTINUED | OUTPATIENT
Start: 2020-06-18 | End: 2020-06-18 | Stop reason: HOSPADM

## 2020-06-17 RX ORDER — BENZONATATE 100 MG/1
100 CAPSULE ORAL 3 TIMES DAILY PRN
Status: DISCONTINUED | OUTPATIENT
Start: 2020-06-17 | End: 2020-06-18 | Stop reason: HOSPADM

## 2020-06-17 RX ORDER — GUAIFENESIN 600 MG/1
600 TABLET, EXTENDED RELEASE ORAL 2 TIMES DAILY
Status: DISCONTINUED | OUTPATIENT
Start: 2020-06-17 | End: 2020-06-18 | Stop reason: HOSPADM

## 2020-06-17 RX ADMIN — OLANZAPINE 10 MG: 10 INJECTION, POWDER, LYOPHILIZED, FOR SOLUTION INTRAMUSCULAR at 06:06

## 2020-06-17 RX ADMIN — GABAPENTIN 300 MG: 300 CAPSULE ORAL at 09:06

## 2020-06-17 RX ADMIN — MORPHINE SULFATE 2 MG: 10 INJECTION INTRAVENOUS at 12:06

## 2020-06-17 RX ADMIN — CEFTRIAXONE 1 G: 1 INJECTION, SOLUTION INTRAVENOUS at 09:06

## 2020-06-17 RX ADMIN — LORAZEPAM 0.5 MG: 2 INJECTION INTRAMUSCULAR; INTRAVENOUS at 05:06

## 2020-06-17 RX ADMIN — DOXYCYCLINE 100 MG: 100 INJECTION, POWDER, LYOPHILIZED, FOR SOLUTION INTRAVENOUS at 10:06

## 2020-06-17 RX ADMIN — GABAPENTIN 300 MG: 300 CAPSULE ORAL at 10:06

## 2020-06-17 RX ADMIN — FUROSEMIDE 40 MG: 10 INJECTION, SOLUTION INTRAMUSCULAR; INTRAVENOUS at 10:06

## 2020-06-17 RX ADMIN — VENLAFAXINE 75 MG: 37.5 TABLET ORAL at 12:06

## 2020-06-17 RX ADMIN — OXYCODONE 5 MG: 5 TABLET ORAL at 04:06

## 2020-06-17 RX ADMIN — DIAZEPAM 10 MG: 5 TABLET ORAL at 12:06

## 2020-06-17 RX ADMIN — OXYCODONE 5 MG: 5 TABLET ORAL at 10:06

## 2020-06-17 RX ADMIN — ENOXAPARIN SODIUM 40 MG: 40 INJECTION SUBCUTANEOUS at 04:06

## 2020-06-17 RX ADMIN — OXCARBAZEPINE 150 MG: 150 TABLET, FILM COATED ORAL at 09:06

## 2020-06-17 RX ADMIN — GUAIFENESIN 600 MG: 600 TABLET, EXTENDED RELEASE ORAL at 09:06

## 2020-06-17 RX ADMIN — GABAPENTIN 300 MG: 300 CAPSULE ORAL at 12:06

## 2020-06-17 RX ADMIN — NICOTINE 1 PATCH: 21 PATCH, EXTENDED RELEASE TRANSDERMAL at 12:06

## 2020-06-17 RX ADMIN — DIAZEPAM 5 MG: 5 TABLET ORAL at 06:06

## 2020-06-17 RX ADMIN — GUAIFENESIN 600 MG: 600 TABLET, EXTENDED RELEASE ORAL at 12:06

## 2020-06-17 RX ADMIN — VENLAFAXINE 75 MG: 37.5 TABLET ORAL at 10:06

## 2020-06-17 RX ADMIN — FUROSEMIDE 40 MG: 10 INJECTION, SOLUTION INTRAVENOUS at 12:06

## 2020-06-17 RX ADMIN — DOXYCYCLINE 100 MG: 100 INJECTION, POWDER, LYOPHILIZED, FOR SOLUTION INTRAVENOUS at 12:06

## 2020-06-17 NOTE — ASSESSMENT & PLAN NOTE
Patient with chronic use of opioids given multiple orthopedic injuries.  Pain medications ordered

## 2020-06-17 NOTE — ASSESSMENT & PLAN NOTE
Patient likely has a substance induced manic state from steroid use and increase in venlafaxine dose.  She also likely has an underlying bipolar disorder which is not being properly treated.  Does not need acute inpatient psychiatric hospitalization at this time.  The increase of venlafaxine only compounded the problem and made her manic state more prominent.  Decrease the venlafaxine to 75 mg morning only for the next 3-4 days, then can increase back to twice daily.  Start oxcarbazepine 150 mg p.o. b.i.d. as a mood stabilizing agent (can be increased to 300 mg twice daily in 1 week).  I would prefer Depakote as a mood stabilizing agent but is likely not a good choice of medicines given her liver problems.  I would also prefer lithium but again not a good choice of medications given her potential thyroid problems.  Recommend thyroid studies for full evaluation of this manic state to rule out any hyperthyroidism.  Given her long history of benzodiazepine use in heavy doses, and previous history of alcohol addiction, not a good candidate for long-term benzodiazepine use.  She is at risk of dementia.  Diazepam not a good option given her liver function.  Needs to get established with outpatient psychiatric care for best medication manage.  Given her the names of [facility] and [facility] to calling get set up with their IOP programs where she can have psychiatric care and monitoring.  They also provide transportation for some insurances.

## 2020-06-17 NOTE — NURSING
Patient extremely hyperactive, digging through the drawers in hospital room, walking around, talking to herself. Patient states she's manic.

## 2020-06-17 NOTE — ED NOTES
Received report from LALITHA Walker, pt resting in bed requesting pain medication, in no acute distress, bed lock and in lowest position, call bell in reach, will check MAR for pain medication and cont to monitor.

## 2020-06-17 NOTE — HPI
Rossi Mcneal is a 64 y.o. female with PMHx of emphysema, hypertension, anxiety chronic pain, GERD, hypertension and uterine cancer status post partial hysterectomy who presents to the hospital today complaining of gradually worsening shortness of breath for 1 week.  Patient states she was evaluated here in this ED approximately 1 week ago upon initial onset of symptoms and received antibiotics and steroids which have provided minimal relief.  She also complains of productive cough with clear sputum and right-sided chest pain that is worse with deep inspiration.  Patient states that she had a follow-up appointment with her PCP, Dr. Corcoran, today but had an anxiety attack and had difficulty breathing.  Therefore, EMS was called and patient was brought to the emergency department for further evaluation.  Patient states that she has tried quitting smoking by using nicotine patches.  Denies fever, chills, nausea, vomiting, urinary complaints.

## 2020-06-17 NOTE — ASSESSMENT & PLAN NOTE
-Patient with chronic use of opioids given multiple orthopedic injuries.  -Pain medications ordered

## 2020-06-17 NOTE — ASSESSMENT & PLAN NOTE
-Noted history of SHIRIN and bipolar disorder  -Had panic attack at home yesterday and now appears somewhat manic.  -Continue home Valium and venlafaxine. (notes she doubled home dose recently).  -Will ask psychiatry to see and assess patient.

## 2020-06-17 NOTE — ASSESSMENT & PLAN NOTE
-Mrs. Mcneal was admitted to inpatient status  -Noted O2 sats in 70s at PCP office per patient.  In ER noted 88% on room air  -Noted treatment with antibiotics as outpatient for recently diagnosed pneumonia as well as ongoing tobacco usage.  This would represent a failure of outpatient antibiotics.  -Respiratory failure likely multifactorial due to pneumonia, COPD, tobacco usage, significant anxiety and questionable pulmonary edema.  -No evidence of PE on CTA a few days ago.  No evidence of ACS - all troponins negative.  -Notes recent significant swelling but none presently after having received IV lasix.  Will d/c lasix and check echo.  -Continue antibiotics, incentive spirometry, supplemental O2 and albuterol.  Add tessalon and guaifenesin, nebulizers  -Check six minute walk test today.  -Plan ambulatory referral to pulmonology at discharge.  -Possible discharge home tomorrow.

## 2020-06-17 NOTE — SUBJECTIVE & OBJECTIVE
Past Medical History:   Diagnosis Date    Bipolar 1 disorder     Emphysema     Hepatitis C     Hypertension     Neuromuscular disorder        Past Surgical History:   Procedure Laterality Date    ESOPHAGOGASTRODUODENOSCOPY N/A 9/19/2019    Procedure: ESOPHAGOGASTRODUODENOSCOPY (EGD);  Surgeon: Jose Luis Hyman MD;  Location: [facility];  Service: Endoscopy;  Laterality: N/A;    HYSTERECTOMY      NECK SURGERY  1990    PARTIAL HYSTERECTOMY  1995       Review of patient's allergies indicates:  No Known Allergies    No current facility-administered medications on file prior to encounter.      Current Outpatient Medications on File Prior to Encounter   Medication Sig    albuterol (VENTOLIN HFA) 90 mcg/actuation inhaler inhale TWO puffs into the lungs EVERY 6 HOURS AS NEEDED FOR WHEEZING    azelastine (ASTELIN) 137 mcg (0.1 %) nasal spray 1 spray (137 mcg total) by Nasal route 2 (two) times daily.    azithromycin (ZITHROMAX Z-GEORGE) 250 MG tablet Take 2 tablets (500 mg total) by mouth once daily for 1 day, THEN 1 tablet (250 mg total) once daily for 4 days.    diazePAM (VALIUM) 10 MG Tab TAKE ONE TABLET BY MOUTH TWICE DAILY AS NEEDED    fluticasone propionate (FLONASE) 50 mcg/actuation nasal spray 2 sprays (100 mcg total) by Each Nare route once daily.    gabapentin (NEURONTIN) 300 MG capsule     lisinopril 10 MG tablet TAKE ONE TABLET BY MOUTH DAILY    nicotine (NICODERM CQ) 21 mg/24 hr PLACE ONE PATCH ONTO SKIN EVERY DAY    nicotine (NICODERM CQ) 21 mg/24 hr place ONE PATCH onto the skin EVERY DAY    oxyCODONE (ROXICODONE) 10 mg Tab immediate release tablet Take 1 tablet (10 mg total) by mouth every 6 (six) hours as needed.    oxyCODONE (ROXICODONE) 5 MG immediate release tablet Take 1 tablet (5 mg total) by mouth every 6 (six) hours as needed for Pain.    pantoprazole (PROTONIX) 40 MG tablet Take 1 tablet (40 mg total) by mouth once daily.    predniSONE (DELTASONE) 50 MG Tab Take 1 tablet (50 mg  total) by mouth once daily. for 4 days    venlafaxine (EFFEXOR) 75 MG tablet Take 1 tablet (75 mg total) by mouth 2 (two) times daily.    venlafaxine (EFFEXOR) 75 MG tablet TAKE ONE TABLET BY MOUTH TWICE DAILY     Family History     Problem Relation (Age of Onset)    Breast cancer Maternal Aunt    Cancer Father    Hypertension Mother    Parkinsonism Father        Tobacco Use    Smoking status: Current Every Day Smoker     Packs/day: 0.50     Years: 18.00     Pack years: 9.00     Types: Cigarettes    Smokeless tobacco: Never Used   Substance and Sexual Activity    Alcohol use: No     Frequency: Never    Drug use: No    Sexual activity: Not on file     Review of Systems   Constitutional: Negative for chills and fever.   HENT: Negative for congestion, rhinorrhea and sore throat.    Eyes: Negative for visual disturbance.   Respiratory: Positive for cough and shortness of breath.    Cardiovascular: Positive for chest pain.   Gastrointestinal: Negative for abdominal pain, diarrhea, nausea and vomiting.   Genitourinary: Negative for dysuria, frequency and hematuria.   Musculoskeletal: Negative for back pain.   Skin: Negative for rash.   Neurological: Negative for dizziness, weakness and headaches.   Psychiatric/Behavioral: The patient is nervous/anxious.      Objective:     Vital Signs (Most Recent):  Temp: 98.2 °F (36.8 °C) (06/16/20 2227)  Pulse: 85 (06/16/20 2202)  Resp: 20 (06/16/20 2003)  BP: 129/76 (06/16/20 2202)  SpO2: (!) 92 % (06/16/20 2202) Vital Signs (24h Range):  Temp:  [97 °F (36.1 °C)-98.3 °F (36.8 °C)] 98.2 °F (36.8 °C)  Pulse:  [] 85  Resp:  [19-25] 20  SpO2:  [85 %-94 %] 92 %  BP: (103-137)/(61-88) 129/76     Weight: 66.7 kg (147 lb)  Body mass index is 26.89 kg/m².    Physical Exam  Vitals signs and nursing note reviewed.   Constitutional:       General: She is not in acute distress.     Appearance: Normal appearance. She is not ill-appearing, toxic-appearing or diaphoretic.   HENT:       Head: Normocephalic and atraumatic.   Eyes:      Conjunctiva/sclera: Conjunctivae normal.      Pupils: Pupils are equal, round, and reactive to light.   Neck:      Musculoskeletal: Normal range of motion.      Vascular: No JVD.      Trachea: No tracheal deviation.   Cardiovascular:      Rate and Rhythm: Normal rate and regular rhythm.      Heart sounds: No murmur. No friction rub.   Pulmonary:      Effort: Pulmonary effort is normal. No accessory muscle usage or respiratory distress.      Breath sounds: No wheezing.      Comments: There are diffuse coarse breath sounds.  No wheezing.  Abdominal:      General: Bowel sounds are normal. There is no distension.      Palpations: Abdomen is soft.      Tenderness: There is no abdominal tenderness.   Musculoskeletal: Normal range of motion.   Skin:     Capillary Refill: Capillary refill takes less than 2 seconds.      Findings: No erythema or rash.   Neurological:      Mental Status: She is alert and oriented to person, place, and time.      Coordination: Coordination normal.   Psychiatric:         Behavior: Behavior normal.           CRANIAL NERVES     CN III, IV, VI   Pupils are equal, round, and reactive to light.       Significant Labs:   ABGs:   Recent Labs   Lab 06/16/20  1708   PH 7.341*   PCO2 40.8   HCO3 22.1*   POCSATURATED 86*   BE -3     CBC:   Recent Labs   Lab 06/16/20  1710   WBC 12.16   HGB 11.1*   HCT 34.9*        CMP:   Recent Labs   Lab 06/16/20  1710      K 4.0      CO2 23   *   BUN 26*   CREATININE 1.2   CALCIUM 8.3*   PROT 7.1   ALBUMIN 3.6   BILITOT 0.3   ALKPHOS 78   AST 53*   ALT 36   ANIONGAP 8   EGFRNONAA 47*     Cardiac Markers:   Recent Labs   Lab 06/16/20  1710   BNP 67     Troponin:   Recent Labs   Lab 06/16/20  1710   TROPONINI <0.006     Urine Studies:   Recent Labs   Lab 06/16/20  1845   COLORU Yellow   APPEARANCEUA Clear   PHUR 5.0   SPECGRAV 1.025   PROTEINUA 2+*   GLUCUA Negative   KETONESU Negative    BILIRUBINUA Negative   OCCULTUA Negative   NITRITE Negative   UROBILINOGEN Negative   LEUKOCYTESUR Negative   RBCUA 2   WBCUA 3   BACTERIA Occasional   SQUAMEPITHEL 3   HYALINECASTS 4*       Significant Imaging: I have reviewed all pertinent imaging results/findings within the past 24 hours.

## 2020-06-17 NOTE — PLAN OF CARE
06/17/20 1624   Readmission Questionnaire   At the time of your discharge, did someone talk to you about what your health problems were? Yes   At the time of discharge, did someone talk to you about what to watch out for regarding worsening of your health problem? Yes   At the time of discharge, did someone talk to you about what to do if you experienced worsening of your health problem? Yes   At the time of discharge, did someone talk to you about which medication to take when you left the hospital and which ones to stop taking? Yes   At the time of discharge, did someone talk to you about when and where to follow up with a doctor after you left the hospital? Yes   What do you believe caused you to be sick enough to be re-admitted? same diagnosis as before SOB   How often do you need to have someone help you when you read instructions, pamphlets, or other written material from your doctor or pharmacy? Often   Do you have problems taking your medications as prescribed? No   Do you have problems obtaining/receiving your medications? No   Does the patient have transportation to healthcare appointments? Yes   Lives With alone   Living Arrangements house   Does the patient have family/friends to help with healtcare needs after discharge? yes   Are you currently feeling confused? No   Are you currently having problems thinking? No   Are you currently having memory problems? No   Have you felt down, depressed, or hopeless? 1   Have you felt little interest or pleasure in doing things? 0   In the last 7 days, my sleep quality was: fair

## 2020-06-17 NOTE — HPI
Patient Rossi Mcneal presents to the hospital with shortness of breath.  She was noted to be exhibiting some odd an elevated behavior and consult was placed psychiatric evaluation of robb and anxiety.  She is easily engaged in conversation.  She tells me that she has a long history of bipolar disorder which was diagnosed in her 20s.  She has hospitalized herself in a few psychiatric hospitals over time.  She says that most of her hospitalizations were to get off of prescription pills which she was overusing.  Has not had a manic episode for many years and her last hospitalization was many years ago in [facility].  She came to the hospital a few days ago and was given prednisone.  She took it for few days and started to have trouble with breathing again so presented to the hospital.  She has been having trouble sleeping for the past couple of weeks an elevated anxious behavior the past few days.  When she felt herself starting to get more anxious, she doubled up on her dose of venlafaxine and started taking for them daily.  She says that at 1 point in time, she was taking for venlafaxine 75 mg daily.  She denies any depression or sadness.  Admits to being an anxious person who worries a lot about things that she should not worry about.  Her manic episodes are described as elevated behavior for days at a time with little sleep.  No history of psychosis.  No psychiatric treatment for a few years.  Medications prescribed by PCP.  Has been on many medications in the past but does not recall any of their names.  Most notably recalls being on high doses of Xanax 2 mg, Valium 10 mg 3-4 times daily, multiple pain medicines, and multiple muscle relaxers on a daily basis.  Also was considered alcoholic in her 20s.  In rehab, she was able to minimize or get off of these medicines.  Currently taking venlafaxine 75 mg twice daily and diazepam 10 mg twice daily.  No past or present history of suicidal ideations or  behavior, no access to weapons.

## 2020-06-17 NOTE — ASSESSMENT & PLAN NOTE
Room air oxygen saturation 88% after DuoNebs but improved to 95% on 2 L nasal cannula.  CTA from previous evaluation shows no evidence of PE.  Unsure of etiology of hypoxia.  May be secondary to worsening emphysema versus infectious etiology given Chest x-ray.  Supplemental oxygen at home if patient remains stable?  - continue IV antibiotics.  - procalcitonin pending  - incentive spirometry  - supplemental oxygen  - will trend troponins given history of chest pain  - echo pending  - will attempt trial of Lasix given chest x-ray

## 2020-06-17 NOTE — PLAN OF CARE
Problem: Adult Inpatient Plan of Care  Goal: Plan of Care Review  Outcome: Ongoing, Progressing     2L NC with O2 sat 93-96%. Tele #[id number].    Pt free from falls and injury throughout shift. Pt AAOx4. Continued medications as ordered. Complaints of pain 6/10  controlled with medications. Pt in no distress. Will continue to monitor.

## 2020-06-17 NOTE — HOSPITAL COURSE
Patient was noted to have room air oxygen saturation of 85% on arrival.  Oxygenation improved to 95% on 2 L nasal cannula.  Patient received DuoNeb treatments in the ED. However, patient continued to be hypoxic on room air oxygen saturation but improved on supplemental oxygen.  Given history of emphysema with hypoxia, patient empirically treated with Rocephin and azithromycin in the ED. Chest x-ray shows left greater than right basilar increased interstitial attenuation suggestive of mild pulmonary edema versus atypical viral pneumonia.  No large pleural effusion.  CBC shows leukocytosis.  H&H stable.  EKG shows no evidence of ischemia.  Initial troponin negative.  BNP 67.    Hospital visit from 06/13/2020 was reviewed.  Patient had CTA which showed no evidence of pulmonary embolus.    Patient admitted for further evaluation of hypoxia.

## 2020-06-17 NOTE — PROGRESS NOTES
[facility] Hospital Medicine  Progress Note    Patient Name: Rossi Mcneal  MRN: [id number]  Patient Class: IP- Inpatient   Admission Date: 6/16/2020  Length of Stay: 1 days  Attending Physician: Jamal Abbott MD  Primary Care Provider: Leobardo Corcoran MD        Subjective:     Principal Problem:Acute hypoxemic respiratory failure        HPI:  Rossi Mcneal is a 64 y.o. female with PMHx of emphysema, hypertension, anxiety chronic pain, GERD, hypertension and uterine cancer status post partial hysterectomy who presents to the hospital today complaining of gradually worsening shortness of breath for 1 week.  Patient states she was evaluated here in this ED approximately 1 week ago upon initial onset of symptoms and received antibiotics and steroids which have provided minimal relief.  She also complains of productive cough with clear sputum and right-sided chest pain that is worse with deep inspiration.  Patient states that she had a follow-up appointment with her PCP, Dr. Corcoran, today but had an anxiety attack and had difficulty breathing.  Therefore, EMS was called and patient was brought to the emergency department for further evaluation.  Patient states that she has tried quitting smoking by using nicotine patches.  Denies fever, chills, nausea, vomiting, urinary complaints.    Overview/Hospital Course:  Patient was noted to have room air oxygen saturation of 85% on arrival.  Oxygenation improved to 95% on 2 L nasal cannula.  Patient received DuoNeb treatments in the ED. However, patient continued to be hypoxic on room air oxygen saturation but improved on supplemental oxygen.  Given history of emphysema with hypoxia, patient empirically treated with Rocephin and azithromycin in the ED. Chest x-ray shows left greater than right basilar increased interstitial attenuation suggestive of mild pulmonary edema versus atypical viral pneumonia.  No large pleural effusion.  CBC shows leukocytosis.   H&H stable.  EKG shows no evidence of ischemia.  Initial troponin negative.  BNP 67.    Hospital visit from 06/13/2020 was reviewed.  Patient had CTA which showed no evidence of pulmonary embolus.    Patient admitted for further evaluation of hypoxia.    Interval History: No acute events overnight.  States she is breathing more comfortably.  Has notably pressured speech.  Still with significant cough.    Review of Systems   Constitutional: Negative for chills and fever.   HENT: Negative for congestion, rhinorrhea and sore throat.    Eyes: Negative for visual disturbance.   Respiratory: Positive for cough and shortness of breath.    Cardiovascular: Negative for chest pain.   Gastrointestinal: Negative for abdominal pain, diarrhea, nausea and vomiting.   Genitourinary: Negative for dysuria, frequency and hematuria.   Musculoskeletal: Negative for back pain.   Skin: Negative for rash.   Neurological: Negative for dizziness, weakness and headaches.   Psychiatric/Behavioral: The patient is nervous/anxious.      Objective:     Vital Signs (Most Recent):  Temp: 97.7 °F (36.5 °C) (06/17/20 0737)  Pulse: 68 (06/17/20 0737)  Resp: 17 (06/17/20 0737)  BP: 116/78 (06/17/20 0737)  SpO2: 95 % (06/17/20 0917) Vital Signs (24h Range):  Temp:  [97 °F (36.1 °C)-98.3 °F (36.8 °C)] 97.7 °F (36.5 °C)  Pulse:  [] 68  Resp:  [17-25] 17  SpO2:  [85 %-96 %] 95 %  BP: (103-137)/(61-88) 116/78     Weight: 65.3 kg (143 lb 14.4 oz)  Body mass index is 26.32 kg/m².    Intake/Output Summary (Last 24 hours) at 6/17/2020 1124  Last data filed at 6/17/2020 0817  Gross per 24 hour   Intake 660 ml   Output --   Net 660 ml      Physical Exam  Vitals signs and nursing note reviewed.   Constitutional:       General: She is not in acute distress.     Appearance: Normal appearance. She is not ill-appearing, toxic-appearing or diaphoretic.   HENT:      Head: Normocephalic and atraumatic.   Eyes:      Conjunctiva/sclera: Conjunctivae normal.       Pupils: Pupils are equal, round, and reactive to light.   Neck:      Musculoskeletal: Normal range of motion.      Vascular: No JVD.      Trachea: No tracheal deviation.   Cardiovascular:      Rate and Rhythm: Normal rate and regular rhythm.      Heart sounds: No murmur. No friction rub.   Pulmonary:      Effort: Pulmonary effort is normal. No accessory muscle usage or respiratory distress.      Breath sounds: No wheezing.      Comments: Course breath sounds L>R, rare wheezing  Abdominal:      General: Bowel sounds are normal. There is no distension.      Palpations: Abdomen is soft.      Tenderness: There is no abdominal tenderness.   Musculoskeletal: Normal range of motion.   Skin:     Capillary Refill: Capillary refill takes less than 2 seconds.      Findings: No erythema or rash.   Neurological:      Mental Status: She is alert and oriented to person, place, and time.      Coordination: Coordination normal.   Psychiatric:      Comments: Mood:  Happy  Affect: congruent  Speech: pressured  Thought Process:  Tangential - easily redirectable  No AH/VH  No SI/HI         Significant Labs: All pertinent labs within the past 24 hours have been reviewed.    Significant Imaging: I have reviewed and interpreted all pertinent imaging results/findings within the past 24 hours.      Assessment/Plan:      * Acute hypoxemic respiratory failure  -Mrs. Mcneal was admitted to inpatient status  -Noted O2 sats in 70s at PCP office per patient.  In ER noted 88% on room air  -Noted treatment with antibiotics as outpatient for recently diagnosed pneumonia as well as ongoing tobacco usage.  This would represent a failure of outpatient antibiotics.  -Respiratory failure likely multifactorial due to pneumonia, COPD, tobacco usage, significant anxiety and questionable pulmonary edema.  -No evidence of PE on CTA a few days ago.  No evidence of ACS - all troponins negative.  -Notes recent significant swelling but none presently after  having received IV lasix.  Will d/c lasix and check echo.  -Continue antibiotics, incentive spirometry, supplemental O2 and albuterol.  Add tessalon and guaifenesin, nebulizers  -Check six minute walk test today.  -Plan ambulatory referral to pulmonology at discharge.  -Possible discharge home tomorrow.    Anxiety  -Noted history of SHIRIN and bipolar disorder  -Had panic attack at home yesterday and now appears somewhat manic.  -Continue home Valium and venlafaxine. (notes she doubled home dose recently).  -Will ask psychiatry to see and assess patient.      GERD (gastroesophageal reflux disease)  -Continue PPI      Tobacco use  -Counselled on cessation for 7 minutes.  -NRT ordered.      Hypertension, essential  -Appears well controlled  -Continue home lisinopril    Chronic, continuous use of opioids  -Patient with chronic use of opioids given multiple orthopedic injuries.  -Pain medications ordered        VTE Risk Mitigation (From admission, onward)         Ordered     enoxaparin injection 40 mg  Every 24 hours      06/17/20 0833     Place SAHIL hose  Until discontinued      06/16/20 2222     Place sequential compression device  Until discontinued      06/16/20 2222     IP VTE LOW RISK PATIENT  Once      06/16/20 2222                      Jamal Abbott MD  Department of Hospital Medicine   [facility] [place]

## 2020-06-17 NOTE — CONSULTS
[facility]  Psychiatry  Consult Note    Patient Name: Rossi Mcneal  MRN: [id number]   Code Status: Full Code  Admission Date: 6/16/2020  Hospital Length of Stay: 1 days  Attending Physician: Jamal Abbott MD  Primary Care Provider: Leobardo Corcoran MD    Current Legal Status: N/A    Patient information was obtained from patient, Chart review, nursing, and ER records.   Inpatient consult to Psychiatry  Consult performed by: Dany Crawley MD  Consult ordered by: Jamal Abbott MD        Subjective:     Principal Problem:Acute hypoxemic respiratory failure    Chief Complaint:  Mood changes     HPI: Patient Rossi Mcneal presents to the hospital with shortness of breath.  She was noted to be exhibiting some odd an elevated behavior and consult was placed psychiatric evaluation of robb and anxiety.  She is easily engaged in conversation.  She tells me that she has a long history of bipolar disorder which was diagnosed in her 20s.  She has hospitalized herself in a few psychiatric hospitals over time.  She says that most of her hospitalizations were to get off of prescription pills which she was overusing.  Has not had a manic episode for many years and her last hospitalization was many years ago in [facility].  She came to the hospital a few days ago and was given prednisone.  She took it for few days and started to have trouble with breathing again so presented to the hospital.  She has been having trouble sleeping for the past couple of weeks an elevated anxious behavior the past few days.  When she felt herself starting to get more anxious, she doubled up on her dose of venlafaxine and started taking for them daily.  She says that at 1 point in time, she was taking for venlafaxine 75 mg daily.  She denies any depression or sadness.  Admits to being an anxious person who worries a lot about things that she should not worry about.  Her manic episodes are described as elevated  behavior for days at a time with little sleep.  No history of psychosis.  No psychiatric treatment for a few years.  Medications prescribed by PCP.  Has been on many medications in the past but does not recall any of their names.  Most notably recalls being on high doses of Xanax 2 mg, Valium 10 mg 3-4 times daily, multiple pain medicines, and multiple muscle relaxers on a daily basis.  Also was considered alcoholic in her 20s.  In rehab, she was able to minimize or get off of these medicines.  Currently taking venlafaxine 75 mg twice daily and diazepam 10 mg twice daily.  No past or present history of suicidal ideations or behavior, no access to weapons.    Hospital Course: No notes on file         Patient History           Medical as of 6/17/2020     Past Medical History     Diagnosis Date Comments Source    Addiction to drug -- History of prescription pill addiction Provider    Bipolar 1 disorder -- -- Provider    Emphysema -- -- Provider    Hepatitis C -- -- Patient    History of psychiatric hospitalization -- -- Provider    Hx of psychiatric care -- -- Provider    Hypertension -- -- Provider    Neuromuscular disorder -- -- Provider    Psychiatric problem -- -- Provider    Therapy -- -- Provider          Pertinent Negatives     Diagnosis Date Noted Comments Source    Suicide attempt 06/17/2020 -- Provider                  Surgical as of 6/17/2020     Past Surgical History     Procedure Laterality Date Comments Source    PARTIAL HYSTERECTOMY -- 1995 -- Provider    NECK SURGERY -- 1990 -- Provider    HYSTERECTOMY -- -- -- Provider    ESOPHAGOGASTRODUODENOSCOPY N/A 9/19/2019 Procedure: ESOPHAGOGASTRODUODENOSCOPY (EGD);  Surgeon: Jose Luis Hyman MD;  Location: [facility];  Service: Endoscopy;  Laterality: N/A; Provider                  Family as of 6/17/2020     Problem Relation Name Age of Onset Comments Source    Hypertension Mother -- -- -- Provider    Cancer Father -- -- -- Provider    Parkinsonism Father --  -- -- Provider    Breast cancer Maternal Aunt -- -- -- Provider            Tobacco Use as of 6/17/2020     Smoking Status Smoking Start Date Smoking Quit Date Packs/Day Years Used    Current Every Day Smoker -- -- 0.50 18.00    Types Comments Smokeless Tobacco Status Smokeless Tobacco Quit Date Source     Cigarettes -- Never Used -- Provider            Alcohol Use as of 6/17/2020     Alcohol Use Drinks/Week Alcohol/Week Comments Source    No   -- -- Provider    Frequency Typical Drinks Binge Drinking        Never -- --              Drug Use as of 6/17/2020     Drug Use Types Frequency Comments Source    No -- -- History of prescription pill addiction, mostly benzos/opioids/muscle relaxers Provider            Sexual Activity as of 6/17/2020     Sexually Active Birth Control Partners Comments Source    Not Asked -- -- -- Provider            Activities of Daily Living as of 6/17/2020     Activities of Daily Living Question Response Comments Source    Patient feels they ought to cut down on drinking/drug use Not Asked -- Provider    Patient annoyed by others criticizing their drinking/drug use Not Asked -- Provider    Patient has felt bad or guilty about drinking/drug use Not Asked -- Provider    Patient has had a drink/used drugs as an eye opener in the AM Not Asked -- Provider            Social Documentation as of 6/17/2020    None           Occupational as of 6/17/2020    None           Socioeconomic as of 6/17/2020     Marital Status Spouse Name Number of Children Years Education Education Level Preferred Language Ethnicity Race Source     -- 2 -- -- English /White White Provider    Financial Resource Strain Food Insecurity: Worry Food Insecurity: Inability Transportation Needs: Medical Transportation Needs: Non-medical    -- -- -- -- --            Pertinent History     Question Response Comments    Lives with alone --    Place in Birth Order -- --    Lives in home --    Number of Siblings -- --     Raised by -- Grew up in [place]    Legal Involvement -- --    Childhood Trauma -- --    Criminal History of -- --    Financial Status unemployed --    Highest Level of Education high school graduation --    Does patient have access to a firearm? No --     Service -- --    Primary Leisure Activity -- --    Spirituality actively participates in organized Denominational --        Past Medical History:   Diagnosis Date    Addiction to drug     History of prescription pill addiction    Bipolar 1 disorder     Emphysema     Hepatitis C     History of psychiatric hospitalization     Hx of psychiatric care     Hypertension     Neuromuscular disorder     Psychiatric problem     Therapy      Past Surgical History:   Procedure Laterality Date    ESOPHAGOGASTRODUODENOSCOPY N/A 9/19/2019    Procedure: ESOPHAGOGASTRODUODENOSCOPY (EGD);  Surgeon: Jose Luis Hyman MD;  Location: [facility];  Service: Endoscopy;  Laterality: N/A;    HYSTERECTOMY      NECK SURGERY  1990    PARTIAL HYSTERECTOMY  1995     Family History     Problem Relation (Age of Onset)    Breast cancer Maternal Aunt    Cancer Father    Hypertension Mother    Parkinsonism Father        Tobacco Use    Smoking status: Current Every Day Smoker     Packs/day: 0.50     Years: 18.00     Pack years: 9.00     Types: Cigarettes    Smokeless tobacco: Never Used   Substance and Sexual Activity    Alcohol use: No     Frequency: Never    Drug use: No     Comment: History of prescription pill addiction, mostly benzos/opioids/muscle relaxers    Sexual activity: Not on file     Review of patient's allergies indicates:  No Known Allergies    No current facility-administered medications on file prior to encounter.      Current Outpatient Medications on File Prior to Encounter   Medication Sig    albuterol (VENTOLIN HFA) 90 mcg/actuation inhaler inhale TWO puffs into the lungs EVERY 6 HOURS AS NEEDED FOR WHEEZING    azelastine (ASTELIN) 137 mcg (0.1 %) nasal spray  1 spray (137 mcg total) by Nasal route 2 (two) times daily.    azithromycin (ZITHROMAX Z-GEORGE) 250 MG tablet Take 2 tablets (500 mg total) by mouth once daily for 1 day, THEN 1 tablet (250 mg total) once daily for 4 days.    diazePAM (VALIUM) 10 MG Tab TAKE ONE TABLET BY MOUTH TWICE DAILY AS NEEDED    fluticasone propionate (FLONASE) 50 mcg/actuation nasal spray 2 sprays (100 mcg total) by Each Nare route once daily.    gabapentin (NEURONTIN) 300 MG capsule     lisinopril 10 MG tablet TAKE ONE TABLET BY MOUTH DAILY    nicotine (NICODERM CQ) 21 mg/24 hr PLACE ONE PATCH ONTO SKIN EVERY DAY    nicotine (NICODERM CQ) 21 mg/24 hr place ONE PATCH onto the skin EVERY DAY    oxyCODONE (ROXICODONE) 10 mg Tab immediate release tablet Take 1 tablet (10 mg total) by mouth every 6 (six) hours as needed.    oxyCODONE (ROXICODONE) 5 MG immediate release tablet Take 1 tablet (5 mg total) by mouth every 6 (six) hours as needed for Pain.    pantoprazole (PROTONIX) 40 MG tablet Take 1 tablet (40 mg total) by mouth once daily.    predniSONE (DELTASONE) 50 MG Tab Take 1 tablet (50 mg total) by mouth once daily. for 4 days    venlafaxine (EFFEXOR) 75 MG tablet Take 1 tablet (75 mg total) by mouth 2 (two) times daily.    venlafaxine (EFFEXOR) 75 MG tablet TAKE ONE TABLET BY MOUTH TWICE DAILY     Psychotherapeutics (From admission, onward)    Start     Stop Route Frequency Ordered    06/16/20 2315  venlafaxine tablet 75 mg      -- Oral 2 times daily 06/16/20 2211 06/16/20 2310  diazePAM tablet 10 mg      -- Oral 2 times daily PRN 06/16/20 2211        Review of Systems   Constitutional: Positive for activity change and fatigue.   Respiratory: Positive for shortness of breath.    Cardiovascular: Negative for chest pain.   Gastrointestinal: Negative for nausea.   Musculoskeletal: Positive for myalgias.   Psychiatric/Behavioral: Positive for sleep disturbance. Negative for decreased concentration and dysphoric mood. The patient  "is nervous/anxious.      Strengths and Liabilities: Strength: Patient is motivated for change., Liability: Patient has poor health., Liability: Patient lacks coping skills.    Objective:     Vital Signs (Most Recent):  Temp: 97.1 °F (36.2 °C) (06/17/20 1124)  Pulse: 98 (06/17/20 1124)  Resp: 18 (06/17/20 1124)  BP: (!) 133/90 (06/17/20 1124)  SpO2: (!) 93 % (06/17/20 1124) Vital Signs (24h Range):  Temp:  [97.1 °F (36.2 °C)-98.3 °F (36.8 °C)] 97.1 °F (36.2 °C)  Pulse:  [] 98  Resp:  [17-22] 18  SpO2:  [88 %-96 %] 93 %  BP: (103-137)/(61-90) 133/90     Height: 5' 2" (157.5 cm)  Weight: 65.3 kg (143 lb 14.4 oz)  Body mass index is 26.32 kg/m².      Intake/Output Summary (Last 24 hours) at 6/17/2020 1615  Last data filed at 6/17/2020 1224  Gross per 24 hour   Intake 780 ml   Output --   Net 780 ml       Physical Exam  Psychiatric:      Comments: EXAMINATION    CONSTITUTIONAL  General Appearance:  Hospital attire    MUSCULOSKELETAL  Muscle Strength and Tone:  Normal  Abnormal Involuntary Movements:  None noted  Gait and Station:  Not observed    PSYCHIATRIC MENTAL STATUS EXAM   Level of Consciousness:  Awake and alert  Orientation:  Name, place, date, situation  Grooming:  Limited to hospital setting  Psychomotor Behavior:  Restless and fidgety  Speech:  Pressured but normal volume  Language:  No abnormality  Mood:  Good  Affect:  Labile and elevated  Thought Process:  Tangential  Associations:  Intact with some looseness  Thought Content:  Denies suicidal/homicidal/psychosis  Memory:  Intact to recent and remote  Attention:  Distracted  Fund of Knowledge:  Appropriate for conversation  Insight:  Fair into mental illness  Judgment:  Limited into outpatient care            Significant Labs:   Last 24 Hours:   Recent Lab Results       06/17/20  1416   06/17/20  1117   06/17/20  0429   06/17/20  0040   06/16/20  2112        Benzodiazepines   Presumptive Positive           Methadone metabolites   Negative           " Phencyclidine   Negative           Procalcitonin       0.02  Comment:  A concentration < 0.25 ng/mL represents a low risk bacterial   infection.  Procalcitonin may not be accurate among patients with localized   infection, recent trauma or major surgery, immunosuppressed state,   invasive fungal infection, renal dysfunction. Decisions regarding   initiation or continuation of antibiotic therapy should not be based   solely on procalcitonin levels.         Albumin               Alkaline Phosphatase               Allens Test               ALT               Amphetamine Screen, Ur   Negative           Anion Gap     10         Ao root annulus 2.82             Ascending aorta 2.87             Ao peak isaac 1.77             Ao VTI 30.38             Appearance, UA               AST               AV valve area 3.38             AORTIC VALVE CUSP SEPERATION 2.03             AV mean gradient 8             AV index (prosthetic) 0.83             AV peak gradient 13             AV Velocity Ratio 0.75             Bacteria, UA               Barbiturate Screen, Ur   Negative           Baso #     0.01         Basophil%     0.1         Bilirubin (UA)               BILIRUBIN TOTAL               Blood Culture, Routine               BNP               Site               BSA 1.69             BUN, Bld     21         Calcium     8.4         Chloride     103         CO2     26         Cocaine (Metab.)   Negative           Color, UA               Creatinine     0.9         Creatinine, Random Ur   29.0  Comment:  The random urine reference ranges provided were established   for 24 hour urine collections.  No reference ranges exist for  random urine specimens.  Correlate clinically.             Left Ventricle Relative Wall Thickness 0.36             DelSys               Differential Method     Automated         E/A ratio 1.10             E/E' ratio 9.05             eGFR if      >60         eGFR if non       >60  Comment:  Calculation used to obtain the estimated glomerular filtration  rate (eGFR) is the CKD-EPI equation.            Eos #     0.0         Eosinophil%     0.0         E wave decelartion time 157.60             Flow               FS 47             Glucose     92         Glucose, UA               Gran # (ANC)     9.5         Gran%     87.5         Granular Casts, UA               Hematocrit     35.4         Hemoglobin     11.2         Hyaline Casts, UA               Immature Grans (Abs)     0.04  Comment:  Mild elevation in immature granulocytes is non specific and   can be seen in a variety of conditions including stress response,   acute inflammation, trauma and pregnancy. Correlation with other   laboratory and clinical findings is essential.           Immature Granulocytes     0.4         IVRT 79.58             IVS 0.93             Ketones, UA               LA WIDTH 4.28             Lactate, Familia               Left Atrium Major Axis 3.80             Left Atrium Minor Axis 4.70             LA size 3.77             LA volume 57.64             LA Volume Index 34.7             LVOT area 4.1             Leukocytes, UA               LV LATERAL E/E' RATIO 7.31             LV SEPTAL E/E' RATIO 11.88             LV Diastolic Volume 90.64             LV Diastolic Volume Index 54.54             LVIDD 4.46             LVIDS 2.35             LV mass 124.12             LV Mass Index 75             LVOT diameter 2.28             LVOT peak alex 1.33             LVOT stroke volume 102.71             LVOT peak VTI 25.17             LV Systolic Volume 19.20             LV Systolic Volume Index 11.6             Lymph #     0.9         Lymph%     8.0         MCH     26.5         MCHC     31.6         MCV     84         Mean e' 0.11             Microscopic Comment               Mode               Mono #     0.4         Mono%     4.0         MPV     9.9         MV valve area p 1/2 method 4.00             MV Peak A Alex 0.86              MV Peak E Alex 0.95             MV stenosis pressure 1/2 time 55.02             NITRITE UA               Non-Squam Epith               nRBC     0         Occult Blood UA               Opiate Scrn, Ur   Presumptive Positive           pH, UA               Platelets     373         POC BE               POC HCO3               POC PCO2               POC PH               POC PO2               POC SATURATED O2               POC TCO2               Potassium     3.9         PROTEIN TOTAL               Protein, UA               PV PEAK VELOCITY 1.27             PW 0.80             Right Atrial Pressure (from IVC) 8             RA Major Axis 4.07             RA Width 3.32             RBC     4.22         RBC, UA               RDW     17.2         RV S' 14.68             RVDD 2.81             Sample               SARS-CoV-2 RNA, Amplification, Qual         Negative  Comment:  This test utilizes isothermal nucleic acid amplification   technology to detect the SARS-CoV-2 RdRp nucleic acid segment.   The analytical sensitivity (limit of detection) is 125 genome   equivalents/mL.   A POSITIVE result implies infection with the SARS-CoV-2 virus;  the patient is presumed to be contagious.    A NEGATIVE result means that SARS-CoV-2 nucleic acids are not  present above the limit of detection. A NEGATIVE result should be   treated as presumptive. It does not rule out the possibility of   COVID-19 and should not be the sole basis for treatment decisions.   If COVID-19 is strongly suspected based on clinical and exposure   history, re-testing using an alternate molecular assay should be   considered.   This test is only for use under the Food and Drug   Administration s Emergency Use Authorization (EUA).   Commercial kits are provided by [employer].   Performance characteristics of the EUA have been independently  verified by [facility] Department of  Pathology and Laboratory Medicine.    _________________________________________________________________  The ID NOW COVID-19 Letter of Authorization, along with the   authorized Fact Sheet for Healthcare Providers, the authorized Fact  Sheet for Patients, and authorized labeling are available on the FDA   website:  www.fda.gov/MedicalDevices/Safety/EmergencySituations/kuc750074.htm       Sinus 2.64             Sodium     139         Sp02               Specific [place], UA               Specimen UA               Squam Epithel, UA               STJ 2.42             TAPSE 2.31             TDI SEPTAL 0.08             TDI LATERAL 0.13             Marijuana (THC) Metabolite   Negative           Toxicology Information   SEE COMMENT  Comment:  This screen includes the following classes of drugs at the   listed cut-off:  Benzodiazepines                  200 ng/ml  Methadone                        300 ng/ml  Cocaine metabolite               300 ng/ml  Opiates                          300 ng/ml  Barbiturates                     200 ng/ml  Amphetamines                    1000 ng/ml  Marijuana metabs (THC)            50 ng/ml  Phencyclidine (PCP)               25 ng/ml  High concentrations of Diphenhydramine may cross-react with  Phencyclidine PCP screening immunoassay giving a false   positive result.  High concentrations of Methylenedioxymethamphetamine (MDMA aka  Ectasy) and other structurally similar compounds may cross-   react with the Amphetamine/Methamphetamine screening   immunoassay giving a false positive result.  A metabolite of the anti-HIV drug Sustiva () may cause  false positive results in the Marijuana metabolite (THC)   screening assay.  Note: This exception list includes only more common   interferants in toxicology screen testing.  Because of many   cross-reactantspositive results on toxicology drug screens   should be confirmed whenever results do not correlate with   clinical presentation.  This report is intended for use in clinical  monitoring and  management of patients. It is not intended for use in   employment related drug testing.  Because of any cross-reactants, positive results on toxicology  drug screens should be confirmed whenever results do not  correlate with clinical presentation.  Presumptive positive results are unconfirmed and may be used   only for medical purposes.  Assay Intended Use: This assay provides only a preliminary analytical  test result. A more specific alternate chemical method must be used  to obtain a confirmed analytical result. Gas chromatography/mass  spectrometry (GS/MS)is the preferred confirmatory method. Clinical  consideration and professional judgement should be applied to any   drug of abuse test result, particularly when preliminary results  are used.             Triscuspid Valve Regurgitation Peak Gradient 31             TR Max Alex 2.77             Troponin I     <0.006  Comment:  The reference interval for Troponin I represents the 99th percentile   cutoff   for our facility and is consistent with 3rd generation assay   performance.   <0.006  Comment:  The reference interval for Troponin I represents the 99th percentile   cutoff   for our facility and is consistent with 3rd generation assay   performance.         TV rest pulmonary artery pressure 39             UROBILINOGEN UA               WBC, UA               WBC     10.81                          06/16/20  2033   06/16/20  2025   06/16/20  1845   06/16/20  1710   06/16/20  1708        Benzodiazepines               Methadone metabolites               Phencyclidine               Procalcitonin               Albumin       3.6       Alkaline Phosphatase       78       Allens Test         N/A     ALT       36       Amphetamine Screen, Ur               Anion Gap       8       Ao root annulus               Ascending aorta               Ao peak alex               Ao VTI               Appearance, UA     Clear         AST       53       AV valve area                AORTIC VALVE CUSP SEPERATION               AV mean gradient               AV index (prosthetic)               AV peak gradient               AV Velocity Ratio               Bacteria, UA     Occasional         Barbiturate Screen, Ur               Baso #       0.01       Basophil%       0.1       Bilirubin (UA)     Negative         BILIRUBIN TOTAL       0.3  Comment:  For infants and newborns, interpretation of results should be based  on gestational age, weight and in agreement with clinical  observations.  Premature Infant recommended reference ranges:  Up to 24 hours.............<8.0 mg/dL  Up to 48 hours............<12.0 mg/dL  3-5 days..................<15.0 mg/dL  6-29 days.................<15.0 mg/dL         Blood Culture, Routine No Growth to date[P] No Growth to date[P]           BNP       67  Comment:  Values of less than 100 pg/ml are consistent with non-CHF populations.       Site         Other     BSA               BUN, Bld       26       Calcium       8.3       Chloride       109       CO2       23       Cocaine (Metab.)               Color, UA     Yellow         Creatinine       1.2       Creatinine, Random Ur               Left Ventricle Relative Wall Thickness               DelSys         Nasal Can     Differential Method       Automated       E/A ratio               E/E' ratio               eGFR if        54       eGFR if non        47  Comment:  Calculation used to obtain the estimated glomerular filtration  rate (eGFR) is the CKD-EPI equation.          Eos #       0.0       Eosinophil%       0.1       E wave decelartion time               Flow         3     FS               Glucose       123       Glucose, UA     Negative         Gran # (ANC)       10.9       Gran%       89.6       Granular Casts, UA     1         Hematocrit       34.9       Hemoglobin       11.1       Hyaline Casts, UA     4         Immature Grans (Abs)       0.07  Comment:  Mild elevation in  immature granulocytes is non specific and   can be seen in a variety of conditions including stress response,   acute inflammation, trauma and pregnancy. Correlation with other   laboratory and clinical findings is essential.         Immature Granulocytes       0.6       IVRT               IVS               Ketones, UA     Negative         LA WIDTH               Lactate, Familia   1.0  Comment:  Falsely low lactic acid results can be found in samples   containing >=13.0 mg/dL total bilirubin and/or >=3.5 mg/dL   direct bilirubin.             Left Atrium Major Axis               Left Atrium Minor Axis               LA size               LA volume               LA Volume Index               LVOT area               Leukocytes, UA     Negative         LV LATERAL E/E' RATIO               LV SEPTAL E/E' RATIO               LV Diastolic Volume               LV Diastolic Volume Index               LVIDD               LVIDS               LV mass               LV Mass Index               LVOT diameter               LVOT peak alex               LVOT stroke volume               LVOT peak VTI               LV Systolic Volume               LV Systolic Volume Index               Lymph #       0.8       Lymph%       6.6       MCH       26.7       MCHC       31.8       MCV       84       Mean e'               Microscopic Comment     SEE COMMENT  Comment:  Other formed elements not mentioned in the report are not   present in the microscopic examination.            Mode         SPONT     Mono #       0.4       Mono%       3.0       MPV       9.4       MV valve area p 1/2 method               MV Peak A Alex               MV Peak E Alex               MV stenosis pressure 1/2 time               NITRITE UA     Negative         Non-Squam Epith     1         nRBC       0       Occult Blood UA     Negative         Opiate Scrn, Ur               pH, UA     5.0         Platelets       327       POC BE         -3     POC HCO3         22.1     POC PCO2          40.8     POC PH         7.341     POC PO2         54     POC SATURATED O2         86     POC TCO2         23     Potassium       4.0       PROTEIN TOTAL       7.1       Protein, UA     2+  Comment:  Recommend a 24 hour urine protein or a urine   protein/creatinine ratio if globulin induced proteinuria is  clinically suspected.           PV PEAK VELOCITY               PW               Right Atrial Pressure (from IVC)               RA Major Axis               RA Width               RBC       4.16       RBC, UA     2         RDW       17.2       RV S'               RVDD               Sample         VENOUS     SARS-CoV-2 RNA, Amplification, Qual               Sinus               Sodium       140       Sp02         90     Specific [place], UA     1.025         Specimen UA     Urine, Clean Catch         Squam Epithel, UA     3         STJ               TAPSE               TDI SEPTAL               TDI LATERAL               Marijuana (THC) Metabolite               Toxicology Information               Triscuspid Valve Regurgitation Peak Gradient               TR Max Alex               Troponin I       <0.006  Comment:  The reference interval for Troponin I represents the 99th percentile   cutoff   for our facility and is consistent with 3rd generation assay   performance.         TV rest pulmonary artery pressure               UROBILINOGEN UA     Negative         WBC, UA     3         WBC       12.16                          All pertinent labs within the past 24 hours have been reviewed.    Significant Imaging: I have reviewed all pertinent imaging results/findings within the past 24 hours.    Assessment/Plan:     Substance induced mood disorder  Patient likely has a substance induced manic state from steroid use and increase in venlafaxine dose.  She also likely has an underlying bipolar disorder which is not being properly treated.  Does not need acute inpatient psychiatric hospitalization at this time.  The  increase of venlafaxine only compounded the problem and made her manic state more prominent.  Decrease the venlafaxine to 75 mg morning only for the next 3-4 days, then can increase back to twice daily.  Start oxcarbazepine 150 mg p.o. b.i.d. as a mood stabilizing agent (can be increased to 300 mg twice daily in 1 week).  I would prefer Depakote as a mood stabilizing agent but is likely not a good choice of medicines given her liver problems.  I would also prefer lithium but again not a good choice of medications given her potential thyroid problems.  Recommend thyroid studies for full evaluation of this manic state to rule out any hyperthyroidism.  Given her long history of benzodiazepine use in heavy doses, and previous history of alcohol addiction, not a good candidate for long-term benzodiazepine use.  She is at risk of dementia.  Diazepam not a good option given her liver function.  Needs to get established with outpatient psychiatric care for best medication manage.  Given her the names of [facility] and [facility] to calling get set up with their IOP programs where she can have psychiatric care and monitoring.  They also provide transportation for some insurances.         Total Time:  60 minutes      Dany Crawley MD   Psychiatry  [facility]

## 2020-06-17 NOTE — PROGRESS NOTES
MEWS Monitoring: MEWS 3, Chart check completed, abnormal VS noted, bedside RNJennifer spoken with during active rounding, no concerns verbalized at this time, instructed to call [phone] for further concerns or assistance.

## 2020-06-17 NOTE — H&P
[facility] Medicine  History & Physical    Patient Name: Rossi Mcneal  MRN: [id number]  Admission Date: 6/16/2020  Attending Physician: Johnny Stoddard MD   Primary Care Provider: Leobardo Corcoran MD         Patient information was obtained from patient and ER records.     Subjective:     Principal Problem:Hypoxia    Chief Complaint:   Chief Complaint   Patient presents with    Anxiety     pt. arrived to the ED via EMS. pt. was at PCP for a f/u, dx pneumonia. pt. states she double up on her effexor for the past 2 days and is feeling anxious. pt. is noted to be friendly and very talkative.         HPI: Rossi Mcneal is a 64 y.o. female with PMHx of emphysema, hypertension, anxiety chronic pain, GERD, hypertension and uterine cancer status post partial hysterectomy who presents to the hospital today complaining of gradually worsening shortness of breath for 1 week.  Patient states she was evaluated here in this ED approximately 1 week ago upon initial onset of symptoms and received antibiotics and steroids which have provided minimal relief.  She also complains of productive cough with clear sputum and right-sided chest pain that is worse with deep inspiration.  Patient states that she had a follow-up appointment with her PCP, Dr. Corcoran, today but had an anxiety attack and had difficulty breathing.  Therefore, EMS was called and patient was brought to the emergency department for further evaluation.  Patient states that she has tried quitting smoking by using nicotine patches.  Denies fever, chills, nausea, vomiting, urinary complaints.    Past Medical History:   Diagnosis Date    Bipolar 1 disorder     Emphysema     Hepatitis C     Hypertension     Neuromuscular disorder        Past Surgical History:   Procedure Laterality Date    ESOPHAGOGASTRODUODENOSCOPY N/A 9/19/2019    Procedure: ESOPHAGOGASTRODUODENOSCOPY (EGD);  Surgeon: Jose Luis Hyman MD;  Location: [facility];  Service:  Endoscopy;  Laterality: N/A;    HYSTERECTOMY      NECK SURGERY  1990    PARTIAL HYSTERECTOMY  1995       Review of patient's allergies indicates:  No Known Allergies    No current facility-administered medications on file prior to encounter.      Current Outpatient Medications on File Prior to Encounter   Medication Sig    albuterol (VENTOLIN HFA) 90 mcg/actuation inhaler inhale TWO puffs into the lungs EVERY 6 HOURS AS NEEDED FOR WHEEZING    azelastine (ASTELIN) 137 mcg (0.1 %) nasal spray 1 spray (137 mcg total) by Nasal route 2 (two) times daily.    azithromycin (ZITHROMAX Z-GEORGE) 250 MG tablet Take 2 tablets (500 mg total) by mouth once daily for 1 day, THEN 1 tablet (250 mg total) once daily for 4 days.    diazePAM (VALIUM) 10 MG Tab TAKE ONE TABLET BY MOUTH TWICE DAILY AS NEEDED    fluticasone propionate (FLONASE) 50 mcg/actuation nasal spray 2 sprays (100 mcg total) by Each Nare route once daily.    gabapentin (NEURONTIN) 300 MG capsule     lisinopril 10 MG tablet TAKE ONE TABLET BY MOUTH DAILY    nicotine (NICODERM CQ) 21 mg/24 hr PLACE ONE PATCH ONTO SKIN EVERY DAY    nicotine (NICODERM CQ) 21 mg/24 hr place ONE PATCH onto the skin EVERY DAY    oxyCODONE (ROXICODONE) 10 mg Tab immediate release tablet Take 1 tablet (10 mg total) by mouth every 6 (six) hours as needed.    oxyCODONE (ROXICODONE) 5 MG immediate release tablet Take 1 tablet (5 mg total) by mouth every 6 (six) hours as needed for Pain.    pantoprazole (PROTONIX) 40 MG tablet Take 1 tablet (40 mg total) by mouth once daily.    predniSONE (DELTASONE) 50 MG Tab Take 1 tablet (50 mg total) by mouth once daily. for 4 days    venlafaxine (EFFEXOR) 75 MG tablet Take 1 tablet (75 mg total) by mouth 2 (two) times daily.    venlafaxine (EFFEXOR) 75 MG tablet TAKE ONE TABLET BY MOUTH TWICE DAILY     Family History     Problem Relation (Age of Onset)    Breast cancer Maternal Aunt    Cancer Father    Hypertension Mother    Parkinsonism  Father        Tobacco Use    Smoking status: Current Every Day Smoker     Packs/day: 0.50     Years: 18.00     Pack years: 9.00     Types: Cigarettes    Smokeless tobacco: Never Used   Substance and Sexual Activity    Alcohol use: No     Frequency: Never    Drug use: No    Sexual activity: Not on file     Review of Systems   Constitutional: Negative for chills and fever.   HENT: Negative for congestion, rhinorrhea and sore throat.    Eyes: Negative for visual disturbance.   Respiratory: Positive for cough and shortness of breath.    Cardiovascular: Positive for chest pain.   Gastrointestinal: Negative for abdominal pain, diarrhea, nausea and vomiting.   Genitourinary: Negative for dysuria, frequency and hematuria.   Musculoskeletal: Negative for back pain.   Skin: Negative for rash.   Neurological: Negative for dizziness, weakness and headaches.   Psychiatric/Behavioral: The patient is nervous/anxious.      Objective:     Vital Signs (Most Recent):  Temp: 98.2 °F (36.8 °C) (06/16/20 2227)  Pulse: 85 (06/16/20 2202)  Resp: 20 (06/16/20 2003)  BP: 129/76 (06/16/20 2202)  SpO2: (!) 92 % (06/16/20 2202) Vital Signs (24h Range):  Temp:  [97 °F (36.1 °C)-98.3 °F (36.8 °C)] 98.2 °F (36.8 °C)  Pulse:  [] 85  Resp:  [19-25] 20  SpO2:  [85 %-94 %] 92 %  BP: (103-137)/(61-88) 129/76     Weight: 66.7 kg (147 lb)  Body mass index is 26.89 kg/m².    Physical Exam  Vitals signs and nursing note reviewed.   Constitutional:       General: She is not in acute distress.     Appearance: Normal appearance. She is not ill-appearing, toxic-appearing or diaphoretic.   HENT:      Head: Normocephalic and atraumatic.   Eyes:      Conjunctiva/sclera: Conjunctivae normal.      Pupils: Pupils are equal, round, and reactive to light.   Neck:      Musculoskeletal: Normal range of motion.      Vascular: No JVD.      Trachea: No tracheal deviation.   Cardiovascular:      Rate and Rhythm: Normal rate and regular rhythm.      Heart sounds:  No murmur. No friction rub.   Pulmonary:      Effort: Pulmonary effort is normal. No accessory muscle usage or respiratory distress.      Breath sounds: No wheezing.      Comments: There are diffuse coarse breath sounds.  No wheezing.  Abdominal:      General: Bowel sounds are normal. There is no distension.      Palpations: Abdomen is soft.      Tenderness: There is no abdominal tenderness.   Musculoskeletal: Normal range of motion.   Skin:     Capillary Refill: Capillary refill takes less than 2 seconds.      Findings: No erythema or rash.   Neurological:      Mental Status: She is alert and oriented to person, place, and time.      Coordination: Coordination normal.   Psychiatric:         Behavior: Behavior normal.           CRANIAL NERVES     CN III, IV, VI   Pupils are equal, round, and reactive to light.       Significant Labs:   ABGs:   Recent Labs   Lab 06/16/20  1708   PH 7.341*   PCO2 40.8   HCO3 22.1*   POCSATURATED 86*   BE -3     CBC:   Recent Labs   Lab 06/16/20  1710   WBC 12.16   HGB 11.1*   HCT 34.9*        CMP:   Recent Labs   Lab 06/16/20  1710      K 4.0      CO2 23   *   BUN 26*   CREATININE 1.2   CALCIUM 8.3*   PROT 7.1   ALBUMIN 3.6   BILITOT 0.3   ALKPHOS 78   AST 53*   ALT 36   ANIONGAP 8   EGFRNONAA 47*     Cardiac Markers:   Recent Labs   Lab 06/16/20  1710   BNP 67     Troponin:   Recent Labs   Lab 06/16/20  1710   TROPONINI <0.006     Urine Studies:   Recent Labs   Lab 06/16/20  1845   COLORU Yellow   APPEARANCEUA Clear   PHUR 5.0   SPECGRAV 1.025   PROTEINUA 2+*   GLUCUA Negative   KETONESU Negative   BILIRUBINUA Negative   OCCULTUA Negative   NITRITE Negative   UROBILINOGEN Negative   LEUKOCYTESUR Negative   RBCUA 2   WBCUA 3   BACTERIA Occasional   SQUAMEPITHEL 3   HYALINECASTS 4*       Significant Imaging: I have reviewed all pertinent imaging results/findings within the past 24 hours.    Assessment/Plan:     * Hypoxia  Room air oxygen saturation 88% after  DuoNebs but improved to 95% on 2 L nasal cannula.  CTA from previous evaluation shows no evidence of PE.  Unsure of etiology of hypoxia.  May be secondary to worsening emphysema versus infectious etiology given Chest x-ray.  Supplemental oxygen at home if patient remains stable?  - continue IV antibiotics.  - procalcitonin pending  - incentive spirometry  - supplemental oxygen  - will trend troponins given history of chest pain    GERD (gastroesophageal reflux disease)  Continue PPI      Anxiety  Continue home Valium      Tobacco use  Encouraged smoking cessation with the patient.  Nicotine patch is ordered      Hypertension, essential  Appears well controlled  Continue home medicines    Chronic, continuous use of opioids  Patient with chronic use of opioids given multiple orthopedic injuries.  Pain medications ordered        VTE Risk Mitigation (From admission, onward)         Ordered     Place SAHIL hose  Until discontinued      06/16/20 2222     Place sequential compression device  Until discontinued      06/16/20 2222     IP VTE LOW RISK PATIENT  Once      06/16/20 2222                   Andres Espino PA-C  Department of Hospital Medicine   [facility] [place]

## 2020-06-17 NOTE — SUBJECTIVE & OBJECTIVE
Patient History           Medical as of 6/17/2020     Past Medical History     Diagnosis Date Comments Source    Addiction to drug -- History of prescription pill addiction Provider    Bipolar 1 disorder -- -- Provider    Emphysema -- -- Provider    Hepatitis C -- -- Patient    History of psychiatric hospitalization -- -- Provider    Hx of psychiatric care -- -- Provider    Hypertension -- -- Provider    Neuromuscular disorder -- -- Provider    Psychiatric problem -- -- Provider    Therapy -- -- Provider          Pertinent Negatives     Diagnosis Date Noted Comments Source    Suicide attempt 06/17/2020 -- Provider                  Surgical as of 6/17/2020     Past Surgical History     Procedure Laterality Date Comments Source    PARTIAL HYSTERECTOMY -- 1995 -- Provider    NECK SURGERY -- 1990 -- Provider    HYSTERECTOMY -- -- -- Provider    ESOPHAGOGASTRODUODENOSCOPY N/A 9/19/2019 Procedure: ESOPHAGOGASTRODUODENOSCOPY (EGD);  Surgeon: Jose Luis Hyman MD;  Location: [facility];  Service: Endoscopy;  Laterality: N/A; Provider                  Family as of 6/17/2020     Problem Relation Name Age of Onset Comments Source    Hypertension Mother -- -- -- Provider    Cancer Father -- -- -- Provider    Parkinsonism Father -- -- -- Provider    Breast cancer Maternal Aunt -- -- -- Provider            Tobacco Use as of 6/17/2020     Smoking Status Smoking Start Date Smoking Quit Date Packs/Day Years Used    Current Every Day Smoker -- -- 0.50 18.00    Types Comments Smokeless Tobacco Status Smokeless Tobacco Quit Date Source     Cigarettes -- Never Used -- Provider            Alcohol Use as of 6/17/2020     Alcohol Use Drinks/Week Alcohol/Week Comments Source    No   -- -- Provider    Frequency Typical Drinks Binge Drinking        Never -- --              Drug Use as of 6/17/2020     Drug Use Types Frequency Comments Source    No -- -- History of prescription pill addiction, mostly benzos/opioids/muscle relaxers Provider             Sexual Activity as of 6/17/2020     Sexually Active Birth Control Partners Comments Source    Not Asked -- -- -- Provider            Activities of Daily Living as of 6/17/2020     Activities of Daily Living Question Response Comments Source    Patient feels they ought to cut down on drinking/drug use Not Asked -- Provider    Patient annoyed by others criticizing their drinking/drug use Not Asked -- Provider    Patient has felt bad or guilty about drinking/drug use Not Asked -- Provider    Patient has had a drink/used drugs as an eye opener in the AM Not Asked -- Provider            Social Documentation as of 6/17/2020    None           Occupational as of 6/17/2020    None           Socioeconomic as of 6/17/2020     Marital Status Spouse Name Number of Children Years Education Education Level Preferred Language Ethnicity Race Source     -- 2 -- -- English /White White Provider    Financial Resource Strain Food Insecurity: Worry Food Insecurity: Inability Transportation Needs: Medical Transportation Needs: Non-medical    -- -- -- -- --            Pertinent History     Question Response Comments    Lives with alone --    Place in Birth Order -- --    Lives in home --    Number of Siblings -- --    Raised by -- Grew up in [place]    Legal Involvement -- --    Childhood Trauma -- --    Criminal History of -- --    Financial Status unemployed --    Highest Level of Education high school graduation --    Does patient have access to a firearm? No --     Service -- --    Primary Leisure Activity -- --    Spirituality actively participates in organized Sikhism --        Past Medical History:   Diagnosis Date    Addiction to drug     History of prescription pill addiction    Bipolar 1 disorder     Emphysema     Hepatitis C     History of psychiatric hospitalization     Hx of psychiatric care     Hypertension     Neuromuscular disorder     Psychiatric problem     Therapy      Past  Surgical History:   Procedure Laterality Date    ESOPHAGOGASTRODUODENOSCOPY N/A 9/19/2019    Procedure: ESOPHAGOGASTRODUODENOSCOPY (EGD);  Surgeon: Jose Luis Hyman MD;  Location: [facility];  Service: Endoscopy;  Laterality: N/A;    HYSTERECTOMY      NECK SURGERY  1990    PARTIAL HYSTERECTOMY  1995     Family History     Problem Relation (Age of Onset)    Breast cancer Maternal Aunt    Cancer Father    Hypertension Mother    Parkinsonism Father        Tobacco Use    Smoking status: Current Every Day Smoker     Packs/day: 0.50     Years: 18.00     Pack years: 9.00     Types: Cigarettes    Smokeless tobacco: Never Used   Substance and Sexual Activity    Alcohol use: No     Frequency: Never    Drug use: No     Comment: History of prescription pill addiction, mostly benzos/opioids/muscle relaxers    Sexual activity: Not on file     Review of patient's allergies indicates:  No Known Allergies    No current facility-administered medications on file prior to encounter.      Current Outpatient Medications on File Prior to Encounter   Medication Sig    albuterol (VENTOLIN HFA) 90 mcg/actuation inhaler inhale TWO puffs into the lungs EVERY 6 HOURS AS NEEDED FOR WHEEZING    azelastine (ASTELIN) 137 mcg (0.1 %) nasal spray 1 spray (137 mcg total) by Nasal route 2 (two) times daily.    azithromycin (ZITHROMAX Z-GEORGE) 250 MG tablet Take 2 tablets (500 mg total) by mouth once daily for 1 day, THEN 1 tablet (250 mg total) once daily for 4 days.    diazePAM (VALIUM) 10 MG Tab TAKE ONE TABLET BY MOUTH TWICE DAILY AS NEEDED    fluticasone propionate (FLONASE) 50 mcg/actuation nasal spray 2 sprays (100 mcg total) by Each Nare route once daily.    gabapentin (NEURONTIN) 300 MG capsule     lisinopril 10 MG tablet TAKE ONE TABLET BY MOUTH DAILY    nicotine (NICODERM CQ) 21 mg/24 hr PLACE ONE PATCH ONTO SKIN EVERY DAY    nicotine (NICODERM CQ) 21 mg/24 hr place ONE PATCH onto the skin EVERY DAY    oxyCODONE (ROXICODONE)  "10 mg Tab immediate release tablet Take 1 tablet (10 mg total) by mouth every 6 (six) hours as needed.    oxyCODONE (ROXICODONE) 5 MG immediate release tablet Take 1 tablet (5 mg total) by mouth every 6 (six) hours as needed for Pain.    pantoprazole (PROTONIX) 40 MG tablet Take 1 tablet (40 mg total) by mouth once daily.    predniSONE (DELTASONE) 50 MG Tab Take 1 tablet (50 mg total) by mouth once daily. for 4 days    venlafaxine (EFFEXOR) 75 MG tablet Take 1 tablet (75 mg total) by mouth 2 (two) times daily.    venlafaxine (EFFEXOR) 75 MG tablet TAKE ONE TABLET BY MOUTH TWICE DAILY     Psychotherapeutics (From admission, onward)    Start     Stop Route Frequency Ordered    06/16/20 2315  venlafaxine tablet 75 mg      -- Oral 2 times daily 06/16/20 2211 06/16/20 2310  diazePAM tablet 10 mg      -- Oral 2 times daily PRN 06/16/20 2211        Review of Systems   Constitutional: Positive for activity change and fatigue.   Respiratory: Positive for shortness of breath.    Cardiovascular: Negative for chest pain.   Gastrointestinal: Negative for nausea.   Musculoskeletal: Positive for myalgias.   Psychiatric/Behavioral: Positive for sleep disturbance. Negative for decreased concentration and dysphoric mood. The patient is nervous/anxious.      Strengths and Liabilities: Strength: Patient is motivated for change., Liability: Patient has poor health., Liability: Patient lacks coping skills.    Objective:     Vital Signs (Most Recent):  Temp: 97.1 °F (36.2 °C) (06/17/20 1124)  Pulse: 98 (06/17/20 1124)  Resp: 18 (06/17/20 1124)  BP: (!) 133/90 (06/17/20 1124)  SpO2: (!) 93 % (06/17/20 1124) Vital Signs (24h Range):  Temp:  [97.1 °F (36.2 °C)-98.3 °F (36.8 °C)] 97.1 °F (36.2 °C)  Pulse:  [] 98  Resp:  [17-22] 18  SpO2:  [88 %-96 %] 93 %  BP: (103-137)/(61-90) 133/90     Height: 5' 2" (157.5 cm)  Weight: 65.3 kg (143 lb 14.4 oz)  Body mass index is 26.32 kg/m².      Intake/Output Summary (Last 24 hours) at " 6/17/2020 1615  Last data filed at 6/17/2020 1224  Gross per 24 hour   Intake 780 ml   Output --   Net 780 ml       Physical Exam  Psychiatric:      Comments: EXAMINATION    CONSTITUTIONAL  General Appearance:  Hospital attire    MUSCULOSKELETAL  Muscle Strength and Tone:  Normal  Abnormal Involuntary Movements:  None noted  Gait and Station:  Not observed    PSYCHIATRIC MENTAL STATUS EXAM   Level of Consciousness:  Awake and alert  Orientation:  Name, place, date, situation  Grooming:  Limited to hospital setting  Psychomotor Behavior:  Restless and fidgety  Speech:  Pressured but normal volume  Language:  No abnormality  Mood:  Good  Affect:  Labile and elevated  Thought Process:  Tangential  Associations:  Intact with some looseness  Thought Content:  Denies suicidal/homicidal/psychosis  Memory:  Intact to recent and remote  Attention:  Distracted  Fund of Knowledge:  Appropriate for conversation  Insight:  Fair into mental illness  Judgment:  Limited into outpatient care            Significant Labs:   Last 24 Hours:   Recent Lab Results       06/17/20  1416   06/17/20  1117   06/17/20  0429   06/17/20  0040   06/16/20  2112        Benzodiazepines   Presumptive Positive           Methadone metabolites   Negative           Phencyclidine   Negative           Procalcitonin       0.02  Comment:  A concentration < 0.25 ng/mL represents a low risk bacterial   infection.  Procalcitonin may not be accurate among patients with localized   infection, recent trauma or major surgery, immunosuppressed state,   invasive fungal infection, renal dysfunction. Decisions regarding   initiation or continuation of antibiotic therapy should not be based   solely on procalcitonin levels.         Albumin               Alkaline Phosphatase               Allens Test               ALT               Amphetamine Screen, Ur   Negative           Anion Gap     10         Ao root annulus 2.82             Ascending aorta 2.87             Ao  peak isaac 1.77             Ao VTI 30.38             Appearance, UA               AST               AV valve area 3.38             AORTIC VALVE CUSP SEPERATION 2.03             AV mean gradient 8             AV index (prosthetic) 0.83             AV peak gradient 13             AV Velocity Ratio 0.75             Bacteria, UA               Barbiturate Screen, Ur   Negative           Baso #     0.01         Basophil%     0.1         Bilirubin (UA)               BILIRUBIN TOTAL               Blood Culture, Routine               BNP               Site               BSA 1.69             BUN, Bld     21         Calcium     8.4         Chloride     103         CO2     26         Cocaine (Metab.)   Negative           Color, UA               Creatinine     0.9         Creatinine, Random Ur   29.0  Comment:  The random urine reference ranges provided were established   for 24 hour urine collections.  No reference ranges exist for  random urine specimens.  Correlate clinically.             Left Ventricle Relative Wall Thickness 0.36             DelSys               Differential Method     Automated         E/A ratio 1.10             E/E' ratio 9.05             eGFR if      >60         eGFR if non      >60  Comment:  Calculation used to obtain the estimated glomerular filtration  rate (eGFR) is the CKD-EPI equation.            Eos #     0.0         Eosinophil%     0.0         E wave decelartion time 157.60             Flow               FS 47             Glucose     92         Glucose, UA               Gran # (ANC)     9.5         Gran%     87.5         Granular Casts, UA               Hematocrit     35.4         Hemoglobin     11.2         Hyaline Casts, UA               Immature Grans (Abs)     0.04  Comment:  Mild elevation in immature granulocytes is non specific and   can be seen in a variety of conditions including stress response,   acute inflammation, trauma and pregnancy. Correlation with  other   laboratory and clinical findings is essential.           Immature Granulocytes     0.4         IVRT 79.58             IVS 0.93             Ketones, UA               LA WIDTH 4.28             Lactate, Familia               Left Atrium Major Axis 3.80             Left Atrium Minor Axis 4.70             LA size 3.77             LA volume 57.64             LA Volume Index 34.7             LVOT area 4.1             Leukocytes, UA               LV LATERAL E/E' RATIO 7.31             LV SEPTAL E/E' RATIO 11.88             LV Diastolic Volume 90.64             LV Diastolic Volume Index 54.54             LVIDD 4.46             LVIDS 2.35             LV mass 124.12             LV Mass Index 75             LVOT diameter 2.28             LVOT peak alex 1.33             LVOT stroke volume 102.71             LVOT peak VTI 25.17             LV Systolic Volume 19.20             LV Systolic Volume Index 11.6             Lymph #     0.9         Lymph%     8.0         MCH     26.5         MCHC     31.6         MCV     84         Mean e' 0.11             Microscopic Comment               Mode               Mono #     0.4         Mono%     4.0         MPV     9.9         MV valve area p 1/2 method 4.00             MV Peak A Alex 0.86             MV Peak E Alex 0.95             MV stenosis pressure 1/2 time 55.02             NITRITE UA               Non-Squam Epith               nRBC     0         Occult Blood UA               Opiate Scrn, Ur   Presumptive Positive           pH, UA               Platelets     373         POC BE               POC HCO3               POC PCO2               POC PH               POC PO2               POC SATURATED O2               POC TCO2               Potassium     3.9         PROTEIN TOTAL               Protein, UA               PV PEAK VELOCITY 1.27             PW 0.80             Right Atrial Pressure (from IVC) 8             RA Major Axis 4.07             RA Width 3.32             RBC     4.22          RBC, UA               RDW     17.2         RV S' 14.68             RVDD 2.81             Sample               SARS-CoV-2 RNA, Amplification, Qual         Negative  Comment:  This test utilizes isothermal nucleic acid amplification   technology to detect the SARS-CoV-2 RdRp nucleic acid segment.   The analytical sensitivity (limit of detection) is 125 genome   equivalents/mL.   A POSITIVE result implies infection with the SARS-CoV-2 virus;  the patient is presumed to be contagious.    A NEGATIVE result means that SARS-CoV-2 nucleic acids are not  present above the limit of detection. A NEGATIVE result should be   treated as presumptive. It does not rule out the possibility of   COVID-19 and should not be the sole basis for treatment decisions.   If COVID-19 is strongly suspected based on clinical and exposure   history, re-testing using an alternate molecular assay should be   considered.   This test is only for use under the Food and Drug   Administration s Emergency Use Authorization (EUA).   Commercial kits are provided by [employer].   Performance characteristics of the EUA have been independently  verified by [facility] Department of  Pathology and Laboratory Medicine.   _________________________________________________________________  The ID NOW COVID-19 Letter of Authorization, along with the   authorized Fact Sheet for Healthcare Providers, the authorized Fact  Sheet for Patients, and authorized labeling are available on the FDA   website:  www.fda.gov/MedicalDevices/Safety/EmergencySituations/owf218860.htm       Sinus 2.64             Sodium     139         Sp02               Specific [place], UA               Specimen UA               Squam Epithel, UA               STJ 2.42             TAPSE 2.31             TDI SEPTAL 0.08             TDI LATERAL 0.13             Marijuana (THC) Metabolite   Negative           Toxicology Information   SEE COMMENT  Comment:  This screen includes the  following classes of drugs at the   listed cut-off:  Benzodiazepines                  200 ng/ml  Methadone                        300 ng/ml  Cocaine metabolite               300 ng/ml  Opiates                          300 ng/ml  Barbiturates                     200 ng/ml  Amphetamines                    1000 ng/ml  Marijuana metabs (THC)            50 ng/ml  Phencyclidine (PCP)               25 ng/ml  High concentrations of Diphenhydramine may cross-react with  Phencyclidine PCP screening immunoassay giving a false   positive result.  High concentrations of Methylenedioxymethamphetamine (MDMA aka  Ectasy) and other structurally similar compounds may cross-   react with the Amphetamine/Methamphetamine screening   immunoassay giving a false positive result.  A metabolite of the anti-HIV drug Sustiva () may cause  false positive results in the Marijuana metabolite (THC)   screening assay.  Note: This exception list includes only more common   interferants in toxicology screen testing.  Because of many   cross-reactantspositive results on toxicology drug screens   should be confirmed whenever results do not correlate with   clinical presentation.  This report is intended for use in clinical monitoring and  management of patients. It is not intended for use in   employment related drug testing.  Because of any cross-reactants, positive results on toxicology  drug screens should be confirmed whenever results do not  correlate with clinical presentation.  Presumptive positive results are unconfirmed and may be used   only for medical purposes.  Assay Intended Use: This assay provides only a preliminary analytical  test result. A more specific alternate chemical method must be used  to obtain a confirmed analytical result. Gas chromatography/mass  spectrometry (GS/MS)is the preferred confirmatory method. Clinical  consideration and professional judgement should be applied to any   drug of abuse test result,  particularly when preliminary results  are used.             Triscuspid Valve Regurgitation Peak Gradient 31             TR Max Alex 2.77             Troponin I     <0.006  Comment:  The reference interval for Troponin I represents the 99th percentile   cutoff   for our facility and is consistent with 3rd generation assay   performance.   <0.006  Comment:  The reference interval for Troponin I represents the 99th percentile   cutoff   for our facility and is consistent with 3rd generation assay   performance.         TV rest pulmonary artery pressure 39             UROBILINOGEN UA               WBC, UA               WBC     10.81                          06/16/20  2033   06/16/20  2025   06/16/20  1845   06/16/20  1710   06/16/20  1708        Benzodiazepines               Methadone metabolites               Phencyclidine               Procalcitonin               Albumin       3.6       Alkaline Phosphatase       78       Allens Test         N/A     ALT       36       Amphetamine Screen, Ur               Anion Gap       8       Ao root annulus               Ascending aorta               Ao peak alex               Ao VTI               Appearance, UA     Clear         AST       53       AV valve area               AORTIC VALVE CUSP SEPERATION               AV mean gradient               AV index (prosthetic)               AV peak gradient               AV Velocity Ratio               Bacteria, UA     Occasional         Barbiturate Screen, Ur               Baso #       0.01       Basophil%       0.1       Bilirubin (UA)     Negative         BILIRUBIN TOTAL       0.3  Comment:  For infants and newborns, interpretation of results should be based  on gestational age, weight and in agreement with clinical  observations.  Premature Infant recommended reference ranges:  Up to 24 hours.............<8.0 mg/dL  Up to 48 hours............<12.0 mg/dL  3-5 days..................<15.0 mg/dL  6-29 days.................<15.0 mg/dL          Blood Culture, Routine No Growth to date[P] No Growth to date[P]           BNP       67  Comment:  Values of less than 100 pg/ml are consistent with non-CHF populations.       Site         Other     BSA               BUN, Bld       26       Calcium       8.3       Chloride       109       CO2       23       Cocaine (Metab.)               Color, UA     Yellow         Creatinine       1.2       Creatinine, Random Ur               Left Ventricle Relative Wall Thickness               DelSys         Nasal Can     Differential Method       Automated       E/A ratio               E/E' ratio               eGFR if        54       eGFR if non        47  Comment:  Calculation used to obtain the estimated glomerular filtration  rate (eGFR) is the CKD-EPI equation.          Eos #       0.0       Eosinophil%       0.1       E wave decelartion time               Flow         3     FS               Glucose       123       Glucose, UA     Negative         Gran # (ANC)       10.9       Gran%       89.6       Granular Casts, UA     1         Hematocrit       34.9       Hemoglobin       11.1       Hyaline Casts, UA     4         Immature Grans (Abs)       0.07  Comment:  Mild elevation in immature granulocytes is non specific and   can be seen in a variety of conditions including stress response,   acute inflammation, trauma and pregnancy. Correlation with other   laboratory and clinical findings is essential.         Immature Granulocytes       0.6       IVRT               IVS               Ketones, UA     Negative         LA WIDTH               Lactate, Familia   1.0  Comment:  Falsely low lactic acid results can be found in samples   containing >=13.0 mg/dL total bilirubin and/or >=3.5 mg/dL   direct bilirubin.             Left Atrium Major Axis               Left Atrium Minor Axis               LA size               LA volume               LA Volume Index               LVOT area                Leukocytes, UA     Negative         LV LATERAL E/E' RATIO               LV SEPTAL E/E' RATIO               LV Diastolic Volume               LV Diastolic Volume Index               LVIDD               LVIDS               LV mass               LV Mass Index               LVOT diameter               LVOT peak alex               LVOT stroke volume               LVOT peak VTI               LV Systolic Volume               LV Systolic Volume Index               Lymph #       0.8       Lymph%       6.6       MCH       26.7       MCHC       31.8       MCV       84       Mean e'               Microscopic Comment     SEE COMMENT  Comment:  Other formed elements not mentioned in the report are not   present in the microscopic examination.            Mode         SPONT     Mono #       0.4       Mono%       3.0       MPV       9.4       MV valve area p 1/2 method               MV Peak A Alex               MV Peak E Alex               MV stenosis pressure 1/2 time               NITRITE UA     Negative         Non-Squam Epith     1         nRBC       0       Occult Blood UA     Negative         Opiate Scrn, Ur               pH, UA     5.0         Platelets       327       POC BE         -3     POC HCO3         22.1     POC PCO2         40.8     POC PH         7.341     POC PO2         54     POC SATURATED O2         86     POC TCO2         23     Potassium       4.0       PROTEIN TOTAL       7.1       Protein, UA     2+  Comment:  Recommend a 24 hour urine protein or a urine   protein/creatinine ratio if globulin induced proteinuria is  clinically suspected.           PV PEAK VELOCITY               PW               Right Atrial Pressure (from IVC)               RA Major Axis               RA Width               RBC       4.16       RBC, UA     2         RDW       17.2       RV S'               RVDD               Sample         VENOUS     SARS-CoV-2 RNA, Amplification, Qual               Sinus               Sodium       140        Sp02         90     Specific [place], UA     1.025         Specimen UA     Urine, Clean Catch         Squam Epithel, UA     3         STJ               TAPSE               TDI SEPTAL               TDI LATERAL               Marijuana (THC) Metabolite               Toxicology Information               Triscuspid Valve Regurgitation Peak Gradient               TR Max Alex               Troponin I       <0.006  Comment:  The reference interval for Troponin I represents the 99th percentile   cutoff   for our facility and is consistent with 3rd generation assay   performance.         TV rest pulmonary artery pressure               UROBILINOGEN UA     Negative         WBC, UA     3         WBC       12.16                          All pertinent labs within the past 24 hours have been reviewed.    Significant Imaging: I have reviewed all pertinent imaging results/findings within the past 24 hours.

## 2020-06-17 NOTE — SUBJECTIVE & OBJECTIVE
Interval History: No acute events overnight.  States she is breathing more comfortably.  Has notably pressured speech.  Still with significant cough.    Review of Systems   Constitutional: Negative for chills and fever.   HENT: Negative for congestion, rhinorrhea and sore throat.    Eyes: Negative for visual disturbance.   Respiratory: Positive for cough and shortness of breath.    Cardiovascular: Negative for chest pain.   Gastrointestinal: Negative for abdominal pain, diarrhea, nausea and vomiting.   Genitourinary: Negative for dysuria, frequency and hematuria.   Musculoskeletal: Negative for back pain.   Skin: Negative for rash.   Neurological: Negative for dizziness, weakness and headaches.   Psychiatric/Behavioral: The patient is nervous/anxious.      Objective:     Vital Signs (Most Recent):  Temp: 97.7 °F (36.5 °C) (06/17/20 0737)  Pulse: 68 (06/17/20 0737)  Resp: 17 (06/17/20 0737)  BP: 116/78 (06/17/20 0737)  SpO2: 95 % (06/17/20 0917) Vital Signs (24h Range):  Temp:  [97 °F (36.1 °C)-98.3 °F (36.8 °C)] 97.7 °F (36.5 °C)  Pulse:  [] 68  Resp:  [17-25] 17  SpO2:  [85 %-96 %] 95 %  BP: (103-137)/(61-88) 116/78     Weight: 65.3 kg (143 lb 14.4 oz)  Body mass index is 26.32 kg/m².    Intake/Output Summary (Last 24 hours) at 6/17/2020 1124  Last data filed at 6/17/2020 0817  Gross per 24 hour   Intake 660 ml   Output --   Net 660 ml      Physical Exam  Vitals signs and nursing note reviewed.   Constitutional:       General: She is not in acute distress.     Appearance: Normal appearance. She is not ill-appearing, toxic-appearing or diaphoretic.   HENT:      Head: Normocephalic and atraumatic.   Eyes:      Conjunctiva/sclera: Conjunctivae normal.      Pupils: Pupils are equal, round, and reactive to light.   Neck:      Musculoskeletal: Normal range of motion.      Vascular: No JVD.      Trachea: No tracheal deviation.   Cardiovascular:      Rate and Rhythm: Normal rate and regular rhythm.      Heart sounds:  No murmur. No friction rub.   Pulmonary:      Effort: Pulmonary effort is normal. No accessory muscle usage or respiratory distress.      Breath sounds: No wheezing.      Comments: Course breath sounds L>R, rare wheezing  Abdominal:      General: Bowel sounds are normal. There is no distension.      Palpations: Abdomen is soft.      Tenderness: There is no abdominal tenderness.   Musculoskeletal: Normal range of motion.   Skin:     Capillary Refill: Capillary refill takes less than 2 seconds.      Findings: No erythema or rash.   Neurological:      Mental Status: She is alert and oriented to person, place, and time.      Coordination: Coordination normal.   Psychiatric:      Comments: Mood:  Happy  Affect: congruent  Speech: pressured  Thought Process:  Tangential - easily redirectable  No AH/VH  No SI/HI         Significant Labs: All pertinent labs within the past 24 hours have been reviewed.    Significant Imaging: I have reviewed and interpreted all pertinent imaging results/findings within the past 24 hours.

## 2020-06-17 NOTE — NURSING
Pt pacing around room, screaming out, HR 120s. Dr. Abbott notified, new orders to notify psych for recommendations. While attempting to notify psych, pt now screaming about going out to smoke and to get air, pt easily redirected however slamming personal items down, clapping on table. Ok to administer 5 mg PO Valium now per Dr. Abbott.      Per Dr. Crawley 10 mg Zyprexa IM or PO for agitation if ok by Dr. Abbott. Ok to administer Zyprexa now despite PO valium already being administered. Pt agreeable to IM zyprexa. Will cont to monitor.

## 2020-06-17 NOTE — NURSING
Patient arrived to floor, AAOx3. Tele box 2759 applied. Patient very talkative and hyperactive. Oxygen sats WNl on 2 liters nasal cannula. Current orders reviewed with patient.

## 2020-06-18 VITALS
DIASTOLIC BLOOD PRESSURE: 71 MMHG | SYSTOLIC BLOOD PRESSURE: 137 MMHG | TEMPERATURE: 98 F | HEIGHT: 62 IN | RESPIRATION RATE: 16 BRPM | HEART RATE: 81 BPM | WEIGHT: 143.88 LBS | BODY MASS INDEX: 26.48 KG/M2 | OXYGEN SATURATION: 88 %

## 2020-06-18 DIAGNOSIS — M54.12 CERVICAL MYELOPATHY WITH CERVICAL RADICULOPATHY: Primary | ICD-10-CM

## 2020-06-18 DIAGNOSIS — G95.9 CERVICAL MYELOPATHY WITH CERVICAL RADICULOPATHY: Primary | ICD-10-CM

## 2020-06-18 PROBLEM — F19.94 SUBSTANCE INDUCED MOOD DISORDER: Status: RESOLVED | Noted: 2020-06-17 | Resolved: 2020-06-18

## 2020-06-18 LAB
ANION GAP SERPL CALC-SCNC: 11 MMOL/L (ref 8–16)
BASOPHILS # BLD AUTO: 0.01 K/UL (ref 0–0.2)
BASOPHILS NFR BLD: 0.1 % (ref 0–1.9)
BUN SERPL-MCNC: 17 MG/DL (ref 8–23)
CALCIUM SERPL-MCNC: 8.4 MG/DL (ref 8.7–10.5)
CHLORIDE SERPL-SCNC: 100 MMOL/L (ref 95–110)
CO2 SERPL-SCNC: 28 MMOL/L (ref 23–29)
CREAT SERPL-MCNC: 0.9 MG/DL (ref 0.5–1.4)
DIFFERENTIAL METHOD: ABNORMAL
EOSINOPHIL # BLD AUTO: 0.2 K/UL (ref 0–0.5)
EOSINOPHIL NFR BLD: 1 % (ref 0–8)
ERYTHROCYTE [DISTWIDTH] IN BLOOD BY AUTOMATED COUNT: 17.2 % (ref 11.5–14.5)
EST. GFR  (AFRICAN AMERICAN): >60 ML/MIN/1.73 M^2
EST. GFR  (NON AFRICAN AMERICAN): >60 ML/MIN/1.73 M^2
GLUCOSE SERPL-MCNC: 88 MG/DL (ref 70–110)
HCT VFR BLD AUTO: 42.8 % (ref 37–48.5)
HGB BLD-MCNC: 13.1 G/DL (ref 12–16)
IMM GRANULOCYTES # BLD AUTO: 0.1 K/UL (ref 0–0.04)
IMM GRANULOCYTES NFR BLD AUTO: 0.6 % (ref 0–0.5)
LYMPHOCYTES # BLD AUTO: 5.1 K/UL (ref 1–4.8)
LYMPHOCYTES NFR BLD: 30.4 % (ref 18–48)
MAGNESIUM SERPL-MCNC: 2.1 MG/DL (ref 1.6–2.6)
MCH RBC QN AUTO: 26.7 PG (ref 27–31)
MCHC RBC AUTO-ENTMCNC: 30.6 G/DL (ref 32–36)
MCV RBC AUTO: 87 FL (ref 82–98)
MONOCYTES # BLD AUTO: 1.6 K/UL (ref 0.3–1)
MONOCYTES NFR BLD: 9.5 % (ref 4–15)
NEUTROPHILS # BLD AUTO: 9.7 K/UL (ref 1.8–7.7)
NEUTROPHILS NFR BLD: 58.4 % (ref 38–73)
NRBC BLD-RTO: 0 /100 WBC
PLATELET # BLD AUTO: 424 K/UL (ref 150–350)
PMV BLD AUTO: 9.8 FL (ref 9.2–12.9)
POTASSIUM SERPL-SCNC: 4.2 MMOL/L (ref 3.5–5.1)
RBC # BLD AUTO: 4.91 M/UL (ref 4–5.4)
SODIUM SERPL-SCNC: 139 MMOL/L (ref 136–145)
WBC # BLD AUTO: 16.66 K/UL (ref 3.9–12.7)

## 2020-06-18 PROCEDURE — 94640 AIRWAY INHALATION TREATMENT: CPT | Mod: HCNC

## 2020-06-18 PROCEDURE — 27000221 HC OXYGEN, UP TO 24 HOURS: Mod: HCNC

## 2020-06-18 PROCEDURE — 83735 ASSAY OF MAGNESIUM: CPT | Mod: HCNC

## 2020-06-18 PROCEDURE — 85025 COMPLETE CBC W/AUTO DIFF WBC: CPT | Mod: HCNC

## 2020-06-18 PROCEDURE — 80048 BASIC METABOLIC PNL TOTAL CA: CPT | Mod: HCNC

## 2020-06-18 PROCEDURE — 25000003 PHARM REV CODE 250: Mod: HCNC | Performed by: HOSPITALIST

## 2020-06-18 PROCEDURE — 99900035 HC TECH TIME PER 15 MIN (STAT): Mod: HCNC

## 2020-06-18 PROCEDURE — 94761 N-INVAS EAR/PLS OXIMETRY MLT: CPT | Mod: HCNC

## 2020-06-18 PROCEDURE — 25000242 PHARM REV CODE 250 ALT 637 W/ HCPCS: Mod: HCNC | Performed by: HOSPITALIST

## 2020-06-18 PROCEDURE — S4991 NICOTINE PATCH NONLEGEND: HCPCS | Mod: HCNC | Performed by: PHYSICIAN ASSISTANT

## 2020-06-18 PROCEDURE — 25000003 PHARM REV CODE 250: Mod: HCNC | Performed by: PHYSICIAN ASSISTANT

## 2020-06-18 RX ORDER — FLUTICASONE FUROATE AND VILANTEROL 100; 25 UG/1; UG/1
1 POWDER RESPIRATORY (INHALATION) DAILY
Qty: 60 EACH | Refills: 1 | Status: SHIPPED | OUTPATIENT
Start: 2020-06-18 | End: 2020-08-10

## 2020-06-18 RX ORDER — LEVOFLOXACIN 750 MG/1
750 TABLET ORAL DAILY
Qty: 5 TABLET | Refills: 0 | Status: SHIPPED | OUTPATIENT
Start: 2020-06-18 | End: 2020-09-28 | Stop reason: ALTCHOICE

## 2020-06-18 RX ORDER — OLANZAPINE 10 MG/2ML
10 INJECTION, POWDER, FOR SOLUTION INTRAMUSCULAR EVERY 8 HOURS PRN
Status: DISCONTINUED | OUTPATIENT
Start: 2020-06-18 | End: 2020-06-18 | Stop reason: HOSPADM

## 2020-06-18 RX ORDER — BENZONATATE 100 MG/1
100 CAPSULE ORAL 3 TIMES DAILY PRN
Qty: 30 CAPSULE | Refills: 1 | Status: SHIPPED | OUTPATIENT
Start: 2020-06-18 | End: 2020-06-28

## 2020-06-18 RX ORDER — GABAPENTIN 300 MG/1
300 CAPSULE ORAL 2 TIMES DAILY
Qty: 60 CAPSULE | Refills: 1 | Status: SHIPPED | OUTPATIENT
Start: 2020-06-18 | End: 2021-03-09

## 2020-06-18 RX ORDER — FLUTICASONE FUROATE AND VILANTEROL 100; 25 UG/1; UG/1
1 POWDER RESPIRATORY (INHALATION) DAILY
Status: DISCONTINUED | OUTPATIENT
Start: 2020-06-18 | End: 2020-06-18 | Stop reason: HOSPADM

## 2020-06-18 RX ORDER — VENLAFAXINE 75 MG/1
TABLET ORAL
Qty: 30 TABLET | Refills: 11
Start: 2020-06-18 | End: 2020-07-14 | Stop reason: SDUPTHER

## 2020-06-18 RX ORDER — OXCARBAZEPINE 150 MG/1
150 TABLET, FILM COATED ORAL 2 TIMES DAILY
Qty: 60 TABLET | Refills: 1 | Status: SHIPPED | OUTPATIENT
Start: 2020-06-18 | End: 2020-07-14 | Stop reason: SDUPTHER

## 2020-06-18 RX ORDER — OXYCODONE HYDROCHLORIDE 5 MG/1
5 TABLET ORAL EVERY 6 HOURS PRN
Qty: 8 TABLET | Refills: 0 | Status: CANCELLED | OUTPATIENT
Start: 2020-06-18

## 2020-06-18 RX ADMIN — NICOTINE 1 PATCH: 21 PATCH, EXTENDED RELEASE TRANSDERMAL at 09:06

## 2020-06-18 RX ADMIN — GABAPENTIN 300 MG: 300 CAPSULE ORAL at 09:06

## 2020-06-18 RX ADMIN — OXYCODONE 5 MG: 5 TABLET ORAL at 09:06

## 2020-06-18 RX ADMIN — LISINOPRIL 10 MG: 5 TABLET ORAL at 09:06

## 2020-06-18 RX ADMIN — OXCARBAZEPINE 150 MG: 150 TABLET, FILM COATED ORAL at 09:06

## 2020-06-18 RX ADMIN — FLUTICASONE FUROATE AND VILANTEROL TRIFENATATE 1 PUFF: 100; 25 POWDER RESPIRATORY (INHALATION) at 11:06

## 2020-06-18 RX ADMIN — PANTOPRAZOLE SODIUM 40 MG: 40 TABLET, DELAYED RELEASE ORAL at 09:06

## 2020-06-18 RX ADMIN — OXYCODONE 5 MG: 5 TABLET ORAL at 02:06

## 2020-06-18 RX ADMIN — GUAIFENESIN 600 MG: 600 TABLET, EXTENDED RELEASE ORAL at 09:06

## 2020-06-18 RX ADMIN — VENLAFAXINE 75 MG: 37.5 TABLET ORAL at 09:06

## 2020-06-18 RX ADMIN — DOXYCYCLINE 100 MG: 100 INJECTION, POWDER, LYOPHILIZED, FOR SOLUTION INTRAVENOUS at 10:06

## 2020-06-18 NOTE — ASSESSMENT & PLAN NOTE
-Mrs. Mcneal was admitted to inpatient status  -Noted O2 sats in 70s at PCP office per patient.  In ER noted 88% on room air  -Noted treatment with antibiotics as outpatient for recently diagnosed pneumonia as well as ongoing tobacco usage.  This would represent a failure of outpatient antibiotics.  -Respiratory failure likely multifactorial due to pneumonia, COPD, tobacco usage, significant anxiety and questionable pulmonary edema.  -No evidence of PE on CTA a few days ago.  No evidence of ACS - all troponins negative.  -Notes recent significant swelling but this resolved after having received IV lasix.  Echo showed normal EF and normal diastolic function.  -She was treated with broad spectrum antibiotics, spirometry, supplemental O2 and albuterol.  Add tessalon and guaifenesin, nebulizers  -She clinically had a significant improvement and felt much better by time of discharge.  She will be discharged and will complete a course of antibiotics.  She will be started on breo and will continue her albuterol inhaler.  Ambulatory referral to pulmonology was placed at discharge.  -Six minute walk test confirmed that she would need nasal canula O2, which we ordered for her on day of discharge.    -She will follow up with pcp within 1-2 weeks.

## 2020-06-18 NOTE — PROGRESS NOTES
TN spoke with patient in room 423 concerning ordering oxygen. Patient's oxygen was approved by Delvis Hassan and this was reported to Lesia.     Patient' contact information is: Patient will be staying with brother and sister in law Judy Demarco- [phone]. Please deliver oxygen to: [address] [place] [id number]. Patients telephone number is [phone] (currently not charged up on today, 6/18/2020).     Patient was given the telephone number to [facility] ( [phone]) to schedule a follow up appointment. [facility], [address] [place] [address].    Pt denied any further questions.

## 2020-06-18 NOTE — TELEPHONE ENCOUNTER
Last Office Visit Info:   The patient's last visit with Leobardo Corcoran MD was on 2/19/2020.    The patient's last visit in current department was on 6/16/2020.        Last CBC Results:   Lab Results   Component Value Date    WBC 16.66 (H) 06/18/2020    HGB 13.1 06/18/2020    HCT 42.8 06/18/2020     (H) 06/18/2020       Last CMP Results  Lab Results   Component Value Date     06/18/2020    K 4.2 06/18/2020     06/18/2020    CO2 28 06/18/2020    BUN 17 06/18/2020    CREATININE 0.9 06/18/2020    CALCIUM 8.4 (L) 06/18/2020    ALBUMIN 3.6 06/16/2020    AST 53 (H) 06/16/2020    ALT 36 06/16/2020       Last Lipids  Lab Results   Component Value Date    CHOL 170 11/16/2015    TRIG 81 11/16/2015    HDL 73 11/16/2015    LDLCALC 80.8 11/16/2015       Last A1C  Lab Results   Component Value Date    HGBA1C 5.4 07/21/2017       Last TSH  Lab Results   Component Value Date    TSH 0.224 (L) 06/17/2020         Current Med Refills  Medication List with Changes/Refills   Current Medications    ALBUTEROL (VENTOLIN HFA) 90 MCG/ACTUATION INHALER    inhale TWO puffs into the lungs EVERY 6 HOURS AS NEEDED FOR WHEEZING       Start Date: 9/13/2019 End Date: --    BENZONATATE (TESSALON) 100 MG CAPSULE    Take 1 capsule (100 mg total) by mouth 3 (three) times daily as needed for Cough.       Start Date: 6/18/2020 End Date: 6/28/2020    FLUTICASONE FUROATE-VILANTEROL (BREO) 100-25 MCG/DOSE DISKUS INHALER    Inhale 1 puff into the lungs once daily. Controller       Start Date: 6/18/2020 End Date: --    FLUTICASONE PROPIONATE (FLONASE) 50 MCG/ACTUATION NASAL SPRAY    2 sprays (100 mcg total) by Each Nare route once daily.       Start Date: 6/7/2019  End Date: --    GABAPENTIN (NEURONTIN) 300 MG CAPSULE    Take 1 capsule (300 mg total) by mouth 2 (two) times daily.       Start Date: 6/18/2020 End Date: 6/18/2021    LEVOFLOXACIN (LEVAQUIN) 750 MG TABLET    Take 1 tablet (750 mg total) by mouth once daily.       Start Date:  6/18/2020 End Date: --    LISINOPRIL 10 MG TABLET    TAKE ONE TABLET BY MOUTH DAILY       Start Date: 11/4/2019 End Date: --    NICOTINE (NICODERM CQ) 21 MG/24 HR    PLACE ONE PATCH ONTO SKIN EVERY DAY       Start Date: 7/25/2018 End Date: --    NICOTINE (NICODERM CQ) 21 MG/24 HR    place ONE PATCH onto the skin EVERY DAY       Start Date: 7/31/2018 End Date: --    OXCARBAZEPINE (TRILEPTAL) 150 MG TAB    Take 1 tablet (150 mg total) by mouth 2 (two) times daily.       Start Date: 6/18/2020 End Date: 6/18/2021    OXYCODONE (ROXICODONE) 5 MG IMMEDIATE RELEASE TABLET    Take 1 tablet (5 mg total) by mouth every 6 (six) hours as needed for Pain.       Start Date: 6/14/2020 End Date: --    PANTOPRAZOLE (PROTONIX) 40 MG TABLET    Take 1 tablet (40 mg total) by mouth once daily.       Start Date: 9/19/2019 End Date: 9/18/2020    VENLAFAXINE (EFFEXOR) 75 MG TABLET    Take one tablet po daily for 2 days then take one tablet po twice daily       Start Date: 6/18/2020 End Date: --       Order(s) placed per written order guidelines:    Please advise.

## 2020-06-18 NOTE — ASSESSMENT & PLAN NOTE
-Noted history of SHIRIN and bipolar disorder  -Had panic attack at home on day prior to admit and subsequently took double dose of venlafaxine.  Was noted to be manic in hospital  -She was seen by psychiatry who recommended discontinuing valium, dcreasing dose of venlafaxine to daily for 4 days and then resuming twice daily dosing and starting trileptal for mood stabilization  -She was given information for two sites to pursue psychiatric follow-up and encouraged to follow up within 1-2 weeks.

## 2020-06-18 NOTE — ASSESSMENT & PLAN NOTE
-Patient with chronic use of opioids given multiple orthopedic injuries.  -Continue home pain medications

## 2020-06-18 NOTE — TELEPHONE ENCOUNTER
----- Message from Yasmin Ortez sent at 6/18/2020  4:29 PM CDT -----  Regarding: Pain meds  Contact: self  Pt was discharged out of the hospital and is calling for her pain meds please.    Refill for oxyCODONE immediate release tablet 10 mg    Send to [facility] [phone]    Pt at [phone]    Thank you!

## 2020-06-18 NOTE — PROGRESS NOTES
TN noted O2 in room 423 for patient's new home oxygen use. The patient's nurse Crystal was informed that patient is cleared for discharge per case management's standpoint.

## 2020-06-18 NOTE — DISCHARGE SUMMARY
[facility] Hospital Medicine  Discharge Summary      Patient Name: Rossi Mcneal  MRN: [id number]  Admission Date: 6/16/2020  Hospital Length of Stay: 2 days  Discharge Date and Time:  06/18/2020 4:27 PM  Attending Physician: No att. providers found   Discharging Provider: Epi Abbott MD  Primary Care Provider: Leobardo Corcoran MD      HPI:   Rossi Mcneal is a 64 y.o. female with PMHx of emphysema, hypertension, anxiety chronic pain, GERD, hypertension and uterine cancer status post partial hysterectomy who presents to the hospital today complaining of gradually worsening shortness of breath for 1 week.  Patient states she was evaluated here in this ED approximately 1 week ago upon initial onset of symptoms and received antibiotics and steroids which have provided minimal relief.  She also complains of productive cough with clear sputum and right-sided chest pain that is worse with deep inspiration.  Patient states that she had a follow-up appointment with her PCP, Dr. Corcoran, today but had an anxiety attack and had difficulty breathing.  Therefore, EMS was called and patient was brought to the emergency department for further evaluation.  Patient states that she has tried quitting smoking by using nicotine patches.  Denies fever, chills, nausea, vomiting, urinary complaints.    Consults:   Consults (From admission, onward)        Status Ordering Provider     Inpatient consult to Psychiatry  Once     Provider:  Dany Crawley MD    Completed EPI ABBOTT        Hospital Course By Problem:   * Acute hypoxemic respiratory failure  -Mrs. Mcneal was admitted to inpatient status  -Noted O2 sats in 70s at PCP office per patient.  In ER noted 88% on room air  -Noted treatment with antibiotics as outpatient for recently diagnosed pneumonia as well as ongoing tobacco usage.  This would represent a failure of outpatient antibiotics.  -Respiratory failure likely multifactorial due to  pneumonia, COPD, tobacco usage, significant anxiety and questionable pulmonary edema.  -No evidence of PE on CTA a few days ago.  No evidence of ACS - all troponins negative.  -Notes recent significant swelling but this resolved after having received IV lasix.  Echo showed normal EF and normal diastolic function.  -She was treated with broad spectrum antibiotics, spirometry, supplemental O2 and albuterol.  Add tessalon and guaifenesin, nebulizers  -She clinically had a significant improvement and felt much better by time of discharge.  She will be discharged and will complete a course of antibiotics.  She will be started on breo and will continue her albuterol inhaler.  Ambulatory referral to pulmonology was placed at discharge.  -Six minute walk test confirmed that she would need nasal canula O2, which we ordered for her on day of discharge.    -She will follow up with pcp within 1-2 weeks.    Anxiety  -Noted history of SHIRIN and bipolar disorder  -Had panic attack at home on day prior to admit and subsequently took double dose of venlafaxine.  Was noted to be manic in hospital  -She was seen by psychiatry who recommended discontinuing valium, dcreasing dose of venlafaxine to daily for 4 days and then resuming twice daily dosing and starting trileptal for mood stabilization  -She was given information for two sites to pursue psychiatric follow-up and encouraged to follow up within 1-2 weeks.    GERD (gastroesophageal reflux disease)  -Continue PPI    Tobacco use  -Counselled on cessation for 7 minutes.  -NRT ordered.    Hypertension, essential  -Appears well controlled  -Continue home lisinopril    Chronic, continuous use of opioids  -Patient with chronic use of opioids given multiple orthopedic injuries.  -Continue home pain medications      Final Active Diagnoses:    Diagnosis Date Noted POA    PRINCIPAL PROBLEM:  Acute hypoxemic respiratory failure [J96.01] 06/16/2020 Yes    Anxiety [F41.9] 02/19/2018 Yes     "GERD (gastroesophageal reflux disease) [K21.9] 09/19/2019 Yes    Tobacco use [Z72.0] 10/20/2017 Yes    Hypertension, essential [I10] 03/03/2016 Yes    Chronic, continuous use of opioids [F11.90] 12/02/2015 Yes      Problems Resolved During this Admission:    Diagnosis Date Noted Date Resolved POA    Substance induced mood disorder [F19.94] 06/17/2020 06/18/2020 Yes       Discharged Condition: stable    Disposition: Home or Self Care    Follow Up:  Follow-up Information     Leobardo Corcoran MD In 1 week.    Specialties: Internal Medicine, Wound Care  Contact information:  [address] [place], [place] [id number]  [phone]             Psychiatry.    Why: Call [facility] or [facility] to set up appointment as soon as possible.           Leobardo Corcoran MD On 6/30/2020.    Specialties: Internal Medicine, Wound Care  Why: @ 14:40  Contact information:  [address] [place] [address]  [phone]                 Patient Instructions:      OXYGEN FOR HOME USE     Order Specific Question Answer Comments   Liter Flow 2    Duration Continuous    Qualifying SpO2: O2 sat 85% on RA with ambulation    Testing done at: Exercise/Activity    Device home concentrator    Length of need (in months): 99 mos    Patient condition with qualifying saturation COPD    Height: 5' 2" (1.575 m)    Weight: 65.3 kg (143 lb 14.4 oz)    Does patient have medical equipment at home? none    Alternative treatment measures have been tried or considered and deemed clinically ineffective. Yes      OXYGEN FOR HOME USE     Order Specific Question Answer Comments   Liter Flow 2    Duration Continuous    Qualifying SpO2: 88% on room air at rest and 85% on room air with ambulation    Testing done at: Exercise/Activity    Route nasal cannula    Portable mode: continuous    Device home concentrator with portable unit    Length of need (in months): 99 mos    Patient condition with qualifying saturation COPD    Height: 5' 2" (1.575 m)  "   Weight: 65.3 kg (143 lb 14.4 oz)    Does patient have medical equipment at home? none    Alternative treatment measures have been tried or considered and deemed clinically ineffective. Yes    Vendor: [facility]    Expected Date of Delivery: 6/18/2020      Ambulatory referral/consult to Pulmonology   Standing Status: Future   Referral Priority: Routine Referral Type: Consultation   Referral Reason: Specialty Services Required   Requested Specialty: Pulmonary Disease   Number of Visits Requested: 1     Diet Adult Regular     Notify your health care provider if you experience any of the following:  increased confusion or weakness     Notify your health care provider if you experience any of the following:  persistent dizziness, light-headedness, or visual disturbances     Notify your health care provider if you experience any of the following:  worsening rash     Notify your health care provider if you experience any of the following:  severe persistent headache     Notify your health care provider if you experience any of the following:  difficulty breathing or increased cough     Notify your health care provider if you experience any of the following:  severe uncontrolled pain     Notify your health care provider if you experience any of the following:  persistent nausea and vomiting or diarrhea     Notify your health care provider if you experience any of the following:  temperature >100.4     Activity as tolerated       Significant Diagnostic Studies: Labs:   BMP:   Recent Labs   Lab 06/16/20 1710 06/17/20  0429 06/18/20  0333   * 92 88    139 139   K 4.0 3.9 4.2    103 100   CO2 23 26 28   BUN 26* 21 17   CREATININE 1.2 0.9 0.9   CALCIUM 8.3* 8.4* 8.4*   MG  --   --  2.1   , CMP   Recent Labs   Lab 06/16/20 1710 06/17/20  0429 06/18/20  0333    139 139   K 4.0 3.9 4.2    103 100   CO2 23 26 28   * 92 88   BUN 26* 21 17   CREATININE 1.2 0.9 0.9   CALCIUM 8.3* 8.4* 8.4*    PROT 7.1  --   --    ALBUMIN 3.6  --   --    BILITOT 0.3  --   --    ALKPHOS 78  --   --    AST 53*  --   --    ALT 36  --   --    ANIONGAP 8 10 11   ESTGFRAFRICA 54* >60 >60   EGFRNONAA 47* >60 >60   , CBC   Recent Labs   Lab 06/16/20  1710 06/17/20  0429 06/18/20  0333   WBC 12.16 10.81 16.66*   HGB 11.1* 11.2* 13.1   HCT 34.9* 35.4* 42.8    373* 424*   , Troponin   Recent Labs   Lab 06/17/20  0429   TROPONINI <0.006    and A1C: No results for input(s): HGBA1C in the last 4320 hours.    Pending Diagnostic Studies:     None         Medications:  Reconciled Home Medications:      Medication List      START taking these medications    benzonatate 100 MG capsule  Commonly known as: TESSALON  Take 1 capsule (100 mg total) by mouth 3 (three) times daily as needed for Cough.     fluticasone furoate-vilanteroL 100-25 mcg/dose diskus inhaler  Commonly known as: BREO  Inhale 1 puff into the lungs once daily. Controller     levoFLOXacin 750 MG tablet  Commonly known as: LEVAQUIN  Take 1 tablet (750 mg total) by mouth once daily.     OXcarbazepine 150 MG Tab  Commonly known as: TRILEPTAL  Take 1 tablet (150 mg total) by mouth 2 (two) times daily.        CHANGE how you take these medications    gabapentin 300 MG capsule  Commonly known as: NEURONTIN  Take 1 capsule (300 mg total) by mouth 2 (two) times daily.  What changed:   · how much to take  · how to take this  · when to take this     oxyCODONE 5 MG immediate release tablet  Commonly known as: ROXICODONE  Take 1 tablet (5 mg total) by mouth every 6 (six) hours as needed for Pain.  What changed: Another medication with the same name was removed. Continue taking this medication, and follow the directions you see here.     venlafaxine 75 MG tablet  Commonly known as: EFFEXOR  Take one tablet po daily for 2 days then take one tablet po twice daily  What changed:   · how much to take  · how to take this  · when to take this  · additional instructions  · Another  medication with the same name was removed. Continue taking this medication, and follow the directions you see here.        CONTINUE taking these medications    albuterol 90 mcg/actuation inhaler  Commonly known as: VENTOLIN HFA  inhale TWO puffs into the lungs EVERY 6 HOURS AS NEEDED FOR WHEEZING     fluticasone propionate 50 mcg/actuation nasal spray  Commonly known as: FLONASE  2 sprays (100 mcg total) by Each Nare route once daily.     lisinopriL 10 MG tablet  TAKE ONE TABLET BY MOUTH DAILY     * nicotine 21 mg/24 hr  Commonly known as: NICODERM CQ  PLACE ONE PATCH ONTO SKIN EVERY DAY     * nicotine 21 mg/24 hr  Commonly known as: NICODERM CQ  place ONE PATCH onto the skin EVERY DAY     pantoprazole 40 MG tablet  Commonly known as: PROTONIX  Take 1 tablet (40 mg total) by mouth once daily.         * This list has 2 medication(s) that are the same as other medications prescribed for you. Read the directions carefully, and ask your doctor or other care provider to review them with you.            STOP taking these medications    azelastine 137 mcg (0.1 %) nasal spray  Commonly known as: ASTELIN     azithromycin 250 MG tablet  Commonly known as: ZITHROMAX Z-GEORGE     diazePAM 10 MG Tab  Commonly known as: VALIUM     predniSONE 50 MG Tab  Commonly known as: DELTASONE            Indwelling Lines/Drains at time of discharge:   Lines/Drains/Airways     None                 Time spent on the discharge of patient: 35 minutes  Patient was seen and examined on the date of discharge and determined to be suitable for discharge.         Jamal Abbott MD  Department of Hospital Medicine  [facility]

## 2020-06-18 NOTE — PLAN OF CARE
06/18/20 1357   Final Note   Assessment Type Final Discharge Note   Anticipated Discharge Disposition Home   What phone number can be called within the next 1-3 days to see how you are doing after discharge? [phone]   Hospital Follow Up  Appt(s) scheduled? Yes   Discharge plans and expectations educations in teach back method with documentation complete? Yes   Right Care Referral Info   Post Acute Recommendation No Care   Post-Acute Status   Post-Acute Authorization   (New Oxygen set up completed through [facility])   Discharge Delays None known at this time

## 2020-06-18 NOTE — NURSING
Rapid Response Follow-up Note    Followed up with patient for proactive rounding.   No acute issues at this time. Reviewed plan of care with primary RN, Roxy. Pt received Zyprexa recently and is resting at this time.   Please call Rapid Response RN with any questions or concerns at [phone].

## 2020-06-18 NOTE — NURSING
Pt discharged per MD order. IV removed. Catheter tip intact. No distress noted. Discharge instructions explained. AVS given to patient and placed in Blue Folder. Pt verbalized understanding. VSS. Afebrile. No complaints of pain, N/V, diarrhea, or SOB. Patient ambulated off the unit  with belongings to Main Entrance. Family with patient.

## 2020-06-18 NOTE — PROGRESS NOTES
[facility]NAGEMENT                  WRITTEN DISCHARGE INFORMATION      APPOINTMENTS AND RESOURCES TO HELP YOU MANAGE YOUR CARE AT HOME BASED ON YOUR PREFERENCES:  (If an appointment is not scheduled for you when you leave the hospital, call your doctor to schedule a follow up visit within a week)        Healthy Living Instructions to HELP MANAGE YOUR CARE AT HOME:  Things You are responsible for:  1.    Getting your prescriptions filled   2.    Taking your medications as directed, DO NOT MISS ANY DOSES!  3.    Following the diet and exercise recommended by your doctor  4.    Going to your follow-up doctor appointment. This is important because it allows the doctor to monitor your progress and determine if any changes need to made to your treatment plan.  5. If you have any questions about MANAGING YOUR CARE AT HOME Call the Nurse Care Line for 24/7 Assistance [phone]       Please answer any calls you may receive from [facility]. We want to continue to support you as you manage your healthcare needs. [facility] is happy to have the opportunity to serve you.      Thank you for choosing [facility] for your healthcare needs!  Your [facility] Case Management Team,  Follow-up Information     Leobardo Corcoran MD In 1 week.    Specialties: Internal Medicine, Wound Care  Contact information:  [address] [address]  [phone]             Psychiatry.    Why: Call [facility] or [facility] to set up appointment as soon as possible.           Leobardo Corcoran MD On 6/30/2020.    Specialties: Internal Medicine, Wound Care  Why: @ 14:40  Contact information:  [address] [address]  [phone]

## 2020-06-18 NOTE — PLAN OF CARE
Problem: Adult Inpatient Plan of Care  Goal: Plan of Care Review  Outcome: Ongoing, Progressing  Flowsheets (Taken 6/18/2020 0302)  Plan of Care Reviewed With: patient    Patient remains free from injury and falls Resting throughout shift. Calm and cooperative. IV antibiotics and scheduled medications administered. Patient oxygen sats remain WNL on 2 liters oxygen via nasal cannula. Plan of care continued.

## 2020-06-18 NOTE — PLAN OF CARE
Patient remained free from falls and injury during shift, medication administered per MD orders, patient ambulates to restroom independently, 2LNC, IV abx administered, patient safety maintained, bed in low lock position with call bell in reach will continue to monitor patient is adequate for discharge.

## 2020-06-19 ENCOUNTER — TELEPHONE (OUTPATIENT)
Dept: ADMINISTRATIVE | Facility: HOSPITAL | Age: 65
End: 2020-06-19

## 2020-06-19 ENCOUNTER — TELEPHONE (OUTPATIENT)
Dept: FAMILY MEDICINE | Facility: CLINIC | Age: 65
End: 2020-06-19

## 2020-06-19 ENCOUNTER — PATIENT OUTREACH (OUTPATIENT)
Dept: ADMINISTRATIVE | Facility: CLINIC | Age: 65
End: 2020-06-19

## 2020-06-19 NOTE — TELEPHONE ENCOUNTER
----- Message from Justina Pricila sent at 6/19/2020 10:17 AM CDT -----  Regarding: self  [phone]  .Type: Patient Call Back    Who called self     What is the request in detail: Pt stated that  she needs to speak with Emily in regards to sending referral to the [facility] office of [address]  Fax [phone]   Ph: [phone]    Can the clinic reply by [facility]? Call back     Would the patient rather a call back or a response via [facility]? Call back     Best call back number: [phone]

## 2020-06-19 NOTE — TELEPHONE ENCOUNTER
I left a voicemail for the pt that her referral was sent to our referral department and is currently waiting insurance approval. When it is approved she will be contacted and assisted with scheduling.

## 2020-06-19 NOTE — TELEPHONE ENCOUNTER
Pt calls and was scheduled for hospital follow up next week with ORLANDO Todd. Pt is asking for a referral to the female pain management doctor that PCP knows. She is also asking for PCP to send in a prescription for a short supply of pain medication. Pt states she is hurting all over from being in a hospital bed. Please advise.

## 2020-06-19 NOTE — PROGRESS NOTES
C3 nurse attempted to contact patient. No answer. The following message was left for the patient to return the call:  Good afternoon,  I am a nurse calling on behalf of [facility] from the Care Coordination Center.  This is a Transitional Care Call for Rossi Mcneal. When you have a moment please contact us at [phone] or [phone] Monday through Friday, between the hours of 8 am to 4 pm. We look forward to speaking with you. On behalf of [facility] have a nice day.    The patient has a scheduled HOSFU appointment with Leobardo Corcoran MD on 6/30 @ 1448. Message sent to Physician staff.

## 2020-06-19 NOTE — TELEPHONE ENCOUNTER
Patient contacted and ID confirmed by name and   Patient recently admitted for hypoxia and acute robb after increasing effexor. Requesting oxycodone for back pain. Discuss my concern of overdose potential in light of her other symptoms. She has been discharged from prior pain clinics as well. She has been referred to PM&R at [facility] and I gave her contact information for that clinic. Can use tyleonol 650mg TID not to exceed 2grams per day in light of history of liver disease. No narcotics will be prescribed. She voiced understanding

## 2020-06-20 ENCOUNTER — NURSE TRIAGE (OUTPATIENT)
Dept: ADMINISTRATIVE | Facility: CLINIC | Age: 65
End: 2020-06-20

## 2020-06-20 ENCOUNTER — HOSPITAL ENCOUNTER (EMERGENCY)
Facility: HOSPITAL | Age: 65
Discharge: HOME | End: 2020-06-21
Attending: EMERGENCY MEDICINE
Payer: MEDICARE

## 2020-06-20 DIAGNOSIS — R05.9 COUGH: ICD-10-CM

## 2020-06-20 DIAGNOSIS — R06.02 SOB (SHORTNESS OF BREATH): Primary | ICD-10-CM

## 2020-06-20 LAB
ALBUMIN SERPL BCP-MCNC: 3.4 G/DL (ref 3.5–5.2)
ALP SERPL-CCNC: 76 U/L (ref 55–135)
ALT SERPL W/O P-5'-P-CCNC: 51 U/L (ref 10–44)
ANION GAP SERPL CALC-SCNC: 8 MMOL/L (ref 8–16)
AST SERPL-CCNC: 72 U/L (ref 10–40)
BACTERIA BLD CULT: NORMAL
BACTERIA BLD CULT: NORMAL
BILIRUB SERPL-MCNC: 0.3 MG/DL (ref 0.1–1)
BNP SERPL-MCNC: 52 PG/ML (ref 0–99)
BUN SERPL-MCNC: 15 MG/DL (ref 8–23)
CALCIUM SERPL-MCNC: 8.7 MG/DL (ref 8.7–10.5)
CHLORIDE SERPL-SCNC: 102 MMOL/L (ref 95–110)
CO2 SERPL-SCNC: 27 MMOL/L (ref 23–29)
CREAT SERPL-MCNC: 0.9 MG/DL (ref 0.5–1.4)
EST. GFR  (AFRICAN AMERICAN): >60 ML/MIN/1.73 M^2
EST. GFR  (NON AFRICAN AMERICAN): >60 ML/MIN/1.73 M^2
GLUCOSE SERPL-MCNC: 117 MG/DL (ref 70–110)
POTASSIUM SERPL-SCNC: 4.3 MMOL/L (ref 3.5–5.1)
PROCALCITONIN SERPL IA-MCNC: 0.03 NG/ML
PROT SERPL-MCNC: 6.8 G/DL (ref 6–8.4)
SODIUM SERPL-SCNC: 137 MMOL/L (ref 136–145)
TROPONIN I SERPL DL<=0.01 NG/ML-MCNC: <0.006 NG/ML (ref 0–0.03)

## 2020-06-20 PROCEDURE — 93005 ELECTROCARDIOGRAM TRACING: CPT | Mod: HCNC

## 2020-06-20 PROCEDURE — 99285 EMERGENCY DEPT VISIT HI MDM: CPT | Mod: 25,HCNC

## 2020-06-20 PROCEDURE — 80053 COMPREHEN METABOLIC PANEL: CPT | Mod: HCNC

## 2020-06-20 PROCEDURE — 85025 COMPLETE CBC W/AUTO DIFF WBC: CPT | Mod: HCNC

## 2020-06-20 PROCEDURE — 84484 ASSAY OF TROPONIN QUANT: CPT | Mod: HCNC

## 2020-06-20 PROCEDURE — 96374 THER/PROPH/DIAG INJ IV PUSH: CPT | Mod: HCNC

## 2020-06-20 PROCEDURE — 93010 ELECTROCARDIOGRAM REPORT: CPT | Mod: HCNC,,, | Performed by: INTERNAL MEDICINE

## 2020-06-20 PROCEDURE — 93010 EKG 12-LEAD: ICD-10-PCS | Mod: HCNC,,, | Performed by: INTERNAL MEDICINE

## 2020-06-20 PROCEDURE — 84145 PROCALCITONIN (PCT): CPT | Mod: HCNC

## 2020-06-20 PROCEDURE — 83880 ASSAY OF NATRIURETIC PEPTIDE: CPT | Mod: HCNC

## 2020-06-20 NOTE — TELEPHONE ENCOUNTER
Was just discharged from hospital for pneumonia. Not sleeping, is on abx. And is hurting. + whistling airway noises- intermittent and none right now. Was breathing faster than normal.       Right breast and right abdomen. Is on home O2. And has headache that is severe. Had nose bleed today and last night. Has no BP machine at home.      diff breathing---.breathing status is the same per pt report but states has SOB at rest and with exertion.  Released Thursday afternoon. Has abx and prednisone and BP meds. Reports severe pain-headache and right lower breast and right abdomen. No blood in sputum. No fever-99.7F.  instructed pt on tylenol use-pt  responded stating sees Liver MD and cannot take anything OTC for pain. instructed pt on call nurse cannot manage narcotics after hours. instructed pt to return to ED for evaluation of pain s/s and SOB at rest.  Verbalizes understanding.       Reason for Disposition   MODERATE difficulty breathing (e.g., speaks in phrases, SOB even at rest, pulse 100-120)    Additional Information   Negative: Bluish (or gray) lips or face now   Negative: Difficult to awaken or acting confused (e.g., disoriented, slurred speech)   Negative: Severe difficulty breathing (e.g., struggling for each breath, speaks in single words)   Negative: Stridor   Negative: Slow, shallow and weak breathing   Negative: Sounds like a life-threatening emergency to the triager   Negative: [1] Caller has URGENT question AND [2] triager unable to answer question   Negative: Fever > 103 F (39.4 C)   Negative: [1] Coughed up blood AND [2] large amount or blood clots (Exception: blood-tinged sputum)   Negative: Patient sounds very sick or weak to the triager    Protocols used: PNEUMONIA ON ANTIBIOTIC POST-HOSPITALIZATION FOLLOW-UP CALL-A-

## 2020-06-21 VITALS
WEIGHT: 145 LBS | DIASTOLIC BLOOD PRESSURE: 95 MMHG | TEMPERATURE: 98 F | HEART RATE: 67 BPM | HEIGHT: 62 IN | RESPIRATION RATE: 11 BRPM | SYSTOLIC BLOOD PRESSURE: 145 MMHG | OXYGEN SATURATION: 100 % | BODY MASS INDEX: 26.68 KG/M2

## 2020-06-21 LAB
BASOPHILS # BLD AUTO: 0.02 K/UL (ref 0–0.2)
BASOPHILS NFR BLD: 0.1 % (ref 0–1.9)
DIFFERENTIAL METHOD: ABNORMAL
EOSINOPHIL # BLD AUTO: 0 K/UL (ref 0–0.5)
EOSINOPHIL NFR BLD: 0 % (ref 0–8)
ERYTHROCYTE [DISTWIDTH] IN BLOOD BY AUTOMATED COUNT: 16.7 % (ref 11.5–14.5)
HCT VFR BLD AUTO: 33.4 % (ref 37–48.5)
HGB BLD-MCNC: 10.9 G/DL (ref 12–16)
IMM GRANULOCYTES # BLD AUTO: 0.09 K/UL (ref 0–0.04)
IMM GRANULOCYTES NFR BLD AUTO: 0.5 % (ref 0–0.5)
LYMPHOCYTES # BLD AUTO: 1.3 K/UL (ref 1–4.8)
LYMPHOCYTES NFR BLD: 7.2 % (ref 18–48)
MCH RBC QN AUTO: 26.8 PG (ref 27–31)
MCHC RBC AUTO-ENTMCNC: 32.6 G/DL (ref 32–36)
MCV RBC AUTO: 82 FL (ref 82–98)
MONOCYTES # BLD AUTO: 1.7 K/UL (ref 0.3–1)
MONOCYTES NFR BLD: 9.3 % (ref 4–15)
NEUTROPHILS # BLD AUTO: 15.2 K/UL (ref 1.8–7.7)
NEUTROPHILS NFR BLD: 82.9 % (ref 38–73)
NRBC BLD-RTO: 0 /100 WBC
PLATELET # BLD AUTO: 351 K/UL (ref 150–350)
PMV BLD AUTO: 9.4 FL (ref 9.2–12.9)
RBC # BLD AUTO: 4.07 M/UL (ref 4–5.4)
WBC # BLD AUTO: 18.29 K/UL (ref 3.9–12.7)

## 2020-06-21 PROCEDURE — 63600175 PHARM REV CODE 636 W HCPCS: Mod: HCNC | Performed by: EMERGENCY MEDICINE

## 2020-06-21 RX ORDER — METHOCARBAMOL 500 MG/1
1000 TABLET, FILM COATED ORAL 3 TIMES DAILY
Qty: 30 TABLET | Refills: 0 | Status: SHIPPED | OUTPATIENT
Start: 2020-06-21 | End: 2020-06-26

## 2020-06-21 RX ORDER — KETOROLAC TROMETHAMINE 30 MG/ML
15 INJECTION, SOLUTION INTRAMUSCULAR; INTRAVENOUS
Status: COMPLETED | OUTPATIENT
Start: 2020-06-21 | End: 2020-06-21

## 2020-06-21 RX ORDER — KETOROLAC TROMETHAMINE 10 MG/1
10 TABLET, FILM COATED ORAL EVERY 6 HOURS PRN
Qty: 15 TABLET | Refills: 0 | Status: SHIPPED | OUTPATIENT
Start: 2020-06-21 | End: 2021-02-05

## 2020-06-21 RX ORDER — PROMETHAZINE HYDROCHLORIDE AND CODEINE PHOSPHATE 6.25; 1 MG/5ML; MG/5ML
5 SOLUTION ORAL EVERY 6 HOURS PRN
Qty: 118 ML | Refills: 0 | Status: SHIPPED | OUTPATIENT
Start: 2020-06-21 | End: 2020-06-27

## 2020-06-21 RX ORDER — BENZONATATE 100 MG/1
100 CAPSULE ORAL 3 TIMES DAILY PRN
Qty: 20 CAPSULE | Refills: 0 | Status: SHIPPED | OUTPATIENT
Start: 2020-06-21 | End: 2020-07-01

## 2020-06-21 RX ADMIN — KETOROLAC TROMETHAMINE 15 MG: 30 INJECTION, SOLUTION INTRAMUSCULAR at 12:06

## 2020-06-21 NOTE — ED PROVIDER NOTES
Encounter Date: 6/20/2020    SCRIBE #1 NOTE: I, Marisa Agarwal, am scribing for, and in the presence of,  Aiden Montero MD. I have scribed the following portions of the note - Other sections scribed: HPI, ROS, PE.       History     Chief Complaint   Patient presents with    Shortness of Breath     Patient reports SOB X 1 week and half. patient was 78 % on room air. Patient was seen in ED Thursday and was sent home with oxygen. patient reports not knowing how to use or set up oxygen. Patient 96% on 3 L     This is a 65 y/o female with a PMHx of Hypertension and Emphysema. She presents to the ED with a CC of shortness of breath. The patient was in the hospital yesterday and was diagnosed with Pneumonia in the left lobe. She describes the pain as stabbing. Associated symptoms include coughing with production. Patient has been using her breathing treatments when necessary, but had issues when trying to set up her O2. NKDA    The history is provided by the patient.     Review of patient's allergies indicates:  No Known Allergies  Past Medical History:   Diagnosis Date    Addiction to drug     History of prescription pill addiction    Bipolar 1 disorder     Emphysema     Hepatitis C     History of psychiatric hospitalization     Hx of psychiatric care     Hypertension     Neuromuscular disorder     Psychiatric problem     Therapy      Past Surgical History:   Procedure Laterality Date    ESOPHAGOGASTRODUODENOSCOPY N/A 9/19/2019    Procedure: ESOPHAGOGASTRODUODENOSCOPY (EGD);  Surgeon: Jose Luis Hyman MD;  [facility];  Service: Endoscopy;  Laterality: N/A;    HYSTERECTOMY      NECK SURGERY  1990    PARTIAL HYSTERECTOMY  1995     Family History   Problem Relation Age of Onset    Hypertension Mother     Cancer Father     Parkinsonism Father     Breast cancer Maternal Aunt      Social History     Tobacco Use    Smoking status: Current Every Day Smoker     Packs/day: 0.50     Years: 18.00      Pack years: 9.00     Types: Cigarettes    Smokeless tobacco: Never Used   Substance Use Topics    Alcohol use: No     Frequency: Never     Comment: Alcoholism and rehab in her 20s    Drug use: No     Comment: History of prescription pill addiction, mostly benzos/opioids/muscle relaxers     Review of Systems   Constitutional: Negative for chills, diaphoresis, fatigue and fever.   HENT: Negative for congestion, rhinorrhea and sore throat.    Eyes: Negative for pain, discharge, redness and itching.   Respiratory: Positive for cough and shortness of breath.    Cardiovascular: Negative for chest pain.   Gastrointestinal: Negative for abdominal pain, diarrhea, nausea and vomiting.   Genitourinary: Negative for difficulty urinating, dysuria and frequency.   Musculoskeletal: Negative for back pain, myalgias and neck pain.   Skin: Negative for pallor and rash.   Neurological: Negative for dizziness and headaches.   Psychiatric/Behavioral: Negative for confusion.       Physical Exam     Initial Vitals [06/20/20 2219]   BP Pulse Resp Temp SpO2   111/74 95 (!) 24 98 °F (36.7 °C) 96 %      MAP       --         Physical Exam    Nursing note and vitals reviewed.  Constitutional: She appears well-developed and well-nourished.   HENT:   Head: Normocephalic and atraumatic.   Right Ear: External ear normal.   Left Ear: External ear normal.   Nose: Nose normal.   Mouth/Throat: Oropharynx is clear and moist.   Eyes: Conjunctivae and EOM are normal. Pupils are equal, round, and reactive to light. No scleral icterus.   Neck: Normal range of motion. Neck supple. No JVD present.   Cardiovascular: Normal rate, regular rhythm, normal heart sounds and intact distal pulses. Exam reveals no gallop and no friction rub.    No murmur heard.  Pulmonary/Chest: Breath sounds normal. No stridor. No respiratory distress. She has no wheezes. She exhibits tenderness (over left sternal border).   Abdominal: Soft. Bowel sounds are normal. She  exhibits no distension and no mass. There is no abdominal tenderness. There is no rebound and no guarding.   Musculoskeletal: Normal range of motion. No tenderness or edema.      Comments: Back is nontender to palpation.    Neurological: She is alert and oriented to person, place, and time. She has normal strength. No cranial nerve deficit.   Skin: Skin is warm and dry. Capillary refill takes less than 2 seconds. No rash noted. No pallor.   Psychiatric: She has a normal mood and affect. Thought content normal.         ED Course   Procedures  Labs Reviewed   CBC W/ AUTO DIFFERENTIAL - Abnormal; Notable for the following components:       Result Value    WBC 18.29 (*)     Hemoglobin 10.9 (*)     Hematocrit 33.4 (*)     Mean Corpuscular Hemoglobin 26.8 (*)     RDW 16.7 (*)     Platelets 351 (*)     Gran # (ANC) 15.2 (*)     Immature Grans (Abs) 0.09 (*)     Mono # 1.7 (*)     Gran% 82.9 (*)     Lymph% 7.2 (*)     All other components within normal limits   COMPREHENSIVE METABOLIC PANEL - Abnormal; Notable for the following components:    Glucose 117 (*)     Albumin 3.4 (*)     AST 72 (*)     ALT 51 (*)     All other components within normal limits   TROPONIN I   PROCALCITONIN   B-TYPE NATRIURETIC PEPTIDE        ECG Results          EKG 12-lead (Final result)  Result time 06/21/20 22:13:52    Final result by Interface, Lab In [facility] (06/21/20 22:13:52)                 Narrative:    Test Reason : R06.02,    Vent. Rate : 072 BPM     Atrial Rate : 072 BPM     P-R Int : 134 ms          QRS Dur : 086 ms      QT Int : 424 ms       P-R-T Axes : 079 087 085 degrees     QTc Int : 464 ms    Normal sinus rhythm  Normal ECG  When compared with ECG of 16-JUN-2020 17:09,  No significant change was found  Confirmed by Chito Miranda MD ([id number]) on 6/21/2020 10:13:44 PM    Referred By: MADISON   SELF           Confirmed By:Chito Miranda MD                            Imaging Results          X-Ray Chest 1 View (Final result)   Result time 06/20/20 23:06:07    Final result by Jenny Yañez MD (06/20/20 23:06:07)                 Impression:      No significant change.      Electronically signed by: Jenny Yañez MD  Date:    06/20/2020  Time:    23:06             Narrative:    EXAMINATION:  XR CHEST 1 VIEW    CLINICAL HISTORY:  Chest Pain;    TECHNIQUE:  Single frontal view of the chest was performed.    COMPARISON:  06/16/2020.    FINDINGS:  Heart is stable in size.  Thoracic rods are seen.  Lungs are symmetrically expanded.  There is coarse interstitial attenuation.  No evidence of new focal consolidation, pneumothorax, or pleural effusion.  No significant change in cardiopulmonary status..                                            Scribe Attestation:   Scribe #1: I performed the above scribed service and the documentation accurately describes the services I performed. I attest to the accuracy of the note.      MDM:    64 y.o.female with PMHx as noted above, presents with SOB. Physical exam remarkable for well appearing female, in mild distress, CTAB, no peripheral edema noted, mild left sternal border ttp.  ED workup remarkable for EKG - nsr, rate 72bpm, no ischemic pattern noted, no STEMI, trop - neg, BNP - 52, CBC/CMP: WBC - 18.29, procal - 0.03, CXR - unremarkable. Pt presentation consistent with SOB baseline, without acute finding today, leukocytosis 2/2 steroid use, chest pain reported negative cardiac markers here with ttp over left chest wall, resolved with toradol.  Discussed further management at home including further sxm treatment for cough.  At this time given patient's history, physical exam, and ED workup do not suspect arrhythmia, CHF exacerbation, COPD exacerbation, cardiac tamponade, MI/ACS, PE, PTX, aortic dissection, pericarditis, PNA, or any further malignant cause. Discussed diagnosis and further treatment with patient including f/u with PCP in the next week. Return precautions given and all questions answered.   Patient in understanding of plan.  Pt discharged to home improved and stable.                          Clinical Impression:       ICD-10-CM ICD-9-CM   1. SOB (shortness of breath)  R06.02 786.05   2. Cough  R05 786.2             ED Disposition Condition    Discharge Stable       I, Aiden Montero M.D., personally performed the services described in this documentation. All medical record entries made by the scribe were at my direction and in my presence.  I have reviewed the chart and agree that the record reflects my personal performance and is accurate and complete.  ED Prescriptions     Medication Sig Dispense Start Date End Date Auth. Provider    benzonatate (TESSALON) 100 MG capsule Take 1 capsule (100 mg total) by mouth 3 (three) times daily as needed. Patient not taking:  Reported on 6/22/2020 20 capsule 6/21/2020 7/1/2020 Aiden Montero MD    ketorolac (TORADOL) 10 mg tablet Take 1 tablet (10 mg total) by mouth every 6 (six) hours as needed for Pain. 15 tablet 6/21/2020  Aiden Montero MD    methocarbamoL (ROBAXIN) 500 MG Tab Take 2 tablets (1,000 mg total) by mouth 3 (three) times daily. for 5 days 30 tablet 6/21/2020 6/26/2020 Aiden Montero MD    promethazine-codeine 6.25-10 mg/5 ml (PHENERGAN WITH CODEINE) 6.25-10 mg/5 mL syrup Take 5 mLs by mouth every 6 (six) hours as needed for Cough. 118 mL 6/21/2020 6/27/2020 Aiden Montero MD        Follow-up Information     Follow up With Specialties Details Why Contact Info    [facility] Emergency Medicine Go to  If symptoms worsen [address]  [facility] [address] [phone]    Leobardo Corcoran MD Internal Medicine, Wound Care Go in 1 week As needed [address] [facility] [address]  [phone]                                       Aiden Montero MD  06/23/20 4053

## 2020-06-22 ENCOUNTER — OUTPATIENT CASE MANAGEMENT (OUTPATIENT)
Dept: ADMINISTRATIVE | Facility: OTHER | Age: 65
End: 2020-06-22

## 2020-06-22 ENCOUNTER — TELEPHONE (OUTPATIENT)
Dept: FAMILY MEDICINE | Facility: CLINIC | Age: 65
End: 2020-06-22

## 2020-06-22 ENCOUNTER — PES CALL (OUTPATIENT)
Dept: ADMINISTRATIVE | Facility: CLINIC | Age: 65
End: 2020-06-22

## 2020-06-22 DIAGNOSIS — F17.218 CIGARETTE NICOTINE DEPENDENCE WITH OTHER NICOTINE-INDUCED DISORDER: ICD-10-CM

## 2020-06-22 RX ORDER — IBUPROFEN 200 MG
TABLET ORAL
Qty: 14 PATCH | Refills: 1 | Status: SHIPPED | OUTPATIENT
Start: 2020-06-22 | End: 2021-02-08

## 2020-06-22 NOTE — LETTER
June 23, 2020    Rossi Mcneal  [address]  [address]  [place] [address]             [facility] [address] [place] [address] Dear Ms.Kim Alassatinder:     I am a nurse  with [facility] Outpatient Case Management program.  The program is free and is made up of nurses and social workers who help connect patients to resources and education that can help them in managing their health.     We received a referral through the [facility] system to offer assistance if needed.  I have attempted to reach you by phone two times and will make a last attempt by July 3.      If you receive this letter and have questions or concerns, please don't hesitate to call. I will be glad to speak with you.     Sincerely,      Vanna Castrejon RN  [facility]  TEL:  [phone]

## 2020-06-22 NOTE — PATIENT INSTRUCTIONS
Pneumonia (Adult)  Pneumonia is an infection deep within the lungs. It is in the small air sacs (alveoli). Pneumonia may be caused by a virus or bacteria. Pneumonia caused by bacteria is usually treated with an antibiotic. Severe cases may need to be treated in the hospital. Milder cases can be treated at home. Symptoms usually start to get better during the first 2 days of treatment.    Home care  Follow these guidelines when caring for yourself at home:  · Rest at home for the first 2 to 3 days, or until you feel stronger. Dont let yourself get overly tired when you go back to your activities.  · Stay away from cigarette smoke - yours or other peoples.  · You may use acetaminophen or ibuprofen to control fever or pain, unless another medicine was prescribed. If you have chronic liver or kidney disease, talk with your healthcare provider before using these medicines. Also talk with your provider if youve had a stomach ulcer or gastrointestinal bleeding. Dont give aspirin to anyone younger than 18 years of age who is ill with a fever. It may cause severe liver damage.  · Your appetite may be poor, so a light diet is fine.  · Drink 6 to 8 glasses of fluids every day to make sure you are getting enough fluids. Beverages can include water, sport drinks, sodas without caffeine, juices, tea, or soup. Fluids will help loosen secretions in the lung. This will make it easier for you to cough up the phlegm (sputum). If you also have heart or kidney disease, check with your healthcare provider before you drink extra fluids.  · Take antibiotic medicine prescribed until it is all gone, even if you are feeling better after a few days.  Follow-up care  Follow up with your healthcare provider in the next 2 to 3 days, or as advised. This is to be sure the medicine is helping you get better.  If you are 65 or older, you should get a pneumococcal vaccine and a yearly flu (influenza) shot. You should also get these vaccines if  you have chronic lung disease like asthma, emphysema, or COPD. Recently, a second type of pneumonia vaccine has become available for everyone over 65 years old. This is in addition to the previous vaccine. Ask your provider about this.  When to seek medical advice  Call your healthcare provider right away if any of these occur:  · You dont get better within the first 48 hours of treatment  · Shortness of breath gets worse  · Rapid breathing (more than 25 breaths per minute)  · Coughing up blood  · Chest pain gets worse with breathing  · Fever of 100.4°F (38°C) or higher that doesnt get better with fever medicine  · Weakness, dizziness, or fainting that gets worse  · Thirst or dry mouth that gets worse  · Sinus pain, headache, or a stiff neck  · Chest pain not caused by coughing  Date Last Reviewed: 1/1/2017  © 9615-2326 [employer]. [address] [place] [address]. All rights reserved. This information is not intended as a substitute for professional medical care. Always follow your healthcare professional's instructions.

## 2020-06-22 NOTE — PROGRESS NOTES
06/22/20- OPCM RN placed call to pt/cg to introduce, offer, and enroll into [facility] program. Pt/cg was not reached, unable to leave message. OPCM RN 1st assessment attempt.  06/23/20- OPCM RN placed call to pt/cg to introduce, offer, and enroll into [facility] program. Pt/cg was not reached, unable to leave message. OPCM RN 2nd assessment attempt. UTR letter mailed out.      CC: ADELFO visit, history reviewed, no changes noted    ROS:Positive No complaints   Negative leaking fluid from the vagina, swelling in her legs, headache, visual changes, low back pain and heartburn      Educated on:US results    A/Plan:  Patient still desires a repeat C/S, will schedule next visit with Dr. Johnson  Repeat US at 32 weeks per [facility] recommendations    f/u in 4 week/s   Madeleine was seen today for routine prenatal visit.    Diagnoses and all orders for this visit:    23 weeks gestation of pregnancy  -     Glucose, Post 50 Gm Glucola; Future  -     CBC (No Diff); Future    Encounter for supervision of normal pregnancy in multigravida  -     Glucose, Post 50 Gm Glucola; Future  -     CBC (No Diff); Future

## 2020-06-22 NOTE — TELEPHONE ENCOUNTER
----- Message from Dyana Petty RN sent at 6/22/2020  9:10 AM CDT -----  Hi,  I spoke to pt for TCC call. She requested a refill on Nicotine patches.    Thanks,  Dyana Petty RN

## 2020-06-24 ENCOUNTER — OFFICE VISIT (OUTPATIENT)
Dept: FAMILY MEDICINE | Facility: CLINIC | Age: 65
End: 2020-06-24
Payer: MEDICARE

## 2020-06-24 VITALS
OXYGEN SATURATION: 93 % | BODY MASS INDEX: 26.78 KG/M2 | RESPIRATION RATE: 20 BRPM | WEIGHT: 145.5 LBS | SYSTOLIC BLOOD PRESSURE: 120 MMHG | HEIGHT: 62 IN | TEMPERATURE: 98 F | HEART RATE: 92 BPM | DIASTOLIC BLOOD PRESSURE: 78 MMHG

## 2020-06-24 DIAGNOSIS — F11.90 CHRONIC, CONTINUOUS USE OF OPIOIDS: ICD-10-CM

## 2020-06-24 DIAGNOSIS — J44.9 CHRONIC OBSTRUCTIVE PULMONARY DISEASE, UNSPECIFIED COPD TYPE: ICD-10-CM

## 2020-06-24 DIAGNOSIS — F31.9 BIPOLAR AFFECTIVE DISORDER, REMISSION STATUS UNSPECIFIED: ICD-10-CM

## 2020-06-24 DIAGNOSIS — Z09 HOSPITAL DISCHARGE FOLLOW-UP: Primary | ICD-10-CM

## 2020-06-24 DIAGNOSIS — J18.9 PNEUMONIA DUE TO INFECTIOUS ORGANISM, UNSPECIFIED LATERALITY, UNSPECIFIED PART OF LUNG: ICD-10-CM

## 2020-06-24 DIAGNOSIS — M79.10 MUSCULAR PAIN: ICD-10-CM

## 2020-06-24 PROCEDURE — 99999 PR PBB SHADOW E&M-EST. PATIENT-LVL V: ICD-10-PCS | Mod: PBBFAC,HCNC,, | Performed by: NURSE PRACTITIONER

## 2020-06-24 PROCEDURE — 99214 OFFICE O/P EST MOD 30 MIN: CPT | Mod: HCNC,S$GLB,, | Performed by: NURSE PRACTITIONER

## 2020-06-24 PROCEDURE — 99999 PR PBB SHADOW E&M-EST. PATIENT-LVL V: CPT | Mod: PBBFAC,HCNC,, | Performed by: NURSE PRACTITIONER

## 2020-06-24 PROCEDURE — 1101F PR PT FALLS ASSESS DOC 0-1 FALLS W/OUT INJ PAST YR: ICD-10-PCS | Mod: HCNC,CPTII,S$GLB, | Performed by: NURSE PRACTITIONER

## 2020-06-24 PROCEDURE — 3008F PR BODY MASS INDEX (BMI) DOCUMENTED: ICD-10-PCS | Mod: HCNC,CPTII,S$GLB, | Performed by: NURSE PRACTITIONER

## 2020-06-24 PROCEDURE — 1159F MED LIST DOCD IN RCRD: CPT | Mod: HCNC,S$GLB,, | Performed by: NURSE PRACTITIONER

## 2020-06-24 PROCEDURE — 1101F PT FALLS ASSESS-DOCD LE1/YR: CPT | Mod: HCNC,CPTII,S$GLB, | Performed by: NURSE PRACTITIONER

## 2020-06-24 PROCEDURE — 99499 UNLISTED E&M SERVICE: CPT | Mod: HCNC,S$GLB,, | Performed by: NURSE PRACTITIONER

## 2020-06-24 PROCEDURE — 3074F SYST BP LT 130 MM HG: CPT | Mod: HCNC,CPTII,S$GLB, | Performed by: NURSE PRACTITIONER

## 2020-06-24 PROCEDURE — 3078F DIAST BP <80 MM HG: CPT | Mod: HCNC,CPTII,S$GLB, | Performed by: NURSE PRACTITIONER

## 2020-06-24 PROCEDURE — 3008F BODY MASS INDEX DOCD: CPT | Mod: HCNC,CPTII,S$GLB, | Performed by: NURSE PRACTITIONER

## 2020-06-24 PROCEDURE — 99214 PR OFFICE/OUTPT VISIT, EST, LEVL IV, 30-39 MIN: ICD-10-PCS | Mod: HCNC,S$GLB,, | Performed by: NURSE PRACTITIONER

## 2020-06-24 PROCEDURE — 1125F AMNT PAIN NOTED PAIN PRSNT: CPT | Mod: HCNC,S$GLB,, | Performed by: NURSE PRACTITIONER

## 2020-06-24 PROCEDURE — 3078F PR MOST RECENT DIASTOLIC BLOOD PRESSURE < 80 MM HG: ICD-10-PCS | Mod: HCNC,CPTII,S$GLB, | Performed by: NURSE PRACTITIONER

## 2020-06-24 PROCEDURE — 99499 RISK ADDL DX/OHS AUDIT: ICD-10-PCS | Mod: HCNC,S$GLB,, | Performed by: NURSE PRACTITIONER

## 2020-06-24 PROCEDURE — 3074F PR MOST RECENT SYSTOLIC BLOOD PRESSURE < 130 MM HG: ICD-10-PCS | Mod: HCNC,CPTII,S$GLB, | Performed by: NURSE PRACTITIONER

## 2020-06-24 PROCEDURE — 1159F PR MEDICATION LIST DOCUMENTED IN MEDICAL RECORD: ICD-10-PCS | Mod: HCNC,S$GLB,, | Performed by: NURSE PRACTITIONER

## 2020-06-24 PROCEDURE — 1125F PR PAIN SEVERITY QUANTIFIED, PAIN PRESENT: ICD-10-PCS | Mod: HCNC,S$GLB,, | Performed by: NURSE PRACTITIONER

## 2020-06-24 NOTE — PROGRESS NOTES
Routine Office Visit    Patient Name: Rossi Mcneal    : 1955  MRN: [id number]    Chief Complaint:  Hospital F/U    Subjective:  Rossi is a 64 y.o. female who presents today for:    1. Hospital F/U:    [facility]  Discharge Summary        Patient Name: Rossi Mcneal  MRN: [id number]  Admission Date: 2020  Hospital Length of Stay: 2 days  Discharge Date and Time:  2020 4:27 PM  Attending Physician: Christina att. providers found   Discharging Provider: Epi Abbott MD  Primary Care Provider: Leobardo Corcoran MD        HPI:   Rossi Mcneal is a 64 y.o. female with PMHx of emphysema, hypertension, anxiety chronic pain, GERD, hypertension and uterine cancer status post partial hysterectomy who presents to the hospital today complaining of gradually worsening shortness of breath for 1 week.  Patient states she was evaluated here in this ED approximately 1 week ago upon initial onset of symptoms and received antibiotics and steroids which have provided minimal relief.  She also complains of productive cough with clear sputum and right-sided chest pain that is worse with deep inspiration.  Patient states that she had a follow-up appointment with her PCP, Dr. Corcoran, today but had an anxiety attack and had difficulty breathing.  Therefore, EMS was called and patient was brought to the emergency department for further evaluation.  Patient states that she has tried quitting smoking by using nicotine patches.  Denies fever, chills, nausea, vomiting, urinary complaints.     Consults:           Consults (From admission, onward)         Status Ordering Provider       Inpatient consult to Psychiatry  Once     Provider:  Dany Crawley MD    Completed EPI ABBOTT         Hospital Course By Problem:   * Acute hypoxemic respiratory failure  -Mrs. Mcneal was admitted to inpatient status  -Noted O2 sats in 70s at PCP office per patient.  In ER noted 88% on room air  -Noted  treatment with antibiotics as outpatient for recently diagnosed pneumonia as well as ongoing tobacco usage.  This would represent a failure of outpatient antibiotics.  -Respiratory failure likely multifactorial due to pneumonia, COPD, tobacco usage, significant anxiety and questionable pulmonary edema.  -No evidence of PE on CTA a few days ago.  No evidence of ACS - all troponins negative.  -Notes recent significant swelling but this resolved after having received IV lasix.  Echo showed normal EF and normal diastolic function.  -She was treated with broad spectrum antibiotics, spirometry, supplemental O2 and albuterol.  Add tessalon and guaifenesin, nebulizers  -She clinically had a significant improvement and felt much better by time of discharge.  She will be discharged and will complete a course of antibiotics.  She will be started on breo and will continue her albuterol inhaler.  Ambulatory referral to pulmonology was placed at discharge.  -Six minute walk test confirmed that she would need nasal canula O2, which we ordered for her on day of discharge.    -She will follow up with pcp within 1-2 weeks.     Anxiety  -Noted history of SHIRIN and bipolar disorder  -Had panic attack at home on day prior to admit and subsequently took double dose of venlafaxine.  Was noted to be manic in hospital  -She was seen by psychiatry who recommended discontinuing valium, dcreasing dose of venlafaxine to daily for 4 days and then resuming twice daily dosing and starting trileptal for mood stabilization  -She was given information for two sites to pursue psychiatric follow-up and encouraged to follow up within 1-2 weeks.     GERD (gastroesophageal reflux disease)  -Continue PPI     Tobacco use  -Counselled on cessation for 7 minutes.  -NRT ordered.     Hypertension, essential  -Appears well controlled  -Continue home lisinopril     Chronic, continuous use of opioids  -Patient with chronic use of opioids given multiple orthopedic  injuries.  -Continue home pain medications     Since Discharge:    She states that she is feeling much better since being discharged.  In terms of her breathing, she no longer has shortness of breath or wheezing, but she still has a slightly productive cough which is causing muscular chest as well as upper back pain.  She denies any cardiac chest pain, wheezing, shortness of breath, or palpitations.  She is requesting a refill of her chronic pain medication for this muscular pain.  She states that she cannot take NSAIDs or Tylenol because of her liver function.  She is no longer smoking.  She uses home oxygenation intermittently, more specifically she States at she uses 2L nasal cannula when she is active while cleaning around the house and sometimes when she is sleeping.  She uses albuterol as needed but she has not been using breo daily.  She is still taking Levaquin and states that she has about 4 days left of the antibiotic for her pneumonia.    She tells me she was given the numbers for 2 psychiatric clinics to establish care with a new psychiatrist.  She was given the numbers for [facility] and [facility] but has not contacted them.  She is stable on venlafaxine and Trileptal which was started at the hospital.  She is requesting a refill of her Valium and chronic pain medication.    She states that she feels more grounded in terms of her mental status.  She has been resting at home and focusing on getting herself better and healing herself.  She appears to be in a much better mood today and is in no acute distress.  Her speech is not rapid and she does not appear to be hyperactive.    Past Medical History  Past Medical History:   Diagnosis Date    Addiction to drug     History of prescription pill addiction    Bipolar 1 disorder     Emphysema     Hepatitis C     History of psychiatric hospitalization     Hx of psychiatric care     Hypertension     Neuromuscular disorder     Psychiatric  problem     Therapy        Past Surgical History  Past Surgical History:   Procedure Laterality Date    ESOPHAGOGASTRODUODENOSCOPY N/A 9/19/2019    Procedure: ESOPHAGOGASTRODUODENOSCOPY (EGD);  Surgeon: Jose Luis Hyman MD;  Location[facility];  Service: Endoscopy;  Laterality: N/A;    HYSTERECTOMY      NECK SURGERY  1990    PARTIAL HYSTERECTOMY  1995       Family History  Family History   Problem Relation Age of Onset    Hypertension Mother     Cancer Father     Parkinsonism Father     Breast cancer Maternal Aunt        Social History  Social History     Socioeconomic History    Marital status:      Spouse name: Not on file    Number of children: 2    Years of education: Not on file    Highest education level: Not on file   Occupational History    Not on file   Social Needs    Financial resource strain: Not on file    Food insecurity     Worry: Not on file     Inability: Not on file    Transportation needs     Medical: Not on file     Non-medical: Not on file   Tobacco Use    Smoking status: Current Every Day Smoker     Packs/day: 0.50     Years: 18.00     Pack years: 9.00     Types: Cigarettes    Smokeless tobacco: Never Used   Substance and Sexual Activity    Alcohol use: No     Frequency: Never     Comment: Alcoholism and rehab in her 20s    Drug use: No     Comment: History of prescription pill addiction, mostly benzos/opioids/muscle relaxers    Sexual activity: Not on file   Lifestyle    Physical activity     Days per week: Not on file     Minutes per session: Not on file    Stress: Not on file   Relationships    Social connections     Talks on phone: Not on file     Gets together: Not on file     Attends [employer] service: Not on file     Active member of club or organization: Not on file     Attends meetings of clubs or organizations: Not on file     Relationship status: Not on file   Other Topics Concern    Patient feels they ought to cut down on drinking/drug use Not  Asked    Patient annoyed by others criticizing their drinking/drug use Not Asked    Patient has felt bad or guilty about drinking/drug use Not Asked    Patient has had a drink/used drugs as an eye opener in the AM Not Asked   Social History Narrative    Not on file       Current Medications  Current Outpatient Medications on File Prior to Visit   Medication Sig Dispense Refill    albuterol (VENTOLIN HFA) 90 mcg/actuation inhaler inhale TWO puffs into the lungs EVERY 6 HOURS AS NEEDED FOR WHEEZING 18 g 2    fluticasone furoate-vilanteroL (BREO) 100-25 mcg/dose diskus inhaler Inhale 1 puff into the lungs once daily. Controller 60 each 1    fluticasone propionate (FLONASE) 50 mcg/actuation nasal spray 2 sprays (100 mcg total) by Each Nare route once daily. 1 Bottle 0    gabapentin (NEURONTIN) 300 MG capsule Take 1 capsule (300 mg total) by mouth 2 (two) times daily. 60 capsule 1    levoFLOXacin (LEVAQUIN) 750 MG tablet Take 1 tablet (750 mg total) by mouth once daily. 5 tablet 0    lisinopril 10 MG tablet TAKE ONE TABLET BY MOUTH DAILY 90 tablet 3    methocarbamoL (ROBAXIN) 500 MG Tab Take 2 tablets (1,000 mg total) by mouth 3 (three) times daily. for 5 days 30 tablet 0    nicotine (NICODERM CQ) 21 mg/24 hr place ONE PATCH onto the skin EVERY DAY 14 patch 1    nicotine (NICODERM CQ) 21 mg/24 hr PLACE ONE PATCH ONTO SKIN EVERY DAY 14 patch 1    OXcarbazepine (TRILEPTAL) 150 MG Tab Take 1 tablet (150 mg total) by mouth 2 (two) times daily. 60 tablet 1    oxyCODONE (ROXICODONE) 5 MG immediate release tablet Take 1 tablet (5 mg total) by mouth every 6 (six) hours as needed for Pain. 8 tablet 0    pantoprazole (PROTONIX) 40 MG tablet Take 1 tablet (40 mg total) by mouth once daily. 30 tablet 5    promethazine-codeine 6.25-10 mg/5 ml (PHENERGAN WITH CODEINE) 6.25-10 mg/5 mL syrup Take 5 mLs by mouth every 6 (six) hours as needed for Cough. 118 mL 0    venlafaxine (EFFEXOR) 75 MG tablet Take one tablet po  "daily for 2 days then take one tablet po twice daily 30 tablet 11    benzonatate (TESSALON) 100 MG capsule Take 1 capsule (100 mg total) by mouth 3 (three) times daily as needed for Cough. (Patient not taking: Reported on 6/24/2020) 30 capsule 1    benzonatate (TESSALON) 100 MG capsule Take 1 capsule (100 mg total) by mouth 3 (three) times daily as needed. (Patient not taking: Reported on 6/24/2020) 20 capsule 0    ketorolac (TORADOL) 10 mg tablet Take 1 tablet (10 mg total) by mouth every 6 (six) hours as needed for Pain. (Patient not taking: Reported on 6/24/2020) 15 tablet 0     No current facility-administered medications on file prior to visit.        Allergies   Review of patient's allergies indicates:  No Known Allergies    Review of Systems (Pertinent positives)  Review of Systems   Constitutional: Negative for chills, diaphoresis, fever, malaise/fatigue and weight loss.   HENT: Negative.    Eyes: Negative.    Respiratory: Positive for cough and sputum production. Negative for hemoptysis, shortness of breath and wheezing.    Cardiovascular: Negative for chest pain, palpitations, orthopnea, claudication, leg swelling and PND.   Gastrointestinal: Negative.    Genitourinary: Negative.    Musculoskeletal: Positive for myalgias.   Skin: Negative.    Neurological: Negative for dizziness, tingling and headaches.   Endo/Heme/Allergies: Negative.    Psychiatric/Behavioral: Negative for depression, hallucinations, memory loss, substance abuse and suicidal ideas. The patient is not nervous/anxious and does not have insomnia.        /78 (BP Location: Left arm)   Pulse 92   Temp 97.5 °F (36.4 °C) (Oral)   Resp 20   Ht 5' 2" (1.575 m)   Wt 66 kg (145 lb 8.1 oz)   SpO2 (!) 93%   BMI 26.61 kg/m²     Physical Exam  Vitals signs reviewed.   Constitutional:       General: She is not in acute distress.     Appearance: Normal appearance. She is not ill-appearing, toxic-appearing or diaphoretic.   Neck:      " Musculoskeletal: Normal range of motion and neck supple. No neck rigidity or muscular tenderness.   Cardiovascular:      Rate and Rhythm: Normal rate and regular rhythm.      Pulses: Normal pulses.      Heart sounds: Normal heart sounds. No murmur. No friction rub. No gallop.    Pulmonary:      Effort: Pulmonary effort is normal. No respiratory distress.      Breath sounds: Normal breath sounds. No stridor. No wheezing, rhonchi or rales.   Chest:      Chest wall: No tenderness.   Abdominal:      General: Abdomen is flat. Bowel sounds are normal. There is no distension.      Palpations: Abdomen is soft. There is no mass.      Tenderness: There is no abdominal tenderness. There is no right CVA tenderness, left CVA tenderness, guarding or rebound.      Hernia: No hernia is present.   Musculoskeletal:      Right lower leg: No edema.      Left lower leg: No edema.   Skin:     General: Skin is warm and dry.      Capillary Refill: Capillary refill takes less than 2 seconds.   Neurological:      General: No focal deficit present.      Mental Status: She is alert and oriented to person, place, and time.      Cranial Nerves: No cranial nerve deficit.      Sensory: No sensory deficit.      Motor: No weakness.      Coordination: Coordination normal.      Gait: Gait normal.      Deep Tendon Reflexes: Reflexes normal.   Psychiatric:         Attention and Perception: Attention normal. She is attentive.         Mood and Affect: Mood and affect normal. Mood is not anxious or elated. Affect is not labile or tearful.         Speech: Speech normal. Speech is not rapid and pressured or tangential.         Behavior: Behavior normal. Behavior is cooperative.          Assessment/Plan:  Rossi Mcneal is a 64 y.o. female who presents today for :    Rossi was seen today for hospital follow up.    Diagnoses and all orders for this visit:    Hospital discharge follow-up    The patient was seen today for hospital discharge follow-up.  All  questions were answered to the patient's satisfaction.  Her home medication regimen was reviewed and reconciled with the patient.    Pneumonia due to infectious organism, unspecified laterality, unspecified part of lung    Resolving.  No shortness of breath or wheezing.  Breath sounds clear.  Educated patient that cough can persist for number of weeks after pneumonia.  She should finish the Levaquin regimen which she was given.  She reports no side effects from the Levaquin regimen.    Chronic obstructive pulmonary disease, unspecified COPD type  -     Ambulatory referral/consult to Pulmonology; Future    Sats 93 on room air today.  She has home O2.  I encouraged her to wear this if she feels short of breath and continue to wear this when she is active or sleeping as needed.  Will refer to pulmonology for further evaluation. Reminded patient that Breo is a daily medication.    Bipolar affective disorder, remission status unspecified  -     Ambulatory referral/consult to Psychiatry; Future    Her mood is stable today.  She is attentive with normal speech and her affect is not labile.  She should continue with the medications prescribed to her after her hospital discharge.  I will refer her to  Psychiatry.  She was encouraged to contact [facility], [facility], as well as [facility] to see who she can get in to see sooner.    Chronic, continuous use of opioids    I instructed the patient that I cannot refill her chronic pain medication or Valium.  I will however send a message to her PCP to see if he is amenable to refilling this.      Muscular pain    From frequent coughing.  Recommended topical heat and use of p.r.n. chronic pain medication.    Patient is scheduled follow-up with her PCP next week.  I encouraged her to maintain his follow-up.  She should follow-up in the meantime for any acute concerns.  Patient verbalized understanding of instructions.        This office note has been  dictated.  This dictation has been generated using M-Modal Fluency Direct dictation; some phonetic errors may occur.   My collaborating physician is Dr. Dayday Paige.

## 2020-06-24 NOTE — Clinical Note
Dr. Corcoran,    Saw Mrs. Richey today. Her mood and breathing are much better. She is requesting a refill of her valium and pain medication. Don't know if you can refill those for her but I told her I would ask.

## 2020-06-26 ENCOUNTER — TELEPHONE (OUTPATIENT)
Dept: ADMINISTRATIVE | Facility: HOSPITAL | Age: 65
End: 2020-06-26

## 2020-06-26 RX ORDER — OXYCODONE HYDROCHLORIDE 5 MG/1
5 TABLET ORAL EVERY 6 HOURS PRN
Qty: 8 TABLET | Refills: 0 | OUTPATIENT
Start: 2020-06-26

## 2020-06-26 NOTE — TELEPHONE ENCOUNTER
Called pt and informed about refill request response from PCP. Pt verbalized understanding and states will keep upcoming office visit appt. Appt confirmed.

## 2020-06-26 NOTE — TELEPHONE ENCOUNTER
----- Message from Jaimie Zhou sent at 6/26/2020  3:01 PM CDT -----  Regarding: REFILL  Contact: Patient  Type: RX Refill Request    Who Called: Patient     Have you contacted your pharmacy:     Refill or New Rx: Refill     RX Name and Strength: oxyCODONE (ROXICODONE) 5 MG immediate release tablet, diazepam (VALIUM) 10 MG Tab     How is the patient currently taking it? (ex. 1XDay):    Is this a 30 day or 90 day RX: 8 pills    Preferred Pharmacy with phone number:   [facility] [place] - [address]  [address]  [place] [address]  Phone: [phone] Fax: [phone]        Local or Mail Order: Local     Ordering Provider: Dr. Corcoran     Would the patient rather a call back or a response via [facility]? Call back     Best Call Back Number: [phone]

## 2020-06-30 ENCOUNTER — OFFICE VISIT (OUTPATIENT)
Dept: FAMILY MEDICINE | Facility: CLINIC | Age: 65
End: 2020-06-30
Payer: MEDICARE

## 2020-06-30 VITALS
HEIGHT: 62 IN | HEART RATE: 107 BPM | DIASTOLIC BLOOD PRESSURE: 84 MMHG | SYSTOLIC BLOOD PRESSURE: 107 MMHG | BODY MASS INDEX: 25.76 KG/M2 | OXYGEN SATURATION: 98 % | TEMPERATURE: 97 F | RESPIRATION RATE: 17 BRPM | WEIGHT: 140 LBS

## 2020-06-30 DIAGNOSIS — K74.60 CIRRHOSIS OF LIVER WITHOUT ASCITES, UNSPECIFIED HEPATIC CIRRHOSIS TYPE: ICD-10-CM

## 2020-06-30 DIAGNOSIS — G95.9 MYELOPATHY: ICD-10-CM

## 2020-06-30 PROCEDURE — 3079F DIAST BP 80-89 MM HG: CPT | Mod: HCNC,CPTII,S$GLB, | Performed by: INTERNAL MEDICINE

## 2020-06-30 PROCEDURE — 1159F MED LIST DOCD IN RCRD: CPT | Mod: HCNC,S$GLB,, | Performed by: INTERNAL MEDICINE

## 2020-06-30 PROCEDURE — 99213 PR OFFICE/OUTPT VISIT, EST, LEVL III, 20-29 MIN: ICD-10-PCS | Mod: HCNC,S$GLB,, | Performed by: INTERNAL MEDICINE

## 2020-06-30 PROCEDURE — 99999 PR PBB SHADOW E&M-EST. PATIENT-LVL III: CPT | Mod: PBBFAC,HCNC,, | Performed by: INTERNAL MEDICINE

## 2020-06-30 PROCEDURE — 1101F PT FALLS ASSESS-DOCD LE1/YR: CPT | Mod: HCNC,CPTII,S$GLB, | Performed by: INTERNAL MEDICINE

## 2020-06-30 PROCEDURE — 3074F PR MOST RECENT SYSTOLIC BLOOD PRESSURE < 130 MM HG: ICD-10-PCS | Mod: HCNC,CPTII,S$GLB, | Performed by: INTERNAL MEDICINE

## 2020-06-30 PROCEDURE — 3074F SYST BP LT 130 MM HG: CPT | Mod: HCNC,CPTII,S$GLB, | Performed by: INTERNAL MEDICINE

## 2020-06-30 PROCEDURE — 99999 PR PBB SHADOW E&M-EST. PATIENT-LVL III: ICD-10-PCS | Mod: PBBFAC,HCNC,, | Performed by: INTERNAL MEDICINE

## 2020-06-30 PROCEDURE — 3008F PR BODY MASS INDEX (BMI) DOCUMENTED: ICD-10-PCS | Mod: HCNC,CPTII,S$GLB, | Performed by: INTERNAL MEDICINE

## 2020-06-30 PROCEDURE — 1101F PR PT FALLS ASSESS DOC 0-1 FALLS W/OUT INJ PAST YR: ICD-10-PCS | Mod: HCNC,CPTII,S$GLB, | Performed by: INTERNAL MEDICINE

## 2020-06-30 PROCEDURE — 3079F PR MOST RECENT DIASTOLIC BLOOD PRESSURE 80-89 MM HG: ICD-10-PCS | Mod: HCNC,CPTII,S$GLB, | Performed by: INTERNAL MEDICINE

## 2020-06-30 PROCEDURE — 1126F AMNT PAIN NOTED NONE PRSNT: CPT | Mod: HCNC,S$GLB,, | Performed by: INTERNAL MEDICINE

## 2020-06-30 PROCEDURE — 99499 UNLISTED E&M SERVICE: CPT | Mod: HCNC,S$GLB,, | Performed by: INTERNAL MEDICINE

## 2020-06-30 PROCEDURE — 1126F PR PAIN SEVERITY QUANTIFIED, NO PAIN PRESENT: ICD-10-PCS | Mod: HCNC,S$GLB,, | Performed by: INTERNAL MEDICINE

## 2020-06-30 PROCEDURE — 99499 RISK ADDL DX/OHS AUDIT: ICD-10-PCS | Mod: HCNC,S$GLB,, | Performed by: INTERNAL MEDICINE

## 2020-06-30 PROCEDURE — 1159F PR MEDICATION LIST DOCUMENTED IN MEDICAL RECORD: ICD-10-PCS | Mod: HCNC,S$GLB,, | Performed by: INTERNAL MEDICINE

## 2020-06-30 PROCEDURE — 3008F BODY MASS INDEX DOCD: CPT | Mod: HCNC,CPTII,S$GLB, | Performed by: INTERNAL MEDICINE

## 2020-06-30 PROCEDURE — 99213 OFFICE O/P EST LOW 20 MIN: CPT | Mod: HCNC,S$GLB,, | Performed by: INTERNAL MEDICINE

## 2020-06-30 NOTE — PROGRESS NOTES
"Subjective:       Patient ID: Rossi Mcneal is a 64 y.o. female.    Chief Complaint: Establish Care and Follow-up    F/u chronic pain    HPI: 65 y/o w/ cirrhosis chronic neck and lower back pain s/p fusion presents for follow up. Primary complaint today is chronic lower back pain. She has been discharged from two pain clinics for taking medicaiton above prescribed dosages. She had recent admission for robb after taking prescribed steroids and increasing her effexor. She was weaned down on effexor. She has not yet contacted psychiatrist for follow up. Mood has improved sleep "fine" she asks repeatedly for "just a few pain pills"     Review of Systems   Constitutional: Negative for activity change, appetite change, fatigue, fever and unexpected weight change.   HENT: Negative for ear pain, rhinorrhea and sore throat.    Eyes: Negative for discharge and visual disturbance.   Respiratory: Negative for chest tightness, shortness of breath and wheezing.    Cardiovascular: Negative for chest pain, palpitations and leg swelling.   Gastrointestinal: Negative for abdominal pain, constipation and diarrhea.   Endocrine: Negative for cold intolerance and heat intolerance.   Genitourinary: Negative for dysuria and hematuria.   Musculoskeletal: Positive for back pain and neck pain. Negative for joint swelling and neck stiffness.   Skin: Negative for rash.   Neurological: Negative for dizziness, syncope, weakness and headaches.   Psychiatric/Behavioral: Negative for suicidal ideas.       Objective:     Vitals:    06/30/20 1416   BP: 107/84   BP Location: Right arm   Patient Position: Sitting   BP Method: Medium (Automatic)   Pulse: 107   Resp: 17   Temp: 97.1 °F (36.2 °C)   TempSrc: Oral   SpO2: 98%   Weight: 63.5 kg (139 lb 15.9 oz)   Height: 5' 2" (1.575 m)          Physical Exam  Constitutional:       General: She is not in acute distress.     Appearance: Normal appearance. She is well-developed. She is not ill-appearing, " toxic-appearing or diaphoretic.   HENT:      Head: Normocephalic and atraumatic.   Eyes:      General: No scleral icterus.     Conjunctiva/sclera: Conjunctivae normal.   Neck:      Musculoskeletal: Normal range of motion.   Cardiovascular:      Rate and Rhythm: Normal rate and regular rhythm.      Heart sounds: No murmur. No friction rub. No gallop.    Pulmonary:      Effort: Pulmonary effort is normal.      Breath sounds: Normal breath sounds. No wheezing or rales.   Abdominal:      General: There is no distension.      Palpations: Abdomen is soft.      Tenderness: There is no abdominal tenderness. There is no guarding or rebound.   Skin:     General: Skin is warm and dry.   Neurological:      Mental Status: She is alert and oriented to person, place, and time.      Cranial Nerves: No cranial nerve deficit.   Psychiatric:         Mood and Affect: Mood normal.         Thought Content: Thought content normal.      Comments: Clear linear speech atremulous         Assessment and Plan   1. Cirrhosis of liver without ascites, unspecified hepatic cirrhosis type  Discussed seh can take up to 2 grams of acetaminophen per day (one 500mg every six hours) no opioids prescribed due to previous overdose she is to follow up with pain management    2. Myelopathy  As above

## 2020-07-02 DIAGNOSIS — M50.90 CERVICAL DISC DISEASE: ICD-10-CM

## 2020-07-02 RX ORDER — DIAZEPAM 10 MG/1
10 TABLET ORAL DAILY PRN
Qty: 15 TABLET | Refills: 0 | Status: SHIPPED | OUTPATIENT
Start: 2020-07-02 | End: 2020-07-14

## 2020-07-03 ENCOUNTER — NURSE TRIAGE (OUTPATIENT)
Dept: ADMINISTRATIVE | Facility: CLINIC | Age: 65
End: 2020-07-03

## 2020-07-03 NOTE — TELEPHONE ENCOUNTER
No answer x2, messages left x2    Reason for Disposition   Message left on identified voice mail    Additional Information   Negative: Caller is angry or rude (e.g., hangs up, verbally abusive, yelling)   Negative: Caller hangs up   Negative: Caller has already spoken with the PCP and has no further questions.   Negative: Caller has already spoken with another triager and has no further questions.   Negative: Caller has already spoken with another triager or PCP AND has further questions AND triager able to answer questions.   Negative: No answer.  First attempt to contact caller.  Follow-up call scheduled within 15 minutes.   Negative: Busy signal.  First attempt to contact caller.  Follow-up call scheduled within 15 minutes.    Protocols used: NO CONTACT OR DUPLICATE CONTACT CALL-A-

## 2020-07-07 ENCOUNTER — TELEPHONE (OUTPATIENT)
Dept: FAMILY MEDICINE | Facility: CLINIC | Age: 65
End: 2020-07-07

## 2020-07-07 NOTE — TELEPHONE ENCOUNTER
----- Message from Carolina Galvez MA sent at 7/2/2020 10:32 AM CDT -----  Regarding: FW: orders  Please advise.. pt requesting orders  ----- Message -----  From: Kiana Alexandre  Sent: 7/2/2020  10:20 AM CDT  To: [facility] Staff  Subject: orders                                           Type: Patient Call Back    Who called:Rossi     What is the request in detail:The patient is requesting orders for the following: blood pressure machine, oxygen reader, home health aide to help her with daily tasks and a portable oxygen tank. She would like it sent to [facility], patient stated that she is eligible.    [facility]    Would the patient rather a call back or a response via [facility]? Call back     Best call back number: [phone]

## 2020-07-08 ENCOUNTER — TELEPHONE (OUTPATIENT)
Dept: FAMILY MEDICINE | Facility: CLINIC | Age: 65
End: 2020-07-08

## 2020-07-08 NOTE — TELEPHONE ENCOUNTER
----- Message from Porsha Ba sent at 7/8/2020  3:53 PM CDT -----  Regarding: pain meds  Type: Patient Call Back    Who called:pt    What is the request in detail:pt is requesting pain medication for migraines. Not sleeping. Call pt    Can the clinic reply by [facility]?    Would the patient rather a call back or a response via [facility]? call    Best call back number:[phone] (home)       Additional Information:

## 2020-07-09 ENCOUNTER — TELEPHONE (OUTPATIENT)
Dept: FAMILY MEDICINE | Facility: CLINIC | Age: 65
End: 2020-07-09

## 2020-07-09 DIAGNOSIS — M50.30 DISC DISEASE, DEGENERATIVE, CERVICAL: Primary | ICD-10-CM

## 2020-07-09 RX ORDER — BUTALBITAL, ACETAMINOPHEN AND CAFFEINE 50; 325; 40 MG/1; MG/1; MG/1
1 TABLET ORAL EVERY 6 HOURS PRN
Qty: 60 TABLET | Refills: 0 | Status: SHIPPED | OUTPATIENT
Start: 2020-07-09 | End: 2020-08-12

## 2020-07-09 NOTE — TELEPHONE ENCOUNTER
"----- Message from Jennifer Aguirre sent at 7/9/2020  9:32 AM CDT -----  Regarding: Patient Access  Name of Who is Calling:  Rossi Mcneal    What is the request in detail:  Patient called stating, "she's returning your call and would like you to please call again."      thanks!      Reply by [facility]: no      Call Back :  [phone] (C)                                        "

## 2020-07-09 NOTE — TELEPHONE ENCOUNTER
I spoke to the pt and she reports that she is seeing Psychiatry as virtual visit. She is awaiting appointment with pain management. She is requesting Fioricet be called in for her headaches. Please advise

## 2020-07-14 ENCOUNTER — TELEPHONE (OUTPATIENT)
Dept: FAMILY MEDICINE | Facility: CLINIC | Age: 65
End: 2020-07-14

## 2020-07-14 DIAGNOSIS — F31.9 BIPOLAR 1 DISORDER: ICD-10-CM

## 2020-07-14 DIAGNOSIS — F31.9 BIPOLAR 1 DISORDER: Primary | ICD-10-CM

## 2020-07-14 DIAGNOSIS — M50.90 CERVICAL DISC DISEASE: ICD-10-CM

## 2020-07-14 RX ORDER — VENLAFAXINE 75 MG/1
TABLET ORAL
Qty: 30 TABLET | Refills: 11 | Status: SHIPPED | OUTPATIENT
Start: 2020-07-14 | End: 2020-07-14

## 2020-07-14 RX ORDER — VENLAFAXINE 75 MG/1
75 TABLET ORAL DAILY
Qty: 90 TABLET | Refills: 0 | Status: SHIPPED | OUTPATIENT
Start: 2020-07-14 | End: 2021-02-08 | Stop reason: SDUPTHER

## 2020-07-14 RX ORDER — OXCARBAZEPINE 150 MG/1
150 TABLET, FILM COATED ORAL 2 TIMES DAILY
Qty: 60 TABLET | Refills: 1 | Status: SHIPPED | OUTPATIENT
Start: 2020-07-14 | End: 2020-11-12

## 2020-07-14 RX ORDER — DIAZEPAM 10 MG/1
TABLET ORAL
Qty: 5 TABLET | Refills: 0 | Status: SHIPPED | OUTPATIENT
Start: 2020-07-14 | End: 2020-08-07 | Stop reason: DRUGHIGH

## 2020-07-14 NOTE — TELEPHONE ENCOUNTER
----- Message from Liana Vasquez sent at 7/14/2020  2:13 PM CDT -----  Contact: [facility]  Type: Patient Call Back    Who called: [facility] pharmacy     What is the request in detail: [facility]  is requesting a call back in regards to the venlafaxine (EFFEXOR) 75 MG tablet. He would like to verify the directions.     [facility] - [place], [place] - [address]  Please advise.    Can the clinic reply by [facility]? No    Best call back number: [phone]    Additional Information: N/A

## 2020-07-14 NOTE — TELEPHONE ENCOUNTER
----- Message from Emily Oliva MA sent at 7/14/2020  1:24 PM CDT -----  Regarding: FW: Electronic Prescriptions/Refill  Contact: PT    ----- Message -----  From: Padmini Mendez  Sent: 7/14/2020  12:45 PM CDT  To: Dulce CEBALLOS Staff  Subject: Electronic Prescriptions/Refill                  Patient said that she was supposed to have 3 scripts called in electronically, but they were never sent. She is completely out of her medication. She spoke with the Doctor on the 8th and is now trying to follow up and see why they were not called in.      1. DIAZOPAM 10mg, 2 a day  2. Effexor 75mg, 2 times a day  3. Trilepal 150mg, 2 a day     Patient wants it sent to [facility] in [facility]   [phone]

## 2020-07-14 NOTE — TELEPHONE ENCOUNTER
I called and spoke to the pharmacy and they are inquiring about the Effexor prescription. Per PCP change direction to Effexor 75 MG one Po daily #90 with three refills.

## 2020-07-27 ENCOUNTER — TELEPHONE (OUTPATIENT)
Dept: HEPATOLOGY | Facility: CLINIC | Age: 65
End: 2020-07-27

## 2020-07-27 NOTE — TELEPHONE ENCOUNTER
Contacted patient and rescheduled appointment to a later time as requested by patient. Sent reminders in the mail.     Call:     Angie Berg Jolynn Staff   Caller: Unspecified (Today,  9:35 AM)             Pt is calling to reschedule appt   Pt#[phone]

## 2020-07-30 DIAGNOSIS — M50.90 CERVICAL DISC DISEASE: ICD-10-CM

## 2020-07-30 RX ORDER — DIAZEPAM 10 MG/1
TABLET ORAL
Qty: 5 TABLET | Refills: 0 | OUTPATIENT
Start: 2020-07-30

## 2020-08-07 ENCOUNTER — TELEPHONE (OUTPATIENT)
Dept: FAMILY MEDICINE | Facility: CLINIC | Age: 65
End: 2020-08-07

## 2020-08-07 ENCOUNTER — OUTPATIENT CASE MANAGEMENT (OUTPATIENT)
Dept: ADMINISTRATIVE | Facility: OTHER | Age: 65
End: 2020-08-07

## 2020-08-07 DIAGNOSIS — M50.90 CERVICAL DISC DISEASE: ICD-10-CM

## 2020-08-07 RX ORDER — DIAZEPAM 10 MG/1
TABLET ORAL
Qty: 5 TABLET | Refills: 0 | OUTPATIENT
Start: 2020-08-07

## 2020-08-07 RX ORDER — DIAZEPAM 5 MG/1
5 TABLET ORAL EVERY 12 HOURS PRN
Qty: 10 TABLET | Refills: 0 | Status: SHIPPED | OUTPATIENT
Start: 2020-08-07 | End: 2021-03-09

## 2020-08-07 NOTE — LETTER
August 7, 2020    Rossi Mcneal  [address]  [facility]  [place] [address]             [facility] [address] [place] [address] Dear Ms. Rossi Sanchezphillip:    Welcome to [facility]s Complex Care Management Program.  It was a pleasure talking with you today.  My name is Vanna Castrejon RN. I look forward to working with you as your . My goal is to help you function at the healthiest and highest level possible. You can contact me directly at [phone].    As an [facility] patient with [employer], some of the services we may be able to provide include:      Development of an individualized care plan with a Registered Nurse    Connection with a    Connection with available resources and services     Coordinate communication among your care team members    Provide coaching and education    Help you understand your doctors treatment plan   Help you obtain information about your insurance coverage.     All services provided by [facility]s Complex Care Managers and other care team members are coordinated with and communicated to your primary care team.    As part of your enrollment, you will be receiving education materials and more information about these services in your [facility] account, by phone or through the mail.  If you do not wish to participate or receive information, please contact our office at [phone].      Sincerely,    Vanna Castrejon RN   [facility]   Out-patient RN Complex Care Manager

## 2020-08-07 NOTE — PROGRESS NOTES
"Outpatient Care Management  Initial Patient Assessment    Patient: Rossi Mcneal  MRN: [id number]  Date of Service: 08/07/2020  Completed by: Vanna Castrejon RN  Referral Date: 06/18/2020  Program: Case Management (High Risk)    Reason for Visit   Patient presents with    OPCM Chart Review    OPCM Enrollment Call    Initial Assessment    Plan Of Care    PHQ-9     08/07/20     Brief Summary: OPCM received inbound call from Ms. Mcneal requesting assistance with her medical health care. Pt was previously outreached for OPCM services but was not contacted. Pt has a risk score of 82%. Pt is a 64 y.o female with a PMHX  chronic neck and lower back pain s/p fusion, COPD, HTN, Anemia,Cirrhosis.Pt lives alone in an apartment she rents. Pt is able to perform her ADL's/ IAD's independently but does use oxygen intermittently (on 2L O2). Pt uses O2 off and on, mostly when she has to use physical force (climbing 18 stairs to enter apartment). According to pt she has been having a very difficult time described not being able to sleep, poor appetite (has lost 15 lbs), high anxiety, feeling very nervous. Pt has been taken off previously prescribed pain medications by her PCP Dr. Corcoran. Ms. Mcneal has been referred to pain management, however she was recently informed by provider's office that there was a lengthy wait time to see provider, no appointment could be established presently. Pt inquired if she would be able to be referred to another pain specialist so she could be seen sooner. Pt has attempted to contact PCP and is waiting to hear back from him. OPC offered to sent in basket message to PCP with pt's inquiry. OPCM did advise PCP would make a determination on prescribing any further pain medications and referral. Pt verbalized understanding. Pt states she is feeling pain "all over my body shoulder, neck, legs, back" rated pain at 9-10/10. Ms. Mcneal was also receiving mental health services through [facility] but chose to terminate services as she could not continue the regimen requirements of the program. Pt was speaking with psychiatrist but stated "I can't be on the phone 2 hours four times a week".   OPC reviewed pt's medical conditions with pt. Pt stated she is having trouble managing her COPD and HTN conditions. Ms. Mcneal did not have knowledge of triggers that can affect her COPD. Triggers and irritants were reviewed with pt in detail. Pt did disclose she is smoking and is not ready to quit. OPCM suggested pt consider attempting to decrease smoking as it does affect her ability to manage her COPD. Medication reconciliation was completed in depth with pt. Upon review pt detailed she was not taking Breo as instructed. OPCM requested pt open medication, reviewed medication administration step by step, and ensured pt administered her daily dose. Pt stated she will begin taking medication daily. Pt also reports she feels her BP is "going up and down". Pt does not BP monitor to assess BP readings. [facility] discussed pt's benefits available through [employer]. [facility] informed pt that she is able to place monthly OTC order with [employer]. Pt does have catalog but has not reviewed approved items. [facility] encouraged pt to acquire BP monitor which would assist with assessing BP daily. Contact information to OTC benefit was provided to pt. Pt was receptive to [facility]'s suggestion, stated she would call [employer].  Pt was advised she would be referred to [facility] for additional resources and for assistance with completing advance directive. [facility] informed pt that educational information would be mailed to her, requested pt read information, and would answer questions that pt may have. [facility]'s contact information was provided. Pt agreed to contact communication with [facility].    Assessment Documentation     [facility] Initial Assessment    Involvement of Care  Do I have permission to speak with other family members about your care?: Yes " (Comment: Layo Cuenca-son)  Assessment completed by: Patient  Patient Reported Insurance  Verified current insurance plan: [employer] Medicare Advantage, Medicaid  [employer] benefits discussed: Transportation, Well Dine, Silver Sneakers, OTC prescription discounts, Mail Order Pharmacy  Current Health Status  Patient Health Rating  Compared to other people your age, how would you rate your health?: Fair  Patient Reported Labs & Vitals  Any patient reported labs and/or vitals?: No  Social Determinants  Advanced Care Planning  Do you have any of the following?: None  If yes, do we have a copy?: No  If no, would the patient like Advance Directive resources?: Yes  Advanced Care Planning resources provided?: Yes  Is Advanced Care Planning an area of need?: Yes  Support  Caregiver presence?: Yes  Name of primary caregiver: LAYO CUENCA  Primary caregiver phone number: [phone]  Primary caregiver relationship: son/daughter  Present activity level: need help from person or special equipment to leave house (Comment: has O2/ )  Who takes you to your medical appointments?: friend, patient (Comment: sometimes uses a friends )  Is the caregiver supporting a need?: Yes  Does the current primary caregiver meet the patient's needs?: Yes  Housing  Living arrangements: alone  Number of people in home: 1  Type of residence: apartment  Own or rent?: rent  Permanent residence?: yes  Does the patient's residence have any of the following?: more than 2 stairs to enter the residence (Comment: 18 stairs to enter apt, good lighting, 3 rugs in kitchen, bedroom, dine)  Is housing an area of need?: no  Access to [employer]  Does the patient have access to any of the following devices or technologies?: none  Clinical Assessment  Medication Adherence  How does the patient obtain their medications?: family picks up, patient drives  How many days a week do you miss medications?: never  Do you use a pill box or medication chart to help  you manage your medications?: pill box  Do you sometimes have difficulty refilling your medications?: no  *Active medication list was reviewed and reconciled with patient and/or caregiver:   Nutritional Status  Diet: Regular  Change in appetite?: yes (Comment: no appetite, no desire to eat )  Recent changes in weight?: weight loss > 10 lbs in the past 2 months (Comment: 15 lb)  Was weight change intentional or unintentional?: unintentional  Dentition: wears dentures (Comment: top & partial bottom)  Is nutrition an area of need?: yes  Labs  Do you have regular lab work to monitor your medications?: yes  Type of lab work: Medication levels, other (see comment) (Comment: check liver enzmmyes )  Where do you get your lab work done?: [facility]  Is lab adherence an area of need?: yes  PHQ Depression Screen  Cognitive/Behavioral Health  Alert and oriented?: yes  Difficulty thinking?: yes (Comment: HA everyday)  Requires prompting?: no  Requires assistance for routine expression?: no  Is Cognitive/Behavioral health an area of need?: no  Culture/Mormon  Does patient have cultural or Sikh beliefs that may impact ability to access healthcare?: no  Communication  Language preference: English   needed?: no  Hearing problems?: no  Decreased vision?: yes  Legally blind?: no  Vision assistance: glasses (Comment: cataracts)  Is Communication an area of need?: no  Health Literacy  Preferred learning method: reading materials, face to face  How often do you need to have someone help you read instructions, pamphlets, or other written material from your doctor or pharmacy?: occasionally (Comment: to check myself will ask friend to read )  Activities of Daily Living  Ambulation: independent  Dressing: independent  Bathing: independent  Transfers: independent (Comment: able to do, limited with arthritis all over (spine,legs, neck))  Toileting: independent  Feeding: independent  Cleaning home/chores: independent  Telephone  use: independent  Shopping/attending doctors' appointments: independent  Paying bills: independent  Taking meds: independent  Climbing stairs: independent  Fall Risk  Patient mobility status: Ambulatory  Number of falls in the past 12 months: 0  Fall risk?: No  Equipment/Current Services  Equipment/supplies used in home: oxygen/respiratory treatment (Comment: 2L not used everyday.)  Current services: outpatient behavioral health (Comment: previously involved with [facility]. Dc last week )  Community & Government Programs  Support: Government support assistance (ex Medicaid waivers, VA aid, etc.) (Comment: SNAP benefits $16/mo)  Government suppot assistance: N/A  [facility] Office of Aging and Adult Services: N/A  Community Resource Assessment  Completion of Initial Assessment         Problem List and History     Patient Active Problem List   Diagnosis    Screening for colon cancer    Disc disease, degenerative, cervical    Chronic, continuous use of opioids    Hepatitis C antibody test positive    Hypertension, essential    Hepatic steatosis    Tobacco use    Abnormal finding on GI tract imaging    Other emphysema    Anxiety    History of fusion of cervical spine    Cervical myelopathy with cervical radiculopathy    Cervical stenosis of spine    Painful cervical ROM    Impaired motor control    Decreased strength, endurance, and mobility    Rotator cuff tendinitis, left    Liver fibrosis    History of hepatitis C; S/p RX with SVR 12 07/2019    GERD (gastroesophageal reflux disease)    Acute hypoxemic respiratory failure       Reviewed Active Problem List with patient and/or Caregiver. The following were identified as areas of need: COPD, HTN    Medical History:  Reviewed medical history with patient and/or caregiver    Social History:  Reviewed social history with patient and/or caregiver    Complex Care Plan    Care plan was discussed and completed today with input from patient and/or  caregiver.    Patient Instructions       Chronic Lung Disease: Avoiding Irritants and Allergens    Many people with chronic lung disease, such as asthma or COPD, need to avoid irritants that can trigger symptoms making it more difficult to breathe. Irritants are certain substances in the air that irritate the airways. Some people are also sensitive to certain allergens. These are substances that cause inflammation in the lungs. Allergens can also cause runny nose, or itchy, watery eyes. You probably cant avoid all these things, all the time. But youll most likely breathe better if you stay away from the substances that bother you.   Try to avoid  Smoke. This includes cigarettes, cigars, pipes, and fireplaces.  · Dont smoke. And dont allow anyone else to smoke near you or in your home.  · Ask for smoke-free hotel rooms and rental cars.  · Make sure fireplaces and wood stoves are well ventilated, and sit well away from them.  Smog. This is made up of car exhaust and other air pollutants.  · Read or listen to local air quality reports. These let you know when air quality is poor.  · Stay indoors as much as you can on smoggy days.  Strong odors. These include scented room fresheners, mothballs, and insect sprays. Perfume and cooking can be other causes of strong odors.  · Avoid using bleach and ammonia for cleaning.  · Use scent-free deodorant, lotion, and other products.  Other irritants. These include dust, aerosol sprays, and fine powders.  · Wear a mask while doing tasks like dusting, sweeping, and yard work.  Cold weather. This can make breathing more difficult.  · Protect your lungs by wearing a scarf over your nose and mouth.   You may also need to avoid  If you have allergies, you should try to avoid the allergens that cause them. Ask your healthcare provider if you need to avoid any of these:  Pollen. This is a fine powder made by trees, grasses, and weeds.  · Try to learn what types of pollen affect you  the most. Pollen levels vary during the year.  · Avoid outdoor activities when pollen levels are high. Use air conditioning instead of opening the windows in your home and car.  Animal dander. This is shed by animals with fur or feathers. The particles can float through the air and stick to carpet, clothing, and furniture.  · Wash your hands and clothes after handling pets.  Dust mites. These are tiny bugs too small to see. They live in mattresses, bedding, carpets, curtains, and indoor dust.  · Wash bedding in hot water (130°F/54.4°C) each week.  · Cover mattresses and pillows with special mite-proof cases.  Mold. This grows in damp places, such as bathrooms, basements, and closets.  · Run an exhaust fan while bathing. Or, leave a window open in the bathroom.  · Use a dehumidifier in damp areas.  Date Last Reviewed: 5/1/2016  © 5341-0575 [employer]. [address] [place] [address]. All rights reserved. This information is not intended as a substitute for professional medical care. Always follow your healthcare professional's instructions.        COPD Flare    You have had a flare-up of your COPD.  COPD, or chronic obstructive pulmonary disease, is a common lung disease. It causes your airways to become irritated and narrower. This makes it harder for you to breathe. Emphysema and chronic bronchitis are both types of COPD. This is a chronic condition, which means you always have it. Sometimes it gets worse. When this happens, it is called a flare-up.  Symptoms of COPD  People with COPD may have symptoms most of the time. In a flare-up, your symptoms get worse. These symptoms may mean you are having a flare-up:  · Shortness of breath, shallow or rapid breathing, or wheezing that gets worse  · Lung infection  · Cough that gets worse  · More mucus, thicker mucus or mucus of a different color  · Tiredness, decreased energy, or trouble doing your usual activities  · Fever  · Chest  tightness  · Your symptoms dont get better even when you use your usual medicines, inhalers, and nebulizer  · Trouble talking  · You feel confused  Causes of flare-ups  Unfortunately, a flare-up can happen even though you did everything right, and you followed your doctors instructions. Some causes of flare-ups are:  · Smoking or secondhand smoke  · Colds, the flu, or respiratory infections  · Air pollution  · Sudden change in the weather  · Dust, irritating chemicals, or strong fumes  · Not taking your medicines as prescribed  Home care  Here are some things you can do at home to treat a flare-up:  · Try not to panic. This makes it harder to breathe, and keeps you from doing the right things.  · Dont smoke or be around others who are smoking.  · Try to drink more fluids than usual during a flare-up, unless your doctor has told you not to because of heart and kidney problems. More fluids can help loosen the mucus.  · Use your inhalers and nebulizer, if you have one, as you have been told to.  · If you were given antibiotics, take them until they are used up or your doctor tells you to stop. Its important to finish the antibiotics, even though you feel better. This will make sure the infection has cleared.  · If you were given prednisone or another steroid, finish it even if you feel better.  Preventing a flare-up  Even though flare-ups happen, the best way to treat one is to prevent it before it starts. Here are some pointers:  · Dont smoke or be around others who are smoking.  · Take your medicines as you have been told.  · Talk with your doctor about getting a flu shot every year. Also find out if you need a pneumonia shot.  · If there is a weather advisory warning to stay indoors, try to stay inside when possible.  · Try to eat healthy and get plenty of sleep.  · Try to avoid things that usually set you off, like dust, chemical fumes, hairsprays, or strong perfumes.  Follow-up care  Follow up with your  healthcare provider, or as advised.  If a culture was done, you will be told if your treatment needs to be changed. You can call as directed for the results.  If X-rays were done, you will be notified of any new findings that may affect your care.  Call 911  Call 911 if any of these occur:  · You have trouble breathing  · You feel confused or its difficult to wake you up  · You faint or lose consciousness  · You have a rapid heart rate  · You have new pain in your chest, arm, shoulder, neck or upper back  When to seek medical advice  Call your healthcare provider right away if any of these occur:  · Wheezing or shortness of breath gets worse  · You need to use your inhalers more often than usual without relief  · Fever of 100.4°F (38ºC) or higher, or as directed by your healthcare provider  · Coughing up lots of dark-colored or bloody mucus (sputum)  · Chest pain with each breath  · You do not start to get better within 24 hours  · Swelling of your ankles gets worse  · Dizziness or weakness  Date Last Reviewed: 9/1/2016  © 5863-8267 [employer]. [address] [place] [address]. All rights reserved. This information is not intended as a substitute for professional medical care. Always follow your healthcare professional's instructions.        Controlling High Blood Pressure  High blood pressure (hypertension) is often called the silent killer. This is because many people who have it dont know it. High blood pressure is defined as 140/90 mm Hg or higher. Know your blood pressure and remember to check it regularly. Doing so can save your life. Here are some things you can do to help control your blood pressure.    Choose heart-healthy foods  · Select low-salt, low-fat foods. Limit sodium intake to 2,400 mg per day or the amount suggested by your healthcare provider.  · Limit canned, dried, cured, packaged, and fast foods. These can contain a lot of salt.  · Eat 8 to 10 servings of fruits and  vegetables every day.  · Choose lean meats, fish, or chicken.  · Eat whole-grain pasta, brown rice, and beans.  · Eat 2 to 3 servings of low-fat or fat-free dairy products.  · Ask your doctor about the DASH eating plan. This plan helps reduce blood pressure.  · When you go to a restaurant, ask that your meal be prepared with no added salt.  Maintain a healthy weight  · Ask your healthcare provider how many calories to eat a day. Then stick to that number.  · Ask your healthcare provider what weight range is healthiest for you. If you are overweight, a weight loss of only 3% to 5% of your body weight can help lower blood pressure. Generally, a good weight loss goal is to lose 10% of your body weight in a year.  · Limit snacks and sweets.  · Get regular exercise.  Get up and get active  · Choose activities you enjoy. Find ones you can do with friends or family. This includes bicycling, dancing, walking, and jogging.  · Park farther away from building entrances.  · Use stairs instead of the elevator.  · When you can, walk or bike instead of driving.  · [place] leaves, garden, or do household repairs.  · Be active at a moderate to vigorous level of physical activity for at least 40 minutes for a minimum of 3 to 4 days a week.   Manage stress  · Make time to relax and enjoy life. Find time to laugh.  · Communicate your concerns with your loved ones and your healthcare provider.  · Visit with family and friends, and keep up with hobbies.  Limit alcohol and quit smoking  · Men should have no more than 2 drinks per day.  · Women should have no more than 1 drink per day.  · Talk with your healthcare provider about quitting smoking. Smoking significantly increases your risk for heart disease and stroke. Ask your healthcare provider about community smoking cessation programs and other options.  Medicines  If lifestyle changes arent enough, your healthcare provider may prescribe high blood pressure medicine. Take all medicines as  prescribed. If you have any questions about your medicines, ask your healthcare provider before stopping or changing them.   Date Last Reviewed: 4/27/2016  © 2227-0042 [employer]. [address] [place] [address], [place] [address]. All rights reserved. This information is not intended as a substitute for professional medical care. Always follow your healthcare professional's instructions.        Women and Heart Disease: Understanding the Risks  Risk factors are habits and conditions that make heart disease more likely. The more you have, the higher your chances of heart attack, also known as acute myocardial infarction, or AMI, and other problems. You can manage most risk factors to help make your heart healthier. Below are factors that increase your risk for having heart disease.    Smoking  This is the most important risk factor you can change. Smoking causes inflammation and damages the smooth muscle that lines the arteries making them less flexible. It also raises your blood pressure, causing further damage to the artery lining. Smoking also increased your risk that your blood may clot, block an artery, and lead to a heart attack or stroke. Smoking also damages your lungs, which can affect the delivery of oxygen to the body. Research shows that smoking makes women up to 6 times more likely to have a heart attack. It's also important to avoid secondhand smoke (smoke from other peoples tobacco products). If you smoke, it's never too late to improve your heart. Ask your doctor about nicotine replacement products and smoking cessation counseling.  Diabetes  Diabetes causes high blood sugar, which can damage blood vessels if not kept under control. Having diabetes also makes you more likely to have a silent heart attack--one without any symptoms. This occurs because long periods of high sugar levels in your blood eventually degrade nerve conduction which reduces your sensation. This translates to decreased  "chest pain than would be felt in a person without diabetes during a heart attack. Youre at risk if your blood sugar level is above 100 mg/dL.  High cholesterol  Cholesterol is a fat-like substance in your blood. It can build up along the artery walls. This is called plaque. Over time, plaque narrows the arteries and reduces blood flow to your heart or brain. If a blood clot forms or a piece of the plaque breaks off, it can completely block the artery and cause a heart attack or stroke. Your risk of heart disease goes up if you have high levels of LDL ("bad") cholesterol or triglycerides (another fatty substance that can build up). Youre also at risk if you have low HDL cholesterol ("good") cholesterol. HDL helps clear the bad cholesterol away. Youre at risk if you have:  HDL cholesterol 50 mg/dL or lower; LDL cholesterol 100 mg/dL or higher; triglycerides of 150 mg/dL or higher.  High blood pressure  High blood pressure occurs when blood pushes too hard against artery walls. This causes damage to the artery walls and then the formation of scar tissue as it heals. This makes the arteries stiff and weak. Plaque sticks to the scarred tissue narrowing and hardening the arteries. High blood pressure also causes your heart to work harder to get blood out to the body. High blood pressure raises your risk of heart attack, also known as acute myocardial infarction, or AMI, and especially stroke. The brain tissue is especially sensitive to high blood pressure damage. Youre at risk if your blood pressure is 120/80 or higher.  Excess weight  Excess weight makes your heart work harder. This raises your risk of a heart attack. Being overweight also puts you at risk of developing diabetes and high blood pressure. Excess weight around the waist or stomach increases your risk the most. Being obese puts you at risk for developing heart disease.  Lack of exercise  Without regular exercise, youre more likely to develop other risk " factors, such as being overweight and developing diabetes. High blood pressure and unhealthy lipid levels are also more likely. Exercise promotes good blood flow and ensures your heart is able to meet the demands placed on it.  Negative emotions  Emotions such as stress and pent-up anger have been linked to heart disease. Over time, these emotions could raise your heart disease risk. If you have heart disease, emotion such as anxiety and depression can make it worse. Managing these emotions is important as they have been shown to reduce hormones that increase stress on the heart in the long run.  Metabolic syndrome  This is caused by a combination of certain risk factors. It puts you at extra high risk of heart disease, stroke, and diabetes. You have metabolic syndrome if you have 3 or more of the following: low HDL cholesterol; high triglycerides; high blood pressure; high blood sugar; extra weight around the waist.  Risks you cant control  A few risk factors cant be changed. But they still raise your heart disease risk.  · Family history. If your mother or sister had heart trouble before age 65 or your father or brother before age 55, youre at higher risk of having a heart attack.  · Age. The older you are, the higher your heart disease risk.     For more information  · www.smokefree.gov/hdex-kc-df-expert  · www.women.smokefree.gov  · National Cancer [employer] Smoking Quitline: 877-44U-QUIT (000-157-5408)      Date Last Reviewed: 4/8/2016  © 4448-3728 [employer]. [address] [place] [address]. All rights reserved. This information is not intended as a substitute for professional medical care. Always follow your healthcare professional's instructions.            Patient Instructions     Instructions were provided via the [employer] patient resources and are available for the patient to view on the patient portal, if active.    Next steps: F/u pt see if she received Smart[employer] info (review if  so)  Inquire if pt was able to contact [employer] (BP machine)  inbasket sent to PCP for pain management specialist  Referral sent to OPCM SW     No follow-ups on file.    Todays OPCM Self-Management Care Plan was developed with the patients/caregivers input and was based on identified barriers from todays assessment.  Goals were written today with the patient/caregiver and the patient has agreed to work towards these goals to improve his/her overall well-being. Patient verbalized understanding of the care plan, goals, and all of today's instructions. Encouraged patient/caregiver to communicate with his/her physician and health care team about health conditions and the treatment plan.  Provided my contact information today and encouraged patient/caregiver to call me with any questions as needed.

## 2020-08-07 NOTE — PATIENT INSTRUCTIONS
Chronic Lung Disease: Avoiding Irritants and Allergens    Many people with chronic lung disease, such as asthma or COPD, need to avoid irritants that can trigger symptoms making it more difficult to breathe. Irritants are certain substances in the air that irritate the airways. Some people are also sensitive to certain allergens. These are substances that cause inflammation in the lungs. Allergens can also cause runny nose, or itchy, watery eyes. You probably cant avoid all these things, all the time. But youll most likely breathe better if you stay away from the substances that bother you.   Try to avoid  Smoke. This includes cigarettes, cigars, pipes, and fireplaces.  · Dont smoke. And dont allow anyone else to smoke near you or in your home.  · Ask for smoke-free hotel rooms and rental cars.  · Make sure fireplaces and wood stoves are well ventilated, and sit well away from them.  Smog. This is made up of car exhaust and other air pollutants.  · Read or listen to local air quality reports. These let you know when air quality is poor.  · Stay indoors as much as you can on smoggy days.  Strong odors. These include scented room fresheners, mothballs, and insect sprays. Perfume and cooking can be other causes of strong odors.  · Avoid using bleach and ammonia for cleaning.  · Use scent-free deodorant, lotion, and other products.  Other irritants. These include dust, aerosol sprays, and fine powders.  · Wear a mask while doing tasks like dusting, sweeping, and yard work.  Cold weather. This can make breathing more difficult.  · Protect your lungs by wearing a scarf over your nose and mouth.   You may also need to avoid  If you have allergies, you should try to avoid the allergens that cause them. Ask your healthcare provider if you need to avoid any of these:  Pollen. This is a fine powder made by trees, grasses, and weeds.  · Try to learn what types of pollen affect you the most. Pollen levels vary during the  year.  · Avoid outdoor activities when pollen levels are high. Use air conditioning instead of opening the windows in your home and car.  Animal dander. This is shed by animals with fur or feathers. The particles can float through the air and stick to carpet, clothing, and furniture.  · Wash your hands and clothes after handling pets.  Dust mites. These are tiny bugs too small to see. They live in mattresses, bedding, carpets, curtains, and indoor dust.  · Wash bedding in hot water (130°F/54.4°C) each week.  · Cover mattresses and pillows with special mite-proof cases.  Mold. This grows in damp places, such as bathrooms, basements, and closets.  · Run an exhaust fan while bathing. Or, leave a window open in the bathroom.  · Use a dehumidifier in damp areas.  Date Last Reviewed: 5/1/2016  © 6299-0003 [employer]. [address] [place] [address]. All rights reserved. This information is not intended as a substitute for professional medical care. Always follow your healthcare professional's instructions.        COPD Flare    You have had a flare-up of your COPD.  COPD, or chronic obstructive pulmonary disease, is a common lung disease. It causes your airways to become irritated and narrower. This makes it harder for you to breathe. Emphysema and chronic bronchitis are both types of COPD. This is a chronic condition, which means you always have it. Sometimes it gets worse. When this happens, it is called a flare-up.  Symptoms of COPD  People with COPD may have symptoms most of the time. In a flare-up, your symptoms get worse. These symptoms may mean you are having a flare-up:  · Shortness of breath, shallow or rapid breathing, or wheezing that gets worse  · Lung infection  · Cough that gets worse  · More mucus, thicker mucus or mucus of a different color  · Tiredness, decreased energy, or trouble doing your usual activities  · Fever  · Chest tightness  · Your symptoms dont get better even when  you use your usual medicines, inhalers, and nebulizer  · Trouble talking  · You feel confused  Causes of flare-ups  Unfortunately, a flare-up can happen even though you did everything right, and you followed your doctors instructions. Some causes of flare-ups are:  · Smoking or secondhand smoke  · Colds, the flu, or respiratory infections  · Air pollution  · Sudden change in the weather  · Dust, irritating chemicals, or strong fumes  · Not taking your medicines as prescribed  Home care  Here are some things you can do at home to treat a flare-up:  · Try not to panic. This makes it harder to breathe, and keeps you from doing the right things.  · Dont smoke or be around others who are smoking.  · Try to drink more fluids than usual during a flare-up, unless your doctor has told you not to because of heart and kidney problems. More fluids can help loosen the mucus.  · Use your inhalers and nebulizer, if you have one, as you have been told to.  · If you were given antibiotics, take them until they are used up or your doctor tells you to stop. Its important to finish the antibiotics, even though you feel better. This will make sure the infection has cleared.  · If you were given prednisone or another steroid, finish it even if you feel better.  Preventing a flare-up  Even though flare-ups happen, the best way to treat one is to prevent it before it starts. Here are some pointers:  · Dont smoke or be around others who are smoking.  · Take your medicines as you have been told.  · Talk with your doctor about getting a flu shot every year. Also find out if you need a pneumonia shot.  · If there is a weather advisory warning to stay indoors, try to stay inside when possible.  · Try to eat healthy and get plenty of sleep.  · Try to avoid things that usually set you off, like dust, chemical fumes, hairsprays, or strong perfumes.  Follow-up care  Follow up with your healthcare provider, or as advised.  If a culture was done,  you will be told if your treatment needs to be changed. You can call as directed for the results.  If X-rays were done, you will be notified of any new findings that may affect your care.  Call 911  Call 911 if any of these occur:  · You have trouble breathing  · You feel confused or its difficult to wake you up  · You faint or lose consciousness  · You have a rapid heart rate  · You have new pain in your chest, arm, shoulder, neck or upper back  When to seek medical advice  Call your healthcare provider right away if any of these occur:  · Wheezing or shortness of breath gets worse  · You need to use your inhalers more often than usual without relief  · Fever of 100.4°F (38ºC) or higher, or as directed by your healthcare provider  · Coughing up lots of dark-colored or bloody mucus (sputum)  · Chest pain with each breath  · You do not start to get better within 24 hours  · Swelling of your ankles gets worse  · Dizziness or weakness  Date Last Reviewed: 9/1/2016 © 2000-2017 [employer]. [address] [place] [address]. All rights reserved. This information is not intended as a substitute for professional medical care. Always follow your healthcare professional's instructions.        Controlling High Blood Pressure  High blood pressure (hypertension) is often called the silent killer. This is because many people who have it dont know it. High blood pressure is defined as 140/90 mm Hg or higher. Know your blood pressure and remember to check it regularly. Doing so can save your life. Here are some things you can do to help control your blood pressure.    Choose heart-healthy foods  · Select low-salt, low-fat foods. Limit sodium intake to 2,400 mg per day or the amount suggested by your healthcare provider.  · Limit canned, dried, cured, packaged, and fast foods. These can contain a lot of salt.  · Eat 8 to 10 servings of fruits and vegetables every day.  · Choose lean meats, fish, or  chicken.  · Eat whole-grain pasta, brown rice, and beans.  · Eat 2 to 3 servings of low-fat or fat-free dairy products.  · Ask your doctor about the DASH eating plan. This plan helps reduce blood pressure.  · When you go to a restaurant, ask that your meal be prepared with no added salt.  Maintain a healthy weight  · Ask your healthcare provider how many calories to eat a day. Then stick to that number.  · Ask your healthcare provider what weight range is healthiest for you. If you are overweight, a weight loss of only 3% to 5% of your body weight can help lower blood pressure. Generally, a good weight loss goal is to lose 10% of your body weight in a year.  · Limit snacks and sweets.  · Get regular exercise.  Get up and get active  · Choose activities you enjoy. Find ones you can do with friends or family. This includes bicycling, dancing, walking, and jogging.  · Park farther away from building entrances.  · Use stairs instead of the elevator.  · When you can, walk or bike instead of driving.  · [place] leaves, garden, or do household repairs.  · Be active at a moderate to vigorous level of physical activity for at least 40 minutes for a minimum of 3 to 4 days a week.   Manage stress  · Make time to relax and enjoy life. Find time to laugh.  · Communicate your concerns with your loved ones and your healthcare provider.  · Visit with family and friends, and keep up with hobbies.  Limit alcohol and quit smoking  · Men should have no more than 2 drinks per day.  · Women should have no more than 1 drink per day.  · Talk with your healthcare provider about quitting smoking. Smoking significantly increases your risk for heart disease and stroke. Ask your healthcare provider about community smoking cessation programs and other options.  Medicines  If lifestyle changes arent enough, your healthcare provider may prescribe high blood pressure medicine. Take all medicines as prescribed. If you have any questions about your  medicines, ask your healthcare provider before stopping or changing them.   Date Last Reviewed: 4/27/2016  © 6898-5876 [employer]. [address] [place] [address], [place] [address]. All rights reserved. This information is not intended as a substitute for professional medical care. Always follow your healthcare professional's instructions.        Women and Heart Disease: Understanding the Risks  Risk factors are habits and conditions that make heart disease more likely. The more you have, the higher your chances of heart attack, also known as acute myocardial infarction, or AMI, and other problems. You can manage most risk factors to help make your heart healthier. Below are factors that increase your risk for having heart disease.    Smoking  This is the most important risk factor you can change. Smoking causes inflammation and damages the smooth muscle that lines the arteries making them less flexible. It also raises your blood pressure, causing further damage to the artery lining. Smoking also increased your risk that your blood may clot, block an artery, and lead to a heart attack or stroke. Smoking also damages your lungs, which can affect the delivery of oxygen to the body. Research shows that smoking makes women up to 6 times more likely to have a heart attack. It's also important to avoid secondhand smoke (smoke from other peoples tobacco products). If you smoke, it's never too late to improve your heart. Ask your doctor about nicotine replacement products and smoking cessation counseling.  Diabetes  Diabetes causes high blood sugar, which can damage blood vessels if not kept under control. Having diabetes also makes you more likely to have a silent heart attack--one without any symptoms. This occurs because long periods of high sugar levels in your blood eventually degrade nerve conduction which reduces your sensation. This translates to decreased chest pain than would be felt in a person without  "diabetes during a heart attack. Youre at risk if your blood sugar level is above 100 mg/dL.  High cholesterol  Cholesterol is a fat-like substance in your blood. It can build up along the artery walls. This is called plaque. Over time, plaque narrows the arteries and reduces blood flow to your heart or brain. If a blood clot forms or a piece of the plaque breaks off, it can completely block the artery and cause a heart attack or stroke. Your risk of heart disease goes up if you have high levels of LDL ("bad") cholesterol or triglycerides (another fatty substance that can build up). Youre also at risk if you have low HDL cholesterol ("good") cholesterol. HDL helps clear the bad cholesterol away. Youre at risk if you have:  HDL cholesterol 50 mg/dL or lower; LDL cholesterol 100 mg/dL or higher; triglycerides of 150 mg/dL or higher.  High blood pressure  High blood pressure occurs when blood pushes too hard against artery walls. This causes damage to the artery walls and then the formation of scar tissue as it heals. This makes the arteries stiff and weak. Plaque sticks to the scarred tissue narrowing and hardening the arteries. High blood pressure also causes your heart to work harder to get blood out to the body. High blood pressure raises your risk of heart attack, also known as acute myocardial infarction, or AMI, and especially stroke. The brain tissue is especially sensitive to high blood pressure damage. Youre at risk if your blood pressure is 120/80 or higher.  Excess weight  Excess weight makes your heart work harder. This raises your risk of a heart attack. Being overweight also puts you at risk of developing diabetes and high blood pressure. Excess weight around the waist or stomach increases your risk the most. Being obese puts you at risk for developing heart disease.  Lack of exercise  Without regular exercise, youre more likely to develop other risk factors, such as being overweight and developing " diabetes. High blood pressure and unhealthy lipid levels are also more likely. Exercise promotes good blood flow and ensures your heart is able to meet the demands placed on it.  Negative emotions  Emotions such as stress and pent-up anger have been linked to heart disease. Over time, these emotions could raise your heart disease risk. If you have heart disease, emotion such as anxiety and depression can make it worse. Managing these emotions is important as they have been shown to reduce hormones that increase stress on the heart in the long run.  Metabolic syndrome  This is caused by a combination of certain risk factors. It puts you at extra high risk of heart disease, stroke, and diabetes. You have metabolic syndrome if you have 3 or more of the following: low HDL cholesterol; high triglycerides; high blood pressure; high blood sugar; extra weight around the waist.  Risks you cant control  A few risk factors cant be changed. But they still raise your heart disease risk.  · Family history. If your mother or sister had heart trouble before age 65 or your father or brother before age 55, youre at higher risk of having a heart attack.  · Age. The older you are, the higher your heart disease risk.     For more information  · www.smokefree.gov/gntu-sk-qs-expert  · www.women.smokefree.gov  · National [employer] Smoking Quitline: 877-44U-QUIT (608-579-4978)      Date Last Reviewed: 4/8/2016  © 4728-7172 [employer]. [address] [place] [address], [place] [address]. All rights reserved. This information is not intended as a substitute for professional medical care. Always follow your healthcare professional's instructions.

## 2020-08-07 NOTE — TELEPHONE ENCOUNTER
----- Message from Nerissa Rosen sent at 8/7/2020  8:37 AM CDT -----  Contact: Self [phone]  Type: RX Refill Request    Who Called: Self    Have you contacted your pharmacy:    Refill or New Rx: refill    RX Name and Strength: diazePAM (VALIUM) 10 MG Tab & oxycodone-acetaminophen (PERCOCET) 5-325 mg per tablet    Preferred Pharmacy with phone number:   [facility] - [address]  [address]  [place] [address]  Phone: [phone] Fax: [phone]    Local or Mail Order: Local    Would the patient rather a call back or a response via [facility]? Call back    Best Call Back Number: [phone]

## 2020-08-07 NOTE — TELEPHONE ENCOUNTER
----- Message from Taylor Freeman sent at 8/7/2020 11:38 AM CDT -----  Contact: Paloma valencia/Dr Bobby Gruber's Office [phone].  Type:Call Back    Who called: Paloma valencia/Dr Bobby Gruber's Office    What is the request in detail: Dr Gruber won't be able to see the patient. She hasn't returned due to the Pandemic. They're not sure when she's returning. According to pt she feels she needs to see someone sooner. The pt will be calling to find out if there's someone else she can be referred to.     Would the patient rather a call back or a response via [facility]? Call back if need to     Best call back number: [phone]

## 2020-08-12 DIAGNOSIS — G44.59 OTHER COMPLICATED HEADACHE SYNDROME: Primary | ICD-10-CM

## 2020-08-12 RX ORDER — BUTALBITAL, ACETAMINOPHEN AND CAFFEINE 50; 325; 40 MG/1; MG/1; MG/1
TABLET ORAL
Qty: 60 TABLET | Refills: 0 | Status: SHIPPED | OUTPATIENT
Start: 2020-08-12 | End: 2020-09-28

## 2020-08-13 ENCOUNTER — OUTPATIENT CASE MANAGEMENT (OUTPATIENT)
Dept: ADMINISTRATIVE | Facility: OTHER | Age: 65
End: 2020-08-13

## 2020-08-13 NOTE — PROGRESS NOTES
1st Attempt to complete Social Work Assessment for Outpatient Care Management; left message requesting a return call.  LCSW will reattempt at a later date.      8/20/20, Chart review, patient unavailable, has radiology appts. Re-scheduled f/up.

## 2020-08-13 NOTE — LETTER
August 24, 2020    Rossi Mcneal  [address]  [facility]  [place] [address]             [facility] [address] [place] [address] Dear Ms. Mcneal:    I am writing from the Outpatient Complex Care Management Department at [facility].  I received a referral from Vanna Marin RN, to contact you regarding any needs you may have.  I have been unable to reach you by phone.   Please contact me if you would like to discuss any needs you may have.    I can be reached at [phone] Monday thru Friday from 8:00am to 4:30pm.  [facility] also has a program with a nurse available 24/7 to answer questions or provide medical advice.  [facility] can be reached at [phone].    Sincerely,           Karuna Clayton LCSW

## 2020-08-14 ENCOUNTER — OUTPATIENT CASE MANAGEMENT (OUTPATIENT)
Dept: ADMINISTRATIVE | Facility: OTHER | Age: 65
End: 2020-08-14

## 2020-08-18 ENCOUNTER — PATIENT OUTREACH (OUTPATIENT)
Dept: ADMINISTRATIVE | Facility: OTHER | Age: 65
End: 2020-08-18

## 2020-08-20 ENCOUNTER — HOSPITAL ENCOUNTER (OUTPATIENT)
Dept: RADIOLOGY | Facility: HOSPITAL | Age: 65
Discharge: HOME | End: 2020-08-20
Attending: NURSE PRACTITIONER
Payer: MEDICARE

## 2020-08-20 DIAGNOSIS — K74.00 LIVER FIBROSIS: ICD-10-CM

## 2020-08-20 PROCEDURE — 76700 US ABDOMEN COMPLETE: ICD-10-PCS | Mod: 26,HCNC,, | Performed by: RADIOLOGY

## 2020-08-20 PROCEDURE — 76700 US EXAM ABDOM COMPLETE: CPT | Mod: TC,HCNC

## 2020-08-20 PROCEDURE — 76700 US EXAM ABDOM COMPLETE: CPT | Mod: 26,HCNC,, | Performed by: RADIOLOGY

## 2020-08-24 NOTE — PROGRESS NOTES
2nd attempt to complete Social Work Assessment for OPCM; left message requesting a return call. LCSW will mail a letter with contact information requesting a return call.  OPCM RN notified.

## 2020-09-08 NOTE — PROGRESS NOTES
LCSW mailed letter after 2nd attempt to complete SW Assessment with contact information requesting a return call and pt has not reached out to this LCSW.  LCSW will close case and notified OPCM RN.

## 2020-09-13 ENCOUNTER — TELEPHONE (OUTPATIENT)
Dept: HEPATOLOGY | Facility: CLINIC | Age: 65
End: 2020-09-13

## 2020-09-13 NOTE — TELEPHONE ENCOUNTER
I left the pt a VM that Mrs. Berg will not be in clinic now on the 15th. I went ahead and rescheduled her to the 28th at 2:30pm, but if that doesn't work,s he will need to call and reschedule. I also mailed the new reminder.

## 2020-09-28 DIAGNOSIS — G44.59 OTHER COMPLICATED HEADACHE SYNDROME: ICD-10-CM

## 2020-09-28 DIAGNOSIS — M50.90 CERVICAL DISC DISEASE: ICD-10-CM

## 2020-09-28 RX ORDER — DIAZEPAM 5 MG/1
TABLET ORAL
Qty: 10 TABLET | Refills: 0 | OUTPATIENT
Start: 2020-09-28

## 2020-09-28 RX ORDER — BUTALBITAL, ACETAMINOPHEN AND CAFFEINE 50; 325; 40 MG/1; MG/1; MG/1
TABLET ORAL
Qty: 60 TABLET | Refills: 1 | Status: SHIPPED | OUTPATIENT
Start: 2020-09-28 | End: 2020-12-30

## 2020-09-29 ENCOUNTER — TELEPHONE (OUTPATIENT)
Dept: HEPATOLOGY | Facility: CLINIC | Age: 65
End: 2020-09-29

## 2020-09-29 NOTE — TELEPHONE ENCOUNTER
Attempted to contact patient and schedule appointment as requested but patient didn't answer. Left patient a voicemail stating the purpose for the call. Awaiting a call back.    Call:    From: Julia Fuller   Sent: 9/28/2020   4:21 PM CDT   To: Morena Neil Staff     Patient calling to schedule appt         Call Back : [phone]

## 2020-12-16 ENCOUNTER — TELEPHONE (OUTPATIENT)
Dept: FAMILY MEDICINE | Facility: CLINIC | Age: 65
End: 2020-12-16

## 2020-12-16 NOTE — TELEPHONE ENCOUNTER
Returned pt's call. Pt requesting letter from PCP for reason that she is taking Effexor to give to her pain management provider.

## 2020-12-16 NOTE — TELEPHONE ENCOUNTER
----- Message from Mayra Nascimento sent at 12/16/2020  2:25 PM CST -----  Type: Patient Call Back    Who called: self    What is the request in detail: patient would like Dr Corcoran to write her a letter for pain management. Please call    Can the clinic reply by [facility]? no    Would the patient rather a call back or a response via [facility]? call    Best call back number: .[phone]

## 2020-12-22 ENCOUNTER — PES CALL (OUTPATIENT)
Dept: ADMINISTRATIVE | Facility: CLINIC | Age: 65
End: 2020-12-22

## 2021-01-15 ENCOUNTER — OFFICE VISIT (OUTPATIENT)
Dept: FAMILY MEDICINE | Facility: CLINIC | Age: 66
End: 2021-01-15
Payer: MEDICARE

## 2021-01-15 VITALS
DIASTOLIC BLOOD PRESSURE: 66 MMHG | OXYGEN SATURATION: 95 % | RESPIRATION RATE: 12 BRPM | SYSTOLIC BLOOD PRESSURE: 138 MMHG | HEART RATE: 98 BPM

## 2021-01-15 DIAGNOSIS — Z79.899 OTHER LONG TERM (CURRENT) DRUG THERAPY: ICD-10-CM

## 2021-01-15 DIAGNOSIS — Z12.31 ENCOUNTER FOR SCREENING MAMMOGRAM FOR MALIGNANT NEOPLASM OF BREAST: ICD-10-CM

## 2021-01-15 DIAGNOSIS — I10 HYPERTENSION, ESSENTIAL: ICD-10-CM

## 2021-01-15 DIAGNOSIS — F31.9 BIPOLAR 1 DISORDER: ICD-10-CM

## 2021-01-15 DIAGNOSIS — Z78.0 ASYMPTOMATIC MENOPAUSAL STATE: ICD-10-CM

## 2021-01-15 DIAGNOSIS — Z13.220 SCREENING FOR HYPERLIPIDEMIA: ICD-10-CM

## 2021-01-15 DIAGNOSIS — J44.9 CHRONIC OBSTRUCTIVE PULMONARY DISEASE, UNSPECIFIED COPD TYPE: Primary | ICD-10-CM

## 2021-01-15 DIAGNOSIS — K74.00 LIVER FIBROSIS: ICD-10-CM

## 2021-01-15 DIAGNOSIS — M50.30 DISC DISEASE, DEGENERATIVE, CERVICAL: ICD-10-CM

## 2021-01-15 PROCEDURE — 99999 PR PBB SHADOW E&M-EST. PATIENT-LVL V: ICD-10-PCS | Mod: PBBFAC,HCNC,, | Performed by: INTERNAL MEDICINE

## 2021-01-15 PROCEDURE — 99499 RISK ADDL DX/OHS AUDIT: ICD-10-PCS | Mod: S$GLB,,, | Performed by: INTERNAL MEDICINE

## 2021-01-15 PROCEDURE — 3288F PR FALLS RISK ASSESSMENT DOCUMENTED: ICD-10-PCS | Mod: CPTII,S$GLB,, | Performed by: INTERNAL MEDICINE

## 2021-01-15 PROCEDURE — 1101F PR PT FALLS ASSESS DOC 0-1 FALLS W/OUT INJ PAST YR: ICD-10-PCS | Mod: CPTII,S$GLB,, | Performed by: INTERNAL MEDICINE

## 2021-01-15 PROCEDURE — 3075F SYST BP GE 130 - 139MM HG: CPT | Mod: CPTII,S$GLB,, | Performed by: INTERNAL MEDICINE

## 2021-01-15 PROCEDURE — 1159F PR MEDICATION LIST DOCUMENTED IN MEDICAL RECORD: ICD-10-PCS | Mod: S$GLB,,, | Performed by: INTERNAL MEDICINE

## 2021-01-15 PROCEDURE — 99999 PR PBB SHADOW E&M-EST. PATIENT-LVL V: CPT | Mod: PBBFAC,HCNC,, | Performed by: INTERNAL MEDICINE

## 2021-01-15 PROCEDURE — 99214 OFFICE O/P EST MOD 30 MIN: CPT | Mod: S$GLB,,, | Performed by: INTERNAL MEDICINE

## 2021-01-15 PROCEDURE — 3075F PR MOST RECENT SYSTOLIC BLOOD PRESS GE 130-139MM HG: ICD-10-PCS | Mod: CPTII,S$GLB,, | Performed by: INTERNAL MEDICINE

## 2021-01-15 PROCEDURE — 3288F FALL RISK ASSESSMENT DOCD: CPT | Mod: CPTII,S$GLB,, | Performed by: INTERNAL MEDICINE

## 2021-01-15 PROCEDURE — 3078F PR MOST RECENT DIASTOLIC BLOOD PRESSURE < 80 MM HG: ICD-10-PCS | Mod: CPTII,S$GLB,, | Performed by: INTERNAL MEDICINE

## 2021-01-15 PROCEDURE — 99214 PR OFFICE/OUTPT VISIT, EST, LEVL IV, 30-39 MIN: ICD-10-PCS | Mod: S$GLB,,, | Performed by: INTERNAL MEDICINE

## 2021-01-15 PROCEDURE — 1101F PT FALLS ASSESS-DOCD LE1/YR: CPT | Mod: CPTII,S$GLB,, | Performed by: INTERNAL MEDICINE

## 2021-01-15 PROCEDURE — 3078F DIAST BP <80 MM HG: CPT | Mod: CPTII,S$GLB,, | Performed by: INTERNAL MEDICINE

## 2021-01-15 PROCEDURE — 99499 UNLISTED E&M SERVICE: CPT | Mod: S$GLB,,, | Performed by: INTERNAL MEDICINE

## 2021-01-15 PROCEDURE — 1159F MED LIST DOCD IN RCRD: CPT | Mod: S$GLB,,, | Performed by: INTERNAL MEDICINE

## 2021-01-15 RX ORDER — TIZANIDINE 4 MG/1
4 TABLET ORAL EVERY 8 HOURS PRN
Qty: 60 TABLET | Refills: 0 | Status: SHIPPED | OUTPATIENT
Start: 2021-01-15 | End: 2021-01-25

## 2021-01-21 ENCOUNTER — PATIENT OUTREACH (OUTPATIENT)
Dept: ADMINISTRATIVE | Facility: OTHER | Age: 66
End: 2021-01-21

## 2021-01-22 ENCOUNTER — TELEPHONE (OUTPATIENT)
Dept: FAMILY MEDICINE | Facility: CLINIC | Age: 66
End: 2021-01-22

## 2021-01-25 ENCOUNTER — TELEPHONE (OUTPATIENT)
Dept: HEPATOLOGY | Facility: CLINIC | Age: 66
End: 2021-01-25

## 2021-01-25 DIAGNOSIS — K74.00 LIVER FIBROSIS: Primary | ICD-10-CM

## 2021-01-25 DIAGNOSIS — G44.59 OTHER COMPLICATED HEADACHE SYNDROME: ICD-10-CM

## 2021-01-25 DIAGNOSIS — F31.9 BIPOLAR 1 DISORDER: ICD-10-CM

## 2021-01-25 RX ORDER — OXCARBAZEPINE 150 MG/1
TABLET, FILM COATED ORAL
Qty: 180 TABLET | Refills: 1 | Status: SHIPPED | OUTPATIENT
Start: 2021-01-25 | End: 2021-02-08

## 2021-01-25 RX ORDER — BUTALBITAL, ACETAMINOPHEN AND CAFFEINE 50; 325; 40 MG/1; MG/1; MG/1
TABLET ORAL
Qty: 60 TABLET | Refills: 0 | Status: SHIPPED | OUTPATIENT
Start: 2021-01-25 | End: 2021-03-01

## 2021-01-29 ENCOUNTER — TELEPHONE (OUTPATIENT)
Dept: FAMILY MEDICINE | Facility: CLINIC | Age: 66
End: 2021-01-29

## 2021-01-29 DIAGNOSIS — F31.9 BIPOLAR 1 DISORDER: Primary | ICD-10-CM

## 2021-02-01 ENCOUNTER — TELEPHONE (OUTPATIENT)
Dept: PSYCHIATRY | Facility: CLINIC | Age: 66
End: 2021-02-01

## 2021-02-02 ENCOUNTER — TELEPHONE (OUTPATIENT)
Dept: FAMILY MEDICINE | Facility: CLINIC | Age: 66
End: 2021-02-02

## 2021-02-02 ENCOUNTER — HOSPITAL ENCOUNTER (OUTPATIENT)
Dept: RADIOLOGY | Facility: HOSPITAL | Age: 66
Discharge: HOME | End: 2021-02-02
Attending: NURSE PRACTITIONER
Payer: MEDICARE

## 2021-02-02 ENCOUNTER — OFFICE VISIT (OUTPATIENT)
Dept: HEPATOLOGY | Facility: CLINIC | Age: 66
End: 2021-02-02
Payer: MEDICARE

## 2021-02-02 VITALS
TEMPERATURE: 97 F | HEIGHT: 62 IN | HEART RATE: 91 BPM | RESPIRATION RATE: 18 BRPM | SYSTOLIC BLOOD PRESSURE: 145 MMHG | OXYGEN SATURATION: 94 % | DIASTOLIC BLOOD PRESSURE: 88 MMHG | BODY MASS INDEX: 22.39 KG/M2 | WEIGHT: 121.69 LBS

## 2021-02-02 DIAGNOSIS — K74.00 LIVER FIBROSIS: ICD-10-CM

## 2021-02-02 DIAGNOSIS — K74.69 COMPENSATED CIRRHOSIS RELATED TO HEPATITIS C VIRUS (HCV): Primary | ICD-10-CM

## 2021-02-02 DIAGNOSIS — B19.20 COMPENSATED CIRRHOSIS RELATED TO HEPATITIS C VIRUS (HCV): Primary | ICD-10-CM

## 2021-02-02 DIAGNOSIS — Z86.19 HISTORY OF HEPATITIS C: ICD-10-CM

## 2021-02-02 PROBLEM — K76.0 HEPATIC STEATOSIS: Status: RESOLVED | Noted: 2017-08-10 | Resolved: 2021-02-02

## 2021-02-02 PROCEDURE — 99999 PR PBB SHADOW E&M-EST. PATIENT-LVL V: CPT | Mod: PBBFAC,,, | Performed by: NURSE PRACTITIONER

## 2021-02-02 PROCEDURE — 1126F AMNT PAIN NOTED NONE PRSNT: CPT | Mod: S$GLB,,, | Performed by: NURSE PRACTITIONER

## 2021-02-02 PROCEDURE — 3288F FALL RISK ASSESSMENT DOCD: CPT | Mod: CPTII,S$GLB,, | Performed by: NURSE PRACTITIONER

## 2021-02-02 PROCEDURE — 1159F MED LIST DOCD IN RCRD: CPT | Mod: S$GLB,,, | Performed by: NURSE PRACTITIONER

## 2021-02-02 PROCEDURE — 99499 RISK ADDL DX/OHS AUDIT: ICD-10-PCS | Mod: S$GLB,,, | Performed by: NURSE PRACTITIONER

## 2021-02-02 PROCEDURE — 76705 ECHO EXAM OF ABDOMEN: CPT | Mod: TC

## 2021-02-02 PROCEDURE — 1101F PT FALLS ASSESS-DOCD LE1/YR: CPT | Mod: CPTII,S$GLB,, | Performed by: NURSE PRACTITIONER

## 2021-02-02 PROCEDURE — 99214 PR OFFICE/OUTPT VISIT, EST, LEVL IV, 30-39 MIN: ICD-10-PCS | Mod: S$GLB,,, | Performed by: NURSE PRACTITIONER

## 2021-02-02 PROCEDURE — 99214 OFFICE O/P EST MOD 30 MIN: CPT | Mod: S$GLB,,, | Performed by: NURSE PRACTITIONER

## 2021-02-02 PROCEDURE — 76705 US ABDOMEN LIMITED: ICD-10-PCS | Mod: 26,,, | Performed by: RADIOLOGY

## 2021-02-02 PROCEDURE — 3079F PR MOST RECENT DIASTOLIC BLOOD PRESSURE 80-89 MM HG: ICD-10-PCS | Mod: CPTII,S$GLB,, | Performed by: NURSE PRACTITIONER

## 2021-02-02 PROCEDURE — 3077F PR MOST RECENT SYSTOLIC BLOOD PRESSURE >= 140 MM HG: ICD-10-PCS | Mod: CPTII,S$GLB,, | Performed by: NURSE PRACTITIONER

## 2021-02-02 PROCEDURE — 3008F PR BODY MASS INDEX (BMI) DOCUMENTED: ICD-10-PCS | Mod: CPTII,S$GLB,, | Performed by: NURSE PRACTITIONER

## 2021-02-02 PROCEDURE — 99499 UNLISTED E&M SERVICE: CPT | Mod: S$GLB,,, | Performed by: NURSE PRACTITIONER

## 2021-02-02 PROCEDURE — 3077F SYST BP >= 140 MM HG: CPT | Mod: CPTII,S$GLB,, | Performed by: NURSE PRACTITIONER

## 2021-02-02 PROCEDURE — 3008F BODY MASS INDEX DOCD: CPT | Mod: CPTII,S$GLB,, | Performed by: NURSE PRACTITIONER

## 2021-02-02 PROCEDURE — 1159F PR MEDICATION LIST DOCUMENTED IN MEDICAL RECORD: ICD-10-PCS | Mod: S$GLB,,, | Performed by: NURSE PRACTITIONER

## 2021-02-02 PROCEDURE — 99999 PR PBB SHADOW E&M-EST. PATIENT-LVL V: ICD-10-PCS | Mod: PBBFAC,,, | Performed by: NURSE PRACTITIONER

## 2021-02-02 PROCEDURE — 3079F DIAST BP 80-89 MM HG: CPT | Mod: CPTII,S$GLB,, | Performed by: NURSE PRACTITIONER

## 2021-02-02 PROCEDURE — 1126F PR PAIN SEVERITY QUANTIFIED, NO PAIN PRESENT: ICD-10-PCS | Mod: S$GLB,,, | Performed by: NURSE PRACTITIONER

## 2021-02-02 PROCEDURE — 3288F PR FALLS RISK ASSESSMENT DOCUMENTED: ICD-10-PCS | Mod: CPTII,S$GLB,, | Performed by: NURSE PRACTITIONER

## 2021-02-02 PROCEDURE — 76705 ECHO EXAM OF ABDOMEN: CPT | Mod: 26,,, | Performed by: RADIOLOGY

## 2021-02-02 PROCEDURE — 1101F PR PT FALLS ASSESS DOC 0-1 FALLS W/OUT INJ PAST YR: ICD-10-PCS | Mod: CPTII,S$GLB,, | Performed by: NURSE PRACTITIONER

## 2021-02-05 ENCOUNTER — TELEPHONE (OUTPATIENT)
Dept: FAMILY MEDICINE | Facility: CLINIC | Age: 66
End: 2021-02-05

## 2021-02-05 RX ORDER — KETOROLAC TROMETHAMINE 10 MG/1
10 TABLET, FILM COATED ORAL EVERY 6 HOURS PRN
Qty: 40 TABLET | Refills: 0 | Status: SHIPPED | OUTPATIENT
Start: 2021-02-05 | End: 2021-02-08

## 2021-02-08 ENCOUNTER — TELEPHONE (OUTPATIENT)
Dept: PSYCHIATRY | Facility: CLINIC | Age: 66
End: 2021-02-08

## 2021-02-08 ENCOUNTER — OFFICE VISIT (OUTPATIENT)
Dept: PSYCHIATRY | Facility: CLINIC | Age: 66
End: 2021-02-08
Payer: MEDICARE

## 2021-02-08 VITALS
OXYGEN SATURATION: 95 % | BODY MASS INDEX: 22.62 KG/M2 | WEIGHT: 122.94 LBS | HEIGHT: 62 IN | HEART RATE: 104 BPM | SYSTOLIC BLOOD PRESSURE: 145 MMHG | DIASTOLIC BLOOD PRESSURE: 90 MMHG

## 2021-02-08 DIAGNOSIS — R79.89 LOW TSH LEVEL: ICD-10-CM

## 2021-02-08 DIAGNOSIS — F31.9 BIPOLAR 1 DISORDER: Primary | ICD-10-CM

## 2021-02-08 DIAGNOSIS — M54.12 CERVICAL MYELOPATHY WITH CERVICAL RADICULOPATHY: ICD-10-CM

## 2021-02-08 DIAGNOSIS — F11.90 CHRONIC, CONTINUOUS USE OF OPIOIDS: ICD-10-CM

## 2021-02-08 DIAGNOSIS — Z86.19 HISTORY OF HEPATITIS C: ICD-10-CM

## 2021-02-08 DIAGNOSIS — F41.9 ANXIETY: ICD-10-CM

## 2021-02-08 DIAGNOSIS — G95.9 CERVICAL MYELOPATHY WITH CERVICAL RADICULOPATHY: ICD-10-CM

## 2021-02-08 PROCEDURE — 99999 PR PBB SHADOW E&M-EST. PATIENT-LVL IV: ICD-10-PCS | Mod: PBBFAC,,, | Performed by: PHYSICIAN ASSISTANT

## 2021-02-08 PROCEDURE — 99204 PR OFFICE/OUTPT VISIT, NEW, LEVL IV, 45-59 MIN: ICD-10-PCS | Mod: S$GLB,,, | Performed by: PHYSICIAN ASSISTANT

## 2021-02-08 PROCEDURE — 99204 OFFICE O/P NEW MOD 45 MIN: CPT | Mod: S$GLB,,, | Performed by: PHYSICIAN ASSISTANT

## 2021-02-08 PROCEDURE — 99499 RISK ADDL DX/OHS AUDIT: ICD-10-PCS | Mod: S$GLB,,, | Performed by: PHYSICIAN ASSISTANT

## 2021-02-08 PROCEDURE — 99999 PR PBB SHADOW E&M-EST. PATIENT-LVL IV: CPT | Mod: PBBFAC,,, | Performed by: PHYSICIAN ASSISTANT

## 2021-02-08 PROCEDURE — 99499 UNLISTED E&M SERVICE: CPT | Mod: S$GLB,,, | Performed by: PHYSICIAN ASSISTANT

## 2021-02-08 PROCEDURE — 99214 OFFICE O/P EST MOD 30 MIN: CPT | Mod: PBBFAC | Performed by: PHYSICIAN ASSISTANT

## 2021-02-08 RX ORDER — VENLAFAXINE 37.5 MG/1
37.5 TABLET ORAL DAILY
Qty: 14 TABLET | Refills: 0 | Status: SHIPPED | OUTPATIENT
Start: 2021-02-08 | End: 2021-03-09

## 2021-02-08 RX ORDER — IBUPROFEN 200 MG
1 TABLET ORAL DAILY
Qty: 90 PATCH | Refills: 0 | Status: SHIPPED | OUTPATIENT
Start: 2021-02-08 | End: 2021-05-09

## 2021-02-08 RX ORDER — PREGABALIN 75 MG/1
CAPSULE ORAL
Qty: 134 CAPSULE | Refills: 0 | Status: SHIPPED | OUTPATIENT
Start: 2021-02-08 | End: 2021-03-09 | Stop reason: SDUPTHER

## 2021-02-08 RX ORDER — RISPERIDONE 0.5 MG/1
0.5 TABLET ORAL 2 TIMES DAILY
Qty: 180 TABLET | Refills: 0 | Status: SHIPPED | OUTPATIENT
Start: 2021-02-08 | End: 2021-03-09

## 2021-02-08 RX ORDER — HYDROXYZINE HYDROCHLORIDE 25 MG/1
25 TABLET, FILM COATED ORAL 3 TIMES DAILY PRN
Qty: 90 TABLET | Refills: 0 | Status: SHIPPED | OUTPATIENT
Start: 2021-02-08 | End: 2021-03-10

## 2021-02-15 ENCOUNTER — TELEPHONE (OUTPATIENT)
Dept: PSYCHIATRY | Facility: CLINIC | Age: 66
End: 2021-02-15

## 2021-02-22 ENCOUNTER — LAB VISIT (OUTPATIENT)
Dept: LAB | Facility: HOSPITAL | Age: 66
End: 2021-02-22
Attending: INTERNAL MEDICINE
Payer: MEDICARE

## 2021-02-22 DIAGNOSIS — I10 HYPERTENSION, ESSENTIAL: ICD-10-CM

## 2021-02-22 DIAGNOSIS — Z79.899 OTHER LONG TERM (CURRENT) DRUG THERAPY: ICD-10-CM

## 2021-02-22 DIAGNOSIS — Z13.220 SCREENING FOR HYPERLIPIDEMIA: ICD-10-CM

## 2021-02-22 DIAGNOSIS — R79.89 LOW TSH LEVEL: ICD-10-CM

## 2021-02-22 LAB
ALBUMIN SERPL BCP-MCNC: 4 G/DL (ref 3.5–5.2)
ALP SERPL-CCNC: 95 U/L (ref 55–135)
ALT SERPL W/O P-5'-P-CCNC: 19 U/L (ref 10–44)
ANION GAP SERPL CALC-SCNC: 8 MMOL/L (ref 8–16)
AST SERPL-CCNC: 24 U/L (ref 10–40)
BASOPHILS # BLD AUTO: 0.07 K/UL (ref 0–0.2)
BASOPHILS NFR BLD: 0.7 % (ref 0–1.9)
BILIRUB SERPL-MCNC: 0.2 MG/DL (ref 0.1–1)
BUN SERPL-MCNC: 15 MG/DL (ref 8–23)
CALCIUM SERPL-MCNC: 9 MG/DL (ref 8.7–10.5)
CHLORIDE SERPL-SCNC: 108 MMOL/L (ref 95–110)
CHOLEST SERPL-MCNC: 214 MG/DL (ref 120–199)
CHOLEST/HDLC SERPL: 2.6 {RATIO} (ref 2–5)
CO2 SERPL-SCNC: 24 MMOL/L (ref 23–29)
CREAT SERPL-MCNC: 0.8 MG/DL (ref 0.5–1.4)
DIFFERENTIAL METHOD: ABNORMAL
EOSINOPHIL # BLD AUTO: 0.2 K/UL (ref 0–0.5)
EOSINOPHIL NFR BLD: 2.3 % (ref 0–8)
ERYTHROCYTE [DISTWIDTH] IN BLOOD BY AUTOMATED COUNT: 15.2 % (ref 11.5–14.5)
EST. GFR  (AFRICAN AMERICAN): >60 ML/MIN/1.73 M^2
EST. GFR  (NON AFRICAN AMERICAN): >60 ML/MIN/1.73 M^2
GLUCOSE SERPL-MCNC: 100 MG/DL (ref 70–110)
HCT VFR BLD AUTO: 39.3 % (ref 37–48.5)
HDLC SERPL-MCNC: 82 MG/DL (ref 40–75)
HDLC SERPL: 38.3 % (ref 20–50)
HGB BLD-MCNC: 13.3 G/DL (ref 12–16)
IMM GRANULOCYTES # BLD AUTO: 0.03 K/UL (ref 0–0.04)
IMM GRANULOCYTES NFR BLD AUTO: 0.3 % (ref 0–0.5)
LDLC SERPL CALC-MCNC: 119 MG/DL (ref 63–159)
LYMPHOCYTES # BLD AUTO: 3 K/UL (ref 1–4.8)
LYMPHOCYTES NFR BLD: 31.3 % (ref 18–48)
MCH RBC QN AUTO: 29 PG (ref 27–31)
MCHC RBC AUTO-ENTMCNC: 33.8 G/DL (ref 32–36)
MCV RBC AUTO: 86 FL (ref 82–98)
MONOCYTES # BLD AUTO: 0.8 K/UL (ref 0.3–1)
MONOCYTES NFR BLD: 8.1 % (ref 4–15)
NEUTROPHILS # BLD AUTO: 5.5 K/UL (ref 1.8–7.7)
NEUTROPHILS NFR BLD: 57.3 % (ref 38–73)
NONHDLC SERPL-MCNC: 132 MG/DL
NRBC BLD-RTO: 0 /100 WBC
PLATELET # BLD AUTO: 382 K/UL (ref 150–350)
PMV BLD AUTO: 9.7 FL (ref 9.2–12.9)
POTASSIUM SERPL-SCNC: 4.4 MMOL/L (ref 3.5–5.1)
PROT SERPL-MCNC: 7.7 G/DL (ref 6–8.4)
RBC # BLD AUTO: 4.58 M/UL (ref 4–5.4)
SODIUM SERPL-SCNC: 140 MMOL/L (ref 136–145)
T4 FREE SERPL-MCNC: 0.84 NG/DL (ref 0.71–1.51)
TRIGL SERPL-MCNC: 65 MG/DL (ref 30–150)
TSH SERPL DL<=0.005 MIU/L-ACNC: 2.04 UIU/ML (ref 0.4–4)
WBC # BLD AUTO: 9.61 K/UL (ref 3.9–12.7)

## 2021-02-22 PROCEDURE — 84443 ASSAY THYROID STIM HORMONE: CPT

## 2021-02-22 PROCEDURE — 84436 ASSAY OF TOTAL THYROXINE: CPT

## 2021-02-22 PROCEDURE — 85025 COMPLETE CBC W/AUTO DIFF WBC: CPT

## 2021-02-22 PROCEDURE — 86376 MICROSOMAL ANTIBODY EACH: CPT

## 2021-02-22 PROCEDURE — 80061 LIPID PANEL: CPT

## 2021-02-22 PROCEDURE — 80053 COMPREHEN METABOLIC PANEL: CPT

## 2021-02-22 PROCEDURE — 36415 COLL VENOUS BLD VENIPUNCTURE: CPT

## 2021-02-22 PROCEDURE — 84480 ASSAY TRIIODOTHYRONINE (T3): CPT

## 2021-02-22 PROCEDURE — 84439 ASSAY OF FREE THYROXINE: CPT

## 2021-02-22 PROCEDURE — 84481 FREE ASSAY (FT-3): CPT

## 2021-02-23 LAB — THYROPEROXIDASE IGG SERPL-ACNC: <6 IU/ML

## 2021-02-24 LAB
T3 SERPL-MCNC: 98 NG/DL (ref 60–180)
T3FREE SERPL-MCNC: 2.7 PG/ML (ref 2.3–4.2)
T4 SERPL-MCNC: 6.7 UG/DL (ref 4.5–11.5)

## 2021-03-01 DIAGNOSIS — G44.59 OTHER COMPLICATED HEADACHE SYNDROME: ICD-10-CM

## 2021-03-01 RX ORDER — BUTALBITAL, ACETAMINOPHEN AND CAFFEINE 50; 325; 40 MG/1; MG/1; MG/1
TABLET ORAL
Qty: 60 TABLET | Refills: 0 | Status: SHIPPED | OUTPATIENT
Start: 2021-03-01 | End: 2021-03-15 | Stop reason: SDUPTHER

## 2021-03-08 ENCOUNTER — PATIENT OUTREACH (OUTPATIENT)
Dept: ADMINISTRATIVE | Facility: OTHER | Age: 66
End: 2021-03-08

## 2021-03-08 ENCOUNTER — HOSPITAL ENCOUNTER (OUTPATIENT)
Dept: RADIOLOGY | Facility: HOSPITAL | Age: 66
Discharge: HOME | End: 2021-03-08
Attending: INTERNAL MEDICINE
Payer: MEDICARE

## 2021-03-08 ENCOUNTER — HOSPITAL ENCOUNTER (OUTPATIENT)
Dept: RADIOLOGY | Facility: CLINIC | Age: 66
Discharge: HOME | End: 2021-03-08
Attending: INTERNAL MEDICINE
Payer: MEDICARE

## 2021-03-08 VITALS — BODY MASS INDEX: 22.45 KG/M2 | HEIGHT: 62 IN | WEIGHT: 122 LBS

## 2021-03-08 DIAGNOSIS — Z78.0 ASYMPTOMATIC MENOPAUSAL STATE: ICD-10-CM

## 2021-03-08 DIAGNOSIS — Z12.31 ENCOUNTER FOR SCREENING MAMMOGRAM FOR MALIGNANT NEOPLASM OF BREAST: ICD-10-CM

## 2021-03-08 PROCEDURE — 77063 MAMMO DIGITAL SCREENING BILAT WITH TOMO: ICD-10-PCS | Mod: 26,,, | Performed by: RADIOLOGY

## 2021-03-08 PROCEDURE — 77080 DEXA BONE DENSITY SPINE HIP: ICD-10-PCS | Mod: 26,,, | Performed by: INTERNAL MEDICINE

## 2021-03-08 PROCEDURE — 77067 SCR MAMMO BI INCL CAD: CPT | Mod: 26,,, | Performed by: RADIOLOGY

## 2021-03-08 PROCEDURE — 77067 MAMMO DIGITAL SCREENING BILAT WITH TOMO: ICD-10-PCS | Mod: 26,,, | Performed by: RADIOLOGY

## 2021-03-08 PROCEDURE — 77063 BREAST TOMOSYNTHESIS BI: CPT | Mod: 26,,, | Performed by: RADIOLOGY

## 2021-03-08 PROCEDURE — 77067 SCR MAMMO BI INCL CAD: CPT | Mod: TC,PO

## 2021-03-08 PROCEDURE — 77080 DXA BONE DENSITY AXIAL: CPT | Mod: TC,PO

## 2021-03-08 PROCEDURE — 77080 DXA BONE DENSITY AXIAL: CPT | Mod: 26,,, | Performed by: INTERNAL MEDICINE

## 2021-03-09 ENCOUNTER — OFFICE VISIT (OUTPATIENT)
Dept: PSYCHIATRY | Facility: CLINIC | Age: 66
End: 2021-03-09
Payer: MEDICARE

## 2021-03-09 VITALS
BODY MASS INDEX: 23.74 KG/M2 | DIASTOLIC BLOOD PRESSURE: 83 MMHG | OXYGEN SATURATION: 97 % | HEIGHT: 62 IN | HEART RATE: 82 BPM | SYSTOLIC BLOOD PRESSURE: 126 MMHG | WEIGHT: 129 LBS

## 2021-03-09 DIAGNOSIS — F41.9 ANXIETY: ICD-10-CM

## 2021-03-09 DIAGNOSIS — F31.9 BIPOLAR 1 DISORDER: Primary | ICD-10-CM

## 2021-03-09 PROCEDURE — 99213 OFFICE O/P EST LOW 20 MIN: CPT | Mod: PBBFAC | Performed by: PHYSICIAN ASSISTANT

## 2021-03-09 PROCEDURE — 99214 OFFICE O/P EST MOD 30 MIN: CPT | Mod: S$GLB,,, | Performed by: PHYSICIAN ASSISTANT

## 2021-03-09 PROCEDURE — 99214 PR OFFICE/OUTPT VISIT, EST, LEVL IV, 30-39 MIN: ICD-10-PCS | Mod: S$GLB,,, | Performed by: PHYSICIAN ASSISTANT

## 2021-03-09 PROCEDURE — 99999 PR PBB SHADOW E&M-EST. PATIENT-LVL III: ICD-10-PCS | Mod: PBBFAC,,, | Performed by: PHYSICIAN ASSISTANT

## 2021-03-09 PROCEDURE — 99999 PR PBB SHADOW E&M-EST. PATIENT-LVL III: CPT | Mod: PBBFAC,,, | Performed by: PHYSICIAN ASSISTANT

## 2021-03-09 RX ORDER — PREGABALIN 300 MG/1
300 CAPSULE ORAL 2 TIMES DAILY
Qty: 180 CAPSULE | Refills: 0 | Status: SHIPPED | OUTPATIENT
Start: 2021-03-09 | End: 2021-09-23

## 2021-03-09 RX ORDER — RISPERIDONE 1 MG/1
1 TABLET ORAL 2 TIMES DAILY
Qty: 180 TABLET | Refills: 0 | Status: SHIPPED | OUTPATIENT
Start: 2021-03-09 | End: 2021-05-26 | Stop reason: SDUPTHER

## 2021-03-09 RX ORDER — DIAZEPAM 2 MG/1
2-4 TABLET ORAL EVERY 8 HOURS
Qty: 60 TABLET | Refills: 0 | Status: SHIPPED | OUTPATIENT
Start: 2021-03-09 | End: 2021-04-12

## 2021-03-10 ENCOUNTER — OFFICE VISIT (OUTPATIENT)
Dept: PULMONOLOGY | Facility: CLINIC | Age: 66
End: 2021-03-10
Payer: MEDICARE

## 2021-03-10 VITALS
HEIGHT: 62 IN | SYSTOLIC BLOOD PRESSURE: 120 MMHG | OXYGEN SATURATION: 97 % | BODY MASS INDEX: 23.77 KG/M2 | DIASTOLIC BLOOD PRESSURE: 84 MMHG | WEIGHT: 129.19 LBS | TEMPERATURE: 98 F | HEART RATE: 81 BPM

## 2021-03-10 DIAGNOSIS — J44.9 CHRONIC OBSTRUCTIVE PULMONARY DISEASE, UNSPECIFIED COPD TYPE: ICD-10-CM

## 2021-03-10 DIAGNOSIS — G47.33 OSA (OBSTRUCTIVE SLEEP APNEA): Primary | ICD-10-CM

## 2021-03-10 DIAGNOSIS — Z72.0 TOBACCO USE: ICD-10-CM

## 2021-03-10 PROCEDURE — 3079F DIAST BP 80-89 MM HG: CPT | Mod: CPTII,S$GLB,, | Performed by: INTERNAL MEDICINE

## 2021-03-10 PROCEDURE — 3074F SYST BP LT 130 MM HG: CPT | Mod: CPTII,S$GLB,, | Performed by: INTERNAL MEDICINE

## 2021-03-10 PROCEDURE — 1101F PT FALLS ASSESS-DOCD LE1/YR: CPT | Mod: CPTII,S$GLB,, | Performed by: INTERNAL MEDICINE

## 2021-03-10 PROCEDURE — 3288F FALL RISK ASSESSMENT DOCD: CPT | Mod: CPTII,S$GLB,, | Performed by: INTERNAL MEDICINE

## 2021-03-10 PROCEDURE — 1101F PR PT FALLS ASSESS DOC 0-1 FALLS W/OUT INJ PAST YR: ICD-10-PCS | Mod: CPTII,S$GLB,, | Performed by: INTERNAL MEDICINE

## 2021-03-10 PROCEDURE — 99999 PR PBB SHADOW E&M-EST. PATIENT-LVL IV: ICD-10-PCS | Mod: PBBFAC,,, | Performed by: INTERNAL MEDICINE

## 2021-03-10 PROCEDURE — 3079F PR MOST RECENT DIASTOLIC BLOOD PRESSURE 80-89 MM HG: ICD-10-PCS | Mod: CPTII,S$GLB,, | Performed by: INTERNAL MEDICINE

## 2021-03-10 PROCEDURE — 99999 PR PBB SHADOW E&M-EST. PATIENT-LVL IV: CPT | Mod: PBBFAC,,, | Performed by: INTERNAL MEDICINE

## 2021-03-10 PROCEDURE — 1125F AMNT PAIN NOTED PAIN PRSNT: CPT | Mod: S$GLB,,, | Performed by: INTERNAL MEDICINE

## 2021-03-10 PROCEDURE — 3288F PR FALLS RISK ASSESSMENT DOCUMENTED: ICD-10-PCS | Mod: CPTII,S$GLB,, | Performed by: INTERNAL MEDICINE

## 2021-03-10 PROCEDURE — 3008F BODY MASS INDEX DOCD: CPT | Mod: CPTII,S$GLB,, | Performed by: INTERNAL MEDICINE

## 2021-03-10 PROCEDURE — 99204 OFFICE O/P NEW MOD 45 MIN: CPT | Mod: S$GLB,,, | Performed by: INTERNAL MEDICINE

## 2021-03-10 PROCEDURE — 3008F PR BODY MASS INDEX (BMI) DOCUMENTED: ICD-10-PCS | Mod: CPTII,S$GLB,, | Performed by: INTERNAL MEDICINE

## 2021-03-10 PROCEDURE — 3074F PR MOST RECENT SYSTOLIC BLOOD PRESSURE < 130 MM HG: ICD-10-PCS | Mod: CPTII,S$GLB,, | Performed by: INTERNAL MEDICINE

## 2021-03-10 PROCEDURE — 99204 PR OFFICE/OUTPT VISIT, NEW, LEVL IV, 45-59 MIN: ICD-10-PCS | Mod: S$GLB,,, | Performed by: INTERNAL MEDICINE

## 2021-03-10 PROCEDURE — 1125F PR PAIN SEVERITY QUANTIFIED, PAIN PRESENT: ICD-10-PCS | Mod: S$GLB,,, | Performed by: INTERNAL MEDICINE

## 2021-03-10 PROCEDURE — 1159F PR MEDICATION LIST DOCUMENTED IN MEDICAL RECORD: ICD-10-PCS | Mod: S$GLB,,, | Performed by: INTERNAL MEDICINE

## 2021-03-10 PROCEDURE — 1159F MED LIST DOCD IN RCRD: CPT | Mod: S$GLB,,, | Performed by: INTERNAL MEDICINE

## 2021-03-10 RX ORDER — OXCARBAZEPINE 150 MG/1
TABLET, FILM COATED ORAL
Status: ON HOLD | COMMUNITY
Start: 2021-02-25 | End: 2022-02-01

## 2021-03-15 ENCOUNTER — OFFICE VISIT (OUTPATIENT)
Dept: FAMILY MEDICINE | Facility: CLINIC | Age: 66
End: 2021-03-15
Payer: MEDICARE

## 2021-03-15 VITALS
SYSTOLIC BLOOD PRESSURE: 104 MMHG | TEMPERATURE: 98 F | WEIGHT: 127.88 LBS | RESPIRATION RATE: 24 BRPM | BODY MASS INDEX: 23.53 KG/M2 | HEART RATE: 92 BPM | DIASTOLIC BLOOD PRESSURE: 70 MMHG | OXYGEN SATURATION: 92 % | HEIGHT: 62 IN

## 2021-03-15 DIAGNOSIS — G44.59 OTHER COMPLICATED HEADACHE SYNDROME: ICD-10-CM

## 2021-03-15 DIAGNOSIS — R09.81 NASAL CONGESTION: ICD-10-CM

## 2021-03-15 DIAGNOSIS — F17.200 TOBACCO DEPENDENCE: ICD-10-CM

## 2021-03-15 DIAGNOSIS — M81.0 AGE-RELATED OSTEOPOROSIS WITHOUT CURRENT PATHOLOGICAL FRACTURE: Primary | ICD-10-CM

## 2021-03-15 PROCEDURE — 3074F SYST BP LT 130 MM HG: CPT | Mod: CPTII,S$GLB,, | Performed by: INTERNAL MEDICINE

## 2021-03-15 PROCEDURE — 3008F BODY MASS INDEX DOCD: CPT | Mod: CPTII,S$GLB,, | Performed by: INTERNAL MEDICINE

## 2021-03-15 PROCEDURE — 1159F PR MEDICATION LIST DOCUMENTED IN MEDICAL RECORD: ICD-10-PCS | Mod: S$GLB,,, | Performed by: INTERNAL MEDICINE

## 2021-03-15 PROCEDURE — 99214 PR OFFICE/OUTPT VISIT, EST, LEVL IV, 30-39 MIN: ICD-10-PCS | Mod: S$GLB,,, | Performed by: INTERNAL MEDICINE

## 2021-03-15 PROCEDURE — 99999 PR PBB SHADOW E&M-EST. PATIENT-LVL IV: ICD-10-PCS | Mod: PBBFAC,,, | Performed by: INTERNAL MEDICINE

## 2021-03-15 PROCEDURE — 3288F FALL RISK ASSESSMENT DOCD: CPT | Mod: CPTII,S$GLB,, | Performed by: INTERNAL MEDICINE

## 2021-03-15 PROCEDURE — 3074F PR MOST RECENT SYSTOLIC BLOOD PRESSURE < 130 MM HG: ICD-10-PCS | Mod: CPTII,S$GLB,, | Performed by: INTERNAL MEDICINE

## 2021-03-15 PROCEDURE — 3078F PR MOST RECENT DIASTOLIC BLOOD PRESSURE < 80 MM HG: ICD-10-PCS | Mod: CPTII,S$GLB,, | Performed by: INTERNAL MEDICINE

## 2021-03-15 PROCEDURE — 1125F AMNT PAIN NOTED PAIN PRSNT: CPT | Mod: S$GLB,,, | Performed by: INTERNAL MEDICINE

## 2021-03-15 PROCEDURE — 1101F PT FALLS ASSESS-DOCD LE1/YR: CPT | Mod: CPTII,S$GLB,, | Performed by: INTERNAL MEDICINE

## 2021-03-15 PROCEDURE — 99214 OFFICE O/P EST MOD 30 MIN: CPT | Mod: S$GLB,,, | Performed by: INTERNAL MEDICINE

## 2021-03-15 PROCEDURE — 1125F PR PAIN SEVERITY QUANTIFIED, PAIN PRESENT: ICD-10-PCS | Mod: S$GLB,,, | Performed by: INTERNAL MEDICINE

## 2021-03-15 PROCEDURE — 99999 PR PBB SHADOW E&M-EST. PATIENT-LVL IV: CPT | Mod: PBBFAC,,, | Performed by: INTERNAL MEDICINE

## 2021-03-15 PROCEDURE — 1159F MED LIST DOCD IN RCRD: CPT | Mod: S$GLB,,, | Performed by: INTERNAL MEDICINE

## 2021-03-15 PROCEDURE — 1101F PR PT FALLS ASSESS DOC 0-1 FALLS W/OUT INJ PAST YR: ICD-10-PCS | Mod: CPTII,S$GLB,, | Performed by: INTERNAL MEDICINE

## 2021-03-15 PROCEDURE — 3288F PR FALLS RISK ASSESSMENT DOCUMENTED: ICD-10-PCS | Mod: CPTII,S$GLB,, | Performed by: INTERNAL MEDICINE

## 2021-03-15 PROCEDURE — 3008F PR BODY MASS INDEX (BMI) DOCUMENTED: ICD-10-PCS | Mod: CPTII,S$GLB,, | Performed by: INTERNAL MEDICINE

## 2021-03-15 PROCEDURE — 3078F DIAST BP <80 MM HG: CPT | Mod: CPTII,S$GLB,, | Performed by: INTERNAL MEDICINE

## 2021-03-15 RX ORDER — VIT C/E/ZN/COPPR/LUTEIN/ZEAXAN 250MG-90MG
1000 CAPSULE ORAL DAILY
Qty: 90 CAPSULE | Refills: 3 | Status: CANCELLED | OUTPATIENT
Start: 2021-03-15

## 2021-03-15 RX ORDER — BUTALBITAL, ACETAMINOPHEN AND CAFFEINE 50; 325; 40 MG/1; MG/1; MG/1
TABLET ORAL
Qty: 60 TABLET | Refills: 1 | Status: SHIPPED | OUTPATIENT
Start: 2021-03-15 | End: 2021-04-09

## 2021-03-15 RX ORDER — ALENDRONATE SODIUM 70 MG/1
70 TABLET ORAL
Qty: 12 TABLET | Refills: 3 | Status: ON HOLD | OUTPATIENT
Start: 2021-03-15 | End: 2022-02-01

## 2021-03-15 RX ORDER — FLUTICASONE PROPIONATE 50 MCG
2 SPRAY, SUSPENSION (ML) NASAL DAILY
Qty: 16 ML | Refills: 3 | Status: SHIPPED | OUTPATIENT
Start: 2021-03-15

## 2021-03-19 ENCOUNTER — LAB VISIT (OUTPATIENT)
Dept: FAMILY MEDICINE | Facility: CLINIC | Age: 66
End: 2021-03-19
Payer: MEDICARE

## 2021-03-19 DIAGNOSIS — J44.9 CHRONIC OBSTRUCTIVE PULMONARY DISEASE, UNSPECIFIED COPD TYPE: ICD-10-CM

## 2021-03-19 PROCEDURE — U0003 INFECTIOUS AGENT DETECTION BY NUCLEIC ACID (DNA OR RNA); SEVERE ACUTE RESPIRATORY SYNDROME CORONAVIRUS 2 (SARS-COV-2) (CORONAVIRUS DISEASE [COVID-19]), AMPLIFIED PROBE TECHNIQUE, MAKING USE OF HIGH THROUGHPUT TECHNOLOGIES AS DESCRIBED BY CMS-2020-01-R: HCPCS | Performed by: INTERNAL MEDICINE

## 2021-03-19 PROCEDURE — U0005 INFEC AGEN DETEC AMPLI PROBE: HCPCS | Performed by: INTERNAL MEDICINE

## 2021-03-20 LAB — SARS-COV-2 RNA RESP QL NAA+PROBE: NOT DETECTED

## 2021-03-26 ENCOUNTER — TELEPHONE (OUTPATIENT)
Dept: SLEEP MEDICINE | Facility: HOSPITAL | Age: 66
End: 2021-03-26

## 2021-04-09 DIAGNOSIS — G44.59 OTHER COMPLICATED HEADACHE SYNDROME: ICD-10-CM

## 2021-04-09 RX ORDER — BUTALBITAL, ACETAMINOPHEN AND CAFFEINE 50; 325; 40 MG/1; MG/1; MG/1
TABLET ORAL
Qty: 60 TABLET | Refills: 0 | Status: SHIPPED | OUTPATIENT
Start: 2021-04-15 | End: 2021-06-10

## 2021-04-15 ENCOUNTER — PES CALL (OUTPATIENT)
Dept: ADMINISTRATIVE | Facility: CLINIC | Age: 66
End: 2021-04-15

## 2021-05-06 ENCOUNTER — TELEPHONE (OUTPATIENT)
Dept: ADMINISTRATIVE | Facility: CLINIC | Age: 66
End: 2021-05-06

## 2021-05-12 ENCOUNTER — TELEPHONE (OUTPATIENT)
Dept: PSYCHIATRY | Facility: CLINIC | Age: 66
End: 2021-05-12

## 2021-05-17 ENCOUNTER — TELEPHONE (OUTPATIENT)
Dept: PSYCHIATRY | Facility: CLINIC | Age: 66
End: 2021-05-17

## 2021-05-18 ENCOUNTER — TELEPHONE (OUTPATIENT)
Dept: PSYCHIATRY | Facility: CLINIC | Age: 66
End: 2021-05-18

## 2021-05-25 ENCOUNTER — TELEPHONE (OUTPATIENT)
Dept: PSYCHIATRY | Facility: CLINIC | Age: 66
End: 2021-05-25

## 2021-05-26 ENCOUNTER — OFFICE VISIT (OUTPATIENT)
Dept: PSYCHIATRY | Facility: CLINIC | Age: 66
End: 2021-05-26
Payer: MEDICARE

## 2021-05-26 VITALS
BODY MASS INDEX: 23.69 KG/M2 | DIASTOLIC BLOOD PRESSURE: 80 MMHG | OXYGEN SATURATION: 92 % | WEIGHT: 128.75 LBS | HEART RATE: 82 BPM | SYSTOLIC BLOOD PRESSURE: 110 MMHG | HEIGHT: 62 IN

## 2021-05-26 DIAGNOSIS — F41.9 ANXIETY: ICD-10-CM

## 2021-05-26 DIAGNOSIS — F31.9 BIPOLAR 1 DISORDER: Primary | ICD-10-CM

## 2021-05-26 PROCEDURE — 3288F PR FALLS RISK ASSESSMENT DOCUMENTED: ICD-10-PCS | Mod: CPTII,S$GLB,, | Performed by: PHYSICIAN ASSISTANT

## 2021-05-26 PROCEDURE — 90833 PR PSYCHOTHERAPY W/PATIENT W/E&M, 30 MIN (ADD ON): ICD-10-PCS | Mod: S$GLB,,, | Performed by: PHYSICIAN ASSISTANT

## 2021-05-26 PROCEDURE — 1126F AMNT PAIN NOTED NONE PRSNT: CPT | Mod: S$GLB,,, | Performed by: PHYSICIAN ASSISTANT

## 2021-05-26 PROCEDURE — 99999 PR PBB SHADOW E&M-EST. PATIENT-LVL III: ICD-10-PCS | Mod: PBBFAC,,, | Performed by: PHYSICIAN ASSISTANT

## 2021-05-26 PROCEDURE — 1101F PT FALLS ASSESS-DOCD LE1/YR: CPT | Mod: CPTII,S$GLB,, | Performed by: PHYSICIAN ASSISTANT

## 2021-05-26 PROCEDURE — 90833 PSYTX W PT W E/M 30 MIN: CPT | Mod: S$GLB,,, | Performed by: PHYSICIAN ASSISTANT

## 2021-05-26 PROCEDURE — 99214 PR OFFICE/OUTPT VISIT, EST, LEVL IV, 30-39 MIN: ICD-10-PCS | Mod: S$GLB,,, | Performed by: PHYSICIAN ASSISTANT

## 2021-05-26 PROCEDURE — 1159F PR MEDICATION LIST DOCUMENTED IN MEDICAL RECORD: ICD-10-PCS | Mod: S$GLB,,, | Performed by: PHYSICIAN ASSISTANT

## 2021-05-26 PROCEDURE — 1101F PR PT FALLS ASSESS DOC 0-1 FALLS W/OUT INJ PAST YR: ICD-10-PCS | Mod: CPTII,S$GLB,, | Performed by: PHYSICIAN ASSISTANT

## 2021-05-26 PROCEDURE — 3008F BODY MASS INDEX DOCD: CPT | Mod: CPTII,S$GLB,, | Performed by: PHYSICIAN ASSISTANT

## 2021-05-26 PROCEDURE — 99214 OFFICE O/P EST MOD 30 MIN: CPT | Mod: S$GLB,,, | Performed by: PHYSICIAN ASSISTANT

## 2021-05-26 PROCEDURE — 3008F PR BODY MASS INDEX (BMI) DOCUMENTED: ICD-10-PCS | Mod: CPTII,S$GLB,, | Performed by: PHYSICIAN ASSISTANT

## 2021-05-26 PROCEDURE — 1126F PR PAIN SEVERITY QUANTIFIED, NO PAIN PRESENT: ICD-10-PCS | Mod: S$GLB,,, | Performed by: PHYSICIAN ASSISTANT

## 2021-05-26 PROCEDURE — 1159F MED LIST DOCD IN RCRD: CPT | Mod: S$GLB,,, | Performed by: PHYSICIAN ASSISTANT

## 2021-05-26 PROCEDURE — 3288F FALL RISK ASSESSMENT DOCD: CPT | Mod: CPTII,S$GLB,, | Performed by: PHYSICIAN ASSISTANT

## 2021-05-26 PROCEDURE — 99999 PR PBB SHADOW E&M-EST. PATIENT-LVL III: CPT | Mod: PBBFAC,,, | Performed by: PHYSICIAN ASSISTANT

## 2021-05-26 RX ORDER — RISPERIDONE 1 MG/1
1 TABLET ORAL 3 TIMES DAILY
Qty: 90 TABLET | Refills: 2 | Status: ON HOLD | OUTPATIENT
Start: 2021-05-26 | End: 2022-02-01

## 2021-06-09 DIAGNOSIS — G44.59 OTHER COMPLICATED HEADACHE SYNDROME: ICD-10-CM

## 2021-06-10 RX ORDER — BUTALBITAL, ACETAMINOPHEN AND CAFFEINE 50; 325; 40 MG/1; MG/1; MG/1
TABLET ORAL
Qty: 60 TABLET | Refills: 0 | Status: SHIPPED | OUTPATIENT
Start: 2021-06-10 | End: 2021-07-13

## 2021-06-23 NOTE — Clinical Note
Hi:  Just entered order for EUS on this pt.  pls call to schedule pls Let me know if order does not contain correct pool :) Thanks! Brittany 
Meaghan JEROME

## 2021-07-13 DIAGNOSIS — G44.59 OTHER COMPLICATED HEADACHE SYNDROME: ICD-10-CM

## 2021-07-14 RX ORDER — BUTALBITAL, ACETAMINOPHEN AND CAFFEINE 50; 325; 40 MG/1; MG/1; MG/1
TABLET ORAL
Qty: 60 TABLET | Refills: 1 | Status: SHIPPED | OUTPATIENT
Start: 2021-07-14 | End: 2021-09-13

## 2021-07-29 ENCOUNTER — TELEPHONE (OUTPATIENT)
Dept: FAMILY MEDICINE | Facility: CLINIC | Age: 66
End: 2021-07-29

## 2021-08-12 DIAGNOSIS — G44.59 OTHER COMPLICATED HEADACHE SYNDROME: ICD-10-CM

## 2021-08-12 RX ORDER — BUTALBITAL, ACETAMINOPHEN AND CAFFEINE 50; 325; 40 MG/1; MG/1; MG/1
TABLET ORAL
Qty: 60 TABLET | Refills: 1 | OUTPATIENT
Start: 2021-08-12

## 2021-11-16 ENCOUNTER — TELEPHONE (OUTPATIENT)
Dept: FAMILY MEDICINE | Facility: CLINIC | Age: 66
End: 2021-11-16
Payer: COMMERCIAL

## 2021-11-16 DIAGNOSIS — G44.59 OTHER COMPLICATED HEADACHE SYNDROME: ICD-10-CM

## 2021-11-16 RX ORDER — BUTALBITAL, ACETAMINOPHEN AND CAFFEINE 50; 325; 40 MG/1; MG/1; MG/1
TABLET ORAL
Qty: 60 TABLET | Refills: 0 | Status: SHIPPED | OUTPATIENT
Start: 2021-11-16 | End: 2021-12-10

## 2021-12-10 DIAGNOSIS — G44.59 OTHER COMPLICATED HEADACHE SYNDROME: ICD-10-CM

## 2021-12-10 RX ORDER — BUTALBITAL, ACETAMINOPHEN AND CAFFEINE 50; 325; 40 MG/1; MG/1; MG/1
TABLET ORAL
Qty: 60 TABLET | Refills: 0 | Status: SHIPPED | OUTPATIENT
Start: 2021-12-10 | End: 2022-01-04

## 2021-12-16 ENCOUNTER — PES CALL (OUTPATIENT)
Dept: ADMINISTRATIVE | Facility: CLINIC | Age: 66
End: 2021-12-16
Payer: COMMERCIAL

## 2022-01-04 DIAGNOSIS — G44.59 OTHER COMPLICATED HEADACHE SYNDROME: ICD-10-CM

## 2022-01-04 RX ORDER — BUTALBITAL, ACETAMINOPHEN AND CAFFEINE 50; 325; 40 MG/1; MG/1; MG/1
TABLET ORAL
Qty: 60 TABLET | Refills: 0 | Status: ON HOLD | OUTPATIENT
Start: 2022-01-07 | End: 2022-02-03 | Stop reason: HOSPADM

## 2022-01-04 NOTE — TELEPHONE ENCOUNTER
Care Due:                  Date            Visit Type   Department     Provider  --------------------------------------------------------------------------------                                             [facility]                                           MEDICINE/  Last Visit: 03-      None         INTERNAL MED   Leobardo Corcoran  Next Visit: None Scheduled  None         None Found                                                            Last  Test          Frequency    Reason                     Performed    Due Date  --------------------------------------------------------------------------------    Office Visit  12 months..  lisinopriL...............  03-   03-    CMP.........  12 months..  lisinopriL...............  Not Found    Overdue    Powered by [employer]. Reference number: [id number].   1/04/2022 10:08:41 AM CST

## 2022-01-27 NOTE — PROGRESS NOTES
Hep a given. lorrie well.  D/c'd in NAD   Lab are due in April    Key take away: work on diet changes and increasing activity  Goal of 150-200 minutes of moderate-intensity exercise each week    Optional testing- Insulin resistance testing:  Insulin resistance is when cells in your muscles, fat, and liver don't respond well to insulin and can't easily take up glucose from your blood. As a result, your pancreas makes more insulin to help glucose enter your cells.    Exercise is one of the fastest and most effective ways to reverse insulin resistance. Lose weight, especially around the middle. Losing weight around the abdomen not only improves insulin sensitivity but also lowers your risk of heart disease. Adopt a high-protein, low-sugar diet

## 2022-01-31 ENCOUNTER — HOSPITAL ENCOUNTER (INPATIENT)
Facility: HOSPITAL | Age: 67
LOS: 2 days | Discharge: HOME | DRG: 917 | End: 2022-02-03
Attending: EMERGENCY MEDICINE | Admitting: STUDENT IN AN ORGANIZED HEALTH CARE EDUCATION/TRAINING PROGRAM
Payer: MEDICARE

## 2022-01-31 DIAGNOSIS — E87.3 RESPIRATORY ALKALOSIS: ICD-10-CM

## 2022-01-31 DIAGNOSIS — I10 HYPERTENSION, ESSENTIAL: Chronic | ICD-10-CM

## 2022-01-31 DIAGNOSIS — R07.9 CHEST PAIN: ICD-10-CM

## 2022-01-31 DIAGNOSIS — J44.9 CHRONIC OBSTRUCTIVE PULMONARY DISEASE, UNSPECIFIED COPD TYPE: Chronic | ICD-10-CM

## 2022-01-31 DIAGNOSIS — K74.69 COMPENSATED CIRRHOSIS RELATED TO HEPATITIS C VIRUS (HCV): ICD-10-CM

## 2022-01-31 DIAGNOSIS — R00.0 TACHYCARDIA: ICD-10-CM

## 2022-01-31 DIAGNOSIS — Z86.19 HISTORY OF HEPATITIS C: ICD-10-CM

## 2022-01-31 DIAGNOSIS — K92.1 MELENA: ICD-10-CM

## 2022-01-31 DIAGNOSIS — B19.20 COMPENSATED CIRRHOSIS RELATED TO HEPATITIS C VIRUS (HCV): ICD-10-CM

## 2022-01-31 DIAGNOSIS — R05.9 COUGH: ICD-10-CM

## 2022-01-31 DIAGNOSIS — T39.011A ACCIDENTAL ASPIRIN OVERDOSE, INITIAL ENCOUNTER: Primary | ICD-10-CM

## 2022-01-31 DIAGNOSIS — F30.9 MANIA: ICD-10-CM

## 2022-01-31 DIAGNOSIS — E87.20 METABOLIC ACIDOSIS: ICD-10-CM

## 2022-01-31 LAB
ALBUMIN SERPL BCP-MCNC: 4.1 G/DL (ref 3.5–5.2)
ALP SERPL-CCNC: 64 U/L (ref 55–135)
ALT SERPL W/O P-5'-P-CCNC: 19 U/L (ref 10–44)
ANION GAP SERPL CALC-SCNC: 13 MMOL/L (ref 8–16)
AST SERPL-CCNC: 30 U/L (ref 10–40)
BASOPHILS # BLD AUTO: 0.07 K/UL (ref 0–0.2)
BASOPHILS NFR BLD: 0.5 % (ref 0–1.9)
BILIRUB SERPL-MCNC: 0.1 MG/DL (ref 0.1–1)
BUN SERPL-MCNC: 18 MG/DL (ref 8–23)
CALCIUM SERPL-MCNC: 9.5 MG/DL (ref 8.7–10.5)
CHLORIDE SERPL-SCNC: 109 MMOL/L (ref 95–110)
CO2 SERPL-SCNC: 17 MMOL/L (ref 23–29)
CREAT SERPL-MCNC: 0.8 MG/DL (ref 0.5–1.4)
CTP QC/QA: YES
CTP QC/QA: YES
DIFFERENTIAL METHOD: ABNORMAL
EOSINOPHIL # BLD AUTO: 0.1 K/UL (ref 0–0.5)
EOSINOPHIL NFR BLD: 0.8 % (ref 0–8)
ERYTHROCYTE [DISTWIDTH] IN BLOOD BY AUTOMATED COUNT: 13.4 % (ref 11.5–14.5)
EST. GFR  (AFRICAN AMERICAN): >60 ML/MIN/1.73 M^2
EST. GFR  (NON AFRICAN AMERICAN): >60 ML/MIN/1.73 M^2
GLUCOSE SERPL-MCNC: 100 MG/DL (ref 70–110)
HCT VFR BLD AUTO: 42.3 % (ref 37–48.5)
HGB BLD-MCNC: 14.5 G/DL (ref 12–16)
IMM GRANULOCYTES # BLD AUTO: 0.06 K/UL (ref 0–0.04)
IMM GRANULOCYTES NFR BLD AUTO: 0.4 % (ref 0–0.5)
LACTATE SERPL-SCNC: 0.8 MMOL/L (ref 0.5–2.2)
LYMPHOCYTES # BLD AUTO: 3 K/UL (ref 1–4.8)
LYMPHOCYTES NFR BLD: 20.6 % (ref 18–48)
MCH RBC QN AUTO: 31.2 PG (ref 27–31)
MCHC RBC AUTO-ENTMCNC: 34.3 G/DL (ref 32–36)
MCV RBC AUTO: 91 FL (ref 82–98)
MONOCYTES # BLD AUTO: 1.1 K/UL (ref 0.3–1)
MONOCYTES NFR BLD: 7.9 % (ref 4–15)
NEUTROPHILS # BLD AUTO: 10.1 K/UL (ref 1.8–7.7)
NEUTROPHILS NFR BLD: 69.8 % (ref 38–73)
NRBC BLD-RTO: 0 /100 WBC
PLATELET # BLD AUTO: 282 K/UL (ref 150–450)
PMV BLD AUTO: 9.9 FL (ref 9.2–12.9)
POC MOLECULAR INFLUENZA A AGN: NEGATIVE
POC MOLECULAR INFLUENZA B AGN: NEGATIVE
POTASSIUM SERPL-SCNC: 4 MMOL/L (ref 3.5–5.1)
PROT SERPL-MCNC: 7.9 G/DL (ref 6–8.4)
RBC # BLD AUTO: 4.65 M/UL (ref 4–5.4)
SARS-COV-2 RDRP RESP QL NAA+PROBE: NEGATIVE
SODIUM SERPL-SCNC: 139 MMOL/L (ref 136–145)
WBC # BLD AUTO: 14.43 K/UL (ref 3.9–12.7)

## 2022-01-31 PROCEDURE — 93010 EKG 12-LEAD: ICD-10-PCS | Mod: ,,, | Performed by: INTERNAL MEDICINE

## 2022-01-31 PROCEDURE — 87389 HIV-1 AG W/HIV-1&-2 AB AG IA: CPT | Performed by: EMERGENCY MEDICINE

## 2022-01-31 PROCEDURE — 80047 BASIC METABLC PNL IONIZED CA: CPT | Mod: HCNC

## 2022-01-31 PROCEDURE — 85025 COMPLETE CBC W/AUTO DIFF WBC: CPT | Performed by: EMERGENCY MEDICINE

## 2022-01-31 PROCEDURE — 96361 HYDRATE IV INFUSION ADD-ON: CPT | Mod: HCNC

## 2022-01-31 PROCEDURE — 84443 ASSAY THYROID STIM HORMONE: CPT | Performed by: EMERGENCY MEDICINE

## 2022-01-31 PROCEDURE — 96375 TX/PRO/DX INJ NEW DRUG ADDON: CPT | Mod: HCNC

## 2022-01-31 PROCEDURE — 25000242 PHARM REV CODE 250 ALT 637 W/ HCPCS: Performed by: PHYSICIAN ASSISTANT

## 2022-01-31 PROCEDURE — 80143 DRUG ASSAY ACETAMINOPHEN: CPT | Performed by: EMERGENCY MEDICINE

## 2022-01-31 PROCEDURE — 83605 ASSAY OF LACTIC ACID: CPT | Mod: 91 | Performed by: EMERGENCY MEDICINE

## 2022-01-31 PROCEDURE — 99291 CRITICAL CARE FIRST HOUR: CPT | Mod: 25,HCNC

## 2022-01-31 PROCEDURE — 93005 ELECTROCARDIOGRAM TRACING: CPT

## 2022-01-31 PROCEDURE — 99900035 HC TECH TIME PER 15 MIN (STAT)

## 2022-01-31 PROCEDURE — U0002 COVID-19 LAB TEST NON-CDC: HCPCS | Performed by: EMERGENCY MEDICINE

## 2022-01-31 PROCEDURE — 84484 ASSAY OF TROPONIN QUANT: CPT | Performed by: EMERGENCY MEDICINE

## 2022-01-31 PROCEDURE — 63600175 PHARM REV CODE 636 W HCPCS: Performed by: PHYSICIAN ASSISTANT

## 2022-01-31 PROCEDURE — 99291 CRITICAL CARE FIRST HOUR: CPT | Mod: ,,, | Performed by: EMERGENCY MEDICINE

## 2022-01-31 PROCEDURE — 96365 THER/PROPH/DIAG IV INF INIT: CPT | Mod: HCNC

## 2022-01-31 PROCEDURE — 82077 ASSAY SPEC XCP UR&BREATH IA: CPT | Performed by: EMERGENCY MEDICINE

## 2022-01-31 PROCEDURE — 83880 ASSAY OF NATRIURETIC PEPTIDE: CPT | Performed by: EMERGENCY MEDICINE

## 2022-01-31 PROCEDURE — 80179 DRUG ASSAY SALICYLATE: CPT | Performed by: EMERGENCY MEDICINE

## 2022-01-31 PROCEDURE — 99291 PR CRITICAL CARE, E/M 30-74 MINUTES: ICD-10-PCS | Mod: ,,, | Performed by: EMERGENCY MEDICINE

## 2022-01-31 PROCEDURE — 93010 ELECTROCARDIOGRAM REPORT: CPT | Mod: ,,, | Performed by: INTERNAL MEDICINE

## 2022-01-31 PROCEDURE — 25000003 PHARM REV CODE 250: Performed by: PHYSICIAN ASSISTANT

## 2022-01-31 PROCEDURE — 87040 BLOOD CULTURE FOR BACTERIA: CPT | Performed by: EMERGENCY MEDICINE

## 2022-01-31 PROCEDURE — 80053 COMPREHEN METABOLIC PANEL: CPT | Performed by: EMERGENCY MEDICINE

## 2022-01-31 PROCEDURE — 94761 N-INVAS EAR/PLS OXIMETRY MLT: CPT

## 2022-01-31 RX ORDER — IPRATROPIUM BROMIDE AND ALBUTEROL SULFATE 2.5; .5 MG/3ML; MG/3ML
3 SOLUTION RESPIRATORY (INHALATION)
Status: COMPLETED | OUTPATIENT
Start: 2022-01-31 | End: 2022-01-31

## 2022-01-31 RX ORDER — METHYLPREDNISOLONE SOD SUCC 125 MG
125 VIAL (EA) INJECTION
Status: COMPLETED | OUTPATIENT
Start: 2022-01-31 | End: 2022-02-01

## 2022-01-31 RX ORDER — LORAZEPAM 2 MG/ML
1 INJECTION INTRAMUSCULAR
Status: COMPLETED | OUTPATIENT
Start: 2022-02-01 | End: 2022-02-01

## 2022-01-31 RX ORDER — LORAZEPAM 2 MG/ML
1 INJECTION INTRAMUSCULAR
Status: COMPLETED | OUTPATIENT
Start: 2022-01-31 | End: 2022-01-31

## 2022-01-31 RX ORDER — VANCOMYCIN HCL IN 5 % DEXTROSE 1G/250ML
20 PLASTIC BAG, INJECTION (ML) INTRAVENOUS
Status: COMPLETED | OUTPATIENT
Start: 2022-01-31 | End: 2022-02-01

## 2022-01-31 RX ORDER — ACETAMINOPHEN 500 MG
1000 TABLET ORAL
Status: COMPLETED | OUTPATIENT
Start: 2022-01-31 | End: 2022-01-31

## 2022-01-31 RX ADMIN — ACETAMINOPHEN 1000 MG: 500 TABLET ORAL at 10:01

## 2022-01-31 RX ADMIN — SODIUM CHLORIDE, SODIUM LACTATE, POTASSIUM CHLORIDE, AND CALCIUM CHLORIDE 1000 ML: .6; .31; .03; .02 INJECTION, SOLUTION INTRAVENOUS at 10:01

## 2022-01-31 RX ADMIN — VANCOMYCIN HYDROCHLORIDE 1000 MG: 1 INJECTION, POWDER, LYOPHILIZED, FOR SOLUTION INTRAVENOUS at 11:01

## 2022-01-31 RX ADMIN — IPRATROPIUM BROMIDE AND ALBUTEROL SULFATE 3 ML: 2.5; .5 SOLUTION RESPIRATORY (INHALATION) at 10:01

## 2022-01-31 RX ADMIN — LORAZEPAM 1 MG: 2 INJECTION INTRAMUSCULAR; INTRAVENOUS at 10:01

## 2022-02-01 PROBLEM — T39.011A: Status: ACTIVE | Noted: 2022-02-01

## 2022-02-01 PROBLEM — F31.9 BIPOLAR DISORDER: Status: ACTIVE | Noted: 2022-02-01

## 2022-02-01 PROBLEM — F30.9 MANIC EPISODE: Status: ACTIVE | Noted: 2022-02-01

## 2022-02-01 PROBLEM — A41.9 SEPSIS: Status: ACTIVE | Noted: 2022-02-01

## 2022-02-01 LAB
ALBUMIN SERPL BCP-MCNC: 3.1 G/DL (ref 3.5–5.2)
ALBUMIN SERPL BCP-MCNC: 3.3 G/DL (ref 3.5–5.2)
ALLENS TEST: ABNORMAL
ALP SERPL-CCNC: 50 U/L (ref 55–135)
ALT SERPL W/O P-5'-P-CCNC: 16 U/L (ref 10–44)
AMPHET+METHAMPHET UR QL: NEGATIVE
ANION GAP SERPL CALC-SCNC: 10 MMOL/L (ref 8–16)
ANION GAP SERPL CALC-SCNC: 13 MMOL/L (ref 8–16)
ANION GAP SERPL CALC-SCNC: 8 MMOL/L (ref 8–16)
APAP SERPL-MCNC: 20.3 UG/ML (ref 10–20)
APAP SERPL-MCNC: 9.7 UG/ML (ref 10–20)
APTT BLDCRRT: 25.4 SEC (ref 21–32)
AST SERPL-CCNC: 25 U/L (ref 10–40)
BACTERIA #/AREA URNS AUTO: ABNORMAL /HPF
BARBITURATES UR QL SCN>200 NG/ML: NEGATIVE
BASOPHILS # BLD AUTO: 0.03 K/UL (ref 0–0.2)
BASOPHILS NFR BLD: 0.3 % (ref 0–1.9)
BENZODIAZ UR QL SCN>200 NG/ML: NEGATIVE
BILIRUB SERPL-MCNC: 0.2 MG/DL (ref 0.1–1)
BILIRUB UR QL STRIP: NEGATIVE
BILIRUB UR QL STRIP: NEGATIVE
BNP SERPL-MCNC: 19 PG/ML (ref 0–99)
BUN SERPL-MCNC: 12 MG/DL (ref 8–23)
BUN SERPL-MCNC: 15 MG/DL (ref 8–23)
BUN SERPL-MCNC: 16 MG/DL (ref 8–23)
BZE UR QL SCN: NEGATIVE
CALCIUM SERPL-MCNC: 8 MG/DL (ref 8.7–10.5)
CALCIUM SERPL-MCNC: 8.4 MG/DL (ref 8.7–10.5)
CALCIUM SERPL-MCNC: 8.7 MG/DL (ref 8.7–10.5)
CANNABINOIDS UR QL SCN: NEGATIVE
CHLORIDE SERPL-SCNC: 102 MMOL/L (ref 95–110)
CHLORIDE SERPL-SCNC: 110 MMOL/L (ref 95–110)
CHLORIDE SERPL-SCNC: 113 MMOL/L (ref 95–110)
CLARITY UR REFRACT.AUTO: CLEAR
CLARITY UR REFRACT.AUTO: CLEAR
CO2 SERPL-SCNC: 17 MMOL/L (ref 23–29)
CO2 SERPL-SCNC: 19 MMOL/L (ref 23–29)
CO2 SERPL-SCNC: 31 MMOL/L (ref 23–29)
COLOR UR AUTO: ABNORMAL
COLOR UR AUTO: YELLOW
CREAT SERPL-MCNC: 0.7 MG/DL (ref 0.5–1.4)
CREAT SERPL-MCNC: 0.8 MG/DL (ref 0.5–1.4)
CREAT SERPL-MCNC: 0.9 MG/DL (ref 0.5–1.4)
CREAT UR-MCNC: 30 MG/DL (ref 15–325)
CTP QC/QA: YES
DELSYS: ABNORMAL
DIFFERENTIAL METHOD: ABNORMAL
EOSINOPHIL # BLD AUTO: 0.1 K/UL (ref 0–0.5)
EOSINOPHIL NFR BLD: 0.9 % (ref 0–8)
ERYTHROCYTE [DISTWIDTH] IN BLOOD BY AUTOMATED COUNT: 13.5 % (ref 11.5–14.5)
EST. GFR  (AFRICAN AMERICAN): >60 ML/MIN/1.73 M^2
EST. GFR  (NON AFRICAN AMERICAN): >60 ML/MIN/1.73 M^2
ETHANOL SERPL-MCNC: <10 MG/DL
GLUCOSE SERPL-MCNC: 110 MG/DL (ref 70–110)
GLUCOSE SERPL-MCNC: 111 MG/DL (ref 70–110)
GLUCOSE SERPL-MCNC: 145 MG/DL (ref 70–110)
GLUCOSE UR QL STRIP: NEGATIVE
GLUCOSE UR QL STRIP: NEGATIVE
HCO3 UR-SCNC: 20.2 MMOL/L (ref 24–28)
HCT VFR BLD AUTO: 35.2 % (ref 37–48.5)
HGB BLD-MCNC: 11.9 G/DL (ref 12–16)
HGB UR QL STRIP: ABNORMAL
HGB UR QL STRIP: NEGATIVE
IMM GRANULOCYTES # BLD AUTO: 0.04 K/UL (ref 0–0.04)
IMM GRANULOCYTES NFR BLD AUTO: 0.5 % (ref 0–0.5)
INR PPP: 1.1 (ref 0.8–1.2)
KETONES UR QL STRIP: NEGATIVE
KETONES UR QL STRIP: NEGATIVE
LACTATE SERPL-SCNC: 1 MMOL/L (ref 0.5–2.2)
LEUKOCYTE ESTERASE UR QL STRIP: ABNORMAL
LEUKOCYTE ESTERASE UR QL STRIP: NEGATIVE
LYMPHOCYTES # BLD AUTO: 1.2 K/UL (ref 1–4.8)
LYMPHOCYTES NFR BLD: 14.2 % (ref 18–48)
MCH RBC QN AUTO: 31 PG (ref 27–31)
MCHC RBC AUTO-ENTMCNC: 33.8 G/DL (ref 32–36)
MCV RBC AUTO: 92 FL (ref 82–98)
METHADONE UR QL SCN>300 NG/ML: NEGATIVE
MICROSCOPIC COMMENT: ABNORMAL
MICROSCOPIC COMMENT: NORMAL
MODE: ABNORMAL
MONOCYTES # BLD AUTO: 0.2 K/UL (ref 0.3–1)
MONOCYTES NFR BLD: 2.7 % (ref 4–15)
NEUTROPHILS # BLD AUTO: 7.1 K/UL (ref 1.8–7.7)
NEUTROPHILS NFR BLD: 81.4 % (ref 38–73)
NITRITE UR QL STRIP: NEGATIVE
NITRITE UR QL STRIP: NEGATIVE
NON-SQ EPI CELLS #/AREA URNS AUTO: <1 /HPF
NRBC BLD-RTO: 0 /100 WBC
OPIATES UR QL SCN: ABNORMAL
PCO2 BLDA: 28.7 MMHG (ref 35–45)
PCP UR QL SCN>25 NG/ML: NEGATIVE
PH SMN: 7.46 [PH] (ref 7.35–7.45)
PH UR STRIP: 6 [PH] (ref 5–8)
PH UR STRIP: 8 [PH] (ref 5–8)
PHOSPHATE SERPL-MCNC: 1.1 MG/DL (ref 2.7–4.5)
PLATELET # BLD AUTO: 224 K/UL (ref 150–450)
PMV BLD AUTO: 10.1 FL (ref 9.2–12.9)
PO2 BLDA: 23 MMHG (ref 80–100)
POC BE: -4 MMOL/L
POC SATURATED O2: 45 % (ref 95–100)
POC TCO2: 21 MMOL/L (ref 23–27)
POTASSIUM SERPL-SCNC: 3.4 MMOL/L (ref 3.5–5.1)
POTASSIUM SERPL-SCNC: 3.5 MMOL/L (ref 3.5–5.1)
POTASSIUM SERPL-SCNC: 3.5 MMOL/L (ref 3.5–5.1)
PROT SERPL-MCNC: 6.1 G/DL (ref 6–8.4)
PROT UR QL STRIP: NEGATIVE
PROT UR QL STRIP: NEGATIVE
PROTHROMBIN TIME: 12.4 SEC (ref 9–12.5)
RBC # BLD AUTO: 3.84 M/UL (ref 4–5.4)
RBC #/AREA URNS AUTO: 2 /HPF (ref 0–4)
RBC #/AREA URNS AUTO: 7 /HPF (ref 0–4)
SALICYLATES SERPL-MCNC: 40.6 MG/DL (ref 15–30)
SALICYLATES SERPL-MCNC: 42.6 MG/DL (ref 15–30)
SALICYLATES SERPL-MCNC: 8.5 MG/DL (ref 15–30)
SAMPLE: ABNORMAL
SARS-COV-2 RDRP RESP QL NAA+PROBE: NEGATIVE
SITE: ABNORMAL
SODIUM SERPL-SCNC: 140 MMOL/L (ref 136–145)
SODIUM SERPL-SCNC: 141 MMOL/L (ref 136–145)
SODIUM SERPL-SCNC: 142 MMOL/L (ref 136–145)
SP GR UR STRIP: 1.01 (ref 1–1.03)
SP GR UR STRIP: 1.01 (ref 1–1.03)
SQUAMOUS #/AREA URNS AUTO: 1 /HPF
SQUAMOUS #/AREA URNS AUTO: 3 /HPF
TOXICOLOGY INFORMATION: ABNORMAL
TROPONIN I SERPL DL<=0.01 NG/ML-MCNC: 0.01 NG/ML (ref 0–0.03)
TSH SERPL DL<=0.005 MIU/L-ACNC: 0.54 UIU/ML (ref 0.4–4)
URN SPEC COLLECT METH UR: ABNORMAL
URN SPEC COLLECT METH UR: ABNORMAL
WBC # BLD AUTO: 8.74 K/UL (ref 3.9–12.7)
WBC #/AREA URNS AUTO: 1 /HPF (ref 0–5)
WBC #/AREA URNS AUTO: 2 /HPF (ref 0–5)

## 2022-02-01 PROCEDURE — 80069 RENAL FUNCTION PANEL: CPT | Performed by: STUDENT IN AN ORGANIZED HEALTH CARE EDUCATION/TRAINING PROGRAM

## 2022-02-01 PROCEDURE — 99222 1ST HOSP IP/OBS MODERATE 55: CPT | Mod: ,,, | Performed by: HOSPITALIST

## 2022-02-01 PROCEDURE — 99223 1ST HOSP IP/OBS HIGH 75: CPT | Mod: ,,, | Performed by: PSYCHIATRY & NEUROLOGY

## 2022-02-01 PROCEDURE — 99223 PR INITIAL HOSPITAL CARE,LEVL III: ICD-10-PCS | Mod: ,,, | Performed by: INTERNAL MEDICINE

## 2022-02-01 PROCEDURE — 97535 SELF CARE MNGMENT TRAINING: CPT

## 2022-02-01 PROCEDURE — 63600175 PHARM REV CODE 636 W HCPCS

## 2022-02-01 PROCEDURE — 85730 THROMBOPLASTIN TIME PARTIAL: CPT | Performed by: EMERGENCY MEDICINE

## 2022-02-01 PROCEDURE — 25000003 PHARM REV CODE 250: Performed by: PHYSICIAN ASSISTANT

## 2022-02-01 PROCEDURE — 63600175 PHARM REV CODE 636 W HCPCS: Performed by: PHYSICIAN ASSISTANT

## 2022-02-01 PROCEDURE — 80048 BASIC METABOLIC PNL TOTAL CA: CPT | Performed by: EMERGENCY MEDICINE

## 2022-02-01 PROCEDURE — S4991 NICOTINE PATCH NONLEGEND: HCPCS

## 2022-02-01 PROCEDURE — 85025 COMPLETE CBC W/AUTO DIFF WBC: CPT

## 2022-02-01 PROCEDURE — 97165 OT EVAL LOW COMPLEX 30 MIN: CPT

## 2022-02-01 PROCEDURE — 25000242 PHARM REV CODE 250 ALT 637 W/ HCPCS

## 2022-02-01 PROCEDURE — 11000001 HC ACUTE MED/SURG PRIVATE ROOM

## 2022-02-01 PROCEDURE — 80053 COMPREHEN METABOLIC PANEL: CPT

## 2022-02-01 PROCEDURE — 96375 TX/PRO/DX INJ NEW DRUG ADDON: CPT | Mod: HCNC

## 2022-02-01 PROCEDURE — 25000003 PHARM REV CODE 250: Performed by: EMERGENCY MEDICINE

## 2022-02-01 PROCEDURE — 99223 1ST HOSP IP/OBS HIGH 75: CPT | Mod: ,,, | Performed by: INTERNAL MEDICINE

## 2022-02-01 PROCEDURE — 25000003 PHARM REV CODE 250

## 2022-02-01 PROCEDURE — 81001 URINALYSIS AUTO W/SCOPE: CPT | Mod: 91 | Performed by: STUDENT IN AN ORGANIZED HEALTH CARE EDUCATION/TRAINING PROGRAM

## 2022-02-01 PROCEDURE — U0002 COVID-19 LAB TEST NON-CDC: HCPCS | Performed by: STUDENT IN AN ORGANIZED HEALTH CARE EDUCATION/TRAINING PROGRAM

## 2022-02-01 PROCEDURE — 99222 PR INITIAL HOSPITAL CARE,LEVL II: ICD-10-PCS | Mod: ,,, | Performed by: HOSPITALIST

## 2022-02-01 PROCEDURE — 80143 DRUG ASSAY ACETAMINOPHEN: CPT | Performed by: EMERGENCY MEDICINE

## 2022-02-01 PROCEDURE — 36415 COLL VENOUS BLD VENIPUNCTURE: CPT | Performed by: STUDENT IN AN ORGANIZED HEALTH CARE EDUCATION/TRAINING PROGRAM

## 2022-02-01 PROCEDURE — 80179 DRUG ASSAY SALICYLATE: CPT | Performed by: EMERGENCY MEDICINE

## 2022-02-01 PROCEDURE — 85610 PROTHROMBIN TIME: CPT | Performed by: EMERGENCY MEDICINE

## 2022-02-01 PROCEDURE — 81001 URINALYSIS AUTO W/SCOPE: CPT | Performed by: EMERGENCY MEDICINE

## 2022-02-01 PROCEDURE — 63600175 PHARM REV CODE 636 W HCPCS: Performed by: EMERGENCY MEDICINE

## 2022-02-01 PROCEDURE — 82803 BLOOD GASES ANY COMBINATION: CPT

## 2022-02-01 PROCEDURE — 99900035 HC TECH TIME PER 15 MIN (STAT)

## 2022-02-01 PROCEDURE — 80307 DRUG TEST PRSMV CHEM ANLYZR: CPT | Performed by: PHYSICIAN ASSISTANT

## 2022-02-01 PROCEDURE — 25000003 PHARM REV CODE 250: Performed by: STUDENT IN AN ORGANIZED HEALTH CARE EDUCATION/TRAINING PROGRAM

## 2022-02-01 PROCEDURE — 99223 PR INITIAL HOSPITAL CARE,LEVL III: ICD-10-PCS | Mod: ,,, | Performed by: PSYCHIATRY & NEUROLOGY

## 2022-02-01 PROCEDURE — 80179 DRUG ASSAY SALICYLATE: CPT | Mod: 91 | Performed by: STUDENT IN AN ORGANIZED HEALTH CARE EDUCATION/TRAINING PROGRAM

## 2022-02-01 RX ORDER — SODIUM BICARBONATE 1 MEQ/ML
50 SYRINGE (ML) INTRAVENOUS
Status: COMPLETED | OUTPATIENT
Start: 2022-02-01 | End: 2022-02-01

## 2022-02-01 RX ORDER — FLUTICASONE PROPIONATE 50 MCG
2 SPRAY, SUSPENSION (ML) NASAL DAILY
Status: DISCONTINUED | OUTPATIENT
Start: 2022-02-02 | End: 2022-02-01

## 2022-02-01 RX ORDER — IBUPROFEN 200 MG
16 TABLET ORAL
Status: DISCONTINUED | OUTPATIENT
Start: 2022-02-01 | End: 2022-02-03 | Stop reason: HOSPADM

## 2022-02-01 RX ORDER — POTASSIUM CHLORIDE 20 MEQ/1
40 TABLET, EXTENDED RELEASE ORAL
Status: COMPLETED | OUTPATIENT
Start: 2022-02-01 | End: 2022-02-01

## 2022-02-01 RX ORDER — ALBUTEROL SULFATE 90 UG/1
1 AEROSOL, METERED RESPIRATORY (INHALATION) EVERY 4 HOURS PRN
Status: DISCONTINUED | OUTPATIENT
Start: 2022-02-01 | End: 2022-02-03 | Stop reason: HOSPADM

## 2022-02-01 RX ORDER — HYDROXYZINE PAMOATE 25 MG/1
25 CAPSULE ORAL EVERY 8 HOURS PRN
Status: DISCONTINUED | OUTPATIENT
Start: 2022-02-01 | End: 2022-02-03 | Stop reason: HOSPADM

## 2022-02-01 RX ORDER — ALENDRONATE SODIUM 70 MG/1
70 TABLET ORAL
Status: DISCONTINUED | OUTPATIENT
Start: 2022-02-01 | End: 2022-02-01

## 2022-02-01 RX ORDER — ENOXAPARIN SODIUM 100 MG/ML
40 INJECTION SUBCUTANEOUS EVERY 24 HOURS
Status: DISCONTINUED | OUTPATIENT
Start: 2022-02-01 | End: 2022-02-02

## 2022-02-01 RX ORDER — ACETAMINOPHEN 325 MG/1
650 TABLET ORAL EVERY 6 HOURS PRN
Status: CANCELLED | OUTPATIENT
Start: 2022-02-01

## 2022-02-01 RX ORDER — HYDROCODONE BITARTRATE AND ACETAMINOPHEN 5; 325 MG/1; MG/1
1 TABLET ORAL 2 TIMES DAILY PRN
COMMUNITY
Start: 2022-01-28 | End: 2022-08-20

## 2022-02-01 RX ORDER — ONDANSETRON 8 MG/1
8 TABLET, ORALLY DISINTEGRATING ORAL EVERY 8 HOURS PRN
Status: DISCONTINUED | OUTPATIENT
Start: 2022-02-01 | End: 2022-02-03 | Stop reason: HOSPADM

## 2022-02-01 RX ORDER — OXCARBAZEPINE 150 MG/1
150 TABLET, FILM COATED ORAL DAILY
Status: DISCONTINUED | OUTPATIENT
Start: 2022-02-01 | End: 2022-02-01

## 2022-02-01 RX ORDER — TALC
6 POWDER (GRAM) TOPICAL NIGHTLY PRN
Status: DISCONTINUED | OUTPATIENT
Start: 2022-02-01 | End: 2022-02-03 | Stop reason: HOSPADM

## 2022-02-01 RX ORDER — ARIPIPRAZOLE 5 MG/1
5 TABLET ORAL DAILY
Status: DISCONTINUED | OUTPATIENT
Start: 2022-02-01 | End: 2022-02-02

## 2022-02-01 RX ORDER — IBUPROFEN 200 MG
24 TABLET ORAL
Status: DISCONTINUED | OUTPATIENT
Start: 2022-02-01 | End: 2022-02-03 | Stop reason: HOSPADM

## 2022-02-01 RX ORDER — DICLOFENAC SODIUM 10 MG/G
2 GEL TOPICAL 2 TIMES DAILY
Status: DISCONTINUED | OUTPATIENT
Start: 2022-02-01 | End: 2022-02-03 | Stop reason: HOSPADM

## 2022-02-01 RX ORDER — PROCHLORPERAZINE EDISYLATE 5 MG/ML
5 INJECTION INTRAMUSCULAR; INTRAVENOUS EVERY 6 HOURS PRN
Status: DISCONTINUED | OUTPATIENT
Start: 2022-02-01 | End: 2022-02-03 | Stop reason: HOSPADM

## 2022-02-01 RX ORDER — ACETAMINOPHEN 325 MG/1
650 TABLET ORAL EVERY 4 HOURS PRN
Status: DISCONTINUED | OUTPATIENT
Start: 2022-02-01 | End: 2022-02-02

## 2022-02-01 RX ORDER — LISINOPRIL 10 MG/1
10 TABLET ORAL DAILY
Status: DISCONTINUED | OUTPATIENT
Start: 2022-02-01 | End: 2022-02-03 | Stop reason: HOSPADM

## 2022-02-01 RX ORDER — VANCOMYCIN HCL IN 5 % DEXTROSE 1G/250ML
20 PLASTIC BAG, INJECTION (ML) INTRAVENOUS
Status: DISCONTINUED | OUTPATIENT
Start: 2022-02-01 | End: 2022-02-03

## 2022-02-01 RX ORDER — BUTALBITAL, ACETAMINOPHEN AND CAFFEINE 50; 325; 40 MG/1; MG/1; MG/1
1 TABLET ORAL ONCE
Status: DISCONTINUED | OUTPATIENT
Start: 2022-02-01 | End: 2022-02-02

## 2022-02-01 RX ORDER — IBUPROFEN 200 MG
1 TABLET ORAL DAILY
Status: DISCONTINUED | OUTPATIENT
Start: 2022-02-01 | End: 2022-02-03 | Stop reason: HOSPADM

## 2022-02-01 RX ORDER — NALOXONE HCL 0.4 MG/ML
0.02 VIAL (ML) INJECTION
Status: DISCONTINUED | OUTPATIENT
Start: 2022-02-01 | End: 2022-02-03 | Stop reason: HOSPADM

## 2022-02-01 RX ORDER — RISPERIDONE 1 MG/1
1 TABLET ORAL 2 TIMES DAILY
Status: DISCONTINUED | OUTPATIENT
Start: 2022-02-01 | End: 2022-02-01

## 2022-02-01 RX ORDER — SODIUM CHLORIDE 0.9 % (FLUSH) 0.9 %
10 SYRINGE (ML) INJECTION EVERY 12 HOURS PRN
Status: DISCONTINUED | OUTPATIENT
Start: 2022-02-01 | End: 2022-02-03 | Stop reason: HOSPADM

## 2022-02-01 RX ORDER — HYDROCODONE BITARTRATE AND ACETAMINOPHEN 5; 325 MG/1; MG/1
1 TABLET ORAL 2 TIMES DAILY PRN
Status: DISCONTINUED | OUTPATIENT
Start: 2022-02-01 | End: 2022-02-02

## 2022-02-01 RX ORDER — GUAIFENESIN 600 MG/1
600 TABLET, EXTENDED RELEASE ORAL 2 TIMES DAILY
Status: DISCONTINUED | OUTPATIENT
Start: 2022-02-01 | End: 2022-02-03 | Stop reason: HOSPADM

## 2022-02-01 RX ORDER — GLUCAGON 1 MG
1 KIT INJECTION
Status: DISCONTINUED | OUTPATIENT
Start: 2022-02-01 | End: 2022-02-03 | Stop reason: HOSPADM

## 2022-02-01 RX ADMIN — Medication 1 PATCH: at 10:02

## 2022-02-01 RX ADMIN — HYDROXYZINE PAMOATE 25 MG: 25 CAPSULE ORAL at 02:02

## 2022-02-01 RX ADMIN — ARIPIPRAZOLE 5 MG: 5 TABLET ORAL at 02:02

## 2022-02-01 RX ADMIN — SODIUM BICARBONATE: 84 INJECTION, SOLUTION INTRAVENOUS at 03:02

## 2022-02-01 RX ADMIN — DICLOFENAC SODIUM 2 G: 10 GEL TOPICAL at 09:02

## 2022-02-01 RX ADMIN — POTASSIUM CHLORIDE 40 MEQ: 1500 TABLET, EXTENDED RELEASE ORAL at 10:02

## 2022-02-01 RX ADMIN — ENOXAPARIN SODIUM 40 MG: 100 INJECTION SUBCUTANEOUS at 04:02

## 2022-02-01 RX ADMIN — Medication 6 MG: at 09:02

## 2022-02-01 RX ADMIN — GUAIFENESIN 600 MG: 600 TABLET, EXTENDED RELEASE ORAL at 09:02

## 2022-02-01 RX ADMIN — HYDROXYZINE PAMOATE 25 MG: 25 CAPSULE ORAL at 11:02

## 2022-02-01 RX ADMIN — SODIUM BICARBONATE: 84 INJECTION, SOLUTION INTRAVENOUS at 04:02

## 2022-02-01 RX ADMIN — POTASSIUM CHLORIDE 40 MEQ: 1500 TABLET, EXTENDED RELEASE ORAL at 02:02

## 2022-02-01 RX ADMIN — PIPERACILLIN AND TAZOBACTAM 4.5 G: 4; .5 INJECTION, POWDER, LYOPHILIZED, FOR SOLUTION INTRAVENOUS; PARENTERAL at 10:02

## 2022-02-01 RX ADMIN — PIPERACILLIN AND TAZOBACTAM 4.5 G: 4; .5 INJECTION, POWDER, LYOPHILIZED, FOR SOLUTION INTRAVENOUS; PARENTERAL at 06:02

## 2022-02-01 RX ADMIN — SODIUM CHLORIDE, SODIUM LACTATE, POTASSIUM CHLORIDE, AND CALCIUM CHLORIDE 1000 ML: .6; .31; .03; .02 INJECTION, SOLUTION INTRAVENOUS at 01:02

## 2022-02-01 RX ADMIN — ACETAMINOPHEN 650 MG: 325 TABLET ORAL at 09:02

## 2022-02-01 RX ADMIN — HYDROCODONE BITARTRATE AND ACETAMINOPHEN 1 TABLET: 5; 325 TABLET ORAL at 04:02

## 2022-02-01 RX ADMIN — SODIUM BICARBONATE: 84 INJECTION, SOLUTION INTRAVENOUS at 10:02

## 2022-02-01 RX ADMIN — ACETAMINOPHEN 650 MG: 325 TABLET ORAL at 02:02

## 2022-02-01 RX ADMIN — METHYLPREDNISOLONE SODIUM SUCCINATE 125 MG: 125 INJECTION, POWDER, FOR SOLUTION INTRAMUSCULAR; INTRAVENOUS at 01:02

## 2022-02-01 RX ADMIN — PIPERACILLIN AND TAZOBACTAM 4.5 G: 4; .5 INJECTION, POWDER, LYOPHILIZED, FOR SOLUTION INTRAVENOUS; PARENTERAL at 02:02

## 2022-02-01 RX ADMIN — ALBUTEROL SULFATE 1 PUFF: 108 INHALANT RESPIRATORY (INHALATION) at 09:02

## 2022-02-01 RX ADMIN — SODIUM BICARBONATE 50 MEQ: 84 INJECTION INTRAVENOUS at 01:02

## 2022-02-01 RX ADMIN — LORAZEPAM 1 MG: 2 INJECTION INTRAMUSCULAR; INTRAVENOUS at 01:02

## 2022-02-01 RX ADMIN — VANCOMYCIN HYDROCHLORIDE 1000 MG: 1 INJECTION, POWDER, LYOPHILIZED, FOR SOLUTION INTRAVENOUS at 11:02

## 2022-02-01 NOTE — SUBJECTIVE & OBJECTIVE
Patient History           Medical as of 2/1/2022     Past Medical History     Diagnosis Date Comments Source    Addiction to drug -- History of prescription pill addiction Provider    Bipolar 1 disorder -- -- Provider    Emphysema -- -- Provider    Hepatitis C -- -- Patient    History of psychiatric hospitalization -- -- Provider    Hx of psychiatric care -- -- Provider    Hypertension -- -- Provider    Psychiatric problem -- -- Provider    Scoliosis -- -- Provider    Therapy -- -- Provider          Pertinent Negatives     Diagnosis Date Noted Comments Source    Atypical hyperplasia of breast 03/08/2021 -- Provider    BRCA1 negative 03/08/2021 -- Provider    BRCA1 positive 03/08/2021 -- Provider    BRCA2 negative 03/08/2021 -- Provider    BRCA2 positive 03/08/2021 -- Provider    Breast cancer 03/08/2021 -- Provider    Colon cancer 03/08/2021 -- Provider    Endometrial cancer 03/08/2021 -- Provider    Lobular carcinoma in situ 03/08/2021 -- Provider    Ovarian cancer 03/08/2021 -- Provider    Suicide attempt 06/17/2020 -- Provider    Usual hyperplasia of lactiferous duct 03/08/2021 -- Provider                  Surgical as of 2/1/2022     Past Surgical History     Procedure Laterality Date Comments Source    PARTIAL HYSTERECTOMY -- 1995 -- Provider    NECK SURGERY -- 1990 -- Provider    HYSTERECTOMY -- -- -- Provider    ESOPHAGOGASTRODUODENOSCOPY N/A 9/19/2019 Procedure: ESOPHAGOGASTRODUODENOSCOPY (EGD);  Surgeon: Jose Luis Hyman MD;  Location: [facility];  Service: Endoscopy;  Laterality: N/A; Provider                  Family as of 2/1/2022     Problem Relation Name Age of Onset Comments Source    Hypertension Mother -- -- -- Provider    Cancer Father -- -- -- Provider    Parkinsonism Father -- -- -- Provider    Breast cancer Maternal Aunt -- -- -- Provider            Tobacco Use as of 2/1/2022     Smoking Status Smoking Start Date Smoking Quit Date Packs/Day Years Used    Current Every Day Smoker -- -- 0.75  40.00    Types Comments Smokeless Tobacco Status Smokeless Tobacco Quit Date Source     Cigarettes -- Never Used -- Provider            Alcohol Use as of 2/1/2022     Alcohol Use Drinks/Week Alcohol/Week Comments Source    No   -- Alcoholism and rehab in her 20s Provider            Drug Use as of 2/1/2022     Drug Use Types Frequency Comments Source    No -- -- History of prescription pill addiction, mostly benzos/opioids/muscle relaxers Provider            Sexual Activity as of 2/1/2022     Sexually Active Birth Control Partners Comments Source    Not Currently -- Male -- Provider            Activities of Daily Living as of 2/1/2022     Activities of Daily Living Question Response Comments Source    Patient feels they ought to cut down on drinking/drug use Not Asked -- Provider    Patient annoyed by others criticizing their drinking/drug use Not Asked -- Provider    Patient has felt bad or guilty about drinking/drug use Not Asked -- Provider    Patient has had a drink/used drugs as an eye opener in the AM Not Asked -- Provider            Social Documentation as of 2/1/2022    None           Occupational as of 2/1/2022    None           Socioeconomic as of 2/1/2022     Marital Status Spouse Name Number of Children Years Education Education Level Preferred Language Ethnicity Race Source     -- 2 -- -- English Not  or /a White Provider            Pertinent History     Question Response Comments    Lives with alone --    Place in Birth Order -- --    Lives in home --    Number of Siblings -- --    Raised by -- [place] up [place]    Legal Involvement -- --    Childhood Trauma -- --    Criminal History of -- --    Financial Status unemployed --    Highest Level of Education high school graduation --    Does patient have access to a firearm? No --     Service -- --    Primary Leisure Activity -- --    Spirituality actively participates in organized Protestant --        Past Medical History:    Diagnosis Date    Addiction to drug     History of prescription pill addiction    Bipolar 1 disorder     Emphysema     Hepatitis C     History of psychiatric hospitalization     Hx of psychiatric care     Hypertension     Psychiatric problem     Scoliosis     Therapy      Past Surgical History:   Procedure Laterality Date    ESOPHAGOGASTRODUODENOSCOPY N/A 9/19/2019    Procedure: ESOPHAGOGASTRODUODENOSCOPY (EGD);  Surgeon: Jose Luis Hyman MD;  Location: [facility];  Service: Endoscopy;  Laterality: N/A;    HYSTERECTOMY      NECK SURGERY  1990    PARTIAL HYSTERECTOMY  1995     Family History     Problem Relation (Age of Onset)    Breast cancer Maternal Aunt    Cancer Father    Hypertension Mother    Parkinsonism Father        Tobacco Use    Smoking status: Current Every Day Smoker     Packs/day: 0.75     Years: 40.00     Pack years: 30.00     Types: Cigarettes    Smokeless tobacco: Never Used   Substance and Sexual Activity    Alcohol use: No     Comment: Alcoholism and rehab in her 20s    Drug use: No     Comment: History of prescription pill addiction, mostly benzos/opioids/muscle relaxers    Sexual activity: Not Currently     Partners: Male     Review of patient's allergies indicates:  No Known Allergies    No current facility-administered medications on file prior to encounter.     Current Outpatient Medications on File Prior to Encounter   Medication Sig    butalbital-acetaminophen-caffeine -40 mg (FIORICET, ESGIC) -40 mg per tablet TAKE ONE TABLET BY MOUTH EVERY 6 HOURS AS NEEDED FOR HEADACHE need office visit prior to further refills    HYDROcodone-acetaminophen (NORCO) 5-325 mg per tablet Take 1 tablet by mouth 2 (two) times daily as needed for Pain.    albuterol (VENTOLIN HFA) 90 mcg/actuation inhaler inhale TWO puffs INTO THE LUNGS EVERY 6 HOURS AS NEEDED FOR WHEEZING    fluticasone propionate (FLONASE) 50 mcg/actuation nasal spray 2 sprays (100 mcg total) by Each  Nostril route once daily.    [DISCONTINUED] alendronate (FOSAMAX) 70 MG tablet Take 1 tablet (70 mg total) by mouth every 7 days.    [DISCONTINUED] diazePAM (VALIUM) 2 MG tablet TAKE 1-2 TABLETS BY MOUTH EVERY 8 HOURS    [DISCONTINUED] fluticasone-umeclidin-vilanter (TRELEGY ELLIPTA) 100-62.5-25 mcg DsDv Inhale 1 puff into the lungs once daily.    [DISCONTINUED] lisinopriL 10 MG tablet TAKE ONE TABLET BY MOUTH DAILY    [DISCONTINUED] OXcarbazepine (TRILEPTAL) 150 MG Tab     [DISCONTINUED] pregabalin (LYRICA) 300 MG Cap TAKE ONE CAPSULE BY MOUTH TWICE DAILY    [DISCONTINUED] risperiDONE (RISPERDAL) 1 MG tablet Take 1 tablet (1 mg total) by mouth 3 (three) times daily.     Psychotherapeutics (From admission, onward)            Start     Stop Route Frequency Ordered    02/01/22 0900  risperiDONE tablet 1 mg         -- Oral 2 times daily 02/01/22 0243        Review of Systems   Constitutional: Positive for appetite change, fatigue and fever. Negative for chills.   HENT: Positive for congestion.    Respiratory: Positive for cough. Negative for shortness of breath.    Cardiovascular: Negative for chest pain and palpitations.   Gastrointestinal: Negative for diarrhea and nausea.   Musculoskeletal: Negative for back pain.     Strengths and Liabilities: Strength: Patient accepts guidance/feedback, Strength: Patient is motivated for change., Strength: Patient has positive support network., Liability: Patient has poor health.    Objective:     Vital Signs (Most Recent):  Temp: 97.3 °F (36.3 °C) (02/01/22 1112)  Pulse: 96 (02/01/22 1112)  Resp: 18 (02/01/22 1112)  BP: 126/78 (02/01/22 1112)  SpO2: (!) 92 % (02/01/22 1112) Vital Signs (24h Range):  Temp:  [97.3 °F (36.3 °C)-98.9 °F (37.2 °C)] 97.3 °F (36.3 °C)  Pulse:  [] 96  Resp:  [16-24] 18  SpO2:  [90 %-99 %] 92 %  BP: (120-208)/() 126/78     Height: (P) 5' (152.4 cm)  Weight: 49.9 kg (110 lb)  Body mass index is 21.48 kg/m² (pended).      Intake/Output  "Summary (Last 24 hours) at 2/1/2022 1303  Last data filed at 2/1/2022 0107  Gross per 24 hour   Intake 1000 ml   Output --   Net 1000 ml       Physical Exam  Psychiatric:      Comments: Mental Status Exam:  Appearance: fair hygiene and grooming, dressed in hospital scrubs, NAD, appears stated age   Behavior/Cooperation: calm, cooperative, no psychomotor agitation or tremors  Language: fluent english  Speech: normal tone, normal rate, normal pitch, normal volume, talkative  Mood: "depressed"  Affect: full, reactive, appropriate  Thought Process: linear, logical, goal-directed  Thought Content:  denies SI/HI/paranoia/delusions; no objective evidence of paranoia or delusions  Perception: denies AVH; no objective evidence of AVH   Orientation: grossly intact  Memory: intact to conversation  Attention Span/Concentration: intact to conversation  Fund of Knowledge: appropriate for education level  Insight: improving  Judgment: good          Significant Labs:   Last 24 Hours:   Recent Lab Results       02/01/22  0428   02/01/22  0407   02/01/22  0346   02/01/22  0153   02/01/22  0144        Benzodiazepines Negative               Methadone metabolites Negative               Phencyclidine Negative               POC Molecular Influenza A Ag               POC Molecular Influenza B Ag               Acetaminophen (Tylenol), Serum       9.7  Comment: Toxic Levels:  Adults (4 hr post-ingestion).........>150 ug/mL  Adults (12 hr post-ingestion)........>40 ug/mL  Peds (2 hr post-ingestion, liquid)...>225 ug/mL           Albumin     3.3           Alcohol, Serum               Alkaline Phosphatase     50           Allens Test         N/A       ALT     16           Amphetamine Screen, Ur Negative               Anion Gap     13           Appearance, UA Clear               aPTT       25.4  Comment: aPTT therapeutic range = 39-69 seconds         AST     25           Bacteria, UA Occasional               Barbiturate Screen, Ur Negative       "         Baso #     0.03           Basophil %     0.3           Bilirubin (UA) Negative               BILIRUBIN TOTAL     0.2  Comment: For infants and newborns, interpretation of results should be based  on gestational age, weight and in agreement with clinical  observations.    Premature Infant recommended reference ranges:  Up to 24 hours.............<8.0 mg/dL  Up to 48 hours............<12.0 mg/dL  3-5 days..................<15.0 mg/dL  6-29 days.................<15.0 mg/dL             Blood Culture, Routine               BNP               Site         RB       BUN     15           Calcium     8.7           Chloride     110           CO2     19           Cocaine (Metab.) Negative               Color, UA Yellow               Creatinine     0.7           Creatinine, Urine 30.0               DelSys         Room Air       Differential Method     Automated           eGFR if      >60.0           eGFR if non      >60.0  Comment: Calculation used to obtain the estimated glomerular filtration  rate (eGFR) is the CKD-EPI equation.              Eos #     0.1           Eosinophil %     0.9           Glucose     110           Glucose, UA Negative               Gran # (ANC)     7.1           Gran %     81.4           Hematocrit     35.2           Hemoglobin     11.9           Immature Grans (Abs)     0.04  Comment: Mild elevation in immature granulocytes is non specific and   can be seen in a variety of conditions including stress response,   acute inflammation, trauma and pregnancy. Correlation with other   laboratory and clinical findings is essential.             Immature Granulocytes     0.5           INR       1.1  Comment: Coumadin Therapy:  2.0 - 3.0 for INR for all indicators except mechanical heart valves  and antiphospholipid syndromes which should use 2.5 - 3.5.           Ketones, UA Negative               Lactate, Familia               Leukocytes, UA Negative               Lymph #      1.2           Lymph %     14.2           MCH     31.0           MCHC     33.8           MCV     92           Microscopic Comment SEE COMMENT  Comment: Other formed elements not mentioned in the report are not   present in the microscopic examination.                  Mode         SPONT       Mono #     0.2           Mono %     2.7           MPV     10.1           NITRITE UA Negative               Non-Squam Epith <1               nRBC     0           Occult Blood UA 1+               Opiate Scrn, Ur Presumptive Positive               pH, UA 6.0               Platelets     224           POC BE         -4       POC HCO3         20.2       POC PCO2         28.7       POC PH         7.456       POC PO2         23       POC SATURATED O2         45       POC TCO2         21       Potassium     3.4           PROTEIN TOTAL     6.1           Protein, UA Negative  Comment: Recommend a 24 hour urine protein or a urine   protein/creatinine ratio if globulin induced proteinuria is  clinically suspected.                 Protime       12.4          Acceptable   Yes             RBC     3.84           RBC, UA 7               RDW     13.5           Salicylate Lvl               Sample         ARTERIAL       SARS-CoV-2 RNA, Amplification, Qual   Negative             Sodium     142           Specific [place], UA 1.015               Specimen UA Urine, Clean Catch               Squam Epithel, UA 1               Marijuana (THC) Metabolite Negative               Toxicology Information SEE COMMENT  Comment: This screen includes the following classes of drugs at the listed   cut-off:    Benzodiazepines 200 ng/ml  Methadone 300 ng/ml  Cocaine metabolite 300 ng/ml  Opiates 300 ng/ml  Barbiturates 200 ng/ml  Amphetamines 1000 ng/ml  Marijuana metabs (THC) 50 ng/ml  Phencyclidine (PCP) 25 ng/ml    This is a screening test. If results do not correlate with clinical   presentation, then a confirmatory send out test is advised.     This  report is intended for use in clinical monitoring and management   of   patients. It is not intended for use in employment related drug   testing.                 Troponin I               TSH               WBC, UA 2               WBC     8.74                            02/01/22  0141   01/31/22  2345   01/31/22  2224   01/31/22  2117   01/31/22  2112        Benzodiazepines               Methadone metabolites               Phencyclidine               POC Molecular Influenza A Ag     Negative           POC Molecular Influenza B Ag     Negative           Acetaminophen (Tylenol), Serum   20.3  Comment: Toxic Levels:  Adults (4 hr post-ingestion).........>150 ug/mL  Adults (12 hr post-ingestion)........>40 ug/mL  Peds (2 hr post-ingestion, liquid)...>225 ug/mL               Albumin               Alcohol, Serum   <10             Alkaline Phosphatase               Allens Test               ALT               Amphetamine Screen, Ur               Anion Gap 10               Appearance, UA               aPTT               AST               Bacteria, UA               Barbiturate Screen, Ur               Baso #               Basophil %               Bilirubin (UA)               BILIRUBIN TOTAL               Blood Culture, Routine       No Growth to date  [P]   No Growth to date  [P]       BNP               Site               BUN 16               Calcium 8.4               Chloride 113               CO2 17               Cocaine (Metab.)               Color, UA               Creatinine 0.8               Creatinine, Urine               DelSys               Differential Method               eGFR if  >60.0               eGFR if non  >60.0  Comment: Calculation used to obtain the estimated glomerular filtration  rate (eGFR) is the CKD-EPI equation.                  Eos #               Eosinophil %               Glucose 111               Glucose, UA               Gran # (ANC)               Gran %                Hematocrit               Hemoglobin               Immature Grans (Abs)               Immature Granulocytes               INR               Ketones, UA               Lactate, Familia   1.0  Comment: Falsely low lactic acid results can be found in samples   containing >=13.0 mg/dL total bilirubin and/or >=3.5 mg/dL   direct bilirubin.               Leukocytes, UA               Lymph #               Lymph %               MCH               MCHC               MCV               Microscopic Comment               Mode               Mono #               Mono %               MPV               NITRITE UA               Non-Squam Epith               nRBC               Occult Blood UA               Opiate Scrn, Ur               pH, UA               Platelets               POC BE               POC HCO3               POC PCO2               POC PH               POC PO2               POC SATURATED O2               POC TCO2               Potassium 3.5               PROTEIN TOTAL               Protein, UA               Protime                Acceptable     Yes           RBC               RBC, UA               RDW               Salicylate Lvl 40.6  Comment: Toxic:  30.0 - 70.0 mg/dl  Lethal: >70.0 mg/dl  *Critical value notification by lenny__ with confirmation of receipt to  Toro Lopes RN___ at 2022-02-01 02:34:23     42.6  Comment: Toxic:  30.0 - 70.0 mg/dl  Lethal: >70.0 mg/dl  *Critical value notification by logan with confirmation of receipt to   Lynsey Bell LPN at 2022-02-01 00:29:12               Sample               SARS-CoV-2 RNA, Amplification, Qual               Sodium 140               Specific [place], UA               Specimen UA               Squam Epithel, UA               Marijuana (THC) Metabolite               Toxicology Information               Troponin I   0.015  Comment: The reference interval for Troponin I represents the 99th percentile   cutoff   for our facility and is consistent with 3rd  generation assay   performance.               TSH   0.536             WBC, UA               WBC                                01/31/22  2111   01/31/22  1832        Benzodiazepines         Methadone metabolites         Phencyclidine         POC Molecular Influenza A Ag         POC Molecular Influenza B Ag         Acetaminophen (Tylenol), Serum         Albumin 4.1         Alcohol, Serum         Alkaline Phosphatase 64         Allens Test         ALT 19         Amphetamine Screen, Ur         Anion Gap 13         Appearance, UA         aPTT         AST 30         Bacteria, UA         Barbiturate Screen, Ur         Baso # 0.07         Basophil % 0.5         Bilirubin (UA)         BILIRUBIN TOTAL 0.1  Comment: For infants and newborns, interpretation of results should be based  on gestational age, weight and in agreement with clinical  observations.    Premature Infant recommended reference ranges:  Up to 24 hours.............<8.0 mg/dL  Up to 48 hours............<12.0 mg/dL  3-5 days..................<15.0 mg/dL  6-29 days.................<15.0 mg/dL           Blood Culture, Routine         BNP 19  Comment: Values of less than 100 pg/ml are consistent with non-CHF populations.         Site         BUN 18         Calcium 9.5         Chloride 109         CO2 17         Cocaine (Metab.)         Color, UA         Creatinine 0.8         Creatinine, Urine         DelSys         Differential Method Automated         eGFR if  >60.0         eGFR if non  >60.0  Comment: Calculation used to obtain the estimated glomerular filtration  rate (eGFR) is the CKD-EPI equation.            Eos # 0.1         Eosinophil % 0.8         Glucose 100         Glucose, UA         Gran # (ANC) 10.1         Gran % 69.8         Hematocrit 42.3         Hemoglobin 14.5         Immature Grans (Abs) 0.06  Comment: Mild elevation in immature granulocytes is non specific and   can be seen in a variety of conditions including  stress response,   acute inflammation, trauma and pregnancy. Correlation with other   laboratory and clinical findings is essential.           Immature Granulocytes 0.4         INR         Ketones, UA         Lactate, Familia 0.8  Comment: Falsely low lactic acid results can be found in samples   containing >=13.0 mg/dL total bilirubin and/or >=3.5 mg/dL   direct bilirubin.           Leukocytes, UA         Lymph # 3.0         Lymph % 20.6         MCH 31.2         MCHC 34.3         MCV 91         Microscopic Comment         Mode         Mono # 1.1         Mono % 7.9         MPV 9.9         NITRITE UA         Non-Squam Epith         nRBC 0         Occult Blood UA         Opiate Scrn, Ur         pH, UA         Platelets 282         POC BE         POC HCO3         POC PCO2         POC PH         POC PO2         POC SATURATED O2         POC TCO2         Potassium 4.0         PROTEIN TOTAL 7.9         Protein, UA         Protime          Acceptable   Yes       RBC 4.65         RBC, UA         RDW 13.4         Salicylate Lvl         Sample         SARS-CoV-2 RNA, Amplification, Qual   Negative       Sodium 139         Specific Gravity, UA         Specimen UA         Squam Epithel, UA         Marijuana (THC) Metabolite         Toxicology Information         Troponin I         TSH         WBC, UA         WBC 14.43                [P] - Preliminary Result             Significant Imaging: I have reviewed all pertinent imaging results/findings within the past 24 hours.

## 2022-02-01 NOTE — HPI
"Rossi Mcneal is a 66 y.o. female with a past psychiatric history of Bipolar 1 disorder who presented to [facility] due to Accidental aspirin overdose. Psychiatry was consulted for "concern for accident overdose".    Per Primary Team:  Ms. Mcneal is a 65 yo F w/ a hx of COPD (not on home O2), bipolar d/o and Hep C who presented to the ED for fever. History was mostly obtained from ER records given patient's acute change in mental status. She states she has been experiencing a productive cough, fever w/ Tmax 102, chills, post-nasal drip and sinus congestion for multiple days - she is unable to quantify how long symptoms have been ongoing. Denies SOB, CP, abdominal pain, increasing urinary frequency, hematuria, dysuria, foul smelling urine, nausea, vomiting, stool changes. Patient states she took OTC medications including multiple packets of BC powder to help her symptoms. Since taking the powder, she has been experiencing ringing in her ears associated w/ occasional dizziness. She smokes 1/2 PPD and denies ETOH/recreational drug use. She lives alone at home and states she does not have much social support. She has two adult sons that do not live in [place]. Patient states she is able to ambulate up and down her '18 stair' staircase without difficulty.      In the ED, BP ~190-200/100s and tachycardic. On examination patient w/ pressured speech, easy dis tractability, uncontrollable laughter and poor insight. Labs notable for WBC 14.43, acetaminophen level 20.3, salicylate level 42.6, PH 7.4 w/ CO2 28 and HCO3 20.2. CXR w/ no acute changes. EKG with no signs of widened QRS or AV block.COVID and influenza (-). Patient received IVFs, 2 x IV lorazepam 1mg, 1 x vanc & zosyn. Patient was admitted to hospital medicine for concern of ASA overdose and management.     Per Psychiatry:  Patient was calm and cooperative with interview. States that she came in cause she's been feeling bad the past few weeks. Reports headache and " congestion, in addition to sore throat. States that she also has not been sleeping the past few days due to her headache being poor. Also feels that her thoughts have been racing. Mostly endorses depressive symptoms over the past few weeks of anhedonia, poor self esteem, poor concentration and motivation. Reports passive SI, but denies current plan. Denies previous hx of SA or self harming behavior. Reports that last psychiatric hospitalization was years ago. States that she stopped taking the risperdal that she was prescribed due to it not helping with her depressive symptoms. And states that she hasn't seen her psychiatrist in months. Denies that the medications she took was an attempt to end her life. States that she was just trying to manage her headaches, and was taking tylenol, BC powder, and aleve every few hours to help. States that she also takes firocet for headaches. Reports hx of manic episodes in the past. Denies hx of paranoia/AVH. Denies SI/HI. States that she's willing to try new medications to help with her depressive symptoms, and feels that paxil has helped in the past.     Collateral:   Layo (son) - [phone]  Attempted to call @ 12:56 pm no answer and voicemail left.       Medical Review of Systems:  A comprehensive review of systems was negative except for: poor sleep and headache    Psychiatric Review of Systems-is patient experiencing or having changes in  sleep: yes  appetite: no  weight: no  energy/anergy: yes  interest/pleasure/anhedonia: yes  somatic symptoms: no  libido: no  anxiety/panic: no  guilty/hopelessness: yes  concentration: no  S.I.B.s/risky behavior: no  any drugs: no  alcohol: no     Allergies:  Patient has no known allergies.    Past Medical/Surgical History:  Past Medical History:   Diagnosis Date    Addiction to drug     History of prescription pill addiction    Bipolar 1 disorder     Emphysema     Hepatitis C     History of psychiatric hospitalization     Hx of  psychiatric care     Hypertension     Psychiatric problem     Scoliosis     Therapy      Past Surgical History:   Procedure Laterality Date    ESOPHAGOGASTRODUODENOSCOPY N/A 9/19/2019    Procedure: ESOPHAGOGASTRODUODENOSCOPY (EGD);  Surgeon: Jose Luis Hyman MD;  Location: [facility];  Service: Endoscopy;  Laterality: N/A;    HYSTERECTOMY      NECK SURGERY  1990    PARTIAL HYSTERECTOMY  1995       Past Psychiatric History:  Previous Medication Trials: yes, trileptal, risperdal, venlafaxine, cymbalta, and paxil   Previous Psychiatric Hospitalizations: yes , last admission was years ago per patient  Previous Suicide Attempts: no   History of Violence: no  Outpatient Psychiatrist: yes [facility] on [address]    Social History:  Marital Status: single  Children: 2   Employment Status/Info: retired  Education: technical college  Special Ed: no  Housing Status: alone  History of phys/sexual abuse: no  Access to gun: yes, though currently in possession of son    Substance Abuse History:  Recreational Drugs: barbiturates and prescription drugs, though patient denies abuse,   Use of Alcohol: denied  Rehab History: no   Tobacco Use: yes  Use of OTC: denies    Legal History:  Past Charges/Incarcerations: yes, as a teenager   Pending charges: no     Family Psychiatric History:   Unknown    Psychosocial Stressors: health  Functioning Relationships: alone & isolated and good relationship with children

## 2022-02-01 NOTE — H&P
[facility]  [facility]  History & Physical    Patient Name: Rossi Mcneal  MRN: [id number]  Patient Class: IP- Inpatient  Admission Date: 1/31/2022  Attending Physician: Shorty Steve MD   Primary Care Provider: Leobardo Corcoran MD         Patient information was obtained from patient and ER records.     Subjective:     Principal Problem:Accidental aspirin overdose    Chief Complaint:   Chief Complaint   Patient presents with    COVID-19 Concerns    Fever        HPI: Ms. Mcneal is a 67 yo F w/ a hx of COPD (not on home O2), bipolar d/o and Hep C who presented to the ED for fever. History was mostly obtained from ER records given patient's acute change in mental status. She states she has been experiencing a productive cough, fever w/ Tmax 102, chills, post-nasal drip and sinus congestion for multiple days - she is unable to quantify how long symptoms have been ongoing. Denies SOB, CP, abdominal pain, increasing urinary frequency, hematuria, dysuria, foul smelling urine, nausea, vomiting, stool changes. Patient states she took OTC medications including multiple packets of BC powder to help her symptoms. Since taking the powder, she has been experiencing ringing in her ears associated w/ occasional dizziness. She smokes 1/2 PPD and denies ETOH/recreational drug use. She lives alone at home and states she does not have much social support. She has two adult sons that do not live in [place]. Patient states she is able to ambulate up and down her '18 stair' staircase without difficulty.     In the ED, BP ~190-200/100s and tachycardic. On examination patient w/ pressured speech, easy dis tractability, uncontrollable laughter and poor insight. Labs notable for WBC 14.43, acetaminophen level 20.3, salicylate level 42.6, PH 7.4 w/ CO2 28 and HCO3 20.2. CXR w/ no acute changes. EKG with no signs of widened QRS or AV block.COVID and influenza (-). Patient received IVFs, 2 x IV lorazepam  1mg, 1 x vanc & zosyn. Patient was admitted to hospital medicine for concern of ASA overdose and management.       Past Medical History:   Diagnosis Date    Addiction to drug     History of prescription pill addiction    Bipolar 1 disorder     Emphysema     Hepatitis C     History of psychiatric hospitalization     Hx of psychiatric care     Hypertension     Psychiatric problem     Scoliosis     Therapy        Past Surgical History:   Procedure Laterality Date    ESOPHAGOGASTRODUODENOSCOPY N/A 9/19/2019    Procedure: ESOPHAGOGASTRODUODENOSCOPY (EGD);  Surgeon: Jose Luis Hyman MD;  Location: [facility];  Service: Endoscopy;  Laterality: N/A;    HYSTERECTOMY      NECK SURGERY  1990    PARTIAL HYSTERECTOMY  1995       Review of patient's allergies indicates:  No Known Allergies    No current facility-administered medications on file prior to encounter.     Current Outpatient Medications on File Prior to Encounter   Medication Sig    albuterol (VENTOLIN HFA) 90 mcg/actuation inhaler inhale TWO puffs INTO THE LUNGS EVERY 6 HOURS AS NEEDED FOR WHEEZING    alendronate (FOSAMAX) 70 MG tablet Take 1 tablet (70 mg total) by mouth every 7 days.    butalbital-acetaminophen-caffeine -40 mg (FIORICET, ESGIC) -40 mg per tablet TAKE ONE TABLET BY MOUTH EVERY 6 HOURS AS NEEDED FOR HEADACHE need office visit prior to further refills    diazePAM (VALIUM) 2 MG tablet TAKE 1-2 TABLETS BY MOUTH EVERY 8 HOURS    fluticasone propionate (FLONASE) 50 mcg/actuation nasal spray 2 sprays (100 mcg total) by Each Nostril route once daily.    fluticasone-umeclidin-vilanter (TRELEGY ELLIPTA) 100-62.5-25 mcg DsDv Inhale 1 puff into the lungs once daily.    lisinopriL 10 MG tablet TAKE ONE TABLET BY MOUTH DAILY    OXcarbazepine (TRILEPTAL) 150 MG Tab     pregabalin (LYRICA) 300 MG Cap TAKE ONE CAPSULE BY MOUTH TWICE DAILY    risperiDONE (RISPERDAL) 1 MG tablet Take 1 tablet (1 mg total) by mouth 3 (three) times  daily.     Family History     Problem Relation (Age of Onset)    Breast cancer Maternal Aunt    Cancer Father    Hypertension Mother    Parkinsonism Father        Tobacco Use    Smoking status: Current Every Day Smoker     Packs/day: 0.75     Years: 40.00     Pack years: 30.00     Types: Cigarettes    Smokeless tobacco: Never Used   Substance and Sexual Activity    Alcohol use: No     Comment: Alcoholism and rehab in her 20s    Drug use: No     Comment: History of prescription pill addiction, mostly benzos/opioids/muscle relaxers    Sexual activity: Not Currently     Partners: Male     Review of Systems   Constitutional: Positive for activity change, chills, fatigue and fever.   Respiratory: Positive for cough and chest tightness. Negative for shortness of breath and wheezing.    Cardiovascular: Negative for chest pain and palpitations.   Gastrointestinal: Negative for abdominal pain, diarrhea and nausea.   Genitourinary: Negative for difficulty urinating, hematuria and urgency.   Musculoskeletal: Negative for back pain and joint swelling.   Neurological: Positive for dizziness and light-headedness. Negative for tremors and weakness.        Tinnitus    Psychiatric/Behavioral: Positive for decreased concentration and sleep disturbance. Negative for hallucinations. The patient is nervous/anxious and is hyperactive.      Objective:     Vital Signs (Most Recent):  Temp: 98.9 °F (37.2 °C) (01/31/22 1818)  Pulse: 94 (02/01/22 0327)  Resp: 20 (01/31/22 2258)  BP: 120/81 (02/01/22 0327)  SpO2: 99 % (02/01/22 0327) Vital Signs (24h Range):  Temp:  [98.9 °F (37.2 °C)] 98.9 °F (37.2 °C)  Pulse:  [] 94  Resp:  [20-24] 20  SpO2:  [92 %-99 %] 99 %  BP: (120-208)/() 120/81     Weight: 49.9 kg (110 lb)  Body mass index is 21.48 kg/m².    Physical Exam  Constitutional:       General: She is not in acute distress.     Appearance: She is not ill-appearing.   HENT:      Head: Normocephalic and atraumatic.       Mouth/Throat:      Mouth: Mucous membranes are moist.      Pharynx: Oropharynx is clear.   Eyes:      Extraocular Movements: Extraocular movements intact.      Conjunctiva/sclera: Conjunctivae normal.   Cardiovascular:      Rate and Rhythm: Regular rhythm. Tachycardia present.      Heart sounds: Normal heart sounds. No murmur heard.      Pulmonary:      Effort: Pulmonary effort is normal.      Breath sounds: Normal breath sounds. No wheezing, rhonchi or rales.   Abdominal:      General: Bowel sounds are normal.      Palpations: Abdomen is soft.   Skin:     General: Skin is warm and dry.   Neurological:      Mental Status: She is alert.   Psychiatric:      Comments: Pressured speech  Elevated mood  Uncontrollable laughter  Poor judgement   Manic behavior              Significant Labs:   All pertinent labs within the past 24 hours have been reviewed.  CBC:   Recent Labs   Lab 01/31/22 2111 02/01/22  0346   WBC 14.43* 8.74   HGB 14.5 11.9*   HCT 42.3 35.2*    224     CMP:   Recent Labs   Lab 01/31/22 2111 02/01/22  0141    140   K 4.0 3.5    113*   CO2 17* 17*    111*   BUN 18 16   CREATININE 0.8 0.8   CALCIUM 9.5 8.4*   PROT 7.9  --    ALBUMIN 4.1  --    BILITOT 0.1  --    ALKPHOS 64  --    AST 30  --    ALT 19  --    ANIONGAP 13 10   EGFRNONAA >60.0 >60.0       Significant Imaging: I have reviewed all pertinent imaging results/findings within the past 24 hours.    Assessment/Plan:     * Accidental aspirin overdose  65 yo F w/ a hx of COPD (not on home O2), bipolar d/o and Hep C presenting w/ fevers associated w/ productive cough, fever w/ Tmax 102, chills, post-nasal drip and sinus congestion for several days. (-) SOB, CP, abdominal pain, increasing urinary frequency, hematuria, dysuria, foul smelling urine, nausea, vomiting, stool changes. Patient took multiple packets of BC powder and PO tylenol to help sxs. Endorses tinnitus and dizziness. BP ~190-200/100s and tachycardic. No nystagmus  on examination. WBC 14.43, acetaminophen level 20.3, salicylate level 42.6, PH 7.4 w/ CO2 28 and HCO3 20.2. CXR w/ no acute changes. EKG with no signs of widened QRS or AV block.COVID and influenza (-). Patient received IVFs, 2 x IV lorazepam 1mg, 1 x vanc & zosyn.    Patient was admitted to hospital medicine for concern of ASA overdose and management.     Plan:  - continue IV sodium bicarb in the setting of ASA overdose to alkalinize serum and urine    - cardiac monitoring to monitor for possible tachyarrhythmias   - no current indication for HD given lack of respiratory distress, KRISTINE, signs of fluid overload, severe acidemia w/ pH 7.2,  or serum salicylate >90mg/dL  - Zofran PRN for nausea  - continue to monitor salicylate levels to monitor response to therapy   - IVFs as needed       Sepsis  - patient presenting w/ fever, chills and upper respiratory signs of infection. COVID and influenza (-).   - tachycardic on exam and labs notable for leukocytosis, thus patient meets sepsis criteria  - differentials include CAP vs UTI vs effects from ASA overdose     Plan:  - UA pending  - Bcx pending  - continue vanc and zosyn w/ plans to de-escalate w/ cultures and sensitivities or if cultures NGTD x 48 hours   - sputum culture pending       Manic episode  Bipolar disorder  Anxiety  - patient w/ history of bipolar disorder and anxiety, last seen by o/p psychiatry 5/26/2021  - on examination, overt signs of manic episode including pressure speech, exaggerated hand gestures, excitability, racing thoughts, uncontrollable laughter  - as per last psychiatry note, patient was taking risperidone 1mg in the AM and 3mg in the PM, however unclear which dose patient is taking    Plan:  - will continue risperidone 1mg BID as per outpatient record  - monitor for AE such as akithisia   - psychiatry consult in the AM  - PEC hold   - delirium precautions  - bedside sitter   - consider calling family for collateral on patient's baseline  function       Hypertension, essential  - patient hypertensive on admission  - continue home lisinopril       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)  - followed by hepatology o/p; last seen 2/2/2021  - s/p treatment w/ Mavyret w/ subsequent negative HCV RNA 12 weeks post treatment       COPD (chronic obstructive pulmonary disease)  Tobacco use  - patient smoked approximately 1/2-1 PPD of cigarettes  - nicotine patch for nicotine withdrawal  - no signs of COPD exacerbation on examination - patient without wheezes and SpO2 ~95% on RA  - cont albuterol inhaler PRN  - will  on tobacco use censsation when patient is back to baseline mental status     Decreased strength, endurance, and mobility  - PT/OT consulted; appreciate recs       Disc disease, degenerative, cervical  - continue alendronate 70 every 7d      VTE Risk Mitigation (From admission, onward)         Ordered     enoxaparin injection 40 mg  Daily         02/01/22 0235     IP VTE HIGH RISK PATIENT  Once         02/01/22 0235     Place sequential compression device  Until discontinued         02/01/22 0235                   Briana Kahn MD  Department of Hospital Medicine   [facility] Dept

## 2022-02-01 NOTE — ASSESSMENT & PLAN NOTE
- patient presenting w/ fever, chills and upper respiratory signs of infection. COVID and influenza (-).   - tachycardic on exam and labs notable for leukocytosis, thus patient meets sepsis criteria  - differentials include CAP vs UTI vs effects from ASA overdose     Plan:  - UA pending  - Bcx pending  - continue vanc and zosyn w/ plans to de-escalate w/ cultures and sensitivities or if cultures NGTD x 48 hours   - sputum culture pending

## 2022-02-01 NOTE — PROGRESS NOTES
Pharmacokinetic Initial Assessment: IV Vancomycin    Assessment/Plan:    Initiate intravenous vancomycin with loading dose of 1000 mg once followed by a maintenance dose of vancomycin 1000mg IV every 24 hours  Desired empiric serum trough concentration is 15 to 20 mcg/mL  Draw vancomycin trough level 60 min prior to third dose on 2/2 at approximately 2300  Pharmacy will continue to follow and monitor vancomycin.      Please contact pharmacy at extension [phone] with any questions regarding this assessment.     Thank you for the consult,   Kyler Nath       Patient brief summary:  Rossi Mcneal is a 66 y.o. female initiated on antimicrobial therapy with IV Vancomycin for treatment of suspected sepsis    Drug Allergies:   Review of patient's allergies indicates:  No Known Allergies    Actual Body Weight:   50 kg    Renal Function:   Estimated Creatinine Clearance: 48.3 mL/min (based on SCr of 0.8 mg/dL).,     Dialysis Method (if applicable):  N/A    CBC (last 72 hours):  Recent Labs   Lab Result Units 01/31/22  2111   WBC K/uL 14.43*   Hemoglobin g/dL 14.5   Hematocrit % 42.3   Platelets K/uL 282   Gran % % 69.8   Lymph % % 20.6   Mono % % 7.9   Eosinophil % % 0.8   Basophil % % 0.5   Differential Method  Automated       Metabolic Panel (last 72 hours):  Recent Labs   Lab Result Units 01/31/22 2111 02/01/22  0141   Sodium mmol/L 139 140   Potassium mmol/L 4.0 3.5   Chloride mmol/L 109 113*   CO2 mmol/L 17* 17*   Glucose mg/dL 100 111*   BUN mg/dL 18 16   Creatinine mg/dL 0.8 0.8   Albumin g/dL 4.1  --    Total Bilirubin mg/dL 0.1  --    Alkaline Phosphatase U/L 64  --    AST U/L 30  --    ALT U/L 19  --        Drug levels (last 3 results):  No results for input(s): VANCOMYCINRA, VANCOMYCINPE, VANCOMYCINTR in the last 72 hours.    Microbiologic Results:  Microbiology Results (last 7 days)     Procedure Component Value Units Date/Time    Culture, Respiratory with Gram Stain [[id number]]     Order Status: No result  Specimen: Respiratory     Blood culture x two cultures. Draw prior to antibiotics. [[id number]] Collected: 01/31/22 2112    Order Status: Sent Specimen: Blood from Peripheral, Antecubital, Left Updated: 01/31/22 2128    Blood culture x two cultures. Draw prior to antibiotics. [[id number]] Collected: 01/31/22 2117    Order Status: Sent Specimen: Blood from Peripheral, Forearm, Right Updated: 01/31/22 2128

## 2022-02-01 NOTE — SUBJECTIVE & OBJECTIVE
Past Medical History:   Diagnosis Date    Addiction to drug     History of prescription pill addiction    Bipolar 1 disorder     Emphysema     Hepatitis C     History of psychiatric hospitalization     Hx of psychiatric care     Hypertension     Psychiatric problem     Scoliosis     Therapy        Past Surgical History:   Procedure Laterality Date    ESOPHAGOGASTRODUODENOSCOPY N/A 9/19/2019    Procedure: ESOPHAGOGASTRODUODENOSCOPY (EGD);  Surgeon: Jose Luis Hyman MD;  Location: [facility];  Service: Endoscopy;  Laterality: N/A;    HYSTERECTOMY      NECK SURGERY  1990    PARTIAL HYSTERECTOMY  1995       Review of patient's allergies indicates:  No Known Allergies  Current Facility-Administered Medications   Medication Frequency    acetaminophen tablet 650 mg Q4H PRN    albuterol inhaler 1 puff Q4H PRN    ARIPiprazole tablet 5 mg Daily    dextrose 50% injection 12.5 g PRN    dextrose 50% injection 25 g PRN    diclofenac sodium 1 % gel 2 g BID    enoxaparin injection 40 mg Daily    glucagon (human recombinant) injection 1 mg PRN    glucose chewable tablet 16 g PRN    glucose chewable tablet 24 g PRN    guaiFENesin 12 hr tablet 600 mg BID    HYDROcodone-acetaminophen 5-325 mg per tablet 1 tablet BID PRN    hydrOXYzine pamoate capsule 25 mg Q8H PRN    lisinopriL tablet 10 mg Daily    naloxone 0.4 mg/mL injection 0.02 mg PRN    nicotine 21 mg/24 hr 1 patch Daily    ondansetron disintegrating tablet 8 mg Q8H PRN    piperacillin-tazobactam 4.5 g in sodium chloride 0.9% 100 mL IVPB (ready to mix system) Q8H    prochlorperazine injection Soln 5 mg Q6H PRN    sodium bicarbonate 150 mEq in dextrose 5 % 1,000 mL infusion Continuous    sodium chloride 0.9% flush 10 mL Q12H PRN    vancomycin in dextrose 5 % 1 gram/250 mL IVPB 1,000 mg Q24H     Family History     Problem Relation (Age of Onset)    Breast cancer Maternal Aunt    Cancer Father    Hypertension Mother    Parkinsonism Father         Tobacco Use    Smoking status: Current Every Day Smoker     Packs/day: 0.75     Years: 40.00     Pack years: 30.00     Types: Cigarettes    Smokeless tobacco: Never Used   Substance and Sexual Activity    Alcohol use: No     Comment: Alcoholism and rehab in her 20s    Drug use: No     Comment: History of prescription pill addiction, mostly benzos/opioids/muscle relaxers    Sexual activity: Not Currently     Partners: Male     Review of Systems   Constitutional: Negative.    HENT: Negative.    Eyes: Negative.    Respiratory: Negative.    Cardiovascular: Negative.    Gastrointestinal: Negative.    Endocrine: Negative.    Genitourinary: Negative.    Musculoskeletal: Negative.    Skin: Negative.    Neurological: Negative.    Psychiatric/Behavioral: Negative.      Objective:     Vital Signs (Most Recent):  Temp: 98.6 °F (37 °C) (02/01/22 1621)  Pulse: 89 (02/01/22 1621)  Resp: 18 (02/01/22 1621)  BP: (!) 141/81 (02/01/22 1621)  SpO2: (!) 92 % (02/01/22 1621)  O2 Device (Oxygen Therapy): room air (02/01/22 0956) Vital Signs (24h Range):  Temp:  [97.3 °F (36.3 °C)-98.9 °F (37.2 °C)] 98.6 °F (37 °C)  Pulse:  [] 89  Resp:  [16-24] 18  SpO2:  [90 %-99 %] 92 %  BP: (120-208)/() 141/81     Weight: 49.9 kg (110 lb 0.2 oz) (02/01/22 0956)  Body mass index is 21.48 kg/m².  Body surface area is 1.45 meters squared.    I/O last 3 completed shifts:  In: 1000 [IV Piggyback:1000]  Out: -     Physical Exam  Constitutional:       General: She is not in acute distress.  HENT:      Mouth/Throat:      Mouth: Mucous membranes are moist.   Eyes:      General: No scleral icterus.     Extraocular Movements: Extraocular movements intact.      Pupils: Pupils are equal, round, and reactive to light.   Cardiovascular:      Rate and Rhythm: Normal rate and regular rhythm.   Pulmonary:      Effort: Pulmonary effort is normal. No respiratory distress.   Abdominal:      General: There is no distension.      Palpations: Abdomen is  soft.   Musculoskeletal:         General: No deformity.      Right lower leg: No edema.      Left lower leg: No edema.   Skin:     Coloration: Skin is not jaundiced.   Neurological:      General: No focal deficit present.      Mental Status: She is alert.         Significant Labs:  All labs within the past 24 hours have been reviewed.    Significant Imaging:  Labs: Reviewed

## 2022-02-01 NOTE — ASSESSMENT & PLAN NOTE
67 yo F w/ a hx of COPD (not on home O2), bipolar d/o and Hep C presenting w/ fevers associated w/ productive cough, fever w/ Tmax 102, chills, post-nasal drip and sinus congestion for several days. (-) SOB, CP, abdominal pain, increasing urinary frequency, hematuria, dysuria, foul smelling urine, nausea, vomiting, stool changes. Patient took multiple packets of BC powder and PO tylenol to help sxs. Endorses tinnitus and dizziness. BP ~190-200/100s and tachycardic. No nystagmus on examination. WBC 14.43, acetaminophen level 20.3, salicylate level 42.6, PH 7.4 w/ CO2 28 and HCO3 20.2. CXR w/ no acute changes. EKG with no signs of widened QRS or AV block.COVID and influenza (-). Patient received IVFs, 2 x IV lorazepam 1mg, 1 x vanc & zosyn.    Patient was admitted to hospital medicine for concern of ASA overdose and management.     Plan:  - continue IV sodium bicarb in the setting of ASA overdose to alkalinize serum and urine    - cardiac monitoring to monitor for possible tachyarrhythmias   - no current indication for HD given lack of respiratory distress, KRISTINE, signs of fluid overload, severe acidemia w/ pH 7.2,  or serum salicylate >90mg/dL  - Zofran PRN for nausea  - continue to monitor salicylate levels to monitor response to therapy   - IVFs as needed

## 2022-02-01 NOTE — CONSULTS
[facility] Surg  Nephrology  Consult Note    Patient Name: Rossi Mcneal  MRN: [id number]  Admission Date: 1/31/2022  Hospital Length of Stay: 0 days  Attending Provider: Shorty Steve MD   Primary Care Physician: Leobardo Corcoran MD  Principal Problem:Accidental aspirin overdose    Inpatient consult to Nephrology  Consult performed by: Roosevelt Mcclendon MD  Consult ordered by: Briana Kahn MD        Subjective:     HPI: 66 year old female with no history of kidney disease had a headache that continued to worsen. She took multiple doses of fiorcet and ibuprofen. She said she began to feel light headed and short of breath and called her son who told her to go to the ER. In ER elevated salicylate level of 42 and tylenol level of 20.3. ABG with respiratory alkalosis and metabolic acidosis. pH 7.45. sCr 0.9 and she was started on bicarb drip.  Nephrology consulted for aspirin overdose.      Past Medical History:   Diagnosis Date    Addiction to drug     History of prescription pill addiction    Bipolar 1 disorder     Emphysema     Hepatitis C     History of psychiatric hospitalization     Hx of psychiatric care     Hypertension     Psychiatric problem     Scoliosis     Therapy        Past Surgical History:   Procedure Laterality Date    ESOPHAGOGASTRODUODENOSCOPY N/A 9/19/2019    Procedure: ESOPHAGOGASTRODUODENOSCOPY (EGD);  Surgeon: Jose Luis Hyman MD;  Location: [facility];  Service: Endoscopy;  Laterality: N/A;    HYSTERECTOMY      NECK SURGERY  1990    PARTIAL HYSTERECTOMY  1995       Review of patient's allergies indicates:  No Known Allergies  Current Facility-Administered Medications   Medication Frequency    acetaminophen tablet 650 mg Q4H PRN    albuterol inhaler 1 puff Q4H PRN    ARIPiprazole tablet 5 mg Daily    dextrose 50% injection 12.5 g PRN    dextrose 50% injection 25 g PRN    diclofenac sodium 1 % gel 2 g BID    enoxaparin injection 40 mg Daily    glucagon (human  recombinant) injection 1 mg PRN    glucose chewable tablet 16 g PRN    glucose chewable tablet 24 g PRN    guaiFENesin 12 hr tablet 600 mg BID    HYDROcodone-acetaminophen 5-325 mg per tablet 1 tablet BID PRN    hydrOXYzine pamoate capsule 25 mg Q8H PRN    lisinopriL tablet 10 mg Daily    naloxone 0.4 mg/mL injection 0.02 mg PRN    nicotine 21 mg/24 hr 1 patch Daily    ondansetron disintegrating tablet 8 mg Q8H PRN    piperacillin-tazobactam 4.5 g in sodium chloride 0.9% 100 mL IVPB (ready to mix system) Q8H    prochlorperazine injection Soln 5 mg Q6H PRN    sodium bicarbonate 150 mEq in dextrose 5 % 1,000 mL infusion Continuous    sodium chloride 0.9% flush 10 mL Q12H PRN    vancomycin in dextrose 5 % 1 gram/250 mL IVPB 1,000 mg Q24H     Family History     Problem Relation (Age of Onset)    Breast cancer Maternal Aunt    Cancer Father    Hypertension Mother    Parkinsonism Father        Tobacco Use    Smoking status: Current Every Day Smoker     Packs/day: 0.75     Years: 40.00     Pack years: 30.00     Types: Cigarettes    Smokeless tobacco: Never Used   Substance and Sexual Activity    Alcohol use: No     Comment: Alcoholism and rehab in her 20s    Drug use: No     Comment: History of prescription pill addiction, mostly benzos/opioids/muscle relaxers    Sexual activity: Not Currently     Partners: Male     Review of Systems   Constitutional: Negative.    HENT: Negative.    Eyes: Negative.    Respiratory: Negative.    Cardiovascular: Negative.    Gastrointestinal: Negative.    Endocrine: Negative.    Genitourinary: Negative.    Musculoskeletal: Negative.    Skin: Negative.    Neurological: Negative.    Psychiatric/Behavioral: Negative.      Objective:     Vital Signs (Most Recent):  Temp: 98.6 °F (37 °C) (02/01/22 1621)  Pulse: 89 (02/01/22 1621)  Resp: 18 (02/01/22 1621)  BP: (!) 141/81 (02/01/22 1621)  SpO2: (!) 92 % (02/01/22 1621)  O2 Device (Oxygen Therapy): room air (02/01/22 0956) Vital  Signs (24h Range):  Temp:  [97.3 °F (36.3 °C)-98.9 °F (37.2 °C)] 98.6 °F (37 °C)  Pulse:  [] 89  Resp:  [16-24] 18  SpO2:  [90 %-99 %] 92 %  BP: (120-208)/() 141/81     Weight: 49.9 kg (110 lb 0.2 oz) (02/01/22 0956)  Body mass index is 21.48 kg/m².  Body surface area is 1.45 meters squared.    I/O last 3 completed shifts:  In: 1000 [IV Piggyback:1000]  Out: -     Physical Exam  Constitutional:       General: She is not in acute distress.  HENT:      Mouth/Throat:      Mouth: Mucous membranes are moist.   Eyes:      General: No scleral icterus.     Extraocular Movements: Extraocular movements intact.      Pupils: Pupils are equal, round, and reactive to light.   Cardiovascular:      Rate and Rhythm: Normal rate and regular rhythm.   Pulmonary:      Effort: Pulmonary effort is normal. No respiratory distress.   Abdominal:      General: There is no distension.      Palpations: Abdomen is soft.   Musculoskeletal:         General: No deformity.      Right lower leg: No edema.      Left lower leg: No edema.   Skin:     Coloration: Skin is not jaundiced.   Neurological:      General: No focal deficit present.      Mental Status: She is alert.         Significant Labs:  All labs within the past 24 hours have been reviewed.    Significant Imaging:  Labs: Reviewed    Assessment/Plan:     * Accidental aspirin overdose  -initially with respiratory alkalosis and metabolic acidosis pH 7.45  -no altered mentation  -normal kidney function and producing urine  -AG benign and lactate 1  -consider repeating lactate  -repeat BMP, salicylate level, and UA every 6-8 hours  -agree with isotonic bicarb drip at 150 cc/hr; continue drip until salicylate level is undetectable   -monitor urine pH and target > 7.5  -no indication for extracorporeal removal    Nephrology signing off. Please call with questions or concern of if clinical condition changes.        Roosevelt Mcclendon MD  Nephrology  [facility] Surg

## 2022-02-01 NOTE — HPI
66 year old female with no history of kidney disease had a headache that continued to worsen. She took multiple doses of fiorcet and ibuprofen. She said she began to feel light headed and short of breath and called her son who told her to go to the ER. In ER elevated salicylate level of 42 and tylenol level of 20.3. ABG with respiratory alkalosis and metabolic acidosis. pH 7.45. sCr 0.9 and she was started on bicarb drip.  Nephrology consulted for aspirin overdose.

## 2022-02-01 NOTE — PT/OT/SLP EVAL
"Occupational Therapy   Evaluation and Discharge Note    Name: Rossi Mcneal  MRN: [id number]  Admitting Diagnosis:  Accidental aspirin overdose   Recent Surgery: * No surgery found *      Recommendations:     Discharge Recommendations: home  Discharge Equipment Recommendations:  none  Barriers to discharge:  None    Assessment:     Rossi Mcneal is a 66 y.o. female with a medical diagnosis of Accidental aspirin overdose. At this time, patient is functioning at their prior level of function and does not require further acute OT services.     Plan:     During this hospitalization, patient does not require further acute OT services.  Please re-consult if situation changes.    · Plan of Care Reviewed with: patient    Subjective     Chief Complaint: "I couldn't sleep, I couldn't cry all I could do was walk around."   Patient/Family Comments/goals: return home    Occupational Profile:  Living Environment: She lives in apartment with 18 steps at entrance railings on both sides. She has walk in shower with built in seat. She was independent with meal and meds. Her son assisted with shopping. She used transportation for doctor appointments.   Previous level of function: She does not have a car for driving.     Equipment Used at home:  none  Assistance upon Discharge: sons nearby but cannot provide daily assistance.     Pain/Comfort:  · Pain Rating 1: 8/10  · Location 1: back  · Pain Addressed 1: Reposition  · Pain Rating Post-Intervention 1: 6/10    Patients cultural, spiritual, Restoration conflicts given the current situation: no    Objective:     Communicated with: RN prior to session.  Patient found HOB elevated with peripheral IV,Other (comments),telemetry (sitter) upon OT entry to room.    General Precautions: Standard, fall,other (see comments) (Psych hold)   Orthopedic Precautions:N/A   Braces: N/A  Respiratory Status: Room air     Occupational Performance:    Bed Mobility:    · Patient completed Rolling/Turning to " Left with  independence  · Patient completed Rolling/Turning to Right with independence  · Patient completed Supine to Sit with independence  · Patient completed Sit to Supine with independence    Functional Mobility/Transfers:  · Patient completed Sit <> Stand Transfer with independence  with  no assistive device   · Patient completed Bed <> Chair Transfer using Step Transfer technique with independence with no assistive device  · Patient completed Toilet Transfer Step Transfer technique with independence with  no AD  · Functional Mobility: In room, assist to manage lines only.    Activities of Daily Living:  · Feeding:  independence    · Grooming: independence    · Upper Body Dressing: independence at EOB  · Lower Body Dressing: independence with socks  · Toileting: independence assist to manage lines only    Cognitive/Visual Perceptual:  Cognitive/Psychosocial Skills:     -       Oriented to: Person, Place, Time and Situation   -       Follows Commands/attention:Follows multistep  commands  -       Memory: No Deficits noted  -       Safety awareness/insight to disability: intact   -       Mood/Affect/Coping skills/emotional control: some pressured speech, anxious at times, able to self calm    Physical Exam:  Upper Extremity Range of Motion:     -       Right Upper Extremity: WNL  -       Left Upper Extremity: WNL  Upper Extremity Strength:    -       Right Upper Extremity: WNL  -       Left Upper Extremity: WNL    AMPAC 6 Click ADL:  AMPAC Total Score: 24    Treatment & Education:  Education on fall prevention and techniques to self calm  Education:    Patient left HOB elevated with all lines intact, call button in reach and sitter present    GOALS:   Multidisciplinary Problems     Occupational Therapy Goals     Not on file          Multidisciplinary Problems (Resolved)        Problem: Occupational Therapy Goal    Goal Priority Disciplines Outcome Interventions   Occupational Therapy Goal   (Resolved)     OT,  PT/OT Met    Description: Patient at PLOF, eval and discharge, please reconsult if she experiences a decline.                    History:     Past Medical History:   Diagnosis Date    Addiction to drug     History of prescription pill addiction    Bipolar 1 disorder     Emphysema     Hepatitis C     History of psychiatric hospitalization     Hx of psychiatric care     Hypertension     Psychiatric problem     Scoliosis     Therapy        Past Surgical History:   Procedure Laterality Date    ESOPHAGOGASTRODUODENOSCOPY N/A 9/19/2019    Procedure: ESOPHAGOGASTRODUODENOSCOPY (EGD);  Surgeon: Jose Luis Hyman MD;  Location: [facility];  Service: Endoscopy;  Laterality: N/A;    HYSTERECTOMY      NECK SURGERY  1990    PARTIAL HYSTERECTOMY  1995       Time Tracking:     OT Date of Treatment: 02/01/22  OT Start Time: 1120  OT Stop Time: 1143  OT Total Time (min): 23 min    Billable Minutes:Evaluation 10  Self Care/Home Management 13    2/1/2022

## 2022-02-01 NOTE — FIRST PROVIDER EVALUATION
Emergency Department TeleTriage Encounter Note      CHIEF COMPLAINT    Chief Complaint   Patient presents with    COVID-19 Concerns    Fever       VITAL SIGNS   Initial Vitals [01/31/22 1818]   BP Pulse Resp Temp SpO2   (!) 197/111 (!) 140 (!) 24 98.9 °F (37.2 °C) (!) 94 %      MAP       --            ALLERGIES    Review of patient's allergies indicates:  No Known Allergies    PROVIDER TRIAGE NOTE  This is a teletriage evaluation of a 66 y.o. female presenting to the ED complaining of fever, cough and shortness of breath for the last two days.      Initial orders will be placed and care will be transferred to an alternate provider when patient is roomed for a full evaluation. Any additional orders and the final disposition will be determined by that provider.           ORDERS  Labs Reviewed   SARS-COV-2 RDRP GENE - Normal    Narrative:     This test utilizes isothermal nucleic acid amplification   technology to detect the SARS-CoV-2 RdRp nucleic acid segment.   The analytical sensitivity (limit of detection) is 125 genome   equivalents/mL.   A POSITIVE result implies infection with the SARS-CoV-2 virus;   the patient is presumed to be contagious.     A NEGATIVE result means that SARS-CoV-2 nucleic acids are not   present above the limit of detection. A NEGATIVE result should be   treated as presumptive. It does not rule out the possibility of   COVID-19 and should not be the sole basis for treatment decisions.   If COVID-19 is strongly suspected based on clinical and exposure   history, re-testing using an alternate molecular assay should be   considered.   This test is only for use under the Food and Drug   Administration s Emergency Use Authorization (EUA).   Commercial kits are provided by [employer].   Performance characteristics of the EUA have been independently   verified by [facility] Department of   Pathology and Laboratory Medicine.    _________________________________________________________________   The authorized Fact Sheet for Healthcare Providers and the authorized Fact   Sheet for Patients of the ID NOW COVID-19 are available on the FDA   website:     https://www.fda.gov/media/579897/download  https://www.fda.gov/media/000406/download       HIV 1 / 2 ANTIBODY       ED Orders (720h ago, onward)    Start Ordered     Status Ordering Provider    01/31/22 1904 01/31/22 1904  X-Ray Chest PA And Lateral  1 time imaging         Ordered ISA ARANA    01/31/22 1822 01/31/22 1821  HIV 1/2 Ag/Ab (4th Gen)  STAT         Ordered LOCO ESPINOZA    01/31/22 1821 01/31/22 1820  EKG 12-lead  Once         In process LOCO ESPINOZA    01/31/22 1820 01/31/22 1820  Airborne and Contact and Droplet Isolation Status  Continuous         Ordered LOCO ESPINOZA    01/31/22 1820 01/31/22 1820  POCT COVID-19 Rapid Screening  Once         Final result LOCO ESPINOZA            Virtual Visit Note: The provider triage portion of this emergency department evaluation and documentation was performed via [employer], a HIPAA-compliant telemedicine application, in concert with a tele-presenter in the room. A face to face patient evaluation with one of my colleagues will occur once the patient is placed in an emergency department room.      DISCLAIMER: This note was prepared with [employer] voice recognition transcription software. Garbled syntax, mangled pronouns, and other bizarre constructions may be attributed to that software system.

## 2022-02-01 NOTE — CONSULTS
Pt seen and staffed full note to follow.    -Please contact ON CALL psychiatry service for any acute issues that may arise.    SONIYA VELIZ MD   Department of Psychiatry   [facility]  2/1/2022 1:11 PM

## 2022-02-01 NOTE — ASSESSMENT & PLAN NOTE
- followed by hepatology o/p; last seen 2/2/2021  - s/p treatment w/ Mavyret w/ subsequent negative HCV RNA 12 weeks post treatment

## 2022-02-01 NOTE — ASSESSMENT & PLAN NOTE
ASSESSMENT     Rossi Mcneal is a 66 y.o. female with a past psychiatric history of bipolar disorder, who presented to the [facility] due to concern for accidental overdose. Patient endorsing both depressive and manic symptoms. Reports that she live alone and has been having worsening depression. Also with recent physical illness resulting in disruption in sleep and appetite. Current infectious disease workup negative for covid, pneumonia, and influenza. Blood cultures pending and patient was septic on arrival. Pending continued sepsis labwork. Would recommend discontinuation of risperdal and oxcarbazepine at patient has been medication noncompliant the past few months. Can start Abilify to manage bipolar possible mixed episode symptoms. .    IMPRESSION  Bipolar 1 disorder, current episode mixed depression and robb  R/o substance use (patient with mutiple opiate and fiorcet rx)    RECOMMENDATION(S)      1. Scheduled Medication(s):  Discontinue oxcarbazepine, and risperdal.  Start Abilify 5 mg daily to manage bipolar depression    2. PRN Medication(s):  PRN Vistaril 25 mg Q6H as needed for anxiety. Would hold currently on benzodiazepine to manage anxiety symptoms    3.  Monitor:  Please obtain daily EKG to monitor QTc    4. Legal Status/Precaution(s):  Recommend/continue PEC/CEC as patient is in imminent danger of hurting self or others and is gravely disabled due to a psychiatric illness.

## 2022-02-01 NOTE — ASSESSMENT & PLAN NOTE
- patient smoked approximately 1/2-1 PPD of cigarettes  - nicotine patch for nicotine withdrawal  - no signs of COPD exacerbation on examination - patient without wheezes and SpO2 ~95% on RA  - cont albuterol inhaler PRN  - will  on tobacco use censsation when patient is back to baseline mental status

## 2022-02-01 NOTE — PLAN OF CARE
Eval and discharge  Problem: Occupational Therapy Goal  Goal: Occupational Therapy Goal  Description: Patient at PLOF, eval and discharge, please reconsult if she experiences a decline.   Outcome: Met

## 2022-02-01 NOTE — PT/OT/SLP PROGRESS
Physical Therapy   Update    Rossi Mcnela   MRN: [id number]     PT orders received and acknowledged. Attempted to see patient at 1200 for PT evaluation; however, upon my entry to room psychiatry physician sitting with patient. Unable to re-attempt today but patient did participate in OT, independent with mobility. Will check back tomorrow for PT assessment, no billable units today.    Familia Whitmore, PT  2/1/2022

## 2022-02-01 NOTE — ASSESSMENT & PLAN NOTE
- patient w/ history of bipolar disorder and anxiety, last seen by o/p psychiatry 5/26/2021  - on examination, overt signs of manic episode including pressure speech, exaggerated hand gestures, excitability, racing thoughts, uncontrollable laughter  - as per last psychiatry note, patient was taking risperidone 1mg in the AM and 3mg in the PM, however unclear which dose patient is taking    Plan:  - will continue risperidone 1mg BID as per outpatient record  - monitor for AE such as akithisia   - psychiatry consult in the AM  - PEC hold   - delirium precautions  - bedside sitter

## 2022-02-01 NOTE — SUBJECTIVE & OBJECTIVE
Past Medical History:   Diagnosis Date    Addiction to drug     History of prescription pill addiction    Bipolar 1 disorder     Emphysema     Hepatitis C     History of psychiatric hospitalization     Hx of psychiatric care     Hypertension     Psychiatric problem     Scoliosis     Therapy        Past Surgical History:   Procedure Laterality Date    ESOPHAGOGASTRODUODENOSCOPY N/A 9/19/2019    Procedure: ESOPHAGOGASTRODUODENOSCOPY (EGD);  Surgeon: Jose Luis Hyman MD;  Location: [facility];  Service: Endoscopy;  Laterality: N/A;    HYSTERECTOMY      NECK SURGERY  1990    PARTIAL HYSTERECTOMY  1995       Review of patient's allergies indicates:  No Known Allergies    No current facility-administered medications on file prior to encounter.     Current Outpatient Medications on File Prior to Encounter   Medication Sig    albuterol (VENTOLIN HFA) 90 mcg/actuation inhaler inhale TWO puffs INTO THE LUNGS EVERY 6 HOURS AS NEEDED FOR WHEEZING    alendronate (FOSAMAX) 70 MG tablet Take 1 tablet (70 mg total) by mouth every 7 days.    butalbital-acetaminophen-caffeine -40 mg (FIORICET, ESGIC) -40 mg per tablet TAKE ONE TABLET BY MOUTH EVERY 6 HOURS AS NEEDED FOR HEADACHE need office visit prior to further refills    diazePAM (VALIUM) 2 MG tablet TAKE 1-2 TABLETS BY MOUTH EVERY 8 HOURS    fluticasone propionate (FLONASE) 50 mcg/actuation nasal spray 2 sprays (100 mcg total) by Each Nostril route once daily.    fluticasone-umeclidin-vilanter (TRELEGY ELLIPTA) 100-62.5-25 mcg DsDv Inhale 1 puff into the lungs once daily.    lisinopriL 10 MG tablet TAKE ONE TABLET BY MOUTH DAILY    OXcarbazepine (TRILEPTAL) 150 MG Tab     pregabalin (LYRICA) 300 MG Cap TAKE ONE CAPSULE BY MOUTH TWICE DAILY    risperiDONE (RISPERDAL) 1 MG tablet Take 1 tablet (1 mg total) by mouth 3 (three) times daily.     Family History     Problem Relation (Age of Onset)    Breast cancer Maternal Aunt    Cancer Father     Hypertension Mother    Parkinsonism Father        Tobacco Use    Smoking status: Current Every Day Smoker     Packs/day: 0.75     Years: 40.00     Pack years: 30.00     Types: Cigarettes    Smokeless tobacco: Never Used   Substance and Sexual Activity    Alcohol use: No     Comment: Alcoholism and rehab in her 20s    Drug use: No     Comment: History of prescription pill addiction, mostly benzos/opioids/muscle relaxers    Sexual activity: Not Currently     Partners: Male     Review of Systems   Constitutional: Positive for activity change, chills, fatigue and fever.   Respiratory: Positive for cough and chest tightness. Negative for shortness of breath and wheezing.    Cardiovascular: Negative for chest pain and palpitations.   Gastrointestinal: Negative for abdominal pain, diarrhea and nausea.   Genitourinary: Negative for difficulty urinating, hematuria and urgency.   Musculoskeletal: Negative for back pain and joint swelling.   Neurological: Positive for dizziness and light-headedness. Negative for tremors and weakness.        Tinnitus    Psychiatric/Behavioral: Positive for decreased concentration and sleep disturbance. Negative for hallucinations. The patient is nervous/anxious and is hyperactive.      Objective:     Vital Signs (Most Recent):  Temp: 98.9 °F (37.2 °C) (01/31/22 1818)  Pulse: 94 (02/01/22 0327)  Resp: 20 (01/31/22 2258)  BP: 120/81 (02/01/22 0327)  SpO2: 99 % (02/01/22 0327) Vital Signs (24h Range):  Temp:  [98.9 °F (37.2 °C)] 98.9 °F (37.2 °C)  Pulse:  [] 94  Resp:  [20-24] 20  SpO2:  [92 %-99 %] 99 %  BP: (120-208)/() 120/81     Weight: 49.9 kg (110 lb)  Body mass index is 21.48 kg/m².    Physical Exam  Constitutional:       General: She is not in acute distress.     Appearance: She is not ill-appearing.   HENT:      Head: Normocephalic and atraumatic.      Mouth/Throat:      Mouth: Mucous membranes are moist.      Pharynx: Oropharynx is clear.   Eyes:      Extraocular  Movements: Extraocular movements intact.      Conjunctiva/sclera: Conjunctivae normal.   Cardiovascular:      Rate and Rhythm: Regular rhythm. Tachycardia present.      Heart sounds: Normal heart sounds. No murmur heard.      Pulmonary:      Effort: Pulmonary effort is normal.      Breath sounds: Normal breath sounds. No wheezing, rhonchi or rales.   Abdominal:      General: Bowel sounds are normal.      Palpations: Abdomen is soft.   Skin:     General: Skin is warm and dry.   Neurological:      Mental Status: She is alert.   Psychiatric:      Comments: Pressured speech  Elevated mood  Uncontrollable laughter  Poor judgement   Manic behavior              Significant Labs:   All pertinent labs within the past 24 hours have been reviewed.  CBC:   Recent Labs   Lab 01/31/22 2111 02/01/22  0346   WBC 14.43* 8.74   HGB 14.5 11.9*   HCT 42.3 35.2*    224     CMP:   Recent Labs   Lab 01/31/22 2111 02/01/22  0141    140   K 4.0 3.5    113*   CO2 17* 17*    111*   BUN 18 16   CREATININE 0.8 0.8   CALCIUM 9.5 8.4*   PROT 7.9  --    ALBUMIN 4.1  --    BILITOT 0.1  --    ALKPHOS 64  --    AST 30  --    ALT 19  --    ANIONGAP 13 10   EGFRNONAA >60.0 >60.0       Significant Imaging: I have reviewed all pertinent imaging results/findings within the past 24 hours.

## 2022-02-01 NOTE — ED NOTES
Pt stated she was ready to urinate. Pt urinated in bed before help arrived. Unable to collect urine at this time.

## 2022-02-01 NOTE — CONSULTS
"[facility]  Psychiatry  Consult Note    Patient Name: Rossi Mcneal  MRN: [id number]   Code Status: Full Code  Admission Date: 1/31/2022  Hospital Length of Stay: 0 days  Attending Physician: Shorty Steve MD  Primary Care Provider: Leobardo Corcoran MD    Current Legal Status: Physician's Emergency Certificate (PEC)    Patient information was obtained from patient, past medical records and ER records.   Consults  Subjective:     Principal Problem:Accidental aspirin overdose    Chief Complaint:  robb     HPI:   Rossi Mcneal is a 66 y.o. female with a past psychiatric history of Bipolar 1 disorder who presented to [facility] [place] due to Accidental aspirin overdose. Psychiatry was consulted for "concern for accident overdose".    Per Primary Team:  Ms. Mcneal is a 67 yo F w/ a hx of COPD (not on home O2), bipolar d/o and Hep C who presented to the ED for fever. History was mostly obtained from ER records given patient's acute change in mental status. She states she has been experiencing a productive cough, fever w/ Tmax 102, chills, post-nasal drip and sinus congestion for multiple days - she is unable to quantify how long symptoms have been ongoing. Denies SOB, CP, abdominal pain, increasing urinary frequency, hematuria, dysuria, foul smelling urine, nausea, vomiting, stool changes. Patient states she took OTC medications including multiple packets of BC powder to help her symptoms. Since taking the powder, she has been experiencing ringing in her ears associated w/ occasional dizziness. She smokes 1/2 PPD and denies ETOH/recreational drug use. She lives alone at home and states she does not have much social support. She has two adult sons that do not live in [place]. Patient states she is able to ambulate up and down her '18 stair' staircase without difficulty.      In the ED, BP ~190-200/100s and tachycardic. On examination patient w/ pressured speech, easy dis tractability, uncontrollable laughter and " poor insight. Labs notable for WBC 14.43, acetaminophen level 20.3, salicylate level 42.6, PH 7.4 w/ CO2 28 and HCO3 20.2. CXR w/ no acute changes. EKG with no signs of widened QRS or AV block.COVID and influenza (-). Patient received IVFs, 2 x IV lorazepam 1mg, 1 x vanc & zosyn. Patient was admitted to hospital medicine for concern of ASA overdose and management.     Per Psychiatry:  Patient was calm and cooperative with interview. States that she came in cause she's been feeling bad the past few weeks. Reports headache and congestion, in addition to sore throat. States that she also has not been sleeping the past few days due to her headache being poor. Also feels that her thoughts have been racing. Mostly endorses depressive symptoms over the past few weeks of anhedonia, poor self esteem, poor concentration and motivation. Reports passive SI, but denies current plan. Denies previous hx of SA or self harming behavior. Reports that last psychiatric hospitalization was years ago. States that she stopped taking the risperdal that she was prescribed due to it not helping with her depressive symptoms. And states that she hasn't seen her psychiatrist in months. Denies that the medications she took was an attempt to end her life. States that she was just trying to manage her headaches, and was taking tylenol, BC powder, and aleve every few hours to help. States that she also takes firocet for headaches. Reports hx of manic episodes in the past. Denies hx of paranoia/AVH. Denies SI/HI. States that she's willing to try new medications to help with her depressive symptoms, and feels that paxil has helped in the past.     Collateral:   Layo (son) - [phone]  Attempted to call @ 12:56 pm no answer and voicemail left.       Medical Review of Systems:  A comprehensive review of systems was negative except for: poor sleep and headache    Psychiatric Review of Systems-is patient experiencing or having changes in  sleep:  yes  appetite: no  weight: no  energy/anergy: yes  interest/pleasure/anhedonia: yes  somatic symptoms: no  libido: no  anxiety/panic: no  guilty/hopelessness: yes  concentration: no  S.I.B.s/risky behavior: no  any drugs: no  alcohol: no     Allergies:  Patient has no known allergies.    Past Medical/Surgical History:  Past Medical History:   Diagnosis Date    Addiction to drug     History of prescription pill addiction    Bipolar 1 disorder     Emphysema     Hepatitis C     History of psychiatric hospitalization     Hx of psychiatric care     Hypertension     Psychiatric problem     Scoliosis     Therapy      Past Surgical History:   Procedure Laterality Date    ESOPHAGOGASTRODUODENOSCOPY N/A 9/19/2019    Procedure: ESOPHAGOGASTRODUODENOSCOPY (EGD);  Surgeon: Jose Luis Hyman MD;  Location: [facility];  Service: Endoscopy;  Laterality: N/A;    HYSTERECTOMY      NECK SURGERY  1990    PARTIAL HYSTERECTOMY  1995       Past Psychiatric History:  Previous Medication Trials: yes, trileptal, risperdal, venlafaxine, cymbalta, and paxil   Previous Psychiatric Hospitalizations: yes , last admission was years ago per patient  Previous Suicide Attempts: no   History of Violence: no  Outpatient Psychiatrist: yes [facility] [address] [facility]    Social History:  Marital Status: single  Children: 2   Employment Status/Info: retired  Education: technical college  Special Ed: no  Housing Status: alone  History of phys/sexual abuse: no  Access to gun: yes, though currently in possession of son    Substance Abuse History:  Recreational Drugs: barbiturates and prescription drugs, though patient denies abuse,   Use of Alcohol: denied  Rehab History: no   Tobacco Use: yes  Use of OTC: denies    Legal History:  Past Charges/Incarcerations: yes, as a teenager   Pending charges: no     Family Psychiatric History:   Unknown    Psychosocial Stressors: health  Functioning Relationships: alone & isolated and good relationship with  children       Hospital Course: No notes on file         Patient History           Medical as of 2/1/2022     Past Medical History     Diagnosis Date Comments Source    Addiction to drug -- History of prescription pill addiction Provider    Bipolar 1 disorder -- -- Provider    Emphysema -- -- Provider    Hepatitis C -- -- Patient    History of psychiatric hospitalization -- -- Provider    Hx of psychiatric care -- -- Provider    Hypertension -- -- Provider    Psychiatric problem -- -- Provider    Scoliosis -- -- Provider    Therapy -- -- Provider          Pertinent Negatives     Diagnosis Date Noted Comments Source    Atypical hyperplasia of breast 03/08/2021 -- Provider    BRCA1 negative 03/08/2021 -- Provider    BRCA1 positive 03/08/2021 -- Provider    BRCA2 negative 03/08/2021 -- Provider    BRCA2 positive 03/08/2021 -- Provider    Breast cancer 03/08/2021 -- Provider    Colon cancer 03/08/2021 -- Provider    Endometrial cancer 03/08/2021 -- Provider    Lobular carcinoma in situ 03/08/2021 -- Provider    Ovarian cancer 03/08/2021 -- Provider    Suicide attempt 06/17/2020 -- Provider    Usual hyperplasia of lactiferous duct 03/08/2021 -- Provider                  Surgical as of 2/1/2022     Past Surgical History     Procedure Laterality Date Comments Source    PARTIAL HYSTERECTOMY -- 1995 -- Provider    NECK SURGERY -- 1990 -- Provider    HYSTERECTOMY -- -- -- Provider    ESOPHAGOGASTRODUODENOSCOPY N/A 9/19/2019 Procedure: ESOPHAGOGASTRODUODENOSCOPY (EGD);  Surgeon: Jose Luis Hyman MD;  Location: [facility];  Service: Endoscopy;  Laterality: N/A; Provider                  Family as of 2/1/2022     Problem Relation Name Age of Onset Comments Source    Hypertension Mother -- -- -- Provider    Cancer Father -- -- -- Provider    Parkinsonism Father -- -- -- Provider    Breast cancer Maternal Aunt -- -- -- Provider            Tobacco Use as of 2/1/2022     Smoking Status Smoking Start Date Smoking Quit Date Packs/Day  Years Used    Current Every Day Smoker -- -- 0.75 40.00    Types Comments Smokeless Tobacco Status Smokeless Tobacco Quit Date Source     Cigarettes -- Never Used -- Provider            Alcohol Use as of 2/1/2022     Alcohol Use Drinks/Week Alcohol/Week Comments Source    No   -- Alcoholism and rehab in her 20s Provider            Drug Use as of 2/1/2022     Drug Use Types Frequency Comments Source    No -- -- History of prescription pill addiction, mostly benzos/opioids/muscle relaxers Provider            Sexual Activity as of 2/1/2022     Sexually Active Birth Control Partners Comments Source    Not Currently -- Male -- Provider            Activities of Daily Living as of 2/1/2022     Activities of Daily Living Question Response Comments Source    Patient feels they ought to cut down on drinking/drug use Not Asked -- Provider    Patient annoyed by others criticizing their drinking/drug use Not Asked -- Provider    Patient has felt bad or guilty about drinking/drug use Not Asked -- Provider    Patient has had a drink/used drugs as an eye opener in the AM Not Asked -- Provider            Social Documentation as of 2/1/2022    None           Occupational as of 2/1/2022    None           Socioeconomic as of 2/1/2022     Marital Status Spouse Name Number of Children Years Education Education Level Preferred Language Ethnicity Race Source     -- 2 -- -- English Not  or /a White Provider            Pertinent History     Question Response Comments    Lives with alone --    Place in Birth Order -- --    Lives in home --    Number of Siblings -- --    Raised by -- Grew up in [place]    Legal Involvement -- --    Childhood Trauma -- --    Criminal History of -- --    Financial Status unemployed --    Highest Level of Education high school graduation --    Does patient have access to a firearm? No --     Service -- --    Primary Leisure Activity -- --    Spirituality actively participates in  organized Yazdanism --        Past Medical History:   Diagnosis Date    Addiction to drug     History of prescription pill addiction    Bipolar 1 disorder     Emphysema     Hepatitis C     History of psychiatric hospitalization     Hx of psychiatric care     Hypertension     Psychiatric problem     Scoliosis     Therapy      Past Surgical History:   Procedure Laterality Date    ESOPHAGOGASTRODUODENOSCOPY N/A 9/19/2019    Procedure: ESOPHAGOGASTRODUODENOSCOPY (EGD);  Surgeon: Jose Luis Hyman MD;  Location: [facility];  Service: Endoscopy;  Laterality: N/A;    HYSTERECTOMY      NECK SURGERY  1990    PARTIAL HYSTERECTOMY  1995     Family History     Problem Relation (Age of Onset)    Breast cancer Maternal Aunt    Cancer Father    Hypertension Mother    Parkinsonism Father        Tobacco Use    Smoking status: Current Every Day Smoker     Packs/day: 0.75     Years: 40.00     Pack years: 30.00     Types: Cigarettes    Smokeless tobacco: Never Used   Substance and Sexual Activity    Alcohol use: No     Comment: Alcoholism and rehab in her 20s    Drug use: No     Comment: History of prescription pill addiction, mostly benzos/opioids/muscle relaxers    Sexual activity: Not Currently     Partners: Male     Review of patient's allergies indicates:  No Known Allergies    No current facility-administered medications on file prior to encounter.     Current Outpatient Medications on File Prior to Encounter   Medication Sig    butalbital-acetaminophen-caffeine -40 mg (FIORICET, ESGIC) -40 mg per tablet TAKE ONE TABLET BY MOUTH EVERY 6 HOURS AS NEEDED FOR HEADACHE need office visit prior to further refills    HYDROcodone-acetaminophen (NORCO) 5-325 mg per tablet Take 1 tablet by mouth 2 (two) times daily as needed for Pain.    albuterol (VENTOLIN HFA) 90 mcg/actuation inhaler inhale TWO puffs INTO THE LUNGS EVERY 6 HOURS AS NEEDED FOR WHEEZING    fluticasone propionate (FLONASE) 50 mcg/actuation  nasal spray 2 sprays (100 mcg total) by Each Nostril route once daily.    [DISCONTINUED] alendronate (FOSAMAX) 70 MG tablet Take 1 tablet (70 mg total) by mouth every 7 days.    [DISCONTINUED] diazePAM (VALIUM) 2 MG tablet TAKE 1-2 TABLETS BY MOUTH EVERY 8 HOURS    [DISCONTINUED] fluticasone-umeclidin-vilanter (TRELEGY ELLIPTA) 100-62.5-25 mcg DsDv Inhale 1 puff into the lungs once daily.    [DISCONTINUED] lisinopriL 10 MG tablet TAKE ONE TABLET BY MOUTH DAILY    [DISCONTINUED] OXcarbazepine (TRILEPTAL) 150 MG Tab     [DISCONTINUED] pregabalin (LYRICA) 300 MG Cap TAKE ONE CAPSULE BY MOUTH TWICE DAILY    [DISCONTINUED] risperiDONE (RISPERDAL) 1 MG tablet Take 1 tablet (1 mg total) by mouth 3 (three) times daily.     Psychotherapeutics (From admission, onward)            Start     Stop Route Frequency Ordered    02/01/22 0900  risperiDONE tablet 1 mg         -- Oral 2 times daily 02/01/22 0243        Review of Systems   Constitutional: Positive for appetite change, fatigue and fever. Negative for chills.   HENT: Positive for congestion.    Respiratory: Positive for cough. Negative for shortness of breath.    Cardiovascular: Negative for chest pain and palpitations.   Gastrointestinal: Negative for diarrhea and nausea.   Musculoskeletal: Negative for back pain.     Strengths and Liabilities: Strength: Patient accepts guidance/feedback, Strength: Patient is motivated for change., Strength: Patient has positive support network., Liability: Patient has poor health.    Objective:     Vital Signs (Most Recent):  Temp: 97.3 °F (36.3 °C) (02/01/22 1112)  Pulse: 96 (02/01/22 1112)  Resp: 18 (02/01/22 1112)  BP: 126/78 (02/01/22 1112)  SpO2: (!) 92 % (02/01/22 1112) Vital Signs (24h Range):  Temp:  [97.3 °F (36.3 °C)-98.9 °F (37.2 °C)] 97.3 °F (36.3 °C)  Pulse:  [] 96  Resp:  [16-24] 18  SpO2:  [90 %-99 %] 92 %  BP: (120-208)/() 126/78     Height: (P) 5' (152.4 cm)  Weight: 49.9 kg (110 lb)  Body mass index  "is 21.48 kg/m² (pended).      Intake/Output Summary (Last 24 hours) at 2/1/2022 1303  Last data filed at 2/1/2022 0107  Gross per 24 hour   Intake 1000 ml   Output --   Net 1000 ml       Physical Exam  Psychiatric:      Comments: Mental Status Exam:  Appearance: fair hygiene and grooming, dressed in hospital scrubs, NAD, appears stated age   Behavior/Cooperation: calm, cooperative, no psychomotor agitation or tremors  Language: fluent english  Speech: normal tone, normal rate, normal pitch, normal volume, talkative  Mood: "depressed"  Affect: full, reactive, appropriate  Thought Process: linear, logical, goal-directed  Thought Content:  denies SI/HI/paranoia/delusions; no objective evidence of paranoia or delusions  Perception: denies AVH; no objective evidence of AVH   Orientation: grossly intact  Memory: intact to conversation  Attention Span/Concentration: intact to conversation  Fund of Knowledge: appropriate for education level  Insight: improving  Judgment: good          Significant Labs:   Last 24 Hours:   Recent Lab Results       02/01/22  0428   02/01/22  0407   02/01/22  0346   02/01/22  0153   02/01/22  0144        Benzodiazepines Negative               Methadone metabolites Negative               Phencyclidine Negative               POC Molecular Influenza A Ag               POC Molecular Influenza B Ag               Acetaminophen (Tylenol), Serum       9.7  Comment: Toxic Levels:  Adults (4 hr post-ingestion).........>150 ug/mL  Adults (12 hr post-ingestion)........>40 ug/mL  Peds (2 hr post-ingestion, liquid)...>225 ug/mL           Albumin     3.3           Alcohol, Serum               Alkaline Phosphatase     50           Allens Test         N/A       ALT     16           Amphetamine Screen, Ur Negative               Anion Gap     13           Appearance, UA Clear               aPTT       25.4  Comment: aPTT therapeutic range = 39-69 seconds         AST     25           Bacteria, UA Occasional         "       Barbiturate Screen, Ur Negative               Baso #     0.03           Basophil %     0.3           Bilirubin (UA) Negative               BILIRUBIN TOTAL     0.2  Comment: For infants and newborns, interpretation of results should be based  on gestational age, weight and in agreement with clinical  observations.    Premature Infant recommended reference ranges:  Up to 24 hours.............<8.0 mg/dL  Up to 48 hours............<12.0 mg/dL  3-5 days..................<15.0 mg/dL  6-29 days.................<15.0 mg/dL             Blood Culture, Routine               BNP               Site         RB       BUN     15           Calcium     8.7           Chloride     110           CO2     19           Cocaine (Metab.) Negative               Color, UA Yellow               Creatinine     0.7           Creatinine, Urine 30.0               DelSys         Room Air       Differential Method     Automated           eGFR if      >60.0           eGFR if non      >60.0  Comment: Calculation used to obtain the estimated glomerular filtration  rate (eGFR) is the CKD-EPI equation.              Eos #     0.1           Eosinophil %     0.9           Glucose     110           Glucose, UA Negative               Gran # (ANC)     7.1           Gran %     81.4           Hematocrit     35.2           Hemoglobin     11.9           Immature Grans (Abs)     0.04  Comment: Mild elevation in immature granulocytes is non specific and   can be seen in a variety of conditions including stress response,   acute inflammation, trauma and pregnancy. Correlation with other   laboratory and clinical findings is essential.             Immature Granulocytes     0.5           INR       1.1  Comment: Coumadin Therapy:  2.0 - 3.0 for INR for all indicators except mechanical heart valves  and antiphospholipid syndromes which should use 2.5 - 3.5.           Ketones, UA Negative               Lactate, Familia                Leukocytes, UA Negative               Lymph #     1.2           Lymph %     14.2           MCH     31.0           MCHC     33.8           MCV     92           Microscopic Comment SEE COMMENT  Comment: Other formed elements not mentioned in the report are not   present in the microscopic examination.                  Mode         SPONT       Mono #     0.2           Mono %     2.7           MPV     10.1           NITRITE UA Negative               Non-Squam Epith <1               nRBC     0           Occult Blood UA 1+               Opiate Scrn, Ur Presumptive Positive               pH, UA 6.0               Platelets     224           POC BE         -4       POC HCO3         20.2       POC PCO2         28.7       POC PH         7.456       POC PO2         23       POC SATURATED O2         45       POC TCO2         21       Potassium     3.4           PROTEIN TOTAL     6.1           Protein, UA Negative  Comment: Recommend a 24 hour urine protein or a urine   protein/creatinine ratio if globulin induced proteinuria is  clinically suspected.                 Protime       12.4          Acceptable   Yes             RBC     3.84           RBC, UA 7               RDW     13.5           Salicylate Lvl               Sample         ARTERIAL       SARS-CoV-2 RNA, Amplification, Qual   Negative             Sodium     142           Specific [place], UA 1.015               Specimen UA Urine, Clean Catch               Squam Epithel, UA 1               Marijuana (THC) Metabolite Negative               Toxicology Information SEE COMMENT  Comment: This screen includes the following classes of drugs at the listed   cut-off:    Benzodiazepines 200 ng/ml  Methadone 300 ng/ml  Cocaine metabolite 300 ng/ml  Opiates 300 ng/ml  Barbiturates 200 ng/ml  Amphetamines 1000 ng/ml  Marijuana metabs (THC) 50 ng/ml  Phencyclidine (PCP) 25 ng/ml    This is a screening test. If results do not correlate with clinical   presentation, then  a confirmatory send out test is advised.     This report is intended for use in clinical monitoring and management   of   patients. It is not intended for use in employment related drug   testing.                 Troponin I               TSH               WBC, UA 2               WBC     8.74                            02/01/22  0141   01/31/22  2345   01/31/22  2224   01/31/22  2117   01/31/22  2112        Benzodiazepines               Methadone metabolites               Phencyclidine               POC Molecular Influenza A Ag     Negative           POC Molecular Influenza B Ag     Negative           Acetaminophen (Tylenol), Serum   20.3  Comment: Toxic Levels:  Adults (4 hr post-ingestion).........>150 ug/mL  Adults (12 hr post-ingestion)........>40 ug/mL  Peds (2 hr post-ingestion, liquid)...>225 ug/mL               Albumin               Alcohol, Serum   <10             Alkaline Phosphatase               Allens Test               ALT               Amphetamine Screen, Ur               Anion Gap 10               Appearance, UA               aPTT               AST               Bacteria, UA               Barbiturate Screen, Ur               Baso #               Basophil %               Bilirubin (UA)               BILIRUBIN TOTAL               Blood Culture, Routine       No Growth to date  [P]   No Growth to date  [P]       BNP               Site               BUN 16               Calcium 8.4               Chloride 113               CO2 17               Cocaine (Metab.)               Color, UA               Creatinine 0.8               Creatinine, Urine               DelSys               Differential Method               eGFR if  >60.0               eGFR if non  >60.0  Comment: Calculation used to obtain the estimated glomerular filtration  rate (eGFR) is the CKD-EPI equation.                  Eos #               Eosinophil %               Glucose 111               Glucose, UA                Gran # (ANC)               Gran %               Hematocrit               Hemoglobin               Immature Grans (Abs)               Immature Granulocytes               INR               Ketones, UA               Lactate, Familia   1.0  Comment: Falsely low lactic acid results can be found in samples   containing >=13.0 mg/dL total bilirubin and/or >=3.5 mg/dL   direct bilirubin.               Leukocytes, UA               Lymph #               Lymph %               MCH               MCHC               MCV               Microscopic Comment               Mode               Mono #               Mono %               MPV               NITRITE UA               Non-Squam Epith               nRBC               Occult Blood UA               Opiate Scrn, Ur               pH, UA               Platelets               POC BE               POC HCO3               POC PCO2               POC PH               POC PO2               POC SATURATED O2               POC TCO2               Potassium 3.5               PROTEIN TOTAL               Protein, UA               Protime                Acceptable     Yes           RBC               RBC, UA               RDW               Salicylate Lvl 40.6  Comment: Toxic:  30.0 - 70.0 mg/dl  Lethal: >70.0 mg/dl  *Critical value notification by lenny__ with confirmation of receipt to  Toro Lopes RN___ at 2022-02-01 02:34:23     42.6  Comment: Toxic:  30.0 - 70.0 mg/dl  Lethal: >70.0 mg/dl  *Critical value notification by logan with confirmation of receipt to   Lynsey Bell LPN at 2022-02-01 00:29:12               Sample               SARS-CoV-2 RNA, Amplification, Qual               Sodium 140               Specific [place], UA               Specimen UA               Squam Epithel, UA               Marijuana (THC) Metabolite               Toxicology Information               Troponin I   0.015  Comment: The reference interval for Troponin I represents the 99th percentile   cutoff    for our facility and is consistent with 3rd generation assay   performance.               TSH   0.536             WBC, UA               WBC                                01/31/22  2111   01/31/22  1832        Benzodiazepines         Methadone metabolites         Phencyclidine         POC Molecular Influenza A Ag         POC Molecular Influenza B Ag         Acetaminophen (Tylenol), Serum         Albumin 4.1         Alcohol, Serum         Alkaline Phosphatase 64         Allens Test         ALT 19         Amphetamine Screen, Ur         Anion Gap 13         Appearance, UA         aPTT         AST 30         Bacteria, UA         Barbiturate Screen, Ur         Baso # 0.07         Basophil % 0.5         Bilirubin (UA)         BILIRUBIN TOTAL 0.1  Comment: For infants and newborns, interpretation of results should be based  on gestational age, weight and in agreement with clinical  observations.    Premature Infant recommended reference ranges:  Up to 24 hours.............<8.0 mg/dL  Up to 48 hours............<12.0 mg/dL  3-5 days..................<15.0 mg/dL  6-29 days.................<15.0 mg/dL           Blood Culture, Routine         BNP 19  Comment: Values of less than 100 pg/ml are consistent with non-CHF populations.         Site         BUN 18         Calcium 9.5         Chloride 109         CO2 17         Cocaine (Metab.)         Color, UA         Creatinine 0.8         Creatinine, Urine         DelSys         Differential Method Automated         eGFR if  >60.0         eGFR if non  >60.0  Comment: Calculation used to obtain the estimated glomerular filtration  rate (eGFR) is the CKD-EPI equation.            Eos # 0.1         Eosinophil % 0.8         Glucose 100         Glucose, UA         Gran # (ANC) 10.1         Gran % 69.8         Hematocrit 42.3         Hemoglobin 14.5         Immature Grans (Abs) 0.06  Comment: Mild elevation in immature granulocytes is non specific and    can be seen in a variety of conditions including stress response,   acute inflammation, trauma and pregnancy. Correlation with other   laboratory and clinical findings is essential.           Immature Granulocytes 0.4         INR         Ketones, UA         Lactate, Familia 0.8  Comment: Falsely low lactic acid results can be found in samples   containing >=13.0 mg/dL total bilirubin and/or >=3.5 mg/dL   direct bilirubin.           Leukocytes, UA         Lymph # 3.0         Lymph % 20.6         MCH 31.2         MCHC 34.3         MCV 91         Microscopic Comment         Mode         Mono # 1.1         Mono % 7.9         MPV 9.9         NITRITE UA         Non-Squam Epith         nRBC 0         Occult Blood UA         Opiate Scrn, Ur         pH, UA         Platelets 282         POC BE         POC HCO3         POC PCO2         POC PH         POC PO2         POC SATURATED O2         POC TCO2         Potassium 4.0         PROTEIN TOTAL 7.9         Protein, UA         Protime          Acceptable   Yes       RBC 4.65         RBC, UA         RDW 13.4         Salicylate Lvl         Sample         SARS-CoV-2 RNA, Amplification, Qual   Negative       Sodium 139         Specific Gravity, UA         Specimen UA         Squam Epithel, UA         Marijuana (THC) Metabolite         Toxicology Information         Troponin I         TSH         WBC, UA         WBC 14.43                [P] - Preliminary Result             Significant Imaging: I have reviewed all pertinent imaging results/findings within the past 24 hours.    Assessment/Plan:     Bipolar disorder  ASSESSMENT     Rossi Mcneal is a 66 y.o. female with a past psychiatric history of bipolar disorder, who [place] the [facility] due to concern for accidental overdose. Patient endorsing both depressive and manic symptoms. Reports that she live alone and has been having worsening depression. Also with recent physical illness resulting in disruption in sleep and  appetite. Current infectious disease workup negative for covid, pneumonia, and influenza. Blood cultures pending and patient was septic on arrival. Pending continued sepsis labwork. Would recommend discontinuation of risperdal and oxcarbazepine at patient has been medication noncompliant the past few months. Can start Abilify to manage bipolar possible mixed episode symptoms. .    IMPRESSION  Bipolar 1 disorder, current episode mixed depression and robb  R/o substance use (patient with mutiple opiate and fiorcet rx)    RECOMMENDATION(S)      1. Scheduled Medication(s):  Discontinue oxcarbazepine, and risperdal.  Start Abilify 5 mg daily to manage bipolar depression    2. PRN Medication(s):  PRN Vistaril 25 mg Q6H as needed for anxiety. Would hold currently on benzodiazepine to manage anxiety symptoms    3.  Monitor:  Please obtain daily EKG to monitor QTc    4. Legal Status/Precaution(s):  Recommend/continue PEC/CEC as patient is in imminent danger of hurting self or others and is gravely disabled due to a psychiatric illness.           Total Time:  60 minutes      Colleen Burkett MD   [facility]  [facility]

## 2022-02-01 NOTE — PROVIDER PROGRESS NOTES - EMERGENCY DEPT.
Signout Note    I received signout from the previous provider.     Chief complaint:  COVID-19 Concerns and Fever      Pertinent history &exam:  Rossi Mcneal is a 66 y.o. female with pertinent PMH of COPD, bipolar disorder, hepatitis-C who presented to emergency department with multiple complaints including fever, fatigue, headache, cough.  Flu and COVID negative.  Per off going team, patient does have history of mild hypoxemic respiratory failure related to COPD, not on home oxygen.  Patient was tachycardic and hypertensive.  She was confused, and thought to be manic with pressured speech, insomnia.  Family stated she has been off doing drugs.  Chest x-ray is unremarkable but given the cough and tachycardia to a heart rate of greater than 140, patient received empiric antibiotics.  There was also apparently concern for COPD exacerbation but previous team did not want to give albuterol given the tachycardia.    PEC filed by previous team.  Concern for grave disability and robb.    She received Ativan and fluids.  At sign-out, she is pending labs and urinalysis for final disposition.    Vitals:    02/01/22 0327   BP: 120/81   Pulse: 94   Resp:    Temp:        Imaging Studies:    X-Ray Chest PA And Lateral   Final Result      1. No acute radiographic abnormality   2. Levoscoliosis with postoperative changes of the thoracic spine.         Electronically signed by: Karson Galeas   Date:    01/31/2022   Time:    19:24          Medications Given:  Medications   sodium bicarbonate 150 mEq in dextrose 5 % 1,000 mL infusion ( Intravenous New Bag 2/1/22 0322)   sodium chloride 0.9% flush 10 mL (has no administration in time range)   naloxone 0.4 mg/mL injection 0.02 mg (has no administration in time range)   glucose chewable tablet 16 g (has no administration in time range)   glucose chewable tablet 24 g (has no administration in time range)   dextrose 50% injection 12.5 g (has no administration in time range)   dextrose  50% injection 25 g (has no administration in time range)   glucagon (human recombinant) injection 1 mg (has no administration in time range)   enoxaparin injection 40 mg (has no administration in time range)   acetaminophen tablet 650 mg (has no administration in time range)   ondansetron disintegrating tablet 8 mg (has no administration in time range)   prochlorperazine injection Soln 5 mg (has no administration in time range)   nicotine 21 mg/24 hr 1 patch (has no administration in time range)   vancomycin - pharmacy to dose (has no administration in time range)   albuterol inhaler 1 puff (has no administration in time range)   lisinopriL tablet 10 mg (has no administration in time range)   OXcarbazepine tablet 150 mg (has no administration in time range)   risperiDONE tablet 1 mg (has no administration in time range)   alendronate tablet 70 mg (has no administration in time range)   vancomycin in dextrose 5 % 1 gram/250 mL IVPB 1,000 mg (has no administration in time range)   piperacillin-tazobactam 4.5 g in sodium chloride 0.9% 100 mL IVPB (ready to mix system) (has no administration in time range)   lactated ringers bolus 1,497 mL (0 mL/kg × 49.9 kg Intravenous Stopped 2/1/22 0107)   acetaminophen tablet 1,000 mg (1,000 mg Oral Given 1/31/22 2216)   lorazepam injection 1 mg (1 mg Intravenous Given 1/31/22 2216)   piperacillin-tazobactam 4.5 g in sodium chloride 0.9% 100 mL IVPB (ready to mix system) (0 g Intravenous Stopped 2/1/22 0233)   methylPREDNISolone sodium succinate injection 125 mg (125 mg Intravenous Given 2/1/22 0147)   albuterol-ipratropium 2.5 mg-0.5 mg/3 mL nebulizer solution 3 mL (3 mLs Nebulization Given 1/31/22 2258)   vancomycin in dextrose 5 % 1 gram/250 mL IVPB 1,000 mg (0 mg Intravenous Stopped 2/1/22 0122)   lorazepam injection 1 mg (1 mg Intravenous Given 2/1/22 0147)   lactated ringers bolus 1,000 mL (1,000 mLs Intravenous New Bag 2/1/22 0146)   sodium bicarbonate 8.4 % (1 mEq/mL)  injection 50 mEq (50 mEq Intravenous Given 2/1/22 0147)       Pending Items/ MDM:  66 y.o. female with above complaints.  COVID and flu negative.  Tachycardic and hypertensive.  She does not drink alcohol so this is less consistent with alcohol withdrawal.  No clonus, rigidity, dilated pupils, severe psychomotor agitation, or tremulousness to suggest other toxidrome including serotonin syndrome.  She received 2 mg of Ativan with improvement of her vital signs.  Also received a total of 2.5 L of IV fluid.    Personally, I am less concerned about sepsis.  Given a new metabolic acidosis on her serum chemistry, salicylate level was obtained which was significantly elevated.  She received sodium bicarb bolus and drip.  I added on coags.  Her acetaminophen level is elevated, but not in the toxic range.  I reassessed the patient, who admitted taking 4 packets is BC powder over the last 36 hours, as well as a total of 8 325 mg acetaminophen tablets.  She also received a g of Tylenol when she got here.  This is not enough acetaminophen level to achieve toxicity, so would not empirically start N-acetylcysteine.  Will trend acetaminophen level.    On reassessment, patient does not seem to be manic.  She is calm, cooperative, gives a clear and coherent history and does not have pressured speech.  Unclear whether her altered mental status earlier was result of the salicylate toxicity, versus robb. Would not rescind PEC at this point.     Case discussed with poison Center.  Admitted to Internal Medicine.    3:38 AM  Salicylate level is down trending.  Acetaminophen level also downtrending.  Still awaiting urinalysis.    Critical Care Procedure Note    Authorized and Performed by: Paz Villatoro    Total critical care time: Approximately 40 minutes    Due to a high probability of clinically significant, life threatening deterioration, the patient required my highest level of preparedness to intervene emergently and I personally  spent this critical care time directly and personally managing the patient. This critical care time included obtaining a history; examining the patient; pulse oximetry; ordering and review of studies; arranging urgent treatment with development of a management plan; evaluation of patient's response to treatment; frequent reassessment; and, discussions with other providers.    This critical care time was performed to assess and manage the high probability of imminent, life-threatening deterioration that could result in multi-organ failure. It was exclusive of separately billable procedures and treating other patients and teaching time.      Diagnostic Impression:    1. Accidental aspirin overdose, initial encounter    2. Tachycardia    3. Cough    4. Lanette    5. Chest pain         ED Disposition Condition    Admit              Patient understands the plan and is in agreement, verbalized understanding, questions answered    Paz Villatoro MD  Emergency Medicine

## 2022-02-01 NOTE — PLAN OF CARE
[facility] - Med Surg  Initial Discharge Assessment       Primary Care Provider: Leobardo Corcoran MD    Admission Diagnosis: Cough [R05.9]  Lanette [F30.9]  Tachycardia [R00.0]  Chest pain [R07.9]  Accidental aspirin overdose, initial encounter [T39.011A]    Admission Date: 1/31/2022  Expected Discharge Date: 2/4/2022         Payor: /     Extended Emergency Contact Information  Primary Emergency Contact: Vale Nascimento   [facility]  Home Phone: [phone]  Mobile Phone: [phone]  Relation: Relative  Secondary Emergency Contact: Antwan Nascimento   [facility]  Home Phone: [phone]  Relation: Son    Discharge Plan A: Psychiatric hospital  Discharge Plan B: Home      [facility] [place] - [address]  [address]  [place] [address]  Phone: [phone] Fax: [phone]      Initial Assessment (most recent)     Adult Discharge Assessment - 02/01/22 1510        Discharge Assessment    Assessment Type Discharge Planning Assessment     Source of Information health record     Reason For Admission Accidental aspirin overdose     Lives With alone     Prior to hospitilization cognitive status: Alert/Oriented     Current cognitive status: Unable to Assess     Walking or Climbing Stairs Difficulty none     Dressing/Bathing Difficulty none     Equipment Currently Used at Home none     Readmission within 30 days? No     Patient currently being followed by outpatient case management? No     Do you currently have service(s) that help you manage your care at home? No     How do you get to doctors appointments? public transportation     Are you on dialysis? No     Do you take coumadin? No     Discharge Plan A Psychiatric hospital     Discharge Plan B Home     DME Needed Upon Discharge  none                    Ashlee Ernst RN  Ext [phone]

## 2022-02-01 NOTE — ASSESSMENT & PLAN NOTE
-initially with respiratory alkalosis and metabolic acidosis pH 7.45  -no altered mentation  -normal kidney function and producing urine  -AG benign and lactate 1  -consider repeating lactate  -repeat BMP, salicylate level, and UA every 6-8 hours  -agree with isotonic bicarb drip at 150 cc/hr; continue drip until salicylate level is undetectable   -monitor urine pH and target > 7.5  -no indication for extracorporeal removal    Nephrology signing off. Please call with questions or concern of if clinical condition changes.

## 2022-02-01 NOTE — HPI
Ms. Mcneal is a 67 yo F w/ a hx of COPD (not on home O2), bipolar d/o and Hep C who presented to the ED for fever. History was mostly obtained from ER records given patient's acute change in mental status. She states she has been experiencing a productive cough, fever w/ Tmax 102, chills, post-nasal drip and sinus congestion for multiple days - she is unable to quantify how long symptoms have been ongoing. Denies SOB, CP, abdominal pain, increasing urinary frequency, hematuria, dysuria, foul smelling urine, nausea, vomiting, stool changes. Patient states she took OTC medications including multiple packets of BC powder to help her symptoms. Since taking the powder, she has been experiencing ringing in her ears associated w/ occasional dizziness. She smokes 1/2 PPD and denies ETOH/recreational drug use. She lives alone at home and states she does not have much social support. She has two adult sons that do not live in [place]. Patient states she is able to ambulate up and down her '18 stair' staircase without difficulty.     In the ED, BP ~190-200/100s and tachycardic. On examination patient w/ pressured speech, easy dis tractability, uncontrollable laughter and poor insight. Labs notable for WBC 14.43, acetaminophen level 20.3, salicylate level 42.6, PH 7.4 w/ CO2 28 and HCO3 20.2. CXR w/ no acute changes. EKG with no signs of widened QRS or AV block.COVID and influenza (-). Patient received IVFs, 2 x IV lorazepam 1mg, 1 x vanc & zosyn. Patient was admitted to hospital medicine for concern of ASA overdose and management.

## 2022-02-01 NOTE — ED PROVIDER NOTES
"Encounter Date: 1/31/2022       History     Chief Complaint   Patient presents with    COVID-19 Concerns    Fever     66-year-old female with hypertension, eczema, COPD, bipolar disorder, hepatitis-C presents for multiple complaints.  She reports fever to 101° F over the past 2 days with associated cough productive of sputum, chills, myalgias and difficulty sleeping.  She reports I feel like my head is going to explode!".  States that she has not slept in a few days and is feeling very keyed up.  She reports history of opiate abuse years ago but denies any recent drug or alcohol use or IVDU.  History is somewhat limited as patient is hyperactive with tangential speech.        Review of patient's allergies indicates:  No Known Allergies  Past Medical History:   Diagnosis Date    Addiction to drug     History of prescription pill addiction    Bipolar 1 disorder     Emphysema     Hepatitis C     History of psychiatric hospitalization     Hx of psychiatric care     Hypertension     Psychiatric problem     Scoliosis     Therapy      Past Surgical History:   Procedure Laterality Date    ESOPHAGOGASTRODUODENOSCOPY N/A 9/19/2019    Procedure: ESOPHAGOGASTRODUODENOSCOPY (EGD);  Surgeon: Jose Luis Hyman MD;  Location: [facility];  Service: Endoscopy;  Laterality: N/A;    HYSTERECTOMY      NECK SURGERY  1990    PARTIAL HYSTERECTOMY  1995     Family History   Problem Relation Age of Onset    Hypertension Mother     Cancer Father     Parkinsonism Father     Breast cancer Maternal Aunt      Social History     Tobacco Use    Smoking status: Current Every Day Smoker     Packs/day: 0.75     Years: 40.00     Pack years: 30.00     Types: Cigarettes    Smokeless tobacco: Never Used   Substance Use Topics    Alcohol use: No     Comment: Alcoholism and rehab in her 20s    Drug use: No     Comment: History of prescription pill addiction, mostly benzos/opioids/muscle relaxers     Review of Systems "   Constitutional: Positive for chills, fatigue and fever.   HENT: Positive for congestion. Negative for sore throat.    Respiratory: Positive for cough, shortness of breath and wheezing.    Cardiovascular: Negative for chest pain.   Gastrointestinal: Negative for nausea.   Genitourinary: Negative for dysuria.   Musculoskeletal: Positive for myalgias. Negative for back pain.   Skin: Negative for rash.   Neurological: Positive for headaches. Negative for weakness.   Hematological: Does not bruise/bleed easily.   Psychiatric/Behavioral: Positive for agitation and sleep disturbance. The patient is hyperactive.        Physical Exam     Initial Vitals [01/31/22 1818]   BP Pulse Resp Temp SpO2   (!) 197/111 (!) 140 (!) 24 98.9 °F (37.2 °C) (!) 94 %      MAP       --         Physical Exam    Nursing note and vitals reviewed.  Constitutional: She appears well-developed and well-nourished. She is not diaphoretic. No distress.   HENT:   Head: Normocephalic and atraumatic.   Eyes: EOM are normal. Pupils are equal, round, and reactive to light.   Neck:   Normal range of motion.  Cardiovascular: Regular rhythm, normal heart sounds and intact distal pulses. Exam reveals no gallop and no friction rub.    No murmur heard.  Tachycardic.  No lower extremity edema, erythema, tenderness or warmth   Pulmonary/Chest: Breath sounds normal. No respiratory distress. She has no wheezes. She has no rhonchi. She has no rales. She exhibits no tenderness.   Abdominal: Abdomen is soft. Bowel sounds are normal. She exhibits no distension and no mass. There is no abdominal tenderness. There is no rebound and no guarding.   Musculoskeletal:         General: Normal range of motion.      Cervical back: Normal range of motion.     Neurological: She is alert and oriented to person, place, and time.   Skin: Skin is warm and dry.   Psychiatric: She has a normal mood and affect. Her speech is rapid and/or pressured. She is hyperactive. She is not actively  hallucinating.   Tangential speech but redirectable.  Pressured speech.  Elevated mood and affect She is inattentive.         ED Course   Procedures  Labs Reviewed   CBC W/ AUTO DIFFERENTIAL - Abnormal; Notable for the following components:       Result Value    WBC 14.43 (*)     MCH 31.2 (*)     Gran # (ANC) 10.1 (*)     Immature Grans (Abs) 0.06 (*)     Mono # 1.1 (*)     All other components within normal limits   COMPREHENSIVE METABOLIC PANEL - Abnormal; Notable for the following components:    CO2 17 (*)     All other components within normal limits   SARS-COV-2 RDRP GENE - Normal    Narrative:     This test utilizes isothermal nucleic acid amplification   technology to detect the SARS-CoV-2 RdRp nucleic acid segment.   The analytical sensitivity (limit of detection) is 125 genome   equivalents/mL.   A POSITIVE result implies infection with the SARS-CoV-2 virus;   the patient is presumed to be contagious.     A NEGATIVE result means that SARS-CoV-2 nucleic acids are not   present above the limit of detection. A NEGATIVE result should be   treated as presumptive. It does not rule out the possibility of   COVID-19 and should not be the sole basis for treatment decisions.   If COVID-19 is strongly suspected based on clinical and exposure   history, re-testing using an alternate molecular assay should be   considered.   This test is only for use under the Food and Drug   Administration s Emergency Use Authorization (EUA).   Commercial kits are provided by [employer].   Performance characteristics of the EUA have been independently   verified by [facility] Department of   Pathology and Laboratory Medicine.   _________________________________________________________________   The authorized Fact Sheet for Healthcare Providers and the authorized Fact   Sheet for Patients of the ID NOW COVID-19 are available on the FDA   website:      https://www.fda.gov/media/906903/download  https://www.fda.gov/media/748251/download       CULTURE, BLOOD   CULTURE, BLOOD   LACTIC ACID, PLASMA   ACETAMINOPHEN LEVEL   B-TYPE NATRIURETIC PEPTIDE   ALCOHOL,MEDICAL (ETHANOL)   TROPONIN I   TSH   HIV 1 / 2 ANTIBODY   URINALYSIS, REFLEX TO URINE CULTURE   DRUG SCREEN PANEL, URINE EMERGENCY   LACTIC ACID, PLASMA   B-TYPE NATRIURETIC PEPTIDE   POCT INFLUENZA A/B MOLECULAR     EKG Readings: (Independently Interpreted)   Initial Reading: No STEMI. Previous EKG: Compared with most recent EKG Previous EKG Date: 6/20/2020. Rhythm: Normal Sinus Rhythm. Heart Rate: 95. Ectopy: No Ectopy. ST Segments: Normal ST Segments. Clinical Impression: Normal Sinus Rhythm     ECG Results          EKG 12-lead (In process)  Result time 01/31/22 22:30:45    In process by Interface, Lab In [facility] (01/31/22 22:30:45)                 Narrative:    Test Reason : R00.0,    Vent. Rate : 095 BPM     Atrial Rate : 095 BPM     P-R Int : 144 ms          QRS Dur : 078 ms      QT Int : 344 ms       P-R-T Axes : 073 083 069 degrees     QTc Int : 432 ms    Normal sinus rhythm  Normal ECG  When compared with ECG of 31-JAN-2022 18:24,  ST no longer depressed in Inferior leads    Referred By: [email or web address]   SELF           Confirmed By:                              EKG 12-lead (Final result)  Result time 01/31/22 21:21:01    Final result by Interface, Lab In [facility] (01/31/22 21:21:01)                 Narrative:    Test Reason : R00.0,    Vent. Rate : 123 BPM     Atrial Rate : 123 BPM     P-R Int : 134 ms          QRS Dur : 086 ms      QT Int : 306 ms       P-R-T Axes : 075 083 060 degrees     QTc Int : 438 ms    Sinus tachycardia  Biatrial enlargement  Nonspecific ST abnormality  Abnormal ECG  When compared with ECG of 20-JUN-2020 23:05,  Vent. rate has increased BY  51 BPM  ST now depressed in Inferior leads  Non-specific change in ST segment in Lateral leads  Confirmed by William SCHROEDER MD ([id number]) on 1/31/2022  9:20:47 PM    Referred By: MADISON   SELF           Confirmed By:William SCHROEDER MD                            Imaging Results          X-Ray Chest PA And Lateral (Final result)  Result time 01/31/22 19:24:43    Final result by Karson Dunn MD (01/31/22 19:24:43)                 Impression:      1. No acute radiographic abnormality  2. Levoscoliosis with postoperative changes of the thoracic spine.      Electronically signed by: Karson Dunn  Date:    01/31/2022  Time:    19:24             Narrative:    EXAMINATION:  XR CHEST PA AND LATERAL    CLINICAL HISTORY:  Cough, unspecified    TECHNIQUE:  PA and lateral views of the chest were performed.    COMPARISON:  06/20/2020    FINDINGS:  Cervical spinal hardware on the left.    Levoscoliosis of the thoracic spine.    Cardiac silhouette is within normal limits.    No mass, consolidation or significant infiltrate.  No effusion or pneumothorax.  No acute osseous abnormality.  Lungs appear adequately aerated.                                 Medications   piperacillin-tazobactam 4.5 g in sodium chloride 0.9% 100 mL IVPB (ready to mix system) (has no administration in time range)   methylPREDNISolone sodium succinate injection 125 mg (has no administration in time range)   vancomycin in dextrose 5 % 1 gram/250 mL IVPB 1,000 mg (has no administration in time range)   lactated ringers bolus 1,497 mL (1,000 mLs Intravenous New Bag 1/31/22 2217)   acetaminophen tablet 1,000 mg (1,000 mg Oral Given 1/31/22 2216)   lorazepam injection 1 mg (1 mg Intravenous Given 1/31/22 2216)   albuterol-ipratropium 2.5 mg-0.5 mg/3 mL nebulizer solution 3 mL (3 mLs Nebulization Given 1/31/22 2258)     Medical Decision Making:   History:   I obtained history from: someone other than patient.       <> Summary of History: See ED course  Old Medical Records: I decided to obtain old medical records.  Old Records Summarized: records from clinic visits.       <> Summary of Records: No recent ED visits  "or admissions  Initial Assessment:   66-year-old female presenting for cough, fever, myalgias and shortness of breath.  She is hypertensive 197/111, tachycardic with heart of 140, tachypneic and mildly at 94% on room air.  She is hyperactive with pressured speech.  Differential Diagnosis:   COVID-19  Pneumonia  COPD exacerbation  Sepsis  Sympathomimetic intoxication  I do also have some concern for manic episode her elevated mood and pressured speech  Independently Interpreted Test(s):   I have ordered and independently interpreted X-rays - see summary below.       <> Summary of X-Ray Reading(s): No consolidation  I have ordered and independently interpreted EKG Reading(s) - see prior notes  Clinical Tests:   Lab Tests: Ordered and Reviewed  Radiological Study: Ordered and Reviewed  Medical Tests: Ordered and Reviewed  ED Management:  Will check labs, give 30 cc/kilos fluid bolus, broad-spectrum antibiotics, Tylenol, Ativan and reassess.    Patient remains extremely hyperactive after Ativan.  On further discussion with the patient she states I am so excited to be here!".  Further questioning she states that she has not been sleeping because she has been so excited.  I asked her if she thinks that she may be manic, she is unable to answer.  I discussed her with both of her sons, 1 of whom stated that he is definitely concerned about manic episode, the other is unsure.  I think that her presentation is consistent with acute manic episode, will PEC for grave disability.  Dispo is pending lab workup reassessment, I anticipate either admission to psychiatric facility verses Hospital Medicine pending results.  I discussed this patient with my supervising physician and I am signing out to Paz Villatoro MD.     11:31 PM  Lor Boone PA-C               ED Course as of 01/31/22 2331   Mon Jan 31, 2022 2138 WBC(!): 14.43 [CC]   2201 Lactate, Familia: 0.8 [CC]   2226 X-Ray Chest PA And Lateral [AP]   2228 POC " "Molecular Influenza A Ag: Negative [CC]   2228 POC Molecular Influenza B Ag: Negative [CC]   2259 I discussed this patient with her son Antwan Nascimento who states he has not have regular contact with her but she has history substance abuse and thinks is very possible she may be manic.  Will discuss with other son who lives locally. [CC]   2302 I discussed this patient with her other son Layo Mcneal that he brought her to the hospital because she was not feeling well.  He has not noticed her after 2 normally.  He states "sometimes she is okay, sometime she is not".  I asked if he thinks that she may be manic, he states that he does not know. [CC]      ED Course User Index  [AP] Paz Villatoro MD  [CC] Lor Boone PA-C             Clinical Impression:   Final diagnoses:  [R00.0] Tachycardia  [R05.9] Cough  [F30.9] Lanette (Primary)                 Lor Boone PA-C  01/31/22 2331    "

## 2022-02-01 NOTE — PHARMACY MED REC
"Admission Medication History     The home medication history was taken by Sarah Carpio.    You may go to "Admission" then "Reconcile Home Medications" tabs to review and/or act upon these items.      The home medication list has been updated by the Pharmacy department.    Please read ALL comments highlighted in yellow.    Please address this information as you see fit.     Feel free to contact us if you have any questions or require assistance.      The medications listed below were removed from the home medication list. Please reorder if appropriate:  Patient reports no longer taking the following medication(s):   ALENDRONATE 70 MG(LAST FILL DATE 3/15/21)   DIAZEPAM 2 MG(LAST FILL DATE 4/21/21)   FLUTICASONE-UMECLI-VILANTER     LISINOPRIL 10  MG (LAST FILL DATE 8/2/21)   OXCARBAZEPINE 150 MG   PREGABALIN 300 MG(LAST FILL DATE 9/23/21)   RISPERIDONE 1 MG(LAST FILL DATE 9/23/21)      Medications listed below were obtained from: [employer]- [employer] Medications   Medication Sig    butalbital-acetaminophen-caffeine -40 mg (FIORICET, ESGIC) -40 mg per tablet TAKE ONE TABLET BY MOUTH EVERY 6 HOURS AS NEEDED FOR HEADACHE need office visit prior to further refills    HYDROcodone-acetaminophen (NORCO) 5-325 mg per tablet Take 1 tablet by mouth 2 (two) times daily as needed for Pain.    albuterol (VENTOLIN HFA) 90 mcg/actuation inhaler inhale TWO puffs INTO THE LUNGS EVERY 6 HOURS AS NEEDED FOR WHEEZING    fluticasone propionate (FLONASE) 50 mcg/actuation nasal spray 2 sprays (100 mcg total) by Each Nostril route once daily.       Potential issues to be addressed PRIOR TO DISCHARGE  Please discuss with the patient barriers to adherence with medication treatment plans    Sarah Carpio  EXT [phone]                  .          "

## 2022-02-02 ENCOUNTER — ANESTHESIA EVENT (OUTPATIENT)
Dept: ENDOSCOPY | Facility: HOSPITAL | Age: 67
DRG: 917 | End: 2022-02-02
Payer: MEDICARE

## 2022-02-02 PROBLEM — K92.1 MELENA: Status: ACTIVE | Noted: 2022-02-02

## 2022-02-02 LAB
ALBUMIN SERPL BCP-MCNC: 3 G/DL (ref 3.5–5.2)
ALBUMIN SERPL BCP-MCNC: 3 G/DL (ref 3.5–5.2)
ALBUMIN SERPL BCP-MCNC: 3.5 G/DL (ref 3.5–5.2)
ALP SERPL-CCNC: 42 U/L (ref 55–135)
ALT SERPL W/O P-5'-P-CCNC: 11 U/L (ref 10–44)
ANION GAP SERPL CALC-SCNC: 6 MMOL/L (ref 8–16)
ANION GAP SERPL CALC-SCNC: 7 MMOL/L (ref 8–16)
ANION GAP SERPL CALC-SCNC: 8 MMOL/L (ref 8–16)
AST SERPL-CCNC: 22 U/L (ref 10–40)
BASOPHILS # BLD AUTO: 0.03 K/UL (ref 0–0.2)
BASOPHILS # BLD AUTO: 0.06 K/UL (ref 0–0.2)
BASOPHILS NFR BLD: 0.3 % (ref 0–1.9)
BASOPHILS NFR BLD: 0.5 % (ref 0–1.9)
BILIRUB SERPL-MCNC: 0.3 MG/DL (ref 0.1–1)
BILIRUB UR QL STRIP: NEGATIVE
BILIRUB UR QL STRIP: NEGATIVE
BUN SERPL-MCNC: 10 MG/DL (ref 8–23)
BUN SERPL-MCNC: 7 MG/DL (ref 8–23)
BUN SERPL-MCNC: 9 MG/DL (ref 8–23)
CALCIUM SERPL-MCNC: 8 MG/DL (ref 8.7–10.5)
CALCIUM SERPL-MCNC: 8.2 MG/DL (ref 8.7–10.5)
CALCIUM SERPL-MCNC: 8.7 MG/DL (ref 8.7–10.5)
CHLORIDE SERPL-SCNC: 102 MMOL/L (ref 95–110)
CHLORIDE SERPL-SCNC: 103 MMOL/L (ref 95–110)
CHLORIDE SERPL-SCNC: 104 MMOL/L (ref 95–110)
CLARITY UR REFRACT.AUTO: CLEAR
CLARITY UR REFRACT.AUTO: CLEAR
CO2 SERPL-SCNC: 27 MMOL/L (ref 23–29)
CO2 SERPL-SCNC: 30 MMOL/L (ref 23–29)
CO2 SERPL-SCNC: 31 MMOL/L (ref 23–29)
COLOR UR AUTO: COLORLESS
COLOR UR AUTO: COLORLESS
CREAT SERPL-MCNC: 0.7 MG/DL (ref 0.5–1.4)
CREAT SERPL-MCNC: 0.7 MG/DL (ref 0.5–1.4)
CREAT SERPL-MCNC: 0.8 MG/DL (ref 0.5–1.4)
DIFFERENTIAL METHOD: ABNORMAL
DIFFERENTIAL METHOD: ABNORMAL
EOSINOPHIL # BLD AUTO: 0.2 K/UL (ref 0–0.5)
EOSINOPHIL # BLD AUTO: 0.3 K/UL (ref 0–0.5)
EOSINOPHIL NFR BLD: 1.6 % (ref 0–8)
EOSINOPHIL NFR BLD: 2.3 % (ref 0–8)
ERYTHROCYTE [DISTWIDTH] IN BLOOD BY AUTOMATED COUNT: 13.6 % (ref 11.5–14.5)
ERYTHROCYTE [DISTWIDTH] IN BLOOD BY AUTOMATED COUNT: 13.8 % (ref 11.5–14.5)
EST. GFR  (AFRICAN AMERICAN): >60 ML/MIN/1.73 M^2
EST. GFR  (NON AFRICAN AMERICAN): >60 ML/MIN/1.73 M^2
GLUCOSE SERPL-MCNC: 109 MG/DL (ref 70–110)
GLUCOSE SERPL-MCNC: 88 MG/DL (ref 70–110)
GLUCOSE SERPL-MCNC: 97 MG/DL (ref 70–110)
GLUCOSE UR QL STRIP: NEGATIVE
GLUCOSE UR QL STRIP: NEGATIVE
HCT VFR BLD AUTO: 32.5 % (ref 37–48.5)
HCT VFR BLD AUTO: 39.7 % (ref 37–48.5)
HGB BLD-MCNC: 10.7 G/DL (ref 12–16)
HGB BLD-MCNC: 13.6 G/DL (ref 12–16)
HGB UR QL STRIP: NEGATIVE
HGB UR QL STRIP: NEGATIVE
HIV 1+2 AB+HIV1 P24 AG SERPL QL IA: NEGATIVE
IMM GRANULOCYTES # BLD AUTO: 0.02 K/UL (ref 0–0.04)
IMM GRANULOCYTES # BLD AUTO: 0.04 K/UL (ref 0–0.04)
IMM GRANULOCYTES NFR BLD AUTO: 0.2 % (ref 0–0.5)
IMM GRANULOCYTES NFR BLD AUTO: 0.3 % (ref 0–0.5)
KETONES UR QL STRIP: NEGATIVE
KETONES UR QL STRIP: NEGATIVE
LEUKOCYTE ESTERASE UR QL STRIP: NEGATIVE
LEUKOCYTE ESTERASE UR QL STRIP: NEGATIVE
LYMPHOCYTES # BLD AUTO: 3.4 K/UL (ref 1–4.8)
LYMPHOCYTES # BLD AUTO: 3.7 K/UL (ref 1–4.8)
LYMPHOCYTES NFR BLD: 31.8 % (ref 18–48)
LYMPHOCYTES NFR BLD: 32.4 % (ref 18–48)
MAGNESIUM SERPL-MCNC: 1.8 MG/DL (ref 1.6–2.6)
MCH RBC QN AUTO: 30.5 PG (ref 27–31)
MCH RBC QN AUTO: 31.9 PG (ref 27–31)
MCHC RBC AUTO-ENTMCNC: 32.9 G/DL (ref 32–36)
MCHC RBC AUTO-ENTMCNC: 34.3 G/DL (ref 32–36)
MCV RBC AUTO: 93 FL (ref 82–98)
MCV RBC AUTO: 93 FL (ref 82–98)
MONOCYTES # BLD AUTO: 1.1 K/UL (ref 0.3–1)
MONOCYTES # BLD AUTO: 1.3 K/UL (ref 0.3–1)
MONOCYTES NFR BLD: 10.4 % (ref 4–15)
MONOCYTES NFR BLD: 11.2 % (ref 4–15)
NEUTROPHILS # BLD AUTO: 5.7 K/UL (ref 1.8–7.7)
NEUTROPHILS # BLD AUTO: 6.3 K/UL (ref 1.8–7.7)
NEUTROPHILS NFR BLD: 53.9 % (ref 38–73)
NEUTROPHILS NFR BLD: 55.1 % (ref 38–73)
NITRITE UR QL STRIP: NEGATIVE
NITRITE UR QL STRIP: NEGATIVE
NRBC BLD-RTO: 0 /100 WBC
NRBC BLD-RTO: 0 /100 WBC
PH UR STRIP: 8 [PH] (ref 5–8)
PH UR STRIP: 8 [PH] (ref 5–8)
PHOSPHATE SERPL-MCNC: 1.8 MG/DL (ref 2.7–4.5)
PHOSPHATE SERPL-MCNC: 2.4 MG/DL (ref 2.7–4.5)
PHOSPHATE SERPL-MCNC: 2.4 MG/DL (ref 2.7–4.5)
PLATELET # BLD AUTO: 207 K/UL (ref 150–450)
PLATELET # BLD AUTO: 275 K/UL (ref 150–450)
PMV BLD AUTO: 10.1 FL (ref 9.2–12.9)
PMV BLD AUTO: 9.9 FL (ref 9.2–12.9)
POCT GLUCOSE: 78 MG/DL (ref 70–110)
POTASSIUM SERPL-SCNC: 3.6 MMOL/L (ref 3.5–5.1)
POTASSIUM SERPL-SCNC: 3.6 MMOL/L (ref 3.5–5.1)
POTASSIUM SERPL-SCNC: 3.7 MMOL/L (ref 3.5–5.1)
PROT SERPL-MCNC: 5.4 G/DL (ref 6–8.4)
PROT UR QL STRIP: NEGATIVE
PROT UR QL STRIP: NEGATIVE
RBC # BLD AUTO: 3.51 M/UL (ref 4–5.4)
RBC # BLD AUTO: 4.27 M/UL (ref 4–5.4)
SALICYLATES SERPL-MCNC: <5 MG/DL (ref 15–30)
SODIUM SERPL-SCNC: 138 MMOL/L (ref 136–145)
SODIUM SERPL-SCNC: 138 MMOL/L (ref 136–145)
SODIUM SERPL-SCNC: 142 MMOL/L (ref 136–145)
SP GR UR STRIP: 1 (ref 1–1.03)
SP GR UR STRIP: 1 (ref 1–1.03)
URN SPEC COLLECT METH UR: ABNORMAL
URN SPEC COLLECT METH UR: ABNORMAL
VANCOMYCIN TROUGH SERPL-MCNC: 4.8 UG/ML (ref 10–22)
WBC # BLD AUTO: 10.4 K/UL (ref 3.9–12.7)
WBC # BLD AUTO: 11.68 K/UL (ref 3.9–12.7)

## 2022-02-02 PROCEDURE — 99232 SBSQ HOSP IP/OBS MODERATE 35: CPT | Mod: HCNC,,, | Performed by: PSYCHIATRY & NEUROLOGY

## 2022-02-02 PROCEDURE — 25000003 PHARM REV CODE 250: Mod: HCNC

## 2022-02-02 PROCEDURE — 97161 PT EVAL LOW COMPLEX 20 MIN: CPT | Mod: HCNC

## 2022-02-02 PROCEDURE — C9113 INJ PANTOPRAZOLE SODIUM, VIA: HCPCS | Performed by: STUDENT IN AN ORGANIZED HEALTH CARE EDUCATION/TRAINING PROGRAM

## 2022-02-02 PROCEDURE — 99232 PR SUBSEQUENT HOSPITAL CARE,LEVL II: ICD-10-PCS | Mod: HCNC,,, | Performed by: PSYCHIATRY & NEUROLOGY

## 2022-02-02 PROCEDURE — 36415 COLL VENOUS BLD VENIPUNCTURE: CPT

## 2022-02-02 PROCEDURE — 36415 COLL VENOUS BLD VENIPUNCTURE: CPT | Mod: HCNC | Performed by: STUDENT IN AN ORGANIZED HEALTH CARE EDUCATION/TRAINING PROGRAM

## 2022-02-02 PROCEDURE — 11000001 HC ACUTE MED/SURG PRIVATE ROOM: Mod: HCNC

## 2022-02-02 PROCEDURE — 80053 COMPREHEN METABOLIC PANEL: CPT

## 2022-02-02 PROCEDURE — 85025 COMPLETE CBC W/AUTO DIFF WBC: CPT | Mod: 91,HCNC | Performed by: STUDENT IN AN ORGANIZED HEALTH CARE EDUCATION/TRAINING PROGRAM

## 2022-02-02 PROCEDURE — 94761 N-INVAS EAR/PLS OXIMETRY MLT: CPT

## 2022-02-02 PROCEDURE — 99233 PR SUBSEQUENT HOSPITAL CARE,LEVL III: ICD-10-PCS | Mod: HCNC,,, | Performed by: STUDENT IN AN ORGANIZED HEALTH CARE EDUCATION/TRAINING PROGRAM

## 2022-02-02 PROCEDURE — 80069 RENAL FUNCTION PANEL: CPT | Mod: HCNC | Performed by: STUDENT IN AN ORGANIZED HEALTH CARE EDUCATION/TRAINING PROGRAM

## 2022-02-02 PROCEDURE — 63600175 PHARM REV CODE 636 W HCPCS: Mod: HCNC

## 2022-02-02 PROCEDURE — 25000003 PHARM REV CODE 250

## 2022-02-02 PROCEDURE — 99233 SBSQ HOSP IP/OBS HIGH 50: CPT | Mod: HCNC,,, | Performed by: STUDENT IN AN ORGANIZED HEALTH CARE EDUCATION/TRAINING PROGRAM

## 2022-02-02 PROCEDURE — 25000003 PHARM REV CODE 250: Performed by: STUDENT IN AN ORGANIZED HEALTH CARE EDUCATION/TRAINING PROGRAM

## 2022-02-02 PROCEDURE — 80202 ASSAY OF VANCOMYCIN: CPT | Mod: HCNC | Performed by: STUDENT IN AN ORGANIZED HEALTH CARE EDUCATION/TRAINING PROGRAM

## 2022-02-02 PROCEDURE — S4991 NICOTINE PATCH NONLEGEND: HCPCS

## 2022-02-02 PROCEDURE — 83735 ASSAY OF MAGNESIUM: CPT | Mod: HCNC | Performed by: STUDENT IN AN ORGANIZED HEALTH CARE EDUCATION/TRAINING PROGRAM

## 2022-02-02 PROCEDURE — 99222 PR INITIAL HOSPITAL CARE,LEVL II: ICD-10-PCS | Mod: HCNC,,, | Performed by: INTERNAL MEDICINE

## 2022-02-02 PROCEDURE — 81003 URINALYSIS AUTO W/O SCOPE: CPT | Mod: HCNC | Performed by: STUDENT IN AN ORGANIZED HEALTH CARE EDUCATION/TRAINING PROGRAM

## 2022-02-02 PROCEDURE — 99222 1ST HOSP IP/OBS MODERATE 55: CPT | Mod: HCNC,,, | Performed by: INTERNAL MEDICINE

## 2022-02-02 PROCEDURE — 97116 GAIT TRAINING THERAPY: CPT | Mod: HCNC

## 2022-02-02 PROCEDURE — 80179 DRUG ASSAY SALICYLATE: CPT | Mod: HCNC | Performed by: STUDENT IN AN ORGANIZED HEALTH CARE EDUCATION/TRAINING PROGRAM

## 2022-02-02 PROCEDURE — 63600175 PHARM REV CODE 636 W HCPCS: Mod: HCNC | Performed by: STUDENT IN AN ORGANIZED HEALTH CARE EDUCATION/TRAINING PROGRAM

## 2022-02-02 PROCEDURE — 85025 COMPLETE CBC W/AUTO DIFF WBC: CPT

## 2022-02-02 RX ORDER — OXYCODONE HYDROCHLORIDE 10 MG/1
10 TABLET ORAL EVERY 6 HOURS PRN
Status: DISCONTINUED | OUTPATIENT
Start: 2022-02-02 | End: 2022-02-03 | Stop reason: HOSPADM

## 2022-02-02 RX ORDER — POTASSIUM CHLORIDE 20 MEQ/1
40 TABLET, EXTENDED RELEASE ORAL ONCE
Status: COMPLETED | OUTPATIENT
Start: 2022-02-02 | End: 2022-02-02

## 2022-02-02 RX ORDER — GABAPENTIN 300 MG/1
300 CAPSULE ORAL 3 TIMES DAILY
Status: DISCONTINUED | OUTPATIENT
Start: 2022-02-02 | End: 2022-02-03 | Stop reason: HOSPADM

## 2022-02-02 RX ORDER — OXYCODONE HYDROCHLORIDE 5 MG/1
5 TABLET ORAL EVERY 4 HOURS PRN
Status: DISCONTINUED | OUTPATIENT
Start: 2022-02-02 | End: 2022-02-03 | Stop reason: HOSPADM

## 2022-02-02 RX ORDER — MAGNESIUM SULFATE HEPTAHYDRATE 40 MG/ML
2 INJECTION, SOLUTION INTRAVENOUS ONCE
Status: COMPLETED | OUTPATIENT
Start: 2022-02-02 | End: 2022-02-02

## 2022-02-02 RX ORDER — PANTOPRAZOLE SODIUM 40 MG/10ML
40 INJECTION, POWDER, LYOPHILIZED, FOR SOLUTION INTRAVENOUS 2 TIMES DAILY
Status: DISCONTINUED | OUTPATIENT
Start: 2022-02-03 | End: 2022-02-03

## 2022-02-02 RX ORDER — HYDRALAZINE HYDROCHLORIDE 25 MG/1
25 TABLET, FILM COATED ORAL EVERY 8 HOURS PRN
Status: DISCONTINUED | OUTPATIENT
Start: 2022-02-02 | End: 2022-02-03 | Stop reason: HOSPADM

## 2022-02-02 RX ORDER — ARIPIPRAZOLE 10 MG/1
10 TABLET ORAL DAILY
Status: DISCONTINUED | OUTPATIENT
Start: 2022-02-03 | End: 2022-02-03 | Stop reason: HOSPADM

## 2022-02-02 RX ORDER — PANTOPRAZOLE SODIUM 40 MG/10ML
80 INJECTION, POWDER, LYOPHILIZED, FOR SOLUTION INTRAVENOUS ONCE
Status: COMPLETED | OUTPATIENT
Start: 2022-02-02 | End: 2022-02-02

## 2022-02-02 RX ORDER — B-COMPLEX WITH VITAMIN C
1 TABLET ORAL DAILY
Status: DISCONTINUED | OUTPATIENT
Start: 2022-02-02 | End: 2022-02-03 | Stop reason: HOSPADM

## 2022-02-02 RX ADMIN — MAGNESIUM SULFATE HEPTAHYDRATE 2 G: 40 INJECTION, SOLUTION INTRAVENOUS at 04:02

## 2022-02-02 RX ADMIN — POTASSIUM BICARBONATE 50 MEQ: 978 TABLET, EFFERVESCENT ORAL at 04:02

## 2022-02-02 RX ADMIN — HYDROXYZINE PAMOATE 25 MG: 25 CAPSULE ORAL at 08:02

## 2022-02-02 RX ADMIN — PANTOPRAZOLE SODIUM 80 MG: 40 INJECTION, POWDER, FOR SOLUTION INTRAVENOUS at 01:02

## 2022-02-02 RX ADMIN — PIPERACILLIN AND TAZOBACTAM 4.5 G: 4; .5 INJECTION, POWDER, LYOPHILIZED, FOR SOLUTION INTRAVENOUS; PARENTERAL at 10:02

## 2022-02-02 RX ADMIN — Medication 6 MG: at 08:02

## 2022-02-02 RX ADMIN — GUAIFENESIN 600 MG: 600 TABLET, EXTENDED RELEASE ORAL at 08:02

## 2022-02-02 RX ADMIN — SODIUM BICARBONATE: 84 INJECTION, SOLUTION INTRAVENOUS at 08:02

## 2022-02-02 RX ADMIN — LISINOPRIL 10 MG: 10 TABLET ORAL at 08:02

## 2022-02-02 RX ADMIN — VANCOMYCIN HYDROCHLORIDE 1000 MG: 1 INJECTION, POWDER, LYOPHILIZED, FOR SOLUTION INTRAVENOUS at 11:02

## 2022-02-02 RX ADMIN — PIPERACILLIN AND TAZOBACTAM 4.5 G: 4; .5 INJECTION, POWDER, LYOPHILIZED, FOR SOLUTION INTRAVENOUS; PARENTERAL at 02:02

## 2022-02-02 RX ADMIN — OXYCODONE HYDROCHLORIDE 10 MG: 10 TABLET ORAL at 04:02

## 2022-02-02 RX ADMIN — HYDROCODONE BITARTRATE AND ACETAMINOPHEN 1 TABLET: 5; 325 TABLET ORAL at 06:02

## 2022-02-02 RX ADMIN — POTASSIUM CHLORIDE 40 MEQ: 1500 TABLET, EXTENDED RELEASE ORAL at 08:02

## 2022-02-02 RX ADMIN — GABAPENTIN 300 MG: 300 CAPSULE ORAL at 08:02

## 2022-02-02 RX ADMIN — HYDRALAZINE HYDROCHLORIDE 25 MG: 25 TABLET, FILM COATED ORAL at 04:02

## 2022-02-02 RX ADMIN — ARIPIPRAZOLE 5 MG: 5 TABLET ORAL at 08:02

## 2022-02-02 RX ADMIN — DICLOFENAC SODIUM 2 G: 10 GEL TOPICAL at 08:02

## 2022-02-02 RX ADMIN — PIPERACILLIN AND TAZOBACTAM 4.5 G: 4; .5 INJECTION, POWDER, LYOPHILIZED, FOR SOLUTION INTRAVENOUS; PARENTERAL at 05:02

## 2022-02-02 RX ADMIN — OXYCODONE 5 MG: 5 TABLET ORAL at 09:02

## 2022-02-02 RX ADMIN — Medication 1 PATCH: at 08:02

## 2022-02-02 RX ADMIN — Medication 1 TABLET: at 03:02

## 2022-02-02 NOTE — HPI
66 F w/ PMH COPD, bipolar disorder, HCV who initially presented to ED for fever and AMS.  Patient reports she had a temp of >102 at home and was self medicating with a significant amount (unable to quantify) of BC powder, tylenol and aspirin.  She was found to tachycardic but HDS in ED, elevated salycilate level and tylenol level.  She was seen by psych and nephrology for management of ASA toxicity.  Currently on PEC per psych.  During admission, patient was noted to have a hgb drop from 14-->10 which was associated with onset of melena.  Melena present in toilet on exam.  No hematemesis or hematochezia.  Does endorse a history of EtOH use as well.  RUQ u/s without evidence of cirrhosis.

## 2022-02-02 NOTE — ANESTHESIA PREPROCEDURE EVALUATION
[facility]  Anesthesia Pre-Operative Evaluation         Patient Name: Rossi Mcneal  YOB: 1955  MRN: [id number]    SUBJECTIVE:     Pre-operative evaluation for Procedure(s) (LRB):  EGD (ESOPHAGOGASTRODUODENOSCOPY) (N/A)     02/02/2022    66 F w/ PMH COPD, bipolar disorder, HCV who initially presented to ED for fever and AMS.  Patient reports she had a temp of >102 at home and was self medicating with a significant amount (unable to quantify) of BC powder, tylenol and aspirin.  She was found to tachycardic but HDS in ED, elevated salycilate level and tylenol level.  She was seen by psych and nephrology for management of ASA toxicity.  Currently on PEC per psych.  During admission, patient was noted to have a hgb drop from 14-->10 which was associated with onset of melena.  Melena present in toilet on exam.  No hematemesis or hematochezia.  Does endorse a history of EtOH use as well.  RUQ u/s without evidence of cirrhosis.    Patient now presents for the above procedure(s).      LDA: None documented.       Peripheral IV - Single Lumen 01/31/22 2111 20 G Left Antecubital (Active)   Site Assessment Clean;Dry;Intact 02/02/22 1128   Extremity Assessment Distal to IV No redness;No swelling;No warmth 02/02/22 1128   Line Status Infusing 02/02/22 1128   Dressing Status Clean;Dry;Intact 02/02/22 1128   Dressing Intervention Integrity maintained 02/02/22 1128   Dressing Change Due 02/04/22 02/02/22 1128   Site Change Due 02/04/22 02/01/22 2124   Reason Not Rotated Not due 02/02/22 1128   Number of days: 1       Prev airway: None documented.    Drips: None documented.   sodium bicarbonate drip 75 mL/hr at 02/02/22 0857       Patient Active Problem List   Diagnosis    Screening for colon cancer    Disc disease, degenerative, cervical    Chronic, continuous use of opioids    Hypertension, essential    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)    Tobacco use    Abnormal  finding on GI tract imaging    COPD (chronic obstructive pulmonary disease)    Anxiety    History of fusion of cervical spine    Cervical myelopathy with cervical radiculopathy    Cervical stenosis of spine    Painful cervical ROM    Impaired motor control    Decreased strength, endurance, and mobility    Rotator cuff tendinitis, left    History of hepatitis C; S/p RX with SVR 12 07/2019    GERD (gastroesophageal reflux disease)    Acute hypoxemic respiratory failure    HAMLET (obstructive sleep apnea)    Accidental aspirin overdose    Bipolar disorder    Manic episode    Sepsis    Tachycardia    Respiratory alkalosis    Metabolic acidosis    Melena       Review of patient's allergies indicates:  No Known Allergies    Current Inpatient Medications:   [START ON 2/3/2022] ARIPiprazole  10 mg Oral Daily    B-complex with vitamin C  1 tablet Oral Daily    diclofenac sodium  2 g Topical (Top) BID    guaiFENesin  600 mg Oral BID    lisinopriL  10 mg Oral Daily    magnesium sulfate IVPB  2 g Intravenous Once    nicotine  1 patch Transdermal Daily    [START ON 2/3/2022] pantoprazole  40 mg Intravenous BID    piperacillin-tazobactam (ZOSYN) IVPB  4.5 g Intravenous Q8H    potassium bicarbonate  50 mEq Oral Once    vancomycin (VANCOCIN) IVPB  20 mg/kg Intravenous Q24H       No current facility-administered medications on file prior to encounter.     Current Outpatient Medications on File Prior to Encounter   Medication Sig Dispense Refill    butalbital-acetaminophen-caffeine -40 mg (FIORICET, ESGIC) -40 mg per tablet TAKE ONE TABLET BY MOUTH EVERY 6 HOURS AS NEEDED FOR HEADACHE need office visit prior to further refills 60 tablet 0    HYDROcodone-acetaminophen (NORCO) 5-325 mg per tablet Take 1 tablet by mouth 2 (two) times daily as needed for Pain.      albuterol (VENTOLIN HFA) 90 mcg/actuation inhaler inhale TWO puffs INTO THE LUNGS EVERY 6 HOURS AS NEEDED FOR WHEEZING 18 g 2     fluticasone propionate (FLONASE) 50 mcg/actuation nasal spray 2 sprays (100 mcg total) by Each Nostril route once daily. 16 mL 3       Past Surgical History:   Procedure Laterality Date    ESOPHAGOGASTRODUODENOSCOPY N/A 9/19/2019    Procedure: ESOPHAGOGASTRODUODENOSCOPY (EGD);  Surgeon: Jose Luis Hyman MD;  Location: [facility];  Service: Endoscopy;  Laterality: N/A;    HYSTERECTOMY      NECK SURGERY  1990    PARTIAL HYSTERECTOMY  1995       Social History     Socioeconomic History    Marital status:     Number of children: 2   Tobacco Use    Smoking status: Current Every Day Smoker     Packs/day: 0.75     Years: 40.00     Pack years: 30.00     Types: Cigarettes    Smokeless tobacco: Never Used   Substance and Sexual Activity    Alcohol use: No     Comment: Alcoholism and rehab in her 20s    Drug use: No     Comment: History of prescription pill addiction, mostly benzos/opioids/muscle relaxers    Sexual activity: Not Currently     Partners: Male       OBJECTIVE:     Vital Signs Range (Last 24H):  Temp:  [36 °C (96.8 °F)-37.2 °C (98.9 °F)]   Pulse:  []   Resp:  [16-20]   BP: (119-183)/()   SpO2:  [90 %-92 %]       Significant Labs:  Lab Results   Component Value Date    WBC 10.40 02/02/2022    HGB 10.7 (L) 02/02/2022    HCT 32.5 (L) 02/02/2022     02/02/2022    CHOL 214 (H) 02/22/2021    TRIG 65 02/22/2021    HDL 82 (H) 02/22/2021    ALT 11 02/02/2022    AST 22 02/02/2022     02/02/2022    K 3.6 02/02/2022     02/02/2022    CREATININE 0.8 02/02/2022    BUN 7 (L) 02/02/2022    CO2 27 02/02/2022    TSH 0.536 01/31/2022    INR 1.1 02/01/2022    HGBA1C 5.4 07/21/2017       Diagnostic Studies: No relevant studies.    EKG:   Results for orders placed or performed during the hospital encounter of 01/31/22   EKG 12-lead    Collection Time: 01/31/22 10:12 PM    Narrative    Test Reason : R00.0,    Vent. Rate : 095 BPM     Atrial Rate : 095 BPM     P-R Int : 144 ms          QRS  Dur : 078 ms      QT Int : 344 ms       P-R-T Axes : 073 083 069 degrees     QTc Int : 432 ms    Normal sinus rhythm  Normal ECG  When compared with ECG of 31-JAN-2022 18:24,  ST no longer depressed in Inferior leads  Confirmed by Dilip Cobb MD ([id number]) on 2/1/2022 1:20:08 PM    Referred By: [email or web address]   SELF           Confirmed By:Dilip Cobb MD       2D ECHO:  TTE:  Results for orders placed or performed during the hospital encounter of 06/16/20   Echo Color Flow Doppler? Yes   Result Value Ref Range    Ascending aorta 2.87 cm    STJ 2.42 cm    AV mean gradient 8 mmHg    Ao peak alex 1.77 m/s    Ao VTI 30.38 cm    IVRT 79.58 msec    IVS 0.93 0.6 - 1.1 cm    LA size 3.77 cm    Left Atrium Major Axis 3.80 cm    Left Atrium Minor Axis 4.70 cm    LVIDd 4.46 3.5 - 6.0 cm    LVIDs 2.35 2.1 - 4.0 cm    LVOT diameter 2.28 cm    LVOT peak VTI 25.17 cm    Posterior Wall 0.80 0.6 - 1.1 cm    MV Peak A Laex 0.86 m/s    E wave deceleration time 157.60 msec    MV Peak E Alex 0.95 m/s    RA Major Axis 4.07 cm    RA Width 3.32 cm    RVDD 2.81 cm    Sinus 2.64 cm    TAPSE 2.31 cm    TR Max Alex 2.77 m/s    TDI LATERAL 0.13 m/s    TDI SEPTAL 0.08 m/s    LA WIDTH 4.28 cm    Ao root annulus 2.82 cm    AORTIC VALVE CUSP SEPERATION 2.03 cm    PV PEAK VELOCITY 1.27 cm/s    MV stenosis pressure 1/2 time 55.02 ms    LV Diastolic Volume 90.64 mL    LV Systolic Volume 19.20 mL    RV S' 14.68 cm/s    LVOT peak alex 1.33 m/s    LV LATERAL E/E' RATIO 7.31 m/s    LV SEPTAL E/E' RATIO 11.88 m/s    FS 47 %    LA volume 57.64 cm3    LV mass 124.12 g    Left Ventricle Relative Wall Thickness 0.36 cm    AV valve area 3.38 cm2    AV Velocity Ratio 0.75     AV index (prosthetic) 0.83     MV valve area p 1/2 method 4.00 cm2    E/A ratio 1.10     Mean e' 0.11 m/s    LVOT area 4.1 cm2    LVOT stroke volume 102.71 cm3    AV peak gradient 13 mmHg    E/E' ratio 9.05 m/s    LV Systolic Volume Index 11.6 mL/m2    LV Diastolic Volume Index 54.54 mL/m2    LA Volume  Index 34.7 mL/m2    LV Mass Index 75 g/m2    Triscuspid Valve Regurgitation Peak Gradient 31 mmHg    BSA 1.69 m2    Right Atrial Pressure (from IVC) 8 mmHg    TV rest pulmonary artery pressure 39 mmHg    Narrative    · Normal left ventricular systolic function. The estimated ejection   fraction is 65%.  · Normal LV diastolic function.  · Normal right ventricular systolic function.  · The estimated PA systolic pressure is 39 mmHg.          NAOMI:  No results found for this or any previous visit.    ASSESSMENT/PLAN:                                                                                                                  02/02/2022  Rossi Mcneal is a 66 y.o., female.    Anesthesia Evaluation    I have reviewed the Patient Summary Reports.    I have reviewed the Nursing Notes. I have reviewed the NPO Status.      Review of Systems  Anesthesia Hx:  No problems with previous Anesthesia Denies Hx of Anesthetic complications  Denies Family Hx of Anesthesia complications.   Denies Personal Hx of Anesthesia complications.   Cardiovascular:   Hypertension    Pulmonary:   COPD Sleep Apnea    Hepatic/GI:   GERD Hepatitis    Musculoskeletal:   Arthritis     Neurological:   Neuromuscular Disease,    Psych:   Psychiatric History          Physical Exam  General:  Well nourished    Airway/Jaw/Neck:  Airway Findings: Mallampati: II MP3, TMD >3FB, Teeth intact    Dental:  Dental Findings: In tact   Chest/Lungs:  Chest/Lungs Clear    Heart/Vascular:  Heart Findings: Normal       Mental Status:  Mental Status Findings:  Cooperative         Anesthesia Plan  Type of Anesthesia, risks & benefits discussed:  Anesthesia Type:  general, MAC    Patient's Preference:   Plan Factors:          Intra-op Monitoring Plan: standard ASA monitors  Intra-op Monitoring Plan Comments:   Post Op Pain Control Plan:   Post Op Pain Control Plan Comments:     Induction:   IV  Beta Blocker:  Patient is not currently on a Beta-Blocker (No further  documentation required).       Informed Consent: Patient understands risks and agrees with Anesthesia plan.  Questions answered. Anesthesia consent signed with patient.  ASA Score: 3     Day of Surgery Review of History & Physical:    H&P update referred to the surgeon.         Ready For Surgery From Anesthesia Perspective.

## 2022-02-02 NOTE — PT/OT/SLP EVAL
Physical Therapy  Evaluation and Discharge    Rossi Mcneal   [id number]    Time Tracking:     PT Received On: 02/02/22   PT Start Time: 1142   PT Stop Time: 1158   PT Total Time (min): 16 min    Billable Minutes: Evaluation 1 procedure and Gait Training 8 minutes      Recommendations:     Discharge recommendations: Home     Equipment recommendations: None    Barriers to Discharge: None (has flight of stairs within home but not a physical barrier for this patient based off her mobility today)    Patient Information:     Recent Surgery: * No surgery found *      Diagnosis: Accidental aspirin overdose    Length of Stay: 1 days    General Precautions: Standard, fall, PEC  Orthopedic Precautions: None  Brace: None    Assessment:     Rossi Mcneal is a 66 y.o. female admitted to [facility] on 1/31/2022 for Accidental aspirin overdose. Rossi Mcneal tolerated evaluation well today. She was sitting up in bed with her sitter present upon my entry to room, both agreeable to evaluation. She was very pleasant and willing to participate, looking forward to walking outside of room. Reports a history of neck and back pain, multiple surgeries. Demonstrates independence with bed mobility and transfers. Ambulates 300 ft in hallways (wearing mask) on room air with therapist supervision, no assistive device. Pt able to hold conversation while walking, no losses of balance noted, cont'd ~5/10 generalized back pain with activity. Urge to void towards end of session, demonstrated ability to get on/off toilet independently before getting back into bed, sitter present at end of session. Discussed PT role, continued mobility and recommendations (Home, no needs from PT standpoint) with patient; verbalized understanding. At this time, Rossi Mcneal has no acute PT needs, will now d/c from acute PT services.    Problem List: pain (chronic back pain)    Plan:     Discharge from acute PT services.    Plan of Care reviewed with: patient    Subjective:  "    Communicated with RN prior to evaluation, appropriate to see for evaluation.    Pt found supine in bed (HOB elevated) upon PT entry to room, agreeable to evaluation.    Patient commenting: "I'd love to get out in the hallway to walk."    Does this patient have any cultural, spiritual, Yarsanism conflicts given the current situation? Patient has no barriers to learning. Patient verbalizes understanding of his/her program and goals and demonstrates them correctly. No cultural, spiritual, or educational needs identified.    Past Medical History:   Diagnosis Date    Addiction to drug     History of prescription pill addiction    Bipolar 1 disorder     Emphysema     Hepatitis C     History of psychiatric hospitalization     Hx of psychiatric care     Hypertension     Psychiatric problem     Scoliosis     Therapy      Past Surgical History:   Procedure Laterality Date    ESOPHAGOGASTRODUODENOSCOPY N/A 9/19/2019    Procedure: ESOPHAGOGASTRODUODENOSCOPY (EGD);  Surgeon: Jose Luis Hyman MD;  [facility]: [facility];  Service: Endoscopy;  Laterality: N/A;    HYSTERECTOMY      NECK SURGERY  1990    PARTIAL HYSTERECTOMY  1995       Living Environment:  Pt reports she lives alone in apartment with [address], B HR. Uses a walk-in shower with a shower chair established.    PLOF:  Prior to admission, patient was independent with mobility and ADL's.    DME:  Patient owns or has access to the following DME: Shower Chair    Objective:     Patient found with: peripheral IV,telemetry    Pain:  Pain Rating 1: 5/10 at generalized back  Pain Rating Post-Intervention 1: 5/10 (same, see above)    Cognitive Exam:  Patient is oriented to Person, Place, Time and Situation.  Patient follows 100% of single-step commands.    Sensation:   Intact at BLE to LT    Lower Extremity Range of Motion:  Right Lower Extremity: WFL actively  Left Lower Extremity: WFL actively    Lower Extremity Strength:  Right Lower Extremity: WFL  Left Lower " Extremity: WFL    Functional Mobility:    · Bed Mobility:  · Supine to Sitting: Independent  · Sitting to Supine: Independent    · Transfers:  · Sit to Stand: Independent from EOB with no AD x 1 trial(s)  · Toilet Transfer: Independent on/off toilet with no AD x 1 trial (s)    · Gait:  · 300 feet in hallways (wearing mask) on room air with therapist supervision, no assistive device. Pt able to hold conversation while walking, no losses of balance noted, cont'd ~5/10 generalized back pain with activity    · Assist level: Supervision  · Device: no AD    · Balance:  · Static Sit: Independent at EOB    · Static Stand: Independent with no AD    Additional Therapeutic Activity/Exercises:     1. She was sitting up in bed with her sitter present upon my entry to room, both agreeable to evaluation. She was very pleasant and willing to participate, looking forward to walking outside of room. Reports a history of neck and back pain, multiple surgeries.    2. Demonstrates independence with bed mobility and transfers. Ambulates 300 ft in hallways (wearing mask) on room air with therapist supervision, no assistive device. Pt able to hold conversation while walking, no losses of balance noted, cont'd ~5/10 generalized back pain with activity.    3. Urge to void towards end of session, demonstrated ability to get on/off toilet independently before getting back into bed, sitter present at end of session.    4. Discussed PT role, continued mobility and recommendations (Home, no needs from PT standpoint) with patient; verbalized understanding.    5. Whiteboard was updated.    AM-PAC 6 CLICK MOBILITY  Turning over in bed (including adjusting bedclothes, sheets and blankets)?: 4  Sitting down on and standing up from a chair with arms (e.g., wheelchair, bedside commode, etc.): 4  Moving from lying on back to sitting on the side of the bed?: 4  Moving to and from a bed to a chair (including a wheelchair)?: 4  Need to walk in hospital  room?: 4  Climbing 3-5 steps with a railing?: 4  Basic Mobility Total Score: 24    Patient was left supine in bed (HOB elevated) with all lines intact, call button in reach and sitter present.    Clinical Decision Making for Evaluation Complexity:  1. Body System(s) Examination: 1-2  2. Clinical Presentation: Stable  3. Evaluation Complexity: Low    GOALS:   Multidisciplinary Problems     Physical Therapy Goals        Problem: Physical Therapy Goal    Goal Priority Disciplines Outcome Goal Variances Interventions   Physical Therapy Goal     PT, PT/OT      Description: Pt has no acute PT needs, thus no goals created.                 Familia Whitmore, PT  2/2/2022

## 2022-02-02 NOTE — SUBJECTIVE & OBJECTIVE
Interval History: NAEON. VSS and afebrile. Patient was seen sitting comfortably at the foot of the bed speaking with psychiatry team. Chaperonne is present at bedside. Patient reports feeling overexcited, chills. She also reports having dark stools and green phlegm prduction. Tinnitus resolving but still present. Her mood is improving from admission but she is concerned about dark stools. GI consulted and protonix started. Increased abilify.     Review of Systems   Constitutional: Positive for chills. Negative for fever.   HENT: Positive for tinnitus. Negative for congestion and rhinorrhea.    Eyes: Negative for pain and visual disturbance.   Respiratory: Negative for chest tightness and shortness of breath.    Cardiovascular: Negative for chest pain and palpitations.   Gastrointestinal: Positive for blood in stool. Negative for abdominal distention, abdominal pain, constipation, diarrhea, nausea and vomiting.   Genitourinary: Negative for difficulty urinating and dysuria.   Musculoskeletal: Positive for back pain and neck pain.   Skin: Negative for rash and wound.   Neurological: Negative for dizziness and headaches.   Psychiatric/Behavioral: Negative for confusion and sleep disturbance. The patient is hyperactive (improving).      Objective:     Vital Signs (Most Recent):  Temp: 98.7 °F (37.1 °C) (02/02/22 0743)  Pulse: 98 (02/02/22 1128)  Resp: 16 (02/02/22 1012)  BP: (!) 170/106 (02/02/22 0743)  SpO2: (!) 92 % (02/02/22 1012) Vital Signs (24h Range):  Temp:  [96.8 °F (36 °C)-98.9 °F (37.2 °C)] 98.7 °F (37.1 °C)  Pulse:  [] 98  Resp:  [16-20] 16  SpO2:  [90 %-92 %] 92 %  BP: (119-170)/() 170/106     Weight: 49.9 kg (110 lb 0.2 oz)  Body mass index is 21.48 kg/m².    Intake/Output Summary (Last 24 hours) at 2/2/2022 1325  Last data filed at 2/2/2022 0800  Gross per 24 hour   Intake 3507.11 ml   Output --   Net 3507.11 ml      Physical Exam  Vitals and nursing note reviewed. Exam conducted with a  chaperone present.   Constitutional:       General: She is not in acute distress.     Appearance: Normal appearance. She is not ill-appearing or diaphoretic.   HENT:      Head: Normocephalic and atraumatic.      Right Ear: External ear normal.      Left Ear: External ear normal.      Nose: Nose normal. No congestion or rhinorrhea.      Mouth/Throat:      Mouth: Mucous membranes are moist.      Pharynx: Oropharynx is clear.   Eyes:      General: No scleral icterus.     Extraocular Movements: Extraocular movements intact.      Pupils: Pupils are equal, round, and reactive to light.   Cardiovascular:      Rate and Rhythm: Regular rhythm. Tachycardia present.      Pulses: Normal pulses.      Heart sounds: Normal heart sounds.   Pulmonary:      Effort: Pulmonary effort is normal.      Breath sounds: Normal breath sounds. No wheezing, rhonchi or rales.   Abdominal:      General: Bowel sounds are normal. There is no distension.      Palpations: Abdomen is soft.      Tenderness: There is no abdominal tenderness. There is no right CVA tenderness or left CVA tenderness.   Musculoskeletal:         General: Normal range of motion.      Cervical back: Normal range of motion and neck supple. No tenderness.      Right lower leg: No edema.      Left lower leg: No edema.   Lymphadenopathy:      Cervical: No cervical adenopathy.   Skin:     General: Skin is warm and dry.   Neurological:      Mental Status: She is alert and oriented to person, place, and time.   Psychiatric:         Attention and Perception: Attention normal.         Mood and Affect: Mood is anxious. Affect is labile.         Speech: Speech is rapid and pressured.         Behavior: Behavior is hyperactive. Behavior is cooperative.         Thought Content: Thought content normal.         Judgment: Judgment normal.         Significant Labs: All pertinent labs within the past 24 hours have been reviewed.    Significant Imaging: I have reviewed all pertinent imaging  results/findings within the past 24 hours.

## 2022-02-02 NOTE — ASSESSMENT & PLAN NOTE
65 yo F w/ a hx of COPD (not on home O2), bipolar d/o and Hep C presenting w/ fevers associated w/ productive cough, fever w/ Tmax 102, chills, post-nasal drip and sinus congestion for several days. (-) SOB, CP, abdominal pain, increasing urinary frequency, hematuria, dysuria, foul smelling urine, nausea, vomiting, stool changes. Patient took multiple packets of BC powder and PO tylenol to help sxs. Endorses tinnitus and dizziness. BP ~190-200/100s and tachycardic. No nystagmus on examination. WBC 14.43, acetaminophen level 20.3, salicylate level 42.6, PH 7.4 w/ CO2 28 and HCO3 20.2. CXR w/ no acute changes. EKG with no signs of widened QRS or AV block.COVID and influenza (-). Patient received IVFs, 2 x IV lorazepam 1mg, 1 x vanc & zosyn.    Patient was admitted to hospital medicine for concern of ASA overdose and management.     Plan:  - Reduced IV sodium bicarb in the setting of ASA overdose to alkalinize serum and urine    - cardiac monitoring to monitor for possible tachyarrhythmias   - no current indication for HD given lack of respiratory distress, KRISTINE, signs of fluid overload, severe acidemia w/ pH 7.2,  or serum salicylate >90mg/dL  - Zofran PRN for nausea  - continue to monitor salicylate levels to monitor response to therapy   - IVFs as needed

## 2022-02-02 NOTE — ASSESSMENT & PLAN NOTE
- patient w/ history of bipolar disorder and anxiety, last seen by o/p psychiatry 5/26/2021  - on examination, overt signs of manic episode including pressure speech, exaggerated hand gestures, excitability, racing thoughts, uncontrollable laughter  - as per last psychiatry note, patient was taking risperidone 1mg in the AM and 3mg in the PM, however unclear which dose patient is taking    Plan:  - discontinue home risperidone 1mg BID   - Abilify 10mg qd  - psychiatry consult  - PEC hold   - delirium precautions  - bedside sitter

## 2022-02-02 NOTE — MEDICAL/APP STUDENT
"Collateral - spoke to patient's son, Layo, [phone]    He states that he is unsure if this current episode is outside of patient's baseline - He believed that he was only dropping her off at the hospital for a COVID test.     He was not sure whether or not the patient has been compliant with psychiatric medications recently, stated that she will "sometimes take her meds, sometimes not" and that she goes through periods of being through the roof.     He was not able to provide a history of the patient's psychiatric hospitalization/outpatient visits.    He inquired about when he could visit the patient, and was advised that the team would get back to him with an answer shortly       Nathan Rodland [facility], MS3  "

## 2022-02-02 NOTE — SUBJECTIVE & OBJECTIVE
Past Medical History:   Diagnosis Date    Addiction to drug     History of prescription pill addiction    Bipolar 1 disorder     Emphysema     Hepatitis C     History of psychiatric hospitalization     Hx of psychiatric care     Hypertension     Psychiatric problem     Scoliosis     Therapy        Past Surgical History:   Procedure Laterality Date    ESOPHAGOGASTRODUODENOSCOPY N/A 9/19/2019    Procedure: ESOPHAGOGASTRODUODENOSCOPY (EGD);  Surgeon: Jose Luis Hyman MD;  Location: [facility];  Service: Endoscopy;  Laterality: N/A;    HYSTERECTOMY      NECK SURGERY  1990    PARTIAL HYSTERECTOMY  1995       Review of patient's allergies indicates:  No Known Allergies  Family History     Problem Relation (Age of Onset)    Breast cancer Maternal Aunt    Cancer Father    Hypertension Mother    Parkinsonism Father        Tobacco Use    Smoking status: Current Every Day Smoker     Packs/day: 0.75     Years: 40.00     Pack years: 30.00     Types: Cigarettes    Smokeless tobacco: Never Used   Substance and Sexual Activity    Alcohol use: No     Comment: Alcoholism and rehab in her 20s    Drug use: No     Comment: History of prescription pill addiction, mostly benzos/opioids/muscle relaxers    Sexual activity: Not Currently     Partners: Male     Review of Systems   Constitutional: Positive for fatigue and fever.   HENT: Negative.    Eyes: Negative.    Respiratory: Negative.    Cardiovascular: Negative.    Gastrointestinal: Negative.    Endocrine: Negative.    Genitourinary: Negative.    Musculoskeletal: Negative.    Skin: Negative.    Allergic/Immunologic: Negative.    Neurological: Negative.    Hematological: Negative.    Psychiatric/Behavioral: Negative.      Objective:     Vital Signs (Most Recent):  Temp: 98.9 °F (37.2 °C) (02/02/22 1556)  Pulse: 90 (02/02/22 1556)  Resp: 18 (02/02/22 1556)  BP: (!) 183/118 (02/02/22 1556)  SpO2: (!) 90 % (02/02/22 1556) Vital Signs (24h Range):  Temp:  [96.8 °F (36  °C)-98.9 °F (37.2 °C)] 98.9 °F (37.2 °C)  Pulse:  [] 90  Resp:  [16-20] 18  SpO2:  [90 %-92 %] 90 %  BP: (119-183)/() 183/118     Weight: 49.9 kg (110 lb 0.2 oz) (02/01/22 0956)  Body mass index is 21.48 kg/m².      Intake/Output Summary (Last 24 hours) at 2/2/2022 1605  Last data filed at 2/2/2022 0800  Gross per 24 hour   Intake 3507.11 ml   Output --   Net 3507.11 ml       Lines/Drains/Airways     Peripheral Intravenous Line                 Peripheral IV - Single Lumen 01/31/22 2111 20 G Left Antecubital 1 day                Physical Exam    Gen: NAD, lying comfortably  HENT: NCAT, oropharynx clear  Eyes: anicteric sclerae, EOMI grossly  Neck: supple, no visible masses/goiter  Cardiac: RRR, no M/R/G, S1/S2 present  Lungs: CTAB, no crackles, no wheezes  Abd: soft, NT/ND, normoactive BS, no rebound  Ext: no LE edema, warm, well perfused  Skin: skin intact on exposed body parts, no visible rashes, lesions  Neuro: A&Ox4, neuro exam grossly intact, moves all extremities  Psych: appropriate mood, affect      Significant Labs:  CBC:   Recent Labs   Lab 01/31/22  2111 02/01/22  0346 02/02/22  0507   WBC 14.43* 8.74 10.40   HGB 14.5 11.9* 10.7*   HCT 42.3 35.2* 32.5*    224 207     CMP:   Recent Labs   Lab 02/02/22  0507 02/02/22  0507 02/02/22  0910   GLU 88   < > 97   CALCIUM 8.0*   < > 8.7   ALBUMIN 3.0*   < > 3.5   PROT 5.4*  --   --       < > 138   K 3.6   < > 3.6   CO2 30*   < > 27      < > 103   BUN 9   < > 7*   CREATININE 0.7   < > 0.8   ALKPHOS 42*  --   --    ALT 11  --   --    AST 22  --   --    BILITOT 0.3  --   --     < > = values in this interval not displayed.     Coagulation:   Recent Labs   Lab 02/01/22  0153   INR 1.1   APTT 25.4       Significant Imaging:  Imaging results within the past 24 hours have been reviewed.

## 2022-02-02 NOTE — ASSESSMENT & PLAN NOTE
- followed by hepatology o/p; last seen 2/2/2021  - s/p treatment w/ Mavyret w/ subsequent negative HCV RNA 12 weeks post treatment     - Patient requesting referral to hepatology on discharge

## 2022-02-02 NOTE — ASSESSMENT & PLAN NOTE
Patient with history of bipolar disorder admitted with ASA overdose and NSAID use with melena and downtrending Hgb.  Currently HDS.  Will plan for EGD tomorrow for further evaluation.    Upper GI Bleed    Management:  -Place patient NPO @MN  -Place 2 large bore IVs, volume resuscitation per primary  -Transfuse pRBC for Hb < 7 g/dL (Consider a higher Hb target if there is clinical evidence of intravascular volume depletion or comorbidities, such as CAD or if high suspicion of vigorous active ongoing bleeding or an uncorrected coagulopathy exists.).  -Correct coagulopathy (goal plt >50, INR <1.5) if present in patients without absolute contraindications.  -PPI 80 mg IV bolus once, then IV PPI 40 BID  -Avoid nonsteroidal agents, antiplatelet agents and anticoagulants if possible in patients without absolute contraindications    -Please call with any additional questions, concerns or changes in the patient's clinical status.

## 2022-02-02 NOTE — CONSULTS
[facility] Surg  Gastroenterology  Consult Note    Patient Name: Rossi Mcneal  MRN: [id number]  Admission Date: 1/31/2022  Hospital Length of Stay: 1 days  Code Status: Full Code   Attending Provider: Shorty Steve MD   Consulting Provider: Rigoberto Ayala MD  Primary Care Physician: Leobardo Corcoran MD  Principal Problem:Accidental aspirin overdose    Inpatient consult to Gastroenterology  Consult performed by: Rigoberto Ayala MD  Consult ordered by: Destiny Becerra MD        Subjective:     HPI:  66 F w/ PMH COPD, bipolar disorder, HCV who initially presented to ED for fever and AMS.  Patient reports she had a temp of >102 at home and was self medicating with a significant amount (unable to quantify) of BC powder, tylenol and aspirin.  She was found to tachycardic but HDS in ED, elevated salycilate level and tylenol level.  She was seen by psych and nephrology for management of ASA toxicity.  Currently on PEC per psych.  During admission, patient was noted to have a hgb drop from 14-->10 which was associated with onset of melena.  Melena present in toilet on exam.  No hematemesis or hematochezia.  Does endorse a history of EtOH use as well.  RUQ u/s without evidence of cirrhosis.      Past Medical History:   Diagnosis Date    Addiction to drug     History of prescription pill addiction    Bipolar 1 disorder     Emphysema     Hepatitis C     History of psychiatric hospitalization     Hx of psychiatric care     Hypertension     Psychiatric problem     Scoliosis     Therapy        Past Surgical History:   Procedure Laterality Date    ESOPHAGOGASTRODUODENOSCOPY N/A 9/19/2019    Procedure: ESOPHAGOGASTRODUODENOSCOPY (EGD);  Surgeon: Jose Luis Hyman MD;  Location: [facility];  Service: Endoscopy;  Laterality: N/A;    HYSTERECTOMY      NECK SURGERY  1990    PARTIAL HYSTERECTOMY  1995       Review of patient's allergies indicates:  No Known Allergies  Family History     Problem Relation (Age of  Onset)    Breast cancer Maternal Aunt    Cancer Father    Hypertension Mother    Parkinsonism Father        Tobacco Use    Smoking status: Current Every Day Smoker     Packs/day: 0.75     Years: 40.00     Pack years: 30.00     Types: Cigarettes    Smokeless tobacco: Never Used   Substance and Sexual Activity    Alcohol use: No     Comment: Alcoholism and rehab in her 20s    Drug use: No     Comment: History of prescription pill addiction, mostly benzos/opioids/muscle relaxers    Sexual activity: Not Currently     Partners: Male     Review of Systems   Constitutional: Positive for fatigue and fever.   HENT: Negative.    Eyes: Negative.    Respiratory: Negative.    Cardiovascular: Negative.    Gastrointestinal: Negative.    Endocrine: Negative.    Genitourinary: Negative.    Musculoskeletal: Negative.    Skin: Negative.    Allergic/Immunologic: Negative.    Neurological: Negative.    Hematological: Negative.    Psychiatric/Behavioral: Negative.      Objective:     Vital Signs (Most Recent):  Temp: 98.9 °F (37.2 °C) (02/02/22 1556)  Pulse: 90 (02/02/22 1556)  Resp: 18 (02/02/22 1556)  BP: (!) 183/118 (02/02/22 1556)  SpO2: (!) 90 % (02/02/22 1556) Vital Signs (24h Range):  Temp:  [96.8 °F (36 °C)-98.9 °F (37.2 °C)] 98.9 °F (37.2 °C)  Pulse:  [] 90  Resp:  [16-20] 18  SpO2:  [90 %-92 %] 90 %  BP: (119-183)/() 183/118     Weight: 49.9 kg (110 lb 0.2 oz) (02/01/22 0956)  Body mass index is 21.48 kg/m².      Intake/Output Summary (Last 24 hours) at 2/2/2022 1605  Last data filed at 2/2/2022 0800  Gross per 24 hour   Intake 3507.11 ml   Output --   Net 3507.11 ml       Lines/Drains/Airways     Peripheral Intravenous Line                 Peripheral IV - Single Lumen 01/31/22 2111 20 G Left Antecubital 1 day                Physical Exam    Gen: NAD, lying comfortably  HENT: NCAT, oropharynx clear  Eyes: anicteric sclerae, EOMI grossly  Neck: supple, no visible masses/goiter  Cardiac: RRR, no M/R/G, S1/S2  present  Lungs: CTAB, no crackles, no wheezes  Abd: soft, NT/ND, normoactive BS, no rebound  Ext: no LE edema, warm, well perfused  Skin: skin intact on exposed body parts, no visible rashes, lesions  Neuro: A&Ox4, neuro exam grossly intact, moves all extremities  Psych: appropriate mood, affect      Significant Labs:  CBC:   Recent Labs   Lab 01/31/22  2111 02/01/22  0346 02/02/22  0507   WBC 14.43* 8.74 10.40   HGB 14.5 11.9* 10.7*   HCT 42.3 35.2* 32.5*    224 207     CMP:   Recent Labs   Lab 02/02/22  0507 02/02/22  0507 02/02/22  0910   GLU 88   < > 97   CALCIUM 8.0*   < > 8.7   ALBUMIN 3.0*   < > 3.5   PROT 5.4*  --   --       < > 138   K 3.6   < > 3.6   CO2 30*   < > 27      < > 103   BUN 9   < > 7*   CREATININE 0.7   < > 0.8   ALKPHOS 42*  --   --    ALT 11  --   --    AST 22  --   --    BILITOT 0.3  --   --     < > = values in this interval not displayed.     Coagulation:   Recent Labs   Lab 02/01/22  0153   INR 1.1   APTT 25.4       Significant Imaging:  Imaging results within the past 24 hours have been reviewed.    Assessment/Plan:     Melena  Patient with history of bipolar disorder admitted with ASA overdose and NSAID use with melena and downtrending Hgb.  Currently HDS.  Will plan for EGD tomorrow for further evaluation.    Upper GI Bleed    Management:  -Place patient NPO @MN  -Place 2 large bore IVs, volume resuscitation per primary  -Transfuse pRBC for Hb < 7 g/dL (Consider a higher Hb target if there is clinical evidence of intravascular volume depletion or comorbidities, such as CAD or if high suspicion of vigorous active ongoing bleeding or an uncorrected coagulopathy exists.).  -Correct coagulopathy (goal plt >50, INR <1.5) if present in patients without absolute contraindications.  -PPI 80 mg IV bolus once, then IV PPI 40 BID  -Avoid nonsteroidal agents, antiplatelet agents and anticoagulants if possible in patients without absolute contraindications    -Please call with any  additional questions, concerns or changes in the patient's clinical status.            Thank you for your consult. I will follow-up with patient. Please contact us if you have any additional questions.    Rigoberto Ayala MD  Gastroenterology  [facility] Surg

## 2022-02-02 NOTE — HOSPITAL COURSE
Ms. Mcneal was admitted to hospital medicine for concerns of ASA overdose. Psych was consulted and patient put on PEC. Started on bicarb gtt. Nephrology consulted. Trending ASA levels, RFP, and U/A to monitor resolution, resolved afternoon of 2/2. Hospital course complicated by melanic stools and concerns for UGIB. Protonix started and GI consulted. EGD showed gastritis. Psychiatry following and patient started Abilify and titrated to 10mg with good response and subjective improvement in mind, PEC removed. Gabapentin 300mg TID started as well for neuropathic pain. Patient medically ready for discharge on 2/3 on abilify 10, protonix daily, and close follow up with Psych.

## 2022-02-02 NOTE — PLAN OF CARE
Problem: Adult Inpatient Plan of Care  Goal: Plan of Care Review  Outcome: Ongoing, Progressing  Goal: Patient-Specific Goal (Individualized)  Outcome: Ongoing, Progressing  Goal: Absence of Hospital-Acquired Illness or Injury  Outcome: Ongoing, Progressing  Goal: Optimal Comfort and Wellbeing  Outcome: Ongoing, Progressing  Goal: Readiness for Transition of Care  Outcome: Ongoing, Progressing   Patient AOX4.  Afebrile during shift.  VS within pt normal range. Pt was anxious during morning assessment.  MD notified. Prescribed intervention given, pt verbalize felling calm during follow up assessment. Pain manage with prescribed intervention when available. After receiving norco, pt verbalize moderate relief upon assessment.  No acute health changes, falls or injuries during shift.

## 2022-02-02 NOTE — ASSESSMENT & PLAN NOTE
ASSESSMENT     Rossi Mcneal is a 66 y.o. female with a past psychiatric history of bipolar disorder, who presented to the [facility] due to concern for accidental overdose. Patient endorsing both depressive and manic symptoms. Reports that she live alone and has been having worsening depression. Also with recent physical illness resulting in disruption in sleep and appetite. Current infectious disease workup negative for covid, pneumonia, and influenza. Blood cultures pending and patient was septic on arrival. Pending continued sepsis labwork. Would recommend discontinuation of risperdal and oxcarbazepine at patient has been medication noncompliant the past few months. Can continue Abilify to manage bipolar possible mixed episode symptoms. .    IMPRESSION  Bipolar 1 disorder, current episode mixed depression and robb  R/o substance use (patient with mutiple opiate and fiorcet rx)    RECOMMENDATION(S)      1. Scheduled Medication(s):  Increase Abilify to 10 mg daily to manage bipolar depression    2. PRN Medication(s):  PRN Vistaril 25 mg Q6H as needed for anxiety. Would hold currently on benzodiazepine to manage anxiety symptoms    3.  Monitor:  Please obtain daily EKG to monitor QTc    4. Legal Status/Precaution(s):  Recommend/continue PEC/CEC as patient is in imminent danger of hurting self or others and is gravely disabled due to a psychiatric illness.

## 2022-02-02 NOTE — PROGRESS NOTES
"[facility] Surg  Psychiatry  Progress Note    Patient Name: Rossi Mcneal  MRN: [id number]   Code Status: Full Code  Admission Date: 1/31/2022  Hospital Length of Stay: 1 days  Expected Discharge Date: 2/4/2022  Attending Physician: Shorty Steve MD  Primary Care Provider: Leobardo Corcoran MD    Current Legal Status: Physician's Emergency Certificate (PEC)    Patient information was obtained from patient, past medical records and ER records.       Subjective:     Patient is a 66 y.o., female, presents with:    Principal Problem:Accidental aspirin overdose    Chief Complaint: accidental overdose    HPI:   Rossi Mcneal is a 66 y.o. female with a past psychiatric history of Bipolar 1 disorder who [place] [facility] [place]. Psychiatry was consulted for "concern for accident overdose".    Per Primary Team:  Ms. Mcneal is a 65 yo F w/ a hx of COPD (not on home O2), bipolar d/o and Hep C who presented to the ED for fever. History was mostly obtained from ER records given patient's acute change in mental status. She states she has been experiencing a productive cough, fever w/ Tmax 102, chills, post-nasal drip and sinus congestion for multiple days - she is unable to quantify how long symptoms have been ongoing. Denies SOB, CP, abdominal pain, increasing urinary frequency, hematuria, dysuria, foul smelling urine, nausea, vomiting, stool changes. Patient states she took OTC medications including multiple packets of BC powder to help her symptoms. Since taking the powder, she has been experiencing ringing in her ears associated w/ occasional dizziness. She smokes 1/2 PPD and denies ETOH/recreational drug use. She lives alone at home and states she does not have much social support. She has two adult sons that do not live in [place]. Patient states she is able to ambulate up and down her '18 stair' staircase without difficulty.      In the ED, BP ~190-200/100s and tachycardic. On " examination patient w/ pressured speech, easy dis tractability, uncontrollable laughter and poor insight. Labs notable for WBC 14.43, acetaminophen level 20.3, salicylate level 42.6, PH 7.4 w/ CO2 28 and HCO3 20.2. CXR w/ no acute changes. EKG with no signs of widened QRS or AV block.COVID and influenza (-). Patient received IVFs, 2 x IV lorazepam 1mg, 1 x vanc & zosyn. Patient was admitted to hospital medicine for concern of ASA overdose and management.     Per Psychiatry:  Patient was calm and cooperative with interview. States that she came in cause she's been feeling bad the past few weeks. Reports headache and congestion, in addition to sore throat. States that she also has not been sleeping the past few days due to her headache being poor. Also feels that her thoughts have been racing. Mostly endorses depressive symptoms over the past few weeks of anhedonia, poor self esteem, poor concentration and motivation. Reports passive SI, but denies current plan. Denies previous hx of SA or self harming behavior. Reports that last psychiatric hospitalization was years ago. States that she stopped taking the risperdal that she was prescribed due to it not helping with her depressive symptoms. And states that she hasn't seen her psychiatrist in months. Denies that the medications she took was an attempt to end her life. States that she was just trying to manage her headaches, and was taking tylenol, BC powder, and aleve every few hours to help. States that she also takes firocet for headaches. Reports hx of manic episodes in the past. Denies hx of paranoia/AVH. Denies SI/HI. States that she's willing to try new medications to help with her depressive symptoms, and feels that paxil has helped in the past.     Collateral:   Layo (son) - [phone]  Attempted to call @ 12:56 pm no answer and voicemail left.       Medical Review of Systems:  A comprehensive review of systems was negative except for: poor sleep and  headache    Psychiatric Review of Systems-is patient experiencing or having changes in  sleep: yes  appetite: no  weight: no  energy/anergy: yes  interest/pleasure/anhedonia: yes  somatic symptoms: no  libido: no  anxiety/panic: no  guilty/hopelessness: yes  concentration: no  S.I.B.s/risky behavior: no  any drugs: no  alcohol: no     Allergies:  Patient has no known allergies.    Past Medical/Surgical History:  Past Medical History:   Diagnosis Date    Addiction to drug     History of prescription pill addiction    Bipolar 1 disorder     Emphysema     Hepatitis C     History of psychiatric hospitalization     Hx of psychiatric care     Hypertension     Psychiatric problem     Scoliosis     Therapy      Past Surgical History:   Procedure Laterality Date    ESOPHAGOGASTRODUODENOSCOPY N/A 9/19/2019    Procedure: ESOPHAGOGASTRODUODENOSCOPY (EGD);  Surgeon: Jose Luis Hyman MD;  Location: [facility];  Service: Endoscopy;  Laterality: N/A;    HYSTERECTOMY      NECK SURGERY  1990    PARTIAL HYSTERECTOMY  1995       Past Psychiatric History:  Previous Medication Trials: yes, trileptal, risperdal, venlafaxine, cymbalta, and paxil   Previous Psychiatric Hospitalizations: yes , last admission was years ago per patient  Previous Suicide Attempts: no   History of Violence: no  Outpatient Psychiatrist: yes [facility] [address]    Social History:  Marital Status: single  Children: 2   Employment Status/Info: retired  Education: technical college  Special Ed: no  Housing Status: alone  History of phys/sexual abuse: no  Access to gun: yes, though currently in possession of son    Substance Abuse History:  Recreational Drugs: barbiturates and prescription drugs, though patient denies abuse,   Use of Alcohol: denied  Rehab History: no   Tobacco Use: yes  Use of OTC: denies    Legal History:  Past Charges/Incarcerations: yes, as a teenager   Pending charges: no     Family Psychiatric History:    Unknown    Psychosocial Stressors: health  Functioning Relationships: alone & isolated and good relationship with children       Hospital Course: 2/2/2022  NAOE or PRNs given. Patient denies medication side effects. Medication compliant. States that she didn't really like the Risperdal, and didn't agree with it helping her bipolar disorder. States that she doesn't feel like it was helping her depression, and asks about being on elavil or valium or ativan. States that these have helped in the past. Also states that she was able to sleep about 2-4 hours last night. Continues to deny SI, and denies that this was a SA. States that she isn't sure if the number she gave yesterday was her son's correct phone number. States that she would be willing to continue the Abilify as long as it doesn't make her too sedated. Denies SI/HI/AVH.    Attempted to call son 2x and voicemail left.         Interval History: refer to above    Family History     Problem Relation (Age of Onset)    Breast cancer Maternal Aunt    Cancer Father    Hypertension Mother    Parkinsonism Father        Tobacco Use    Smoking status: Current Every Day Smoker     Packs/day: 0.75     Years: 40.00     Pack years: 30.00     Types: Cigarettes    Smokeless tobacco: Never Used   Substance and Sexual Activity    Alcohol use: No     Comment: Alcoholism and rehab in her 20s    Drug use: No     Comment: History of prescription pill addiction, mostly benzos/opioids/muscle relaxers    Sexual activity: Not Currently     Partners: Male     Psychotherapeutics (From admission, onward)            Start     Stop Route Frequency Ordered    02/01/22 1445  ARIPiprazole tablet 5 mg         -- Oral Daily 02/01/22 1333           Review of Systems   Constitutional: Negative for appetite change, fatigue and fever.   Respiratory: Negative for cough and shortness of breath.    Cardiovascular: Negative for chest pain.   Gastrointestinal: Negative for constipation, diarrhea and  "nausea.   Musculoskeletal: Negative for back pain.   Neurological: Negative for dizziness and headaches.     Objective:     Vital Signs (Most Recent):  Temp: 98.7 °F (37.1 °C) (02/02/22 0743)  Pulse: 74 (02/02/22 1012)  Resp: 16 (02/02/22 1012)  BP: (!) 170/106 (02/02/22 0743)  SpO2: (!) 92 % (02/02/22 1012) Vital Signs (24h Range):  Temp:  [96.8 °F (36 °C)-98.9 °F (37.2 °C)] 98.7 °F (37.1 °C)  Pulse:  [] 74  Resp:  [16-20] 16  SpO2:  [90 %-92 %] 92 %  BP: (119-170)/() 170/106     Height: 5' (152.4 cm)  Weight: 49.9 kg (110 lb 0.2 oz)  Body mass index is 21.48 kg/m².      Intake/Output Summary (Last 24 hours) at 2/2/2022 1123  Last data filed at 2/2/2022 0800  Gross per 24 hour   Intake 3744.11 ml   Output --   Net 3744.11 ml       Physical Exam  Psychiatric:      Comments: Mental Status Exam:  Appearance: fair hygiene and grooming, casually dressed, NAD, appears stated age   Behavior/Cooperation: calm, cooperative, pleasant, engaged, restless  Language: fluent english  Speech: normal tone, hyperverbal, normal pitch, normal volume, talkative and interruptive  Mood: "better"  Affect: full, reactive, appropriate  Thought Process: tangential, goal-directed  Thought Content:  denies SI/HI/paranoia/delusions; no objective evidence of paranoia or delusions  Perception: denies AVH; no objective evidence of AVH   Orientation: grossly intact  Memory: intact to conversation  Attention Span/Concentration: intact to conversation  Fund of Knowledge: appropriate for education level  Insight: improving  Judgment: good          Significant Labs:   Last 24 Hours:   Recent Lab Results       02/02/22  0910   02/02/22  0905   02/02/22  0507   02/01/22  2339   02/01/22  1727        Albumin 3.5     3.0   3.0   3.1       Alkaline Phosphatase     42           ALT     11           Anion Gap 8     6   7   8       Appearance, UA   Clear                Clear             AST     22           Baso #     0.03           Basophil %     " 0.3           Bilirubin (UA)   Negative                Negative             BILIRUBIN TOTAL     0.3  Comment: For infants and newborns, interpretation of results should be based  on gestational age, weight and in agreement with clinical  observations.    Premature Infant recommended reference ranges:  Up to 24 hours.............<8.0 mg/dL  Up to 48 hours............<12.0 mg/dL  3-5 days..................<15.0 mg/dL  6-29 days.................<15.0 mg/dL             BUN 7     9   10   12       Calcium 8.7     8.0   8.2   8.0       Chloride 103     102   104   102       CO2 27     30   31   31       Color, UA   Colorless                Colorless             Creatinine 0.8     0.7   0.7   0.9       Differential Method     Automated           eGFR if  >60.0     >60.0   >60.0   >60.0       eGFR if non  >60.0  Comment: Calculation used to obtain the estimated glomerular filtration  rate (eGFR) is the CKD-EPI equation.        >60.0  Comment: Calculation used to obtain the estimated glomerular filtration  rate (eGFR) is the CKD-EPI equation.      >60.0  Comment: Calculation used to obtain the estimated glomerular filtration  rate (eGFR) is the CKD-EPI equation.      >60.0  Comment: Calculation used to obtain the estimated glomerular filtration  rate (eGFR) is the CKD-EPI equation.          Eos #     0.2           Eosinophil %     1.6           Glucose 97     88   109   145       Glucose, UA   Negative                Negative             Gran # (ANC)     5.7           Gran %     55.1           Hematocrit     32.5           Hemoglobin     10.7           Immature Grans (Abs)     0.02  Comment: Mild elevation in immature granulocytes is non specific and   can be seen in a variety of conditions including stress response,   acute inflammation, trauma and pregnancy. Correlation with other   laboratory and clinical findings is essential.             Immature Granulocytes     0.2           Ketones, UA    Negative                Negative             Leukocytes, UA   Negative                Negative             Lymph #     3.4           Lymph %     32.4           Magnesium 1.8               MCH     30.5           MCHC     32.9           MCV     93           Microscopic Comment               Mono #     1.1           Mono %     10.4           MPV     10.1           NITRITE UA   Negative                Negative             nRBC     0           Occult Blood UA   Negative                Negative             pH, UA   8.0                8.0             Phosphorus 2.4       1.8   1.1        2.4               Platelets     207           Potassium 3.6     3.6   3.7   3.5       PROTEIN TOTAL     5.4           Protein, UA   Negative  Comment: Recommend a 24 hour urine protein or a urine   protein/creatinine ratio if globulin induced proteinuria is  clinically suspected.                  Negative  Comment: Recommend a 24 hour urine protein or a urine   protein/creatinine ratio if globulin induced proteinuria is  clinically suspected.               RBC     3.51           RBC, UA               RDW     13.8           Salicylate Lvl <5.0  Comment: Toxic:  30.0 - 70.0 mg/dl  Lethal: >70.0 mg/dl           8.5  Comment: Toxic:  30.0 - 70.0 mg/dl  Lethal: >70.0 mg/dl         Sodium 138     138   142   141       Specific [place], UA   1.005                1.005             Specimen UA   Urine, Clean Catch                Urine, Clean Catch             Squam Epithel, UA               WBC, UA               WBC     10.40                            02/01/22  1518        Albumin       Alkaline Phosphatase       ALT       Anion Gap       Appearance, UA Clear       AST       Baso #       Basophil %       Bilirubin (UA) Negative       BILIRUBIN TOTAL       BUN       Calcium       Chloride       CO2       Color, UA Straw       Creatinine       Differential Method       eGFR if        eGFR if non        Eos #        Eosinophil %       Glucose       Glucose, UA Negative       Gran # (ANC)       Gran %       Hematocrit       Hemoglobin       Immature Grans (Abs)       Immature Granulocytes       Ketones, UA Negative       Leukocytes, UA Trace       Lymph #       Lymph %       Magnesium       MCH       MCHC       MCV       Microscopic Comment SEE COMMENT  Comment: Other formed elements not mentioned in the report are not   present in the microscopic examination.          Mono #       Mono %       MPV       NITRITE UA Negative       nRBC       Occult Blood UA Negative       pH, UA 8.0       Phosphorus       Platelets       Potassium       PROTEIN TOTAL       Protein, UA Negative  Comment: Recommend a 24 hour urine protein or a urine   protein/creatinine ratio if globulin induced proteinuria is  clinically suspected.         RBC       RBC, UA 2       RDW       Salicylate Lvl       Sodium       Specific Gravity, UA 1.010       Specimen UA Urine, Clean Catch       Squam Epithel, UA 3       WBC, UA 1       WBC             Significant Imaging: I have reviewed all pertinent imaging results/findings within the past 24 hours.       Scheduled Medications:   ARIPiprazole  5 mg Oral Daily    diclofenac sodium  2 g Topical (Top) BID    enoxaparin  40 mg Subcutaneous Daily    guaiFENesin  600 mg Oral BID    lisinopriL  10 mg Oral Daily    nicotine  1 patch Transdermal Daily    piperacillin-tazobactam (ZOSYN) IVPB  4.5 g Intravenous Q8H    vancomycin (VANCOCIN) IVPB  20 mg/kg Intravenous Q24H       PRN Medications:  acetaminophen, albuterol, dextrose 50%, dextrose 50%, glucagon (human recombinant), glucose, glucose, HYDROcodone-acetaminophen, hydrOXYzine pamoate, influenza, melatonin, naloxone, ondansetron, prochlorperazine, sodium chloride 0.9%    Review of patient's allergies indicates:  No Known Allergies    Assessment/Plan:     Bipolar disorder  ASSESSMENT     Rossi Mcneal is a 66 y.o. female with a past psychiatric history of  bipolar disorder, who presented to the [facility] [place] due to concern for accidental overdose. Patient endorsing both depressive and manic symptoms. Reports that she live alone and has been having worsening depression. Also with recent physical illness resulting in disruption in sleep and appetite. Current infectious disease workup negative for covid, pneumonia, and influenza. Blood cultures pending and patient was septic on arrival. Pending continued sepsis labwork. Would recommend discontinuation of risperdal and oxcarbazepine at patient has been medication noncompliant the past few months. Can continue Abilify to manage bipolar possible mixed episode symptoms. .    IMPRESSION  Bipolar 1 disorder, current episode mixed depression and robb  R/o substance use (patient with mutiple opiate and fiorcet rx)    RECOMMENDATION(S)      1. Scheduled Medication(s):  Increase Abilify to 10 mg daily to manage bipolar depression    2. PRN Medication(s):  PRN Vistaril 25 mg Q6H as needed for anxiety. Would hold currently on benzodiazepine to manage anxiety symptoms    3.  Monitor:  Please obtain daily EKG to monitor QTc    4. Legal Status/Precaution(s):  Recommend/continue PEC/CEC as patient is in imminent danger of hurting self or others and is gravely disabled due to a psychiatric illness.           Total time:  15 with greater than 50% of this time spent in counseling and/or coordination of care.       Colleen Burkett MD   Psychiatry  [facility]

## 2022-02-02 NOTE — PROGRESS NOTES
[facility] Medicine  Progress Note    Patient Name: Rossi Mcneal  MRN: [id number]  Patient Class: IP- Inpatient   Admission Date: 1/31/2022  Length of Stay: 1 days  Attending Physician: Shorty Steve MD  Primary Care Provider: Leobardo Corcoran MD        Subjective:     Principal Problem:Accidental aspirin overdose        HPI:  Ms. Mcneal is a 67 yo F w/ a hx of COPD (not on home O2), bipolar d/o and Hep C who presented to the ED for fever. History was mostly obtained from ER records given patient's acute change in mental status. She states she has been experiencing a productive cough, fever w/ Tmax 102, chills, post-nasal drip and sinus congestion for multiple days - she is unable to quantify how long symptoms have been ongoing. Denies SOB, CP, abdominal pain, increasing urinary frequency, hematuria, dysuria, foul smelling urine, nausea, vomiting, stool changes. Patient states she took OTC medications including multiple packets of BC powder to help her symptoms. Since taking the powder, she has been experiencing ringing in her ears associated w/ occasional dizziness. She smokes 1/2 PPD and denies ETOH/recreational drug use. She lives alone at home and states she does not have much social support. She has two adult sons that do not live in [place]. Patient states she is able to ambulate up and down her '18 stair' staircase without difficulty.     In the ED, BP ~190-200/100s and tachycardic. On examination patient w/ pressured speech, easy dis tractability, uncontrollable laughter and poor insight. Labs notable for WBC 14.43, acetaminophen level 20.3, salicylate level 42.6, PH 7.4 w/ CO2 28 and HCO3 20.2. CXR w/ no acute changes. EKG with no signs of widened QRS or AV block.COVID and influenza (-). Patient received IVFs, 2 x IV lorazepam 1mg, 1 x vanc & zosyn. Patient was admitted to hospital medicine for concern of ASA overdose and management.       Overview/Hospital Course:  Ms.  Fredis was admitted to hospital medicine for concerns of ASA overdose. Started on bicarb gtt. Nephrology consulted. Trending ASA levels and RFP to monitor resolution. Hospital course complicated by melanic stools and concerns for UGIB. Protonix started and GI consulted. Psychiatry consulted to help manage the bipolar. Started Abilify and titrated to 10mg with good response and subjective improvement in mind.       Interval History: NAEON. VSS and afebrile. Patient was seen sitting comfortably at the foot of the bed speaking with psychiatry team. Chaperonne is present at bedside. Patient reports feeling overexcited, chills. She also reports having dark stools and green phlegm prduction. Tinnitus resolving but still present. Her mood is improving from admission but she is concerned about dark stools. GI consulted and protonix started. Increased abilify.     Review of Systems   Constitutional: Positive for chills. Negative for fever.   HENT: Positive for tinnitus. Negative for congestion and rhinorrhea.    Eyes: Negative for pain and visual disturbance.   Respiratory: Negative for chest tightness and shortness of breath.    Cardiovascular: Negative for chest pain and palpitations.   Gastrointestinal: Positive for blood in stool. Negative for abdominal distention, abdominal pain, constipation, diarrhea, nausea and vomiting.   Genitourinary: Negative for difficulty urinating and dysuria.   Musculoskeletal: Positive for back pain and neck pain.   Skin: Negative for rash and wound.   Neurological: Negative for dizziness and headaches.   Psychiatric/Behavioral: Negative for confusion and sleep disturbance. The patient is hyperactive (improving).      Objective:     Vital Signs (Most Recent):  Temp: 98.7 °F (37.1 °C) (02/02/22 0743)  Pulse: 98 (02/02/22 1128)  Resp: 16 (02/02/22 1012)  BP: (!) 170/106 (02/02/22 0743)  SpO2: (!) 92 % (02/02/22 1012) Vital Signs (24h Range):  Temp:  [96.8 °F (36 °C)-98.9 °F (37.2 °C)] 98.7  °F (37.1 °C)  Pulse:  [] 98  Resp:  [16-20] 16  SpO2:  [90 %-92 %] 92 %  BP: (119-170)/() 170/106     Weight: 49.9 kg (110 lb 0.2 oz)  Body mass index is 21.48 kg/m².    Intake/Output Summary (Last 24 hours) at 2/2/2022 1325  Last data filed at 2/2/2022 0800  Gross per 24 hour   Intake 3507.11 ml   Output --   Net 3507.11 ml      Physical Exam  Vitals and nursing note reviewed. Exam conducted with a chaperone present.   Constitutional:       General: She is not in acute distress.     Appearance: Normal appearance. She is not ill-appearing or diaphoretic.   HENT:      Head: Normocephalic and atraumatic.      Right Ear: External ear normal.      Left Ear: External ear normal.      Nose: Nose normal. No congestion or rhinorrhea.      Mouth/Throat:      Mouth: Mucous membranes are moist.      Pharynx: Oropharynx is clear.   Eyes:      General: No scleral icterus.     Extraocular Movements: Extraocular movements intact.      Pupils: Pupils are equal, round, and reactive to light.   Cardiovascular:      Rate and Rhythm: Regular rhythm. Tachycardia present.      Pulses: Normal pulses.      Heart sounds: Normal heart sounds.   Pulmonary:      Effort: Pulmonary effort is normal.      Breath sounds: Normal breath sounds. No wheezing, rhonchi or rales.   Abdominal:      General: Bowel sounds are normal. There is no distension.      Palpations: Abdomen is soft.      Tenderness: There is no abdominal tenderness. There is no right CVA tenderness or left CVA tenderness.   Musculoskeletal:         General: Normal range of motion.      Cervical back: Normal range of motion and neck supple. No tenderness.      Right lower leg: No edema.      Left lower leg: No edema.   Lymphadenopathy:      Cervical: No cervical adenopathy.   Skin:     General: Skin is warm and dry.   Neurological:      Mental Status: She is alert and oriented to person, place, and time.   Psychiatric:         Attention and Perception: Attention normal.          Mood and Affect: Mood is anxious. Affect is labile.         Speech: Speech is rapid and pressured.         Behavior: Behavior is hyperactive. Behavior is cooperative.         Thought Content: Thought content normal.         Judgment: Judgment normal.         Significant Labs: All pertinent labs within the past 24 hours have been reviewed.    Significant Imaging: I have reviewed all pertinent imaging results/findings within the past 24 hours.      Assessment/Plan:      * Accidental aspirin overdose  67 yo F w/ a hx of COPD (not on home O2), bipolar d/o and Hep C presenting w/ fevers associated w/ productive cough, fever w/ Tmax 102, chills, post-nasal drip and sinus congestion for several days. (-) SOB, CP, abdominal pain, increasing urinary frequency, hematuria, dysuria, foul smelling urine, nausea, vomiting, stool changes. Patient took multiple packets of BC powder and PO tylenol to help sxs. Endorses tinnitus and dizziness. BP ~190-200/100s and tachycardic. No nystagmus on examination. WBC 14.43, acetaminophen level 20.3, salicylate level 42.6, PH 7.4 w/ CO2 28 and HCO3 20.2. CXR w/ no acute changes. EKG with no signs of widened QRS or AV block.COVID and influenza (-). Patient received IVFs, 2 x IV lorazepam 1mg, 1 x vanc & zosyn.    Patient was admitted to hospital medicine for concern of ASA overdose and management.     Plan:  - Reduced IV sodium bicarb in the setting of ASA overdose to alkalinize serum and urine    - cardiac monitoring to monitor for possible tachyarrhythmias   - no current indication for HD given lack of respiratory distress, KRISTINE, signs of fluid overload, severe acidemia w/ pH 7.2,  or serum salicylate >90mg/dL  - Zofran PRN for nausea  - continue to monitor salicylate levels to monitor response to therapy   - IVFs as needed         Melena  Patient reports dark stools with history of UGIB. ASA toxicity increases the risk of UGIB.    - PPI started, loaded with 80mg IV protonix, daily 40mg  "IV protonix  - GI consult  - Hold DVT ppx  - made NPO    Metabolic acidosis  Please see, "Accidental aspirin overdose"      Respiratory alkalosis    Please see, "Accidental aspirin overdose"    Tachycardia        Sepsis  - patient presenting w/ fever, chills and upper respiratory signs of infection. COVID and influenza (-).   - tachycardic on exam and labs notable for leukocytosis, thus patient meets sepsis criteria  - differentials include CAP vs UTI vs effects from ASA overdose     Plan:  - UA NGTD  - Bcx NGTD  - continue vanc and zosyn w/ plans to de-escalate w/ cultures and sensitivities or if cultures NGTD x 48 hours   - sputum culture pending       Manic episode  - patient w/ history of bipolar disorder and anxiety, last seen by o/p psychiatry 5/26/2021  - on examination, overt signs of manic episode including pressure speech, exaggerated hand gestures, excitability, racing thoughts, uncontrollable laughter  - as per last psychiatry note, patient was taking risperidone 1mg in the AM and 3mg in the PM, however unclear which dose patient is taking    Plan:  - discontinue home risperidone 1mg BID   - Abilify 10mg qd  - psychiatry consult  - PEC hold   - delirium precautions  - bedside sitter       Bipolar disorder  - see robb       Decreased strength, endurance, and mobility  - PT/OT consulted; appreciate recs       Anxiety  - see robb       COPD (chronic obstructive pulmonary disease)  - patient smoked approximately 1/2-1 PPD of cigarettes  - nicotine patch for nicotine withdrawal  - no signs of COPD exacerbation on examination - patient without wheezes and SpO2 ~95% on RA  - cont albuterol inhaler PRN  - will  on tobacco use censsation when patient is back to baseline mental status     Tobacco use  - see COPD      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)  - followed by hepatology o/p; last seen 2/2/2021  - s/p treatment w/ Mavyret w/ subsequent negative HCV RNA 12 weeks post treatment "     - Patient requesting referral to hepatology on discharge        Hypertension, essential  - patient hypertensive on admission  - continue home lisinopril       Disc disease, degenerative, cervical  - hold home alendronate 70 QD        VTE Risk Mitigation (From admission, onward)         Ordered     IP VTE HIGH RISK PATIENT  Once         02/01/22 0235     Place sequential compression device  Until discontinued         02/01/22 0235                Discharge Planning   NORA: 2/4/2022     Code Status: Full Code   Is the patient medically ready for discharge?: No    Reason for patient still in hospital (select all that apply): Patient new problem, Patient trending condition, Laboratory test, Treatment, Imaging and Consult recommendations  Discharge Plan A: [facility]                  Ellie Quezada MD  Department of Hospital Medicine   [facility]

## 2022-02-02 NOTE — ASSESSMENT & PLAN NOTE
Patient reports dark stools with history of UGIB. ASA toxicity increases the risk of UGIB.    - PPI started, loaded with 80mg IV protonix, daily 40mg IV protonix  - GI consult  - Hold DVT ppx  - made NPO

## 2022-02-02 NOTE — ASSESSMENT & PLAN NOTE
- patient presenting w/ fever, chills and upper respiratory signs of infection. COVID and influenza (-).   - tachycardic on exam and labs notable for leukocytosis, thus patient meets sepsis criteria  - differentials include CAP vs UTI vs effects from ASA overdose     Plan:  - UA NGTD  - Bcx NGTD  - continue vanc and zosyn w/ plans to de-escalate w/ cultures and sensitivities or if cultures NGTD x 48 hours   - sputum culture pending

## 2022-02-02 NOTE — HOSPITAL COURSE
"2/2/2022  NAOE or PRNs given. Patient denies medication side effects. Medication compliant. States that she didn't really like the Risperdal, and didn't agree with it helping her bipolar disorder. States that she doesn't feel like it was helping her depression, and asks about being on elavil or valium or ativan. States that these have helped in the past. Also states that she was able to sleep about 2-4 hours last night. Continues to deny SI, and denies that this was a SA. States that she isn't sure if the number she gave yesterday was her son's correct phone number. States that she would be willing to continue the Abilify as long as it doesn't make her too sedated. Denies SI/HI/AVH.    Attempted to call son 2x and voicemail left.       2/3/2022  Unable to see patient today, due to her being in an EGD procedure. Please refer to Attendings attestation for follow up evaluation. Assess patient later in the morning during rounds. States that's today has been a long day. Denies issues with the procedure the AM. States that she feels ok on the Abilify and willing to continue it. Still endorses wanting valium or ativan for her "anxiety and hyperness". States that she would be will ing follow up outpatient with her psychiatrist and medical doctors. States that she would also continue to the abilify outpatient. Reports improved sleep. Denies SI/HI/AVH.   "

## 2022-02-02 NOTE — PLAN OF CARE
Rossi Mcneal is a 66 y.o. female admitted to [facility] on 1/31/2022 for Accidental aspirin overdose. Rossi Mcneal tolerated evaluation well today. She was sitting up in bed with her sitter present upon my entry to room, both agreeable to evaluation. She is currently PEC'd with sitter present. She was very pleasant and willing to participate, looking forward to walking outside of room. Reports a history of neck and back pain, multiple surgeries. Demonstrates independence with bed mobility and transfers. Ambulates 300 ft in hallways (wearing mask) on room air with therapist supervision, no assistive device. Pt able to hold conversation while walking, no losses of balance noted, cont'd ~5/10 generalized back pain with activity. Urge to void towards end of session, demonstrated ability to get on/off toilet independently before getting back into bed, sitter present at end of session. Discussed PT role, continued mobility and recommendations (Home, no needs from PT standpoint) with patient; verbalized understanding. At this time, Rossi Mcneal has no acute PT needs, will now d/c from acute PT services.    Problem: Physical Therapy Goal  Goal: Physical Therapy Goal  Description: Pt has no acute PT needs, thus no goals created.  Outcome: Met    Familia Whitmore, PT  2/2/2022

## 2022-02-02 NOTE — SUBJECTIVE & OBJECTIVE
Interval History: refer to above    Family History     Problem Relation (Age of Onset)    Breast cancer Maternal Aunt    Cancer Father    Hypertension Mother    Parkinsonism Father        Tobacco Use    Smoking status: Current Every Day Smoker     Packs/day: 0.75     Years: 40.00     Pack years: 30.00     Types: Cigarettes    Smokeless tobacco: Never Used   Substance and Sexual Activity    Alcohol use: No     Comment: Alcoholism and rehab in her 20s    Drug use: No     Comment: History of prescription pill addiction, mostly benzos/opioids/muscle relaxers    Sexual activity: Not Currently     Partners: Male     Psychotherapeutics (From admission, onward)            Start     Stop Route Frequency Ordered    02/01/22 1445  ARIPiprazole tablet 5 mg         -- Oral Daily 02/01/22 1333           Review of Systems   Constitutional: Negative for appetite change, fatigue and fever.   Respiratory: Negative for cough and shortness of breath.    Cardiovascular: Negative for chest pain.   Gastrointestinal: Negative for constipation, diarrhea and nausea.   Musculoskeletal: Negative for back pain.   Neurological: Negative for dizziness and headaches.     Objective:     Vital Signs (Most Recent):  Temp: 98.7 °F (37.1 °C) (02/02/22 0743)  Pulse: 74 (02/02/22 1012)  Resp: 16 (02/02/22 1012)  BP: (!) 170/106 (02/02/22 0743)  SpO2: (!) 92 % (02/02/22 1012) Vital Signs (24h Range):  Temp:  [96.8 °F (36 °C)-98.9 °F (37.2 °C)] 98.7 °F (37.1 °C)  Pulse:  [] 74  Resp:  [16-20] 16  SpO2:  [90 %-92 %] 92 %  BP: (119-170)/() 170/106     Height: 5' (152.4 cm)  Weight: 49.9 kg (110 lb 0.2 oz)  Body mass index is 21.48 kg/m².      Intake/Output Summary (Last 24 hours) at 2/2/2022 1123  Last data filed at 2/2/2022 0800  Gross per 24 hour   Intake 3744.11 ml   Output --   Net 3744.11 ml       Physical Exam  Psychiatric:      Comments: Mental Status Exam:  Appearance: fair hygiene and grooming, casually dressed, NAD, appears stated  "age   Behavior/Cooperation: calm, cooperative, pleasant, engaged, restless  Language: fluent english  Speech: normal tone, hyperverbal, normal pitch, normal volume, talkative and interruptive  Mood: "better"  Affect: full, reactive, appropriate  Thought Process: tangential, goal-directed  Thought Content:  denies SI/HI/paranoia/delusions; no objective evidence of paranoia or delusions  Perception: denies AVH; no objective evidence of AVH   Orientation: grossly intact  Memory: intact to conversation  Attention Span/Concentration: intact to conversation  Fund of Knowledge: appropriate for education level  Insight: improving  Judgment: good          Significant Labs:   Last 24 Hours:   Recent Lab Results       02/02/22  0910   02/02/22  0905   02/02/22  0507   02/01/22  2339   02/01/22  1727        Albumin 3.5     3.0   3.0   3.1       Alkaline Phosphatase     42           ALT     11           Anion Gap 8     6   7   8       Appearance, UA   Clear                Clear             AST     22           Baso #     0.03           Basophil %     0.3           Bilirubin (UA)   Negative                Negative             BILIRUBIN TOTAL     0.3  Comment: For infants and newborns, interpretation of results should be based  on gestational age, weight and in agreement with clinical  observations.    Premature Infant recommended reference ranges:  Up to 24 hours.............<8.0 mg/dL  Up to 48 hours............<12.0 mg/dL  3-5 days..................<15.0 mg/dL  6-29 days.................<15.0 mg/dL             BUN 7     9   10   12       Calcium 8.7     8.0   8.2   8.0       Chloride 103     102   104   102       CO2 27     30   31   31       Color, UA   Colorless                Colorless             Creatinine 0.8     0.7   0.7   0.9       Differential Method     Automated           eGFR if  >60.0     >60.0   >60.0   >60.0       eGFR if non  >60.0  Comment: Calculation used to obtain the " estimated glomerular filtration  rate (eGFR) is the CKD-EPI equation.        >60.0  Comment: Calculation used to obtain the estimated glomerular filtration  rate (eGFR) is the CKD-EPI equation.      >60.0  Comment: Calculation used to obtain the estimated glomerular filtration  rate (eGFR) is the CKD-EPI equation.      >60.0  Comment: Calculation used to obtain the estimated glomerular filtration  rate (eGFR) is the CKD-EPI equation.          Eos #     0.2           Eosinophil %     1.6           Glucose 97     88   109   145       Glucose, UA   Negative                Negative             Gran # (ANC)     5.7           Gran %     55.1           Hematocrit     32.5           Hemoglobin     10.7           Immature Grans (Abs)     0.02  Comment: Mild elevation in immature granulocytes is non specific and   can be seen in a variety of conditions including stress response,   acute inflammation, trauma and pregnancy. Correlation with other   laboratory and clinical findings is essential.             Immature Granulocytes     0.2           Ketones, UA   Negative                Negative             Leukocytes, UA   Negative                Negative             Lymph #     3.4           Lymph %     32.4           Magnesium 1.8               MCH     30.5           MCHC     32.9           MCV     93           Microscopic Comment               Mono #     1.1           Mono %     10.4           MPV     10.1           NITRITE UA   Negative                Negative             nRBC     0           Occult Blood UA   Negative                Negative             pH, UA   8.0                8.0             Phosphorus 2.4       1.8   1.1        2.4               Platelets     207           Potassium 3.6     3.6   3.7   3.5       PROTEIN TOTAL     5.4           Protein, UA   Negative  Comment: Recommend a 24 hour urine protein or a urine   protein/creatinine ratio if globulin induced proteinuria is  clinically suspected.                   Negative  Comment: Recommend a 24 hour urine protein or a urine   protein/creatinine ratio if globulin induced proteinuria is  clinically suspected.               RBC     3.51           RBC, UA               RDW     13.8           Salicylate Lvl <5.0  Comment: Toxic:  30.0 - 70.0 mg/dl  Lethal: >70.0 mg/dl           8.5  Comment: Toxic:  30.0 - 70.0 mg/dl  Lethal: >70.0 mg/dl         Sodium 138     138   142   141       Specific [place], UA   1.005                1.005             Specimen UA   Urine, Clean Catch                Urine, Clean Catch             Squam Epithel, UA               WBC, UA               WBC     10.40                            02/01/22  1518        Albumin       Alkaline Phosphatase       ALT       Anion Gap       Appearance, UA Clear       AST       Baso #       Basophil %       Bilirubin (UA) Negative       BILIRUBIN TOTAL       BUN       Calcium       Chloride       CO2       Color, UA Straw       Creatinine       Differential Method       eGFR if        eGFR if non        Eos #       Eosinophil %       Glucose       Glucose, UA Negative       Gran # (ANC)       Gran %       Hematocrit       Hemoglobin       Immature Grans (Abs)       Immature Granulocytes       Ketones, UA Negative       Leukocytes, UA Trace       Lymph #       Lymph %       Magnesium       MCH       MCHC       MCV       Microscopic Comment SEE COMMENT  Comment: Other formed elements not mentioned in the report are not   present in the microscopic examination.          Mono #       Mono %       MPV       NITRITE UA Negative       nRBC       Occult Blood UA Negative       pH, UA 8.0       Phosphorus       Platelets       Potassium       PROTEIN TOTAL       Protein, UA Negative  Comment: Recommend a 24 hour urine protein or a urine   protein/creatinine ratio if globulin induced proteinuria is  clinically suspected.         RBC       RBC, UA 2       RDW       Salicylate Lvl       Sodium        Specific Gravity, UA 1.010       Specimen UA Urine, Clean Catch       Squam Epithel, UA 3       WBC, UA 1       WBC             Significant Imaging: I have reviewed all pertinent imaging results/findings within the past 24 hours.

## 2022-02-03 ENCOUNTER — ANESTHESIA (OUTPATIENT)
Dept: ENDOSCOPY | Facility: HOSPITAL | Age: 67
DRG: 917 | End: 2022-02-03
Payer: MEDICARE

## 2022-02-03 VITALS
SYSTOLIC BLOOD PRESSURE: 132 MMHG | HEART RATE: 100 BPM | BODY MASS INDEX: 21.6 KG/M2 | TEMPERATURE: 99 F | DIASTOLIC BLOOD PRESSURE: 99 MMHG | HEIGHT: 60 IN | WEIGHT: 110 LBS | OXYGEN SATURATION: 92 % | RESPIRATION RATE: 18 BRPM

## 2022-02-03 LAB
ALBUMIN SERPL BCP-MCNC: 3.5 G/DL (ref 3.5–5.2)
ALP SERPL-CCNC: 50 U/L (ref 55–135)
ALT SERPL W/O P-5'-P-CCNC: 15 U/L (ref 10–44)
ANION GAP SERPL CALC-SCNC: 10 MMOL/L (ref 8–16)
AST SERPL-CCNC: 24 U/L (ref 10–40)
BASOPHILS # BLD AUTO: 0.04 K/UL (ref 0–0.2)
BASOPHILS NFR BLD: 0.4 % (ref 0–1.9)
BILIRUB SERPL-MCNC: 0.4 MG/DL (ref 0.1–1)
BUN SERPL-MCNC: 5 MG/DL (ref 8–23)
CALCIUM SERPL-MCNC: 8.9 MG/DL (ref 8.7–10.5)
CHLORIDE SERPL-SCNC: 107 MMOL/L (ref 95–110)
CO2 SERPL-SCNC: 24 MMOL/L (ref 23–29)
CREAT SERPL-MCNC: 0.7 MG/DL (ref 0.5–1.4)
DIFFERENTIAL METHOD: ABNORMAL
EOSINOPHIL # BLD AUTO: 0.5 K/UL (ref 0–0.5)
EOSINOPHIL NFR BLD: 4.7 % (ref 0–8)
ERYTHROCYTE [DISTWIDTH] IN BLOOD BY AUTOMATED COUNT: 13.5 % (ref 11.5–14.5)
EST. GFR  (AFRICAN AMERICAN): >60 ML/MIN/1.73 M^2
EST. GFR  (NON AFRICAN AMERICAN): >60 ML/MIN/1.73 M^2
GLUCOSE SERPL-MCNC: 85 MG/DL (ref 70–110)
HCT VFR BLD AUTO: 42.5 % (ref 37–48.5)
HGB BLD-MCNC: 14.9 G/DL (ref 12–16)
IMM GRANULOCYTES # BLD AUTO: 0.03 K/UL (ref 0–0.04)
IMM GRANULOCYTES NFR BLD AUTO: 0.3 % (ref 0–0.5)
LYMPHOCYTES # BLD AUTO: 2.7 K/UL (ref 1–4.8)
LYMPHOCYTES NFR BLD: 27.3 % (ref 18–48)
MAGNESIUM SERPL-MCNC: 2.2 MG/DL (ref 1.6–2.6)
MCH RBC QN AUTO: 32.6 PG (ref 27–31)
MCHC RBC AUTO-ENTMCNC: 35.1 G/DL (ref 32–36)
MCV RBC AUTO: 93 FL (ref 82–98)
MONOCYTES # BLD AUTO: 1.2 K/UL (ref 0.3–1)
MONOCYTES NFR BLD: 11.9 % (ref 4–15)
NEUTROPHILS # BLD AUTO: 5.4 K/UL (ref 1.8–7.7)
NEUTROPHILS NFR BLD: 55.4 % (ref 38–73)
NRBC BLD-RTO: 0 /100 WBC
PHOSPHATE SERPL-MCNC: 3.8 MG/DL (ref 2.7–4.5)
PLATELET # BLD AUTO: 312 K/UL (ref 150–450)
PMV BLD AUTO: 9.6 FL (ref 9.2–12.9)
POTASSIUM SERPL-SCNC: 3.8 MMOL/L (ref 3.5–5.1)
PROT SERPL-MCNC: 6.4 G/DL (ref 6–8.4)
RBC # BLD AUTO: 4.57 M/UL (ref 4–5.4)
SODIUM SERPL-SCNC: 141 MMOL/L (ref 136–145)
WBC # BLD AUTO: 9.72 K/UL (ref 3.9–12.7)

## 2022-02-03 PROCEDURE — 80053 COMPREHEN METABOLIC PANEL: CPT | Mod: HCNC

## 2022-02-03 PROCEDURE — 27201012 HC FORCEPS, HOT/COLD, DISP: Mod: HCNC | Performed by: INTERNAL MEDICINE

## 2022-02-03 PROCEDURE — 25000003 PHARM REV CODE 250: Mod: HCNC | Performed by: STUDENT IN AN ORGANIZED HEALTH CARE EDUCATION/TRAINING PROGRAM

## 2022-02-03 PROCEDURE — D9220A PRA ANESTHESIA: ICD-10-PCS | Mod: HCNC,CRNA,, | Performed by: NURSE ANESTHETIST, CERTIFIED REGISTERED

## 2022-02-03 PROCEDURE — 88305 TISSUE EXAM BY PATHOLOGIST: ICD-10-PCS | Mod: 26,HCNC,, | Performed by: PATHOLOGY

## 2022-02-03 PROCEDURE — D9220A PRA ANESTHESIA: Mod: HCNC,ANES,, | Performed by: ANESTHESIOLOGY

## 2022-02-03 PROCEDURE — 36415 COLL VENOUS BLD VENIPUNCTURE: CPT | Mod: HCNC

## 2022-02-03 PROCEDURE — D9220A PRA ANESTHESIA: ICD-10-PCS | Mod: HCNC,ANES,, | Performed by: ANESTHESIOLOGY

## 2022-02-03 PROCEDURE — 25000003 PHARM REV CODE 250: Mod: HCNC | Performed by: NURSE ANESTHETIST, CERTIFIED REGISTERED

## 2022-02-03 PROCEDURE — 83735 ASSAY OF MAGNESIUM: CPT | Mod: HCNC | Performed by: STUDENT IN AN ORGANIZED HEALTH CARE EDUCATION/TRAINING PROGRAM

## 2022-02-03 PROCEDURE — 99232 PR SUBSEQUENT HOSPITAL CARE,LEVL II: ICD-10-PCS | Mod: HCNC,,, | Performed by: PSYCHIATRY & NEUROLOGY

## 2022-02-03 PROCEDURE — 85025 COMPLETE CBC W/AUTO DIFF WBC: CPT | Mod: HCNC | Performed by: STUDENT IN AN ORGANIZED HEALTH CARE EDUCATION/TRAINING PROGRAM

## 2022-02-03 PROCEDURE — 99232 SBSQ HOSP IP/OBS MODERATE 35: CPT | Mod: HCNC,,, | Performed by: PSYCHIATRY & NEUROLOGY

## 2022-02-03 PROCEDURE — D9220A PRA ANESTHESIA: Mod: HCNC,CRNA,, | Performed by: NURSE ANESTHETIST, CERTIFIED REGISTERED

## 2022-02-03 PROCEDURE — 36415 COLL VENOUS BLD VENIPUNCTURE: CPT | Mod: HCNC | Performed by: STUDENT IN AN ORGANIZED HEALTH CARE EDUCATION/TRAINING PROGRAM

## 2022-02-03 PROCEDURE — 25000003 PHARM REV CODE 250: Mod: HCNC

## 2022-02-03 PROCEDURE — 63600175 PHARM REV CODE 636 W HCPCS: Mod: HCNC

## 2022-02-03 PROCEDURE — 43239 PR EGD, FLEX, W/BIOPSY, SGL/MULTI: ICD-10-PCS | Mod: HCNC,,, | Performed by: INTERNAL MEDICINE

## 2022-02-03 PROCEDURE — 63600175 PHARM REV CODE 636 W HCPCS: Mod: HCNC | Performed by: NURSE ANESTHETIST, CERTIFIED REGISTERED

## 2022-02-03 PROCEDURE — 43239 EGD BIOPSY SINGLE/MULTIPLE: CPT | Mod: HCNC,,, | Performed by: INTERNAL MEDICINE

## 2022-02-03 PROCEDURE — 99239 PR HOSPITAL DISCHARGE DAY,>30 MIN: ICD-10-PCS | Mod: HCNC,,, | Performed by: HOSPITALIST

## 2022-02-03 PROCEDURE — 43239 EGD BIOPSY SINGLE/MULTIPLE: CPT | Mod: HCNC | Performed by: INTERNAL MEDICINE

## 2022-02-03 PROCEDURE — 88305 TISSUE EXAM BY PATHOLOGIST: CPT | Mod: HCNC | Performed by: PATHOLOGY

## 2022-02-03 PROCEDURE — 37000008 HC ANESTHESIA 1ST 15 MINUTES: Mod: HCNC | Performed by: INTERNAL MEDICINE

## 2022-02-03 PROCEDURE — 88305 TISSUE EXAM BY PATHOLOGIST: CPT | Mod: 26,HCNC,, | Performed by: PATHOLOGY

## 2022-02-03 PROCEDURE — 84100 ASSAY OF PHOSPHORUS: CPT | Mod: HCNC | Performed by: STUDENT IN AN ORGANIZED HEALTH CARE EDUCATION/TRAINING PROGRAM

## 2022-02-03 PROCEDURE — 99239 HOSP IP/OBS DSCHRG MGMT >30: CPT | Mod: HCNC,,, | Performed by: HOSPITALIST

## 2022-02-03 PROCEDURE — 37000009 HC ANESTHESIA EA ADD 15 MINS: Mod: HCNC | Performed by: INTERNAL MEDICINE

## 2022-02-03 RX ORDER — PANTOPRAZOLE SODIUM 40 MG/1
40 TABLET, DELAYED RELEASE ORAL DAILY
Status: DISCONTINUED | OUTPATIENT
Start: 2022-02-03 | End: 2022-02-03 | Stop reason: HOSPADM

## 2022-02-03 RX ORDER — B-COMPLEX WITH VITAMIN C
1 TABLET ORAL DAILY
Qty: 30 TABLET | Refills: 11 | Status: SHIPPED | OUTPATIENT
Start: 2022-02-04

## 2022-02-03 RX ORDER — BUTALBITAL, ACETAMINOPHEN AND CAFFEINE 50; 325; 40 MG/1; MG/1; MG/1
1 TABLET ORAL EVERY 4 HOURS PRN
Qty: 30 TABLET | Refills: 0 | Status: SHIPPED | OUTPATIENT
Start: 2022-02-03 | End: 2022-02-14

## 2022-02-03 RX ORDER — PROPOFOL 10 MG/ML
VIAL (ML) INTRAVENOUS
Status: DISCONTINUED | OUTPATIENT
Start: 2022-02-03 | End: 2022-02-03

## 2022-02-03 RX ORDER — ARIPIPRAZOLE 10 MG/1
10 TABLET ORAL DAILY
Qty: 30 TABLET | Refills: 11 | Status: SHIPPED | OUTPATIENT
Start: 2022-02-04 | End: 2023-02-04

## 2022-02-03 RX ORDER — LIDOCAINE HYDROCHLORIDE 20 MG/ML
INJECTION, SOLUTION EPIDURAL; INFILTRATION; INTRACAUDAL; PERINEURAL
Status: DISCONTINUED | OUTPATIENT
Start: 2022-02-03 | End: 2022-02-03

## 2022-02-03 RX ORDER — SODIUM CHLORIDE 0.9 % (FLUSH) 0.9 %
10 SYRINGE (ML) INJECTION
Status: DISCONTINUED | OUTPATIENT
Start: 2022-02-03 | End: 2022-02-03 | Stop reason: HOSPADM

## 2022-02-03 RX ORDER — GABAPENTIN 300 MG/1
300 CAPSULE ORAL 3 TIMES DAILY
Qty: 90 CAPSULE | Refills: 11 | Status: SHIPPED | OUTPATIENT
Start: 2022-02-03 | End: 2023-02-03

## 2022-02-03 RX ORDER — DICLOFENAC SODIUM 10 MG/G
2 GEL TOPICAL 2 TIMES DAILY
Qty: 100 G | Refills: 0 | Status: SHIPPED | OUTPATIENT
Start: 2022-02-03

## 2022-02-03 RX ORDER — PANTOPRAZOLE SODIUM 40 MG/1
40 TABLET, DELAYED RELEASE ORAL DAILY
Qty: 30 TABLET | Refills: 2 | Status: SHIPPED | OUTPATIENT
Start: 2022-02-03 | End: 2022-09-21

## 2022-02-03 RX ORDER — PROPOFOL 10 MG/ML
VIAL (ML) INTRAVENOUS CONTINUOUS PRN
Status: DISCONTINUED | OUTPATIENT
Start: 2022-02-03 | End: 2022-02-03

## 2022-02-03 RX ADMIN — PANTOPRAZOLE SODIUM 40 MG: 40 TABLET, DELAYED RELEASE ORAL at 11:02

## 2022-02-03 RX ADMIN — GABAPENTIN 300 MG: 300 CAPSULE ORAL at 02:02

## 2022-02-03 RX ADMIN — OXYCODONE 5 MG: 5 TABLET ORAL at 11:02

## 2022-02-03 RX ADMIN — Medication 150 MCG/KG/MIN: at 10:02

## 2022-02-03 RX ADMIN — PROPOFOL 100 MG: 10 INJECTION, EMULSION INTRAVENOUS at 10:02

## 2022-02-03 RX ADMIN — PIPERACILLIN AND TAZOBACTAM 4.5 G: 4; .5 INJECTION, POWDER, LYOPHILIZED, FOR SOLUTION INTRAVENOUS; PARENTERAL at 01:02

## 2022-02-03 RX ADMIN — OXYCODONE HYDROCHLORIDE 10 MG: 10 TABLET ORAL at 07:02

## 2022-02-03 RX ADMIN — SODIUM CHLORIDE: 0.9 INJECTION, SOLUTION INTRAVENOUS at 09:02

## 2022-02-03 RX ADMIN — LIDOCAINE HYDROCHLORIDE 100 MG: 20 INJECTION, SOLUTION EPIDURAL; INFILTRATION; INTRACAUDAL at 10:02

## 2022-02-03 NOTE — SUBJECTIVE & OBJECTIVE
Interval History: refer to above    Family History     Problem Relation (Age of Onset)    Breast cancer Maternal Aunt    Cancer Father    Hypertension Mother    Parkinsonism Father        Tobacco Use    Smoking status: Current Every Day Smoker     Packs/day: 0.75     Years: 40.00     Pack years: 30.00     Types: Cigarettes    Smokeless tobacco: Never Used   Substance and Sexual Activity    Alcohol use: No     Comment: Alcoholism and rehab in her 20s    Drug use: No     Comment: History of prescription pill addiction, mostly benzos/opioids/muscle relaxers    Sexual activity: Not Currently     Partners: Male     Psychotherapeutics (From admission, onward)            Start     Stop Route Frequency Ordered    02/03/22 0900  ARIPiprazole tablet 10 mg         -- Oral Daily 02/02/22 1251           Review of Systems   Constitutional: Negative for appetite change, fatigue and fever.   Respiratory: Negative for cough and shortness of breath.    Cardiovascular: Negative for chest pain.   Gastrointestinal: Negative for constipation, diarrhea and nausea.   Musculoskeletal: Negative for back pain.   Neurological: Negative for dizziness and headaches.     Objective:     Vital Signs (Most Recent):  Temp: 98.1 °F (36.7 °C) (02/03/22 0944)  Pulse: 93 (02/03/22 1203)  Resp: 15 (02/03/22 1125)  BP: (!) 139/90 (02/03/22 1111)  SpO2: (!) 91 % (02/03/22 1111) Vital Signs (24h Range):  Temp:  [96.7 °F (35.9 °C)-98.9 °F (37.2 °C)] 98.1 °F (36.7 °C)  Pulse:  [] 93  Resp:  [15-21] 15  SpO2:  [90 %-96 %] 91 %  BP: (104-183)/() 139/90     Height: 5' (152.4 cm)  Weight: 49.9 kg (110 lb)  Body mass index is 21.48 kg/m².      Intake/Output Summary (Last 24 hours) at 2/3/2022 1317  Last data filed at 2/3/2022 1015  Gross per 24 hour   Intake 1500 ml   Output --   Net 1500 ml       Physical Exam  Psychiatric:      Comments: Mental Status Exam:  Appearance: fair hygiene and grooming, casually dressed, NAD, appears stated age  "  Behavior/Cooperation: calm, cooperative, pleasant, restless  Language: fluent english  Speech: normal tone, hyperverbal, normal pitch, normal volume, talkative and interruptive  Mood: "better"  Affect: full, reactive, appropriate  Thought Process: linear, goal-directed  Thought Content:  denies SI/HI/paranoia/delusions; no objective evidence of paranoia or delusions  Perception: denies AVH; no objective evidence of AVH   Orientation: grossly intact  Memory: intact to conversation  Attention Span/Concentration: intact to conversation  Fund of Knowledge: appropriate for education level  Insight: improving  Judgment: good          Significant Labs:   Last 24 Hours:   Recent Lab Results       02/03/22  0833   02/03/22  0415   02/02/22 2257   02/02/22 2026 02/02/22 2010        Albumin   3.5             Alkaline Phosphatase   50             ALT   15             Anion Gap   10             AST   24             Baso # 0.04         0.06       Basophil % 0.4         0.5       BILIRUBIN TOTAL   0.4  Comment: For infants and newborns, interpretation of results should be based  on gestational age, weight and in agreement with clinical  observations.    Premature Infant recommended reference ranges:  Up to 24 hours.............<8.0 mg/dL  Up to 48 hours............<12.0 mg/dL  3-5 days..................<15.0 mg/dL  6-29 days.................<15.0 mg/dL               BUN   5             Calcium   8.9             Chloride   107             CO2   24             Creatinine   0.7             Differential Method Automated         Automated       eGFR if    >60.0             eGFR if non    >60.0  Comment: Calculation used to obtain the estimated glomerular filtration  rate (eGFR) is the CKD-EPI equation.                Eos # 0.5         0.3       Eosinophil % 4.7         2.3       Glucose   85             Gran # (ANC) 5.4         6.3       Gran % 55.4         53.9       Hematocrit 42.5         " 39.7       Hemoglobin 14.9         13.6       Immature Grans (Abs) 0.03  Comment: Mild elevation in immature granulocytes is non specific and   can be seen in a variety of conditions including stress response,   acute inflammation, trauma and pregnancy. Correlation with other   laboratory and clinical findings is essential.           0.04  Comment: Mild elevation in immature granulocytes is non specific and   can be seen in a variety of conditions including stress response,   acute inflammation, trauma and pregnancy. Correlation with other   laboratory and clinical findings is essential.         Immature Granulocytes 0.3         0.3       Lymph # 2.7         3.7       Lymph % 27.3         31.8       Magnesium   2.2             MCH 32.6         31.9       MCHC 35.1         34.3       MCV 93         93       Mono # 1.2         1.3       Mono % 11.9         11.2       MPV 9.6         9.9       nRBC 0         0       Phosphorus   3.8             Platelets 312         275       POCT Glucose       78         Potassium   3.8             PROTEIN TOTAL   6.4             RBC 4.57         4.27       RDW 13.5         13.6       Sodium   141             Vancomycin-Trough     4.8           WBC 9.72         11.68                            Significant Imaging: I have reviewed all pertinent imaging results/findings within the past 24 hours.

## 2022-02-03 NOTE — DISCHARGE SUMMARY
[facility] Hospital Medicine  Discharge Summary      Patient Name: Rossi Mcneal  MRN: [id number]  Patient Class: IP- Inpatient  Admission Date: 1/31/2022  Hospital Length of Stay: 2 days  Discharge Date and Time:  02/03/2022 2:02 PM  Attending Physician: Torri Caballero MD   Discharging Provider: Destiny Becerra MD  Primary Care Provider: Leobardo Corcoran MD  [facility] Medicine Team: [facility] [place] Destiny Becerra MD    HPI:   Ms. Mcneal is a 65 yo F w/ a hx of COPD (not on home O2), bipolar d/o and Hep C who presented to the ED for fever. History was mostly obtained from ER records given patient's acute change in mental status. She states she has been experiencing a productive cough, fever w/ Tmax 102, chills, post-nasal drip and sinus congestion for multiple days - she is unable to quantify how long symptoms have been ongoing. Denies SOB, CP, abdominal pain, increasing urinary frequency, hematuria, dysuria, foul smelling urine, nausea, vomiting, stool changes. Patient states she took OTC medications including multiple packets of BC powder to help her symptoms. Since taking the powder, she has been experiencing ringing in her ears associated w/ occasional dizziness. She smokes 1/2 PPD and denies ETOH/recreational drug use. She lives alone at home and states she does not have much social support. She has two adult sons that do not live in [place]. Patient states she is able to ambulate up and down her '18 stair' staircase without difficulty.     In the ED, BP ~190-200/100s and tachycardic. On examination patient w/ pressured speech, easy dis tractability, uncontrollable laughter and poor insight. Labs notable for WBC 14.43, acetaminophen level 20.3, salicylate level 42.6, PH 7.4 w/ CO2 28 and HCO3 20.2. CXR w/ no acute changes. EKG with no signs of widened QRS or AV block.COVID and influenza (-). Patient received IVFs, 2 x IV lorazepam 1mg, 1 x vanc & zosyn. Patient was admitted to hospital medicine for  "concern of ASA overdose and management.       Procedure(s) (LRB):  EGD (ESOPHAGOGASTRODUODENOSCOPY) (N/A)      Hospital Course:   Ms. Mcneal was admitted to hospital medicine for concerns of ASA overdose. Psych was consulted and patient put on PEC. Started on bicarb gtt. Nephrology consulted. Trending ASA levels, RFP, and U/A to monitor resolution, resolved afternoon of 2/2. Hospital course complicated by melanic stools and concerns for UGIB. Protonix started and GI consulted. EGD showed gastritis. Psychiatry following and patient started Abilify and titrated to 10mg with good response and subjective improvement in mind, PEC removed. Gabapentin 300mg TID started as well for neuropathic pain. Patient medically ready for discharge on 2/3 on abilify 10, protonix daily, and close follow up with Psych.       Goals of Care Treatment Preferences:  Code Status: Full Code      Consults:   Consults (From admission, onward)        Status Ordering Provider     Inpatient consult to Gastroenterology  Once        Provider:  (Not yet assigned)    Completed DEISI CURRY     Inpatient consult to Nephrology  Once        Provider:  (Not yet assigned)    Completed REBEKA GARCIA     Pharmacy to dose Vancomycin consult  Once        Provider:  (Not yet assigned)   "And" Linked Group Details    Completed REBEKA GARCIA     Inpatient consult to Psychiatry  Once        Provider:  (Not yet assigned)    Completed REBEKA GARCIA        Vitals:    02/03/22 1203   BP:    Pulse: 93   Resp:    Temp:      Physical Exam  Vitals and nursing note reviewed.   Constitutional:       General: She is not in acute distress.     Appearance: Normal appearance.   HENT:      Mouth/Throat:      Mouth: Mucous membranes are moist.      Pharynx: Oropharynx is clear.   Eyes:      Extraocular Movements: Extraocular movements intact.      Conjunctiva/sclera: Conjunctivae normal.      Pupils: Pupils are equal, round, and reactive to light.   Cardiovascular:     "  Rate and Rhythm: Normal rate and regular rhythm.      Heart sounds: No murmur heard.      Pulmonary:      Effort: Pulmonary effort is normal. No respiratory distress.      Breath sounds: Normal breath sounds. No rales.   Abdominal:      General: Abdomen is flat. There is no distension.      Palpations: Abdomen is soft.      Tenderness: There is no abdominal tenderness.   Musculoskeletal:         General: Normal range of motion.      Cervical back: Normal range of motion and neck supple.      Right lower leg: No edema.      Left lower leg: No edema.   Skin:     General: Skin is warm and dry.   Neurological:      General: No focal deficit present.      Mental Status: She is alert and oriented to person, place, and time.      Cranial Nerves: No cranial nerve deficit.      Motor: No weakness.   Psychiatric:         Mood and Affect: Mood normal.         Behavior: Behavior normal.         Thought Content: Thought content normal.      Comments: A bit of robb and anxiety but much improved throughout hospitalization         Final Active Diagnoses:    Diagnosis Date Noted POA    PRINCIPAL PROBLEM:  Accidental aspirin overdose [T39.011A] 02/01/2022 Yes    Melena [K92.1] 02/02/2022 No    Bipolar disorder [F31.9] 02/01/2022 Yes    Manic episode [F30.9] 02/01/2022 Yes    Sepsis [A41.9] 02/01/2022 Yes    Tachycardia [R00.0]  Yes    Respiratory alkalosis [E87.3]  Yes    Metabolic acidosis [E87.2]  Yes    Decreased strength, endurance, and mobility [R53.1, Z74.09, R68.89] 07/27/2018 Yes    COPD (chronic obstructive pulmonary disease) [J44.9] 02/19/2018 Yes     Chronic    Anxiety [F41.9] 02/19/2018 Yes    Tobacco use [Z72.0] 10/20/2017 Yes    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) [K74.69, B19.20] 08/10/2017 Yes    Hypertension, essential [I10] 03/03/2016 Yes     Chronic    Disc disease, degenerative, cervical [M50.30] 12/02/2015 Yes      Problems Resolved During this Admission:       Discharged  Condition: good    Disposition: Home or Self Care    Follow Up:    Patient Instructions:      Ambulatory referral/consult to Outpatient Case Management   Referral Priority: Routine Referral Type: Consultation   Referral Reason: Specialty Services Required   Number of Visits Requested: 1     Ambulatory referral/consult to Hepatitis C Clinic   Standing Status: Future   Referral Priority: Routine Referral Type: Consultation     Ambulatory referral/consult to Hepatology   Standing Status: Future   Referral Priority: Routine Referral Type: Consultation   Referral Reason: Specialty Services Required   Requested Specialty: Hepatology   Number of Visits Requested: 1       Significant Diagnostic Studies: Labs:   CMP   Recent Labs   Lab 02/02/22  0507 02/02/22 0910 02/03/22  0415    138 141   K 3.6 3.6 3.8    103 107   CO2 30* 27 24   GLU 88 97 85   BUN 9 7* 5*   CREATININE 0.7 0.8 0.7   CALCIUM 8.0* 8.7 8.9   PROT 5.4*  --  6.4   ALBUMIN 3.0* 3.5 3.5   BILITOT 0.3  --  0.4   ALKPHOS 42*  --  50*   AST 22  --  24   ALT 11  --  15   ANIONGAP 6* 8 10   ESTGFRAFRICA >60.0 >60.0 >60.0   EGFRNONAA >60.0 >60.0 >60.0    and CBC   Recent Labs   Lab 02/02/22  0507 02/02/22  0507 02/02/22 2010 02/02/22 2010 02/03/22  0833   WBC 10.40  --  11.68  --  9.72   HGB 10.7*  --  13.6  --  14.9   HCT 32.5*   < > 39.7   < > 42.5     --  275  --  312    < > = values in this interval not displayed.     Endoscopy: Gastroscopy: gastritis    Pending Diagnostic Studies:     Procedure Component Value Units Date/Time    Specimen to Pathology, Surgery Gastrointestinal tract [[id number]] Collected: 02/03/22 1015    Order Status: Sent Lab Status: In process Updated: 02/03/22 1015    Urinalysis [[id number]] Collected: 02/02/22 0905    Order Status: Sent Lab Status: In process Updated: 02/02/22 1053    Specimen: Urine          Medications:  Reconciled Home Medications:      Medication List      START taking these medications     ARIPiprazole 10 MG Tab  Commonly known as: ABILIFY  Take 1 tablet (10 mg total) by mouth once daily.  Start taking on: February 4, 2022     B-complex with vitamin C tablet  Commonly known as: Z-Bec or Equiv  Take 1 tablet by mouth once daily.  Start taking on: February 4, 2022     diclofenac sodium 1 % Gel  Commonly known as: VOLTAREN  Apply 2 g topically 2 (two) times daily.     gabapentin 300 MG capsule  Commonly known as: NEURONTIN  Take 1 capsule (300 mg total) by mouth 3 (three) times daily.     pantoprazole 40 MG tablet  Commonly known as: PROTONIX  Take 1 tablet (40 mg total) by mouth once daily. Please administer with water 45-60 minutes before solid food intake.        CHANGE how you take these medications    butalbital-acetaminophen-caffeine -40 mg -40 mg per tablet  Commonly known as: FIORICET, ESGIC  Take 1 tablet by mouth every 4 (four) hours as needed for Pain.  What changed:   · how much to take  · how to take this  · when to take this  · reasons to take this  · additional instructions        CONTINUE taking these medications    albuterol 90 mcg/actuation inhaler  Commonly known as: VENTOLIN HFA  inhale TWO puffs INTO THE LUNGS EVERY 6 HOURS AS NEEDED FOR WHEEZING     fluticasone propionate 50 mcg/actuation nasal spray  Commonly known as: FLONASE  2 sprays (100 mcg total) by Each Nostril route once daily.     HYDROcodone-acetaminophen 5-325 mg per tablet  Commonly known as: NORCO  Take 1 tablet by mouth 2 (two) times daily as needed for Pain.            Indwelling Lines/Drains at time of discharge:   Lines/Drains/Airways     None                 Time spent on the discharge of patient: 35 minutes         Destiny Becerra MD  Department of Hospital Medicine  [facility] Surg

## 2022-02-03 NOTE — PLAN OF CARE
Pt is AAOx4. Remain PEC, sitter at the bedside. NPO at this time due to a procedure.  Pt remain free from fall and injuries. VS remain stable. Questions and concerns voiced and answered. Medication compliance. Call light in reach. Bed in low locked position. Side rails x2. Belongings at bedside.

## 2022-02-03 NOTE — TRANSFER OF CARE
Anesthesia Transfer of Care Note    Patient: Rossi Mcneal    Procedure(s) Performed: Procedure(s) (LRB):  EGD (ESOPHAGOGASTRODUODENOSCOPY) (N/A)    Patient location: PACU    Anesthesia Type: general    Transport from OR: Transported from OR on 6-10 L/min O2 by face mask with adequate spontaneous ventilation    Post pain: adequate analgesia    Post assessment: tolerated procedure well and no apparent anesthetic complications    Post vital signs: stable    Level of consciousness: comatose, awake and alert    Nausea/Vomiting: no nausea/vomiting    Complications: none    Transfer of care protocol was followed      Last vitals:   Visit Vitals  BP (!) 143/100   Pulse 85   Temp 36.7 °C (98.1 °F)   Resp 17   Ht 5' (1.524 m)   Wt 49.9 kg (110 lb)   SpO2 96%   Breastfeeding No   BMI 21.48 kg/m²

## 2022-02-03 NOTE — ASSESSMENT & PLAN NOTE
ASSESSMENT     Rossi Mcneal is a 66 y.o. female with a past psychiatric history of bipolar disorder, who presented to the [facility] due to concern for accidental overdose. Patient endorsing both depressive and manic symptoms. Reports that she live alone and has been having worsening depression. Also with recent physical illness resulting in disruption in sleep and appetite. Current infectious disease workup negative for covid, pneumonia, and influenza. Blood cultures pending and patient was septic on arrival. Pending continued sepsis labwork. Would recommend discontinuation of risperdal and oxcarbazepine at patient has been medication noncompliant the past few months. Can continue Abilify to manage bipolar possible mixed episode symptoms. Patient on later interview, with improvement in her hypomania. Patient is future oriented, linear, and cooperative with staff. Willing to continue abilify and follow up with her outpatient provider. Patient no longer a danger to herself or other.s     IMPRESSION  Bipolar 1 disorder, current episode mixed depression and robb  R/o substance use (patient with mutiple opiate and fiorcet rx)    RECOMMENDATION(S)      1. Scheduled Medication(s):  Continue Abilify 10 mg daily to manage bipolar depression  Continue Gabapentin 300 mg TID    2. PRN Medication(s):  PRN Vistaril 25 mg Q6H as needed for anxiety. Would hold currently on benzodiazepine to manage anxiety symptoms    3.  Monitor:  Please obtain daily EKG to monitor QTc    4. Legal Status/Precaution(s):  Recommend to discontinue PEC/CEC as patient is no longer in imminent danger of hurting self or others and is no longer gravely disabled due to a psychiatric illness.

## 2022-02-03 NOTE — ANESTHESIA POSTPROCEDURE EVALUATION
Anesthesia Post Evaluation    Patient: Rossi Mcneal    Procedure(s) Performed: Procedure(s) (LRB):  EGD (ESOPHAGOGASTRODUODENOSCOPY) (N/A)    Final Anesthesia Type: general (Natural airway)      Patient location during evaluation: PACU  Patient participation: Yes- Able to Participate  Level of consciousness: awake and alert  Post-procedure vital signs: reviewed and stable  Pain management: adequate  Airway patency: patent    PONV status at discharge: No PONV  Anesthetic complications: no      Cardiovascular status: hemodynamically stable  Respiratory status: unassisted  Hydration status: euvolemic  Follow-up not needed.          Vitals Value Taken Time   /90 02/03/22 1111   Temp  02/03/22 1156   Pulse 71 02/03/22 1112   Resp 15 02/03/22 1125   SpO2 91 % 02/03/22 1112   Vitals shown include unvalidated device data.      Event Time   Out of Recovery 11:11:00         Pain/Chip Score: Pain Rating Prior to Med Admin: 5 (2/3/2022 11:25 AM)  Pain Rating Post Med Admin: 4 (2/3/2022  8:27 AM)  Chip Score: 9 (2/3/2022 10:45 AM)

## 2022-02-03 NOTE — NURSING TRANSFER
Nursing Transfer Note      2/3/2022     Transfer To: 646    Transfer via stretcher    Transfer with cardiac monitoring tele box [id number]    Transported by pt. transport    Chart send with patient: Yes    Patient reassessed at: 1022

## 2022-02-03 NOTE — TREATMENT PLAN
Brief GI treatment plan    EGD performed today.  Findings no acute findings or active GI bleeding.  Please see full endoscopy report for details.    Recommendations:    -continue to trend Hgb while inpatient  - avoid NSIADs  - transfuse Hgb <7      GI will sign off at this time.  Please call questions.    Rigoberto Ayala MD  GI Fellow

## 2022-02-03 NOTE — PROGRESS NOTES
"[facility] -   Psychiatry  Progress Note    Patient Name: Rossi Mcneal  MRN: [id number]   Code Status: Full Code  Admission Date: 1/31/2022  Hospital Length of Stay: 2 days  Expected Discharge Date: 2/4/2022  Attending Physician: Torri Caballero MD  Primary Care Provider: Leobardo Corcoran MD    Current Legal Status: Physician's Emergency Certificate (PEC)    Patient information was obtained from patient and ER records.       Subjective:     Patient is a 66 y.o., female, presents with:    Principal Problem:Accidental aspirin overdose    Chief Complaint: robb    HPI:   Rossi Mcneal is a 66 y.o. female with a past psychiatric history of Bipolar 1 disorder who presented to [facility] [place] due to Accidental aspirin overdose. Psychiatry was consulted for "concern for accident overdose".    Per Primary Team:  Ms. Mcneal is a 65 yo F w/ a hx of COPD (not on home O2), bipolar d/o and Hep C who presented to the ED for fever. History was mostly obtained from ER records given patient's acute change in mental status. She states she has been experiencing a productive cough, fever w/ Tmax 102, chills, post-nasal drip and sinus congestion for multiple days - she is unable to quantify how long symptoms have been ongoing. Denies SOB, CP, abdominal pain, increasing urinary frequency, hematuria, dysuria, foul smelling urine, nausea, vomiting, stool changes. Patient states she took OTC medications including multiple packets of BC powder to help her symptoms. Since taking the powder, she has been experiencing ringing in her ears associated w/ occasional dizziness. She smokes 1/2 PPD and denies ETOH/recreational drug use. She lives alone at home and states she does not have much social support. She has two adult sons that do not live in [place]. Patient states she is able to ambulate up and down her '18 stair' staircase without difficulty.      In the ED, BP ~190-200/100s and tachycardic. On examination patient w/ pressured speech, " easy dis tractability, uncontrollable laughter and poor insight. Labs notable for WBC 14.43, acetaminophen level 20.3, salicylate level 42.6, PH 7.4 w/ CO2 28 and HCO3 20.2. CXR w/ no acute changes. EKG with no signs of widened QRS or AV block.COVID and influenza (-). Patient received IVFs, 2 x IV lorazepam 1mg, 1 x vanc & zosyn. Patient was admitted to hospital medicine for concern of ASA overdose and management.     Per Psychiatry:  Patient was calm and cooperative with interview. States that she came in cause she's been feeling bad the past few weeks. Reports headache and congestion, in addition to sore throat. States that she also has not been sleeping the past few days due to her headache being poor. Also feels that her thoughts have been racing. Mostly endorses depressive symptoms over the past few weeks of anhedonia, poor self esteem, poor concentration and motivation. Reports passive SI, but denies current plan. Denies previous hx of SA or self harming behavior. Reports that last psychiatric hospitalization was years ago. States that she stopped taking the risperdal that she was prescribed due to it not helping with her depressive symptoms. And states that she hasn't seen her psychiatrist in months. Denies that the medications she took was an attempt to end her life. States that she was just trying to manage her headaches, and was taking tylenol, BC powder, and aleve every few hours to help. States that she also takes firocet for headaches. Reports hx of manic episodes in the past. Denies hx of paranoia/AVH. Denies SI/HI. States that she's willing to try new medications to help with her depressive symptoms, and feels that paxil has helped in the past.     Collateral:   Layo (son) - [phone]  Attempted to call @ 12:56 pm no answer and voicemail left.       Medical Review of Systems:  A comprehensive review of systems was negative except for: poor sleep and headache    Psychiatric Review of Systems-is  patient experiencing or having changes in  sleep: yes  appetite: no  weight: no  energy/anergy: yes  interest/pleasure/anhedonia: yes  somatic symptoms: no  libido: no  anxiety/panic: no  guilty/hopelessness: yes  concentration: no  S.I.B.s/risky behavior: no  any drugs: no  alcohol: no     Allergies:  Patient has no known allergies.    Past Medical/Surgical History:  Past Medical History:   Diagnosis Date    Addiction to drug     History of prescription pill addiction    Bipolar 1 disorder     Emphysema     Hepatitis C     History of psychiatric hospitalization     Hx of psychiatric care     Hypertension     Psychiatric problem     Scoliosis     Therapy      Past Surgical History:   Procedure Laterality Date    ESOPHAGOGASTRODUODENOSCOPY N/A 9/19/2019    Procedure: ESOPHAGOGASTRODUODENOSCOPY (EGD);  Surgeon: Jose Luis Hyman MD;  Location: [facility];  Service: Endoscopy;  Laterality: N/A;    HYSTERECTOMY      NECK SURGERY  1990    PARTIAL HYSTERECTOMY  1995       Past Psychiatric History:  Previous Medication Trials: yes, trileptal, risperdal, venlafaxine, cymbalta, and paxil   Previous Psychiatric Hospitalizations: yes , last admission was years ago per patient  Previous Suicide Attempts: no   History of Violence: no  Outpatient Psychiatrist: yes [facility]    Social History:  Marital Status: single  Children: 2   Employment Status/Info: retired  Education: technical college  Special Ed: no  Housing Status: alone  History of phys/sexual abuse: no  Access to gun: yes, though currently in possession of son    Substance Abuse History:  Recreational Drugs: barbiturates and prescription drugs, though patient denies abuse,   Use of Alcohol: denied  Rehab History: no   Tobacco Use: yes  Use of OTC: denies    Legal History:  Past Charges/Incarcerations: yes, as a teenager   Pending charges: no     Family Psychiatric History:   Unknown    Psychosocial Stressors: health  Functioning  "Relationships: alone & isolated and good relationship with children       Hospital Course: 2/2/2022  NAOE or PRNs given. Patient denies medication side effects. Medication compliant. States that she didn't really like the Risperdal, and didn't agree with it helping her bipolar disorder. States that she doesn't feel like it was helping her depression, and asks about being on elavil or valium or ativan. States that these have helped in the past. Also states that she was able to sleep about 2-4 hours last night. Continues to deny SI, and denies that this was a SA. States that she isn't sure if the number she gave yesterday was her son's correct phone number. States that she would be willing to continue the Abilify as long as it doesn't make her too sedated. Denies SI/HI/AVH.    Attempted to call son 2x and voicemail left.       2/3/2022  Unable to see patient today, due to her being in an EGD procedure. Please refer to Attendings attestation for follow up evaluation. Assess patient later in the morning during rounds. States that's today has been a long day. Denies issues with the procedure the AM. States that she feels ok on the Abilify and willing to continue it. Still endorses wanting valium or ativan for her "anxiety and hyperness". States that she would be will ing follow up outpatient with her psychiatrist and medical doctors. States that she would also continue to the abilify outpatient. Reports improved sleep. Denies SI/HI/AVH.       Interval History: refer to above    Family History     Problem Relation (Age of Onset)    Breast cancer Maternal Aunt    Cancer Father    Hypertension Mother    Parkinsonism Father        Tobacco Use    Smoking status: Current Every Day Smoker     Packs/day: 0.75     Years: 40.00     Pack years: 30.00     Types: Cigarettes    Smokeless tobacco: Never Used   Substance and Sexual Activity    Alcohol use: No     Comment: Alcoholism and rehab in her 20s    Drug use: No     Comment: " "History of prescription pill addiction, mostly benzos/opioids/muscle relaxers    Sexual activity: Not Currently     Partners: Male     Psychotherapeutics (From admission, onward)            Start     Stop Route Frequency Ordered    02/03/22 0900  ARIPiprazole tablet 10 mg         -- Oral Daily 02/02/22 1251           Review of Systems   Constitutional: Negative for appetite change, fatigue and fever.   Respiratory: Negative for cough and shortness of breath.    Cardiovascular: Negative for chest pain.   Gastrointestinal: Negative for constipation, diarrhea and nausea.   Musculoskeletal: Negative for back pain.   Neurological: Negative for dizziness and headaches.     Objective:     Vital Signs (Most Recent):  Temp: 98.1 °F (36.7 °C) (02/03/22 0944)  Pulse: 93 (02/03/22 1203)  Resp: 15 (02/03/22 1125)  BP: (!) 139/90 (02/03/22 1111)  SpO2: (!) 91 % (02/03/22 1111) Vital Signs (24h Range):  Temp:  [96.7 °F (35.9 °C)-98.9 °F (37.2 °C)] 98.1 °F (36.7 °C)  Pulse:  [] 93  Resp:  [15-21] 15  SpO2:  [90 %-96 %] 91 %  BP: (104-183)/() 139/90     Height: 5' (152.4 cm)  Weight: 49.9 kg (110 lb)  Body mass index is 21.48 kg/m².      Intake/Output Summary (Last 24 hours) at 2/3/2022 1317  Last data filed at 2/3/2022 1015  Gross per 24 hour   Intake 1500 ml   Output --   Net 1500 ml       Physical Exam  Psychiatric:      Comments: Mental Status Exam:  Appearance: fair hygiene and grooming, casually dressed, NAD, appears stated age   Behavior/Cooperation: calm, cooperative, pleasant, restless  Language: fluent english  Speech: normal tone, hyperverbal, normal pitch, normal volume, talkative and interruptive  Mood: "better"  Affect: full, reactive, appropriate  Thought Process: linear, goal-directed  Thought Content:  denies SI/HI/paranoia/delusions; no objective evidence of paranoia or delusions  Perception: denies AVH; no objective evidence of AVH   Orientation: grossly intact  Memory: intact to " conversation  Attention Span/Concentration: intact to conversation  Fund of Knowledge: appropriate for education level  Insight: improving  Judgment: good          Significant Labs:   Last 24 Hours:   Recent Lab Results       02/03/22  0833   02/03/22  0415   02/02/22 2257   02/02/22 2026 02/02/22 2010        Albumin   3.5             Alkaline Phosphatase   50             ALT   15             Anion Gap   10             AST   24             Baso # 0.04         0.06       Basophil % 0.4         0.5       BILIRUBIN TOTAL   0.4  Comment: For infants and newborns, interpretation of results should be based  on gestational age, weight and in agreement with clinical  observations.    Premature Infant recommended reference ranges:  Up to 24 hours.............<8.0 mg/dL  Up to 48 hours............<12.0 mg/dL  3-5 days..................<15.0 mg/dL  6-29 days.................<15.0 mg/dL               BUN   5             Calcium   8.9             Chloride   107             CO2   24             Creatinine   0.7             Differential Method Automated         Automated       eGFR if    >60.0             eGFR if non    >60.0  Comment: Calculation used to obtain the estimated glomerular filtration  rate (eGFR) is the CKD-EPI equation.                Eos # 0.5         0.3       Eosinophil % 4.7         2.3       Glucose   85             Gran # (ANC) 5.4         6.3       Gran % 55.4         53.9       Hematocrit 42.5         39.7       Hemoglobin 14.9         13.6       Immature Grans (Abs) 0.03  Comment: Mild elevation in immature granulocytes is non specific and   can be seen in a variety of conditions including stress response,   acute inflammation, trauma and pregnancy. Correlation with other   laboratory and clinical findings is essential.           0.04  Comment: Mild elevation in immature granulocytes is non specific and   can be seen in a variety of conditions including stress response,    acute inflammation, trauma and pregnancy. Correlation with other   laboratory and clinical findings is essential.         Immature Granulocytes 0.3         0.3       Lymph # 2.7         3.7       Lymph % 27.3         31.8       Magnesium   2.2             MCH 32.6         31.9       MCHC 35.1         34.3       MCV 93         93       Mono # 1.2         1.3       Mono % 11.9         11.2       MPV 9.6         9.9       nRBC 0         0       Phosphorus   3.8             Platelets 312         275       POCT Glucose       78         Potassium   3.8             PROTEIN TOTAL   6.4             RBC 4.57         4.27       RDW 13.5         13.6       Sodium   141             Vancomycin-Trough     4.8           WBC 9.72         11.68                            Significant Imaging: I have reviewed all pertinent imaging results/findings within the past 24 hours.       Scheduled Medications:   ARIPiprazole  10 mg Oral Daily    B-complex with vitamin C  1 tablet Oral Daily    diclofenac sodium  2 g Topical (Top) BID    gabapentin  300 mg Oral TID    guaiFENesin  600 mg Oral BID    lisinopriL  10 mg Oral Daily    nicotine  1 patch Transdermal Daily    pantoprazole  40 mg Intravenous BID    piperacillin-tazobactam (ZOSYN) IVPB  4.5 g Intravenous Q8H    vancomycin (VANCOCIN) IVPB  20 mg/kg Intravenous Q24H       PRN Medications:  albuterol, dextrose 50%, dextrose 50%, glucagon (human recombinant), glucose, glucose, hydrALAZINE, hydrOXYzine pamoate, influenza, melatonin, naloxone, ondansetron, oxyCODONE, oxyCODONE, prochlorperazine, sodium chloride 0.9%, sodium chloride 0.9%    Review of patient's allergies indicates:  No Known Allergies    Assessment/Plan:     Bipolar disorder  ASSESSMENT     Rossi Mcneal is a 66 y.o. female with a past psychiatric history of bipolar disorder, who presented to the [facility] due to concern for accidental overdose. Patient endorsing both depressive and manic symptoms. Reports that she live  alone and has been having worsening depression. Also with recent physical illness resulting in disruption in sleep and appetite. Current infectious disease workup negative for covid, pneumonia, and influenza. Blood cultures pending and patient was septic on arrival. Pending continued sepsis labwork. Would recommend discontinuation of risperdal and oxcarbazepine at patient has been medication noncompliant the past few months. Can continue Abilify to manage bipolar possible mixed episode symptoms. Patient on later interview, with improvement in her hypomania. Patient is future oriented, linear, and cooperative with staff. Willing to continue abilify and follow up with her outpatient provider. Patient no longer a danger to herself or other.s     IMPRESSION  Bipolar 1 disorder, current episode mixed depression and robb  R/o substance use (patient with mutiple opiate and fiorcet rx)    RECOMMENDATION(S)      1. Scheduled Medication(s):  Continue Abilify 10 mg daily to manage bipolar depression  Continue Gabapentin 300 mg TID    2. PRN Medication(s):  PRN Vistaril 25 mg Q6H as needed for anxiety. Would hold currently on benzodiazepine to manage anxiety symptoms    3.  Monitor:  Please obtain daily EKG to monitor QTc    4. Legal Status/Precaution(s):  Recommend to discontinue PEC/CEC as patient is no longer in imminent danger of hurting self or others and is no longer gravely disabled due to a psychiatric illness.               Total time:  15 with greater than 50% of this time spent in counseling and/or coordination of care.       Colleen Burkett MD   [facility]  [facility]

## 2022-02-03 NOTE — PLAN OF CARE
[facility] - Med Surg  Discharge Final Note    Primary Care Provider: Leobardo Corcoran MD    Expected Discharge Date: 2/3/2022       Future Appointments   Date Time Provider Department Center   2/8/2022  3:40 PM Leobardo Corcoran MD [facility]       Final Discharge Note (most recent)     Final Note - 02/03/22 1424        Final Note    Assessment Type Final Discharge Note     Anticipated Discharge Disposition Home or Self Care     What phone number can be called within the next 1-3 days to see how you are doing after discharge? [phone]     Hospital Resources/Appts/Education Provided Appointments scheduled and added to AVS   Ambulatory referrals sent to Hepatology, [facility], and [facility].       Post-Acute Status    Post-Acute Authorization Other     Other Status No Post-Acute Service Needs     Discharge Delays None known at this time                 Important Message from Medicare  Important Message from Medicare regarding Discharge Appeal Rights: Given to patient/caregiver,Explained to patient/caregiver,Signed/date by patient/caregiver     Date IMM was signed: 02/03/22  Time IMM was signed: 0944         Ashlee Ernst RN  Ext [phone]

## 2022-02-03 NOTE — PLAN OF CARE
Plan of care discussed with patient. Procedure education provided. All questions and concerns answered. Patient verbalizes understanding.     Sitter at bedside with patient, patient in paper scrubs, AAO and calm/cooperative, stated she is anxious and hyper, but okay.Will continue to monitor.

## 2022-02-03 NOTE — PROVATION PATIENT INSTRUCTIONS
Discharge Summary/Instructions after an Endoscopic Procedure  Patient Name: Rossi Mcneal  Patient MRN: [id number]  Patient YOB: 1955  Thursday, February 3, 2022  Pramod Casanova MD  Dear patient,  As a result of recent federal legislation (The Federal Cures Act), you may   receive lab or pathology results from your procedure in your [facility]   account before your physician is able to contact you. Your physician or   their representative will relay the results to you with their   recommendations at their soonest availability.  Thank you,  RESTRICTIONS:  During your procedure today, you received medications for sedation.  These   medications may affect your judgment, balance and coordination.  Therefore,   for 24 hours, you have the following restrictions:   - DO NOT drive a car, operate machinery, make legal/financial decisions,   sign important papers or drink alcohol.    ACTIVITY:  Today: no heavy lifting, straining or running due to procedural   sedation/anesthesia.  The following day: return to full activity including work.  DIET:  Eat and drink normally unless instructed otherwise.     TREATMENT FOR COMMON SIDE EFFECTS:  - Mild abdominal pain, nausea, belching, bloating or excessive gas:  rest,   eat lightly and use a heating pad.  - Sore Throat: treat with throat lozenges and/or gargle with warm salt   water.  - Because air was used during the procedure, expelling large amounts of air   from your rectum or belching is normal.  - If a bowel prep was taken, you may not have a bowel movement for 1-3 days.    This is normal.  SYMPTOMS TO WATCH FOR AND REPORT TO YOUR PHYSICIAN:  1. Abdominal pain or bloating, other than gas cramps.  2. Chest pain.  3. Back pain.  4. Signs of infection such as: chills or fever occurring within 24 hours   after the procedure.  5. Rectal bleeding, which would show as bright red, maroon, or black stools.   (A tablespoon of blood from the rectum is not serious, especially  if   hemorrhoids are present.)  6. Vomiting.  7. Weakness or dizziness.  GO DIRECTLY TO THE NEAREST EMERGENCY ROOM IF YOU HAVE ANY OF THE FOLLOWING:      Difficulty breathing              Chills and/or fever over 101 F   Persistent vomiting and/or vomiting blood   Severe abdominal pain   Severe chest pain   Black, tarry stools   Bleeding- more than one tablespoon   Any other symptom or condition that you feel may need urgent attention  Your doctor recommends these additional instructions:  If any biopsies were taken, your doctors clinic will contact you in 1 to 2   weeks with any results.  - Return patient to [facility] for [facility] care.   - Resume previous diet today.   - Consider avoiding all non-steroidal anti-inflammatory drugs (aspirin,   ibuprofen, naproxen, etc.), unless needed for cardiovascular protection.    Recommend you discuss with your prescribing doctor (of your aspirin) to see   if cardiovascular benefits of your aspirin outweigh the risks of GI   bleeding.  - Use Protonix 40 mg once daily (or any other full strength proton pump   inhibitor) - best taken 45 minutes before your first protein containing   meal.   - Await pathology results.   - Telephone endoscopist for pathology results in 3 weeks.   - Telephone primary care physician to schedule appointment.   - The findings and recommendations were discussed with the patient.  For questions, problems or results please call your physician - Pramod Casanova MD at Work:  [phone].  [facility], EMERGENCY ROOM PHONE NUMBER: [phone]  IF A COMPLICATION OR EMERGENCY SITUATION ARISES AND YOU ARE UNABLE TO REACH   YOUR PHYSICIAN - GO DIRECTLY TO THE EMERGENCY ROOM.  Pramod Casanova MD  2/3/2022 10:34:33 AM  This report has been verified and signed electronically.  Dear patient,  As a result of recent federal legislation (The Federal Cures Act), you may   receive lab or pathology results from your procedure in your [facility]    account before your physician is able to contact you. Your physician or   their representative will relay the results to you with their   recommendations at their soonest availability.  Thank you,  [employer]ATION

## 2022-02-03 NOTE — H&P
Endoscopy History and Physical    PCP - Leobardo Corcoran MD    Procedure - EGD     ASA - III  Mallampati - per anesthesia  History of Anesthesia problems - no  Family history Anesthesia problems - no     HPI:  Rossi Mcneal a 66 y.o. female here for an EGD for evaluation of reported melena in setting of ASA overdose and NSAID usage.     ROS:  Constitutional: No fevers, chills, No weight loss  ENT: No allergies  CV: No chest pain  Pulm: No shortness of breath  GI: see HPI  Derm: No rash    Medical History:  has a past medical history of Addiction to drug, Bipolar 1 disorder, Emphysema, Hepatitis C, History of psychiatric hospitalization, psychiatric care, Hypertension, Psychiatric problem, Scoliosis, and Therapy.    Surgical History:  has a past surgical history that includes Partial hysterectomy (1995); Neck surgery (1990); Hysterectomy; and Esophagogastroduodenoscopy (N/A, 9/19/2019).    Family History: family history includes Breast cancer in her maternal aunt; Cancer in her father; Hypertension in her mother; Parkinsonism in her father.. Otherwise no colon cancer, inflammatory bowel disease, or GI malignancies.    Social History:  reports that she has been smoking cigarettes. She has a 30.00 pack-year smoking history. She has never used smokeless tobacco. She reports that she does not drink alcohol and does not use drugs.    Review of patient's allergies indicates:  No Known Allergies    Medications:   Medications Prior to Admission   Medication Sig Dispense Refill Last Dose    butalbital-acetaminophen-caffeine -40 mg (FIORICET, ESGIC) -40 mg per tablet TAKE ONE TABLET BY MOUTH EVERY 6 HOURS AS NEEDED FOR HEADACHE need office visit prior to further refills 60 tablet 0     HYDROcodone-acetaminophen (NORCO) 5-325 mg per tablet Take 1 tablet by mouth 2 (two) times daily as needed for Pain.       albuterol (VENTOLIN HFA) 90 mcg/actuation inhaler inhale TWO puffs INTO THE LUNGS EVERY 6 HOURS AS NEEDED  FOR WHEEZING 18 g 2     fluticasone propionate (FLONASE) 50 mcg/actuation nasal spray 2 sprays (100 mcg total) by Each Nostril route once daily. 16 mL 3          Objective Findings:    Vital Signs: see nursing notes    Physical Exam:  General Appearance: Well appearing in no acute distress  Eyes:    No scleral icterus  ENT: Neck supple  Lungs: CTA anteriorly  Heart:  S1, S2 normal, no murmurs heard  Abdomen: Soft, non tender, non distended with positive bowel sounds. No hepatosplenomegaly, ascites, or mass  Extremities: no edema  Skin: No rash      Labs:  Lab Results   Component Value Date    WBC 9.72 02/03/2022    HGB 14.9 02/03/2022    HCT 42.5 02/03/2022     02/03/2022    CHOL 214 (H) 02/22/2021    TRIG 65 02/22/2021    HDL 82 (H) 02/22/2021    ALT 15 02/03/2022    AST 24 02/03/2022     02/03/2022    K 3.8 02/03/2022     02/03/2022    CREATININE 0.7 02/03/2022    BUN 5 (L) 02/03/2022    CO2 24 02/03/2022    TSH 0.536 01/31/2022    INR 1.1 02/01/2022    HGBA1C 5.4 07/21/2017         Assessment:   Rossi Mcneal is a 66 y.o. female presenting today for an EGD for evaluation of melena in the setting of ASA overdose and NSAID usage.       Plan:   -Proceed with scheduled procedure today. I have explained the risks and benefits of endoscopy procedures to the patient including but not limited to bleeding, perforation, infection, and death.  -Evaluation and consent for anesthesia portion of this procedure completed by anesthesia team.         Mustapha Yoon MD, PGY-IV  Gastroenterology Fellow  [facility]

## 2022-02-05 LAB
BACTERIA BLD CULT: NORMAL
BACTERIA BLD CULT: NORMAL

## 2022-02-08 ENCOUNTER — TELEPHONE (OUTPATIENT)
Dept: FAMILY MEDICINE | Facility: CLINIC | Age: 67
End: 2022-02-08
Payer: MEDICARE

## 2022-02-08 ENCOUNTER — OUTPATIENT CASE MANAGEMENT (OUTPATIENT)
Dept: ADMINISTRATIVE | Facility: OTHER | Age: 67
End: 2022-02-08
Payer: MEDICARE

## 2022-02-08 LAB
FINAL PATHOLOGIC DIAGNOSIS: NORMAL
GROSS: NORMAL
Lab: NORMAL

## 2022-02-08 NOTE — PROGRESS NOTES
Outpatient Care Management   - Low Risk Patient Assessment    Patient: Rossi Mcneal  MRN:  [id number]  Date of Service:  2/8/2022  Completed by:  Joana Devi LMSW  Referral Date: 02/01/2022  Program: OPCM Low Risk    Reason for Visit   Patient presents with    OPCM SW First Assessment Attempt     02/08/2022    Social Work Assessment - Low/Mod Risk    Plan Of Care     02/08/2022    Case Closure     02/08/2022       Brief Summary:   SW completed assessment with patient. Patient resides alone. Patient reports sons, and sister -in-law check in on her. Patient reports she can complete ADL's without assistance. Patient denied needing assistance with food, shelter, medication or medical. Patient reports receiving SNAP and [employer] food card for food. Patient also reports being on low-income housing. Patient reports moving back to [place] from [place]. Patient reports she moved back to be closer to son and grandchildren, but this has since changed. Patient reports son and family has since relocated to the [facility]. Patient reports not having any support in the area. Patient reports having more support while residing in [place]. SW asked patient has she ever thought about relocating back to [place] or possibly moving to the [facility] to be closer to son. Patient reports yes. Patient reports family currently seeking affordable housing in [place] and or [place] [facility].  Patient reports she would be ok if she had transportation to either visit with her grandchildren or just to get out of the house. Patient reports shes currently saving for a car. Patient believes if she had a car, she would be able to go out as she pleases.  Patient reports having access to a car when mom was alive. Patient reports once mom passed sister sold car.  Patient denied sister provided any compensation from the sale of the car. Patient seeking additional resources that would assist with socialization. SW provide patient  with phone number to [facility]. These resources will assist with socialization. Senior center also provides transportation. Patient reports she was actively being followed by Psychiatry but stopped. Patient reports once she has a visit with PCP, shes hoping to reconnect to mental health services Patient reports using medicate transportation for transportation needs. Patient reports being unable to use medical transportation for todays follow-up visit as she didnt call in time. GAYE sent an in-basket message to PCP office regarding rescheduling appointment. Patient interested in information on Advance Care Planning. SW mailed Advance Directives. Patient reports current symptoms as restlessness and memory. Patient also reports since recent hospital; visit shes been having headaches and blood pressure has been fluctuating. GAYE sent an in-basket message to PCP advising of the above. GAYE mailed all available resources and provided her contact information for any additional needs.     Patient Summary     OPCM Social Work Assessment (Low/Moderate Risk)    General  Level of Caregiver support: Member independent and does not need caregiver assistance  Have you had to make a decision between paying for any of the following in the last 2 months?: None  Transportation means: Other  Employment status: Disabled  Current symptoms: Restlessness, Memory problems  Assessments         Complex Care Plan     Care plan was discussed and completed today with input from patient and/or caregiver.    Patient Instructions     No follow-ups on file.    [facility] Self-Management Care Plan was developed with the patients/caregivers input and was based on identified barriers from [facility] assessment.  Goals were written today with the patient/caregiver and the patient has agreed to work towards these goals to improve his/her overall well-being. Patient verbalized understanding of the care plan, goals, and all  of today's instructions. Encouraged patient/caregiver to communicate with his/her physician and health care team about health conditions and the treatment plan.  Provided my contact information today and encouraged patient/caregiver to call me with any questions as needed.Attempt #:  1  This LMSW attempted to reach patient/caregiver to provide resource and left a message requesting a return call.

## 2022-02-08 NOTE — TELEPHONE ENCOUNTER
Patient cancelled appointment today, patient stated that she has been having headaches and blood pressure elevated. I schedule an appointment for tomorrow with ORLANDO Todd at 11:30 pm.

## 2022-02-08 NOTE — TELEPHONE ENCOUNTER
----- Message from Joana Devi LMSW sent at 2/8/2022  1:26 PM CST -----  Greetings, Dr. Corcoran and or Staff      Patient advised SW she's unable to make today's appointments that's scheduled with your office on today. Patient requesting assistance with rescheduling. Patient also reports since hospital stay she's been having headaches and blood pressure has been fluctuating      Please contact patient and assist with concern.    Thank you,     Joana Devi LMSW

## 2022-02-09 ENCOUNTER — OFFICE VISIT (OUTPATIENT)
Dept: FAMILY MEDICINE | Facility: CLINIC | Age: 67
End: 2022-02-09
Payer: MEDICARE

## 2022-02-09 VITALS
RESPIRATION RATE: 17 BRPM | OXYGEN SATURATION: 95 % | WEIGHT: 108.25 LBS | DIASTOLIC BLOOD PRESSURE: 82 MMHG | SYSTOLIC BLOOD PRESSURE: 110 MMHG | HEART RATE: 82 BPM | HEIGHT: 60 IN | BODY MASS INDEX: 21.25 KG/M2

## 2022-02-09 DIAGNOSIS — B19.20 COMPENSATED CIRRHOSIS RELATED TO HEPATITIS C VIRUS (HCV): Primary | ICD-10-CM

## 2022-02-09 DIAGNOSIS — M81.0 AGE-RELATED OSTEOPOROSIS WITHOUT CURRENT PATHOLOGICAL FRACTURE: ICD-10-CM

## 2022-02-09 DIAGNOSIS — F31.62 BIPOLAR DISORDER, CURRENT EPISODE MIXED, MODERATE: ICD-10-CM

## 2022-02-09 DIAGNOSIS — K74.69 COMPENSATED CIRRHOSIS RELATED TO HEPATITIS C VIRUS (HCV): Primary | ICD-10-CM

## 2022-02-09 DIAGNOSIS — R51.9 CHRONIC DAILY HEADACHE: ICD-10-CM

## 2022-02-09 PROCEDURE — 3074F SYST BP LT 130 MM HG: CPT | Mod: HCNC,CPTII,S$GLB, | Performed by: NURSE PRACTITIONER

## 2022-02-09 PROCEDURE — 1126F PR PAIN SEVERITY QUANTIFIED, NO PAIN PRESENT: ICD-10-PCS | Mod: HCNC,CPTII,S$GLB, | Performed by: NURSE PRACTITIONER

## 2022-02-09 PROCEDURE — 1101F PT FALLS ASSESS-DOCD LE1/YR: CPT | Mod: HCNC,CPTII,S$GLB, | Performed by: NURSE PRACTITIONER

## 2022-02-09 PROCEDURE — 1159F MED LIST DOCD IN RCRD: CPT | Mod: HCNC,CPTII,S$GLB, | Performed by: NURSE PRACTITIONER

## 2022-02-09 PROCEDURE — 3074F PR MOST RECENT SYSTOLIC BLOOD PRESSURE < 130 MM HG: ICD-10-PCS | Mod: HCNC,CPTII,S$GLB, | Performed by: NURSE PRACTITIONER

## 2022-02-09 PROCEDURE — 1111F DSCHRG MED/CURRENT MED MERGE: CPT | Mod: HCNC,CPTII,S$GLB, | Performed by: NURSE PRACTITIONER

## 2022-02-09 PROCEDURE — 99499 UNLISTED E&M SERVICE: CPT | Mod: S$GLB,,, | Performed by: NURSE PRACTITIONER

## 2022-02-09 PROCEDURE — 99215 OFFICE O/P EST HI 40 MIN: CPT | Mod: PBBFAC,PN | Performed by: NURSE PRACTITIONER

## 2022-02-09 PROCEDURE — 3288F FALL RISK ASSESSMENT DOCD: CPT | Mod: HCNC,CPTII,S$GLB, | Performed by: NURSE PRACTITIONER

## 2022-02-09 PROCEDURE — 99999 PR PBB SHADOW E&M-EST. PATIENT-LVL V: ICD-10-PCS | Mod: PBBFAC,HCNC,, | Performed by: NURSE PRACTITIONER

## 2022-02-09 PROCEDURE — 99999 PR PBB SHADOW E&M-EST. PATIENT-LVL V: CPT | Mod: PBBFAC,HCNC,, | Performed by: NURSE PRACTITIONER

## 2022-02-09 PROCEDURE — 1159F PR MEDICATION LIST DOCUMENTED IN MEDICAL RECORD: ICD-10-PCS | Mod: HCNC,CPTII,S$GLB, | Performed by: NURSE PRACTITIONER

## 2022-02-09 PROCEDURE — 1126F AMNT PAIN NOTED NONE PRSNT: CPT | Mod: HCNC,CPTII,S$GLB, | Performed by: NURSE PRACTITIONER

## 2022-02-09 PROCEDURE — 3008F PR BODY MASS INDEX (BMI) DOCUMENTED: ICD-10-PCS | Mod: HCNC,CPTII,S$GLB, | Performed by: NURSE PRACTITIONER

## 2022-02-09 PROCEDURE — 3288F PR FALLS RISK ASSESSMENT DOCUMENTED: ICD-10-PCS | Mod: HCNC,CPTII,S$GLB, | Performed by: NURSE PRACTITIONER

## 2022-02-09 PROCEDURE — 99214 PR OFFICE/OUTPT VISIT, EST, LEVL IV, 30-39 MIN: ICD-10-PCS | Mod: HCNC,S$GLB,, | Performed by: NURSE PRACTITIONER

## 2022-02-09 PROCEDURE — 1160F RVW MEDS BY RX/DR IN RCRD: CPT | Mod: HCNC,CPTII,S$GLB, | Performed by: NURSE PRACTITIONER

## 2022-02-09 PROCEDURE — 1101F PR PT FALLS ASSESS DOC 0-1 FALLS W/OUT INJ PAST YR: ICD-10-PCS | Mod: HCNC,CPTII,S$GLB, | Performed by: NURSE PRACTITIONER

## 2022-02-09 PROCEDURE — 1160F PR REVIEW ALL MEDS BY PRESCRIBER/CLIN PHARMACIST DOCUMENTED: ICD-10-PCS | Mod: HCNC,CPTII,S$GLB, | Performed by: NURSE PRACTITIONER

## 2022-02-09 PROCEDURE — 1111F PR DISCHARGE MEDS RECONCILED W/ CURRENT OUTPATIENT MED LIST: ICD-10-PCS | Mod: HCNC,CPTII,S$GLB, | Performed by: NURSE PRACTITIONER

## 2022-02-09 PROCEDURE — 99499 RISK ADDL DX/OHS AUDIT: ICD-10-PCS | Mod: S$GLB,,, | Performed by: NURSE PRACTITIONER

## 2022-02-09 PROCEDURE — 3079F DIAST BP 80-89 MM HG: CPT | Mod: HCNC,CPTII,S$GLB, | Performed by: NURSE PRACTITIONER

## 2022-02-09 PROCEDURE — 3079F PR MOST RECENT DIASTOLIC BLOOD PRESSURE 80-89 MM HG: ICD-10-PCS | Mod: HCNC,CPTII,S$GLB, | Performed by: NURSE PRACTITIONER

## 2022-02-09 PROCEDURE — 3008F BODY MASS INDEX DOCD: CPT | Mod: HCNC,CPTII,S$GLB, | Performed by: NURSE PRACTITIONER

## 2022-02-09 PROCEDURE — 99214 OFFICE O/P EST MOD 30 MIN: CPT | Mod: HCNC,S$GLB,, | Performed by: NURSE PRACTITIONER

## 2022-02-09 RX ORDER — HYDROXYZINE HYDROCHLORIDE 25 MG/1
25 TABLET, FILM COATED ORAL 3 TIMES DAILY PRN
Qty: 30 TABLET | Refills: 0 | Status: SHIPPED | OUTPATIENT
Start: 2022-02-09 | End: 2022-03-04

## 2022-02-09 NOTE — PROGRESS NOTES
Routine Office Visit    Patient Name: Rossi Mcneal    : 1955  MRN: [id number]    Chief Complaint:  Headaches, liver enzymes, anxiety    Subjective:  Rossi is a 66 y.o. female who presents today for:    1. Headaches, liver enzymes, anxiety- patient who is known to me with history of hepatitis C status post treatment, hypertension, cirrhosis, bipolar 1 disorder reports today for evaluation.  Reports dealing with chronic daily headaches in the last year for which she takes Fioricet almost every day.  She states that she feels like the headaches may be coming from her neck because she has had a fusion in her cervical spine in the past; she states that she was told she needed another fusion but does not want to undergo this.  Headaches normally on the left side.  She does not report any vision changes, nausea, vomiting, or other associated symptoms with the headaches.  She recently was discharged from the hospital for an accidental aspirin overdose.  She did have melena and had an EGD done which showed gastritis.  She denies any abdominal complaints today and states that her stool has a normal color.  She notes that she has osteopenia and used to be on alendronate but is currently not taking this and would like a refill of this.  She would also like something to help with her anxiety until she gets in to see Psychiatry.  She does not report any SI/HI    Past Medical History  Past Medical History:   Diagnosis Date    Addiction to drug     History of prescription pill addiction    Bipolar 1 disorder     Emphysema     Hepatitis C     History of psychiatric hospitalization     Hx of psychiatric care     Hypertension     Psychiatric problem     Scoliosis     Therapy        Past Surgical History  Past Surgical History:   Procedure Laterality Date    ESOPHAGOGASTRODUODENOSCOPY N/A 2019    Procedure: ESOPHAGOGASTRODUODENOSCOPY (EGD);  Surgeon: Jose Luis Hyamn MD;  [facility];  Service: Endoscopy;   Laterality: N/A;    ESOPHAGOGASTRODUODENOSCOPY N/A 2/3/2022    Procedure: EGD (ESOPHAGOGASTRODUODENOSCOPY);  Surgeon: Pramod Casanova MD;  Location: [facility] ([address] [place] [address]);  Service: Endoscopy;  Laterality: N/A;    HYSTERECTOMY      NECK SURGERY  1990    PARTIAL HYSTERECTOMY  1995       Family History  Family History   Problem Relation Age of Onset    Hypertension Mother     Cancer Father     Parkinsonism Father     Breast cancer Maternal Aunt        Social History  Social History     Socioeconomic History    Marital status:     Number of children: 2   Tobacco Use    Smoking status: Current Every Day Smoker     Packs/day: 0.75     Years: 40.00     Pack years: 30.00     Types: Cigarettes    Smokeless tobacco: Never Used   Substance and Sexual Activity    Alcohol use: No     Comment: Alcoholism and rehab in her 20s    Drug use: No     Comment: History of prescription pill addiction, mostly benzos/opioids/muscle relaxers    Sexual activity: Not Currently     Partners: Male       Current Medications  Current Outpatient Medications on File Prior to Visit   Medication Sig Dispense Refill    albuterol (VENTOLIN HFA) 90 mcg/actuation inhaler inhale TWO puffs INTO THE LUNGS EVERY 6 HOURS AS NEEDED FOR WHEEZING 18 g 2    ARIPiprazole (ABILIFY) 10 MG Tab Take 1 tablet (10 mg total) by mouth once daily. 30 tablet 11    B-complex with vitamin C (Z-BEC OR EQUIV) tablet Take 1 tablet by mouth once daily. 30 tablet 11    butalbital-acetaminophen-caffeine -40 mg (FIORICET, ESGIC) -40 mg per tablet Take 1 tablet by mouth every 4 (four) hours as needed for Pain. 30 tablet 0    diclofenac sodium (VOLTAREN) 1 % Gel Apply 2 g topically 2 (two) times daily. 100 g 0    fluticasone propionate (FLONASE) 50 mcg/actuation nasal spray 2 sprays (100 mcg total) by Each Nostril route once daily. 16 mL 3    gabapentin (NEURONTIN) 300 MG capsule Take 1 capsule (300 mg total) by mouth 3 (three) times daily. 90  capsule 11    HYDROcodone-acetaminophen (NORCO) 5-325 mg per tablet Take 1 tablet by mouth 2 (two) times daily as needed for Pain.      pantoprazole (PROTONIX) 40 MG tablet Take 1 tablet (40 mg total) by mouth once daily. Please administer with water 45-60 minutes before solid food intake. 30 tablet 2     No current facility-administered medications on file prior to visit.       Allergies   Review of patient's allergies indicates:  No Known Allergies    Review of Systems (Pertinent positives)  Review of Systems   Constitutional: Negative.  Negative for chills and fever.   HENT: Negative.    Eyes: Negative.    Respiratory: Negative.    Cardiovascular: Negative.    Gastrointestinal: Negative for abdominal pain, blood in stool, constipation, diarrhea, heartburn, melena, nausea and vomiting.   Genitourinary: Negative.  Negative for dysuria, frequency and urgency.   Musculoskeletal: Negative.    Skin: Negative.    Neurological: Negative.    Endo/Heme/Allergies: Negative.    Psychiatric/Behavioral: Negative for depression, hallucinations, memory loss, substance abuse and suicidal ideas. The patient is nervous/anxious. The patient does not have insomnia.        /82 (BP Location: Right arm, Patient Position: Sitting, BP Method: Medium (Manual))   Pulse 82   Resp 17   Ht 5' (1.524 m)   Wt 49.1 kg (108 lb 3.9 oz)   SpO2 95%   BMI 21.14 kg/m²     Physical Exam  Vitals reviewed.   Constitutional:       General: She is not in acute distress.     Appearance: Normal appearance. She is not ill-appearing, toxic-appearing or diaphoretic.   HENT:      Head: Normocephalic and atraumatic.   Eyes:      Extraocular Movements: Extraocular movements intact.      Conjunctiva/sclera: Conjunctivae normal.      Pupils: Pupils are equal, round, and reactive to light.   Cardiovascular:      Rate and Rhythm: Normal rate and regular rhythm.      Pulses: Normal pulses.      Heart sounds: Normal heart sounds.   Pulmonary:      Effort:  Pulmonary effort is normal.      Breath sounds: Normal breath sounds.   Abdominal:      General: Bowel sounds are normal. There is no distension.      Palpations: Abdomen is soft. There is no mass.      Tenderness: There is no abdominal tenderness.   Musculoskeletal:         General: No swelling or tenderness. Normal range of motion.   Skin:     General: Skin is warm and dry.      Capillary Refill: Capillary refill takes less than 2 seconds.   Neurological:      General: No focal deficit present.      Mental Status: She is alert and oriented to person, place, and time.      Motor: No weakness.      Coordination: Coordination normal.      Gait: Gait normal.      Deep Tendon Reflexes: Reflexes normal.   Psychiatric:         Mood and Affect: Mood normal.         Behavior: Behavior normal.          Assessment/Plan:  Rossi Mcneal is a 66 y.o. female who presents today for :    Rossi was seen today for annual exam.    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  Will refer back to hepatology for continued follow-up.    Chronic daily headache  -     Ambulatory referral/consult to Neurology; Future    Will refer to Neurology for further evaluation.  Discussed with patient that chronic Fioricet use can cause rebound headaches    Bipolar disorder, current episode mixed, moderate  -     hydrOXYzine HCL (ATARAX) 25 MG tablet; Take 1 tablet (25 mg total) by mouth 3 (three) times daily as needed for Itching or Anxiety.    Trial p.r.n. hydroxyzine for anxiety until patient can get set up with Psychiatry.  If no relief from this can consider short course of benzodiazepine    Age-related osteoporosis without current pathological fracture  -     Ambulatory referral/consult to Endocrinology; Future    Discussed with patient potential side effects of alendronate  including gastritis and ulcerations/perforation; patient would like to avoid such medicine given this will refer to endocrinology for consideration of IV therapy        This office note has been dictated.  This dictation has been generated using M-Modal Fluency Direct dictation; some phonetic errors may occur.   My collaborating physician is Dr. Dayday Paige.

## 2022-02-10 ENCOUNTER — TELEPHONE (OUTPATIENT)
Dept: GASTROENTEROLOGY | Facility: HOSPITAL | Age: 67
End: 2022-02-10
Payer: MEDICARE

## 2022-02-10 NOTE — TELEPHONE ENCOUNTER
Contacted patient to review pathology results from upper endoscopy on 02/03/2022 that were negative for celiac disease, H.pylori, and dysplasia.     She reports feeling well and denies abdominal pain, heartburn, melena, or hematochezia. She continues to refrain from NSAID usage.     Patient inquired about necessity of taking PPI daily given lack of symptoms. Recommended trial off PPI to see if symptoms return; patient says she will consider this. She had no additional questions.         Mustapha Yoon MD, PGY-IV  Gastroenterology Fellow  [facility]

## 2022-02-14 ENCOUNTER — TELEPHONE (OUTPATIENT)
Dept: FAMILY MEDICINE | Facility: CLINIC | Age: 67
End: 2022-02-14
Payer: MEDICARE

## 2022-02-14 ENCOUNTER — TELEPHONE (OUTPATIENT)
Dept: HEPATOLOGY | Facility: CLINIC | Age: 67
End: 2022-02-14
Payer: MEDICARE

## 2022-02-14 DIAGNOSIS — R51.9 CHRONIC DAILY HEADACHE: Primary | ICD-10-CM

## 2022-02-14 DIAGNOSIS — F41.9 ANXIETY: Primary | ICD-10-CM

## 2022-02-14 RX ORDER — BUTALBITAL, ACETAMINOPHEN AND CAFFEINE 50; 325; 40 MG/1; MG/1; MG/1
TABLET ORAL
Qty: 60 TABLET | Refills: 0 | Status: SHIPPED | OUTPATIENT
Start: 2022-02-14 | End: 2022-03-07

## 2022-02-14 RX ORDER — DIAZEPAM 5 MG/1
5 TABLET ORAL EVERY 12 HOURS PRN
Qty: 60 TABLET | Refills: 0 | Status: SHIPPED | OUTPATIENT
Start: 2022-02-14 | End: 2022-02-15 | Stop reason: CLARIF

## 2022-02-14 NOTE — TELEPHONE ENCOUNTER
----- Message from Stephani Way sent at 2/14/2022 10:42 AM CST -----  Good Morning,    Attached patient has a referral to Hepatology. 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 Thank you for your time and assistance!

## 2022-02-14 NOTE — TELEPHONE ENCOUNTER
No new care gaps identified.  Powered by [employer]. Reference number: [id number].   2/14/2022 11:54:43 AM CST

## 2022-02-14 NOTE — TELEPHONE ENCOUNTER
----- Message from Syeda Bolton sent at 2/14/2022  4:29 PM CST -----  Contact: [email or web address] [phone]  Patient would like to get medical advice.  Symptoms (please be specific):    How long have you had these symptoms:   Would you like a call back, or a response through your [facility] portal?: call back  Pharmacy name and phone # (copy from chart):    Comments:   [email or web address] is calling to get authorization to fill this rx for diazePAM (VALIUM) 5 MG tablet because pt is also getting narcotics elsewhere also

## 2022-02-14 NOTE — TELEPHONE ENCOUNTER
----- Message from Syeda Bolton sent at 2/14/2022  4:29 PM CST -----  Contact: [email or web address] [phone]  Patient would like to get medical advice.  Symptoms (please be specific):    How long have you had these symptoms:   Would you like a call back, or a response through your [facility] portal?: call back  Pharmacy name and phone # (copy from chart):    Comments:   [email or web address] is calling to get authorization to fill this rx for diazePAM (VALIUM) 5 MG tablet because pt is also getting narcotics elsewhere also

## 2022-02-14 NOTE — TELEPHONE ENCOUNTER
Patient last seen by ORLANDO Berg in hepatology on 2/2/21.  Provider out at this time.  Patient scheduled to see ORLANDO Batista on 3/9/22; appt reminder notice mailed.

## 2022-02-15 NOTE — TELEPHONE ENCOUNTER
----- Message from Matilde Aguero sent at 2/15/2022  3:55 PM CST -----  Contact: Geovany [phone]  Geovany at [facility] pharmacy is calling on regards to the pt pt is getting an norco from a pain dr and she was prescribed Valium by Dr Todd.. please advise they need an approval please advise he states he sent a message yesterdsay

## 2022-02-15 NOTE — TELEPHONE ENCOUNTER
Rt Armenta with [facility], patient  gets norco from pain management . Also pain management states she can't have norco if having vailum . Janelle from Dr. Corcoran . Patient told pharmacy she wants the valium because its much needed compared to norco .Pharmacy wants to clarify with NP Peyton .

## 2022-02-16 ENCOUNTER — TELEPHONE (OUTPATIENT)
Dept: FAMILY MEDICINE | Facility: CLINIC | Age: 67
End: 2022-02-16
Payer: MEDICARE

## 2022-02-16 DIAGNOSIS — F41.9 ANXIETY: Primary | ICD-10-CM

## 2022-02-16 RX ORDER — DIAZEPAM 5 MG/1
5 TABLET ORAL EVERY 12 HOURS PRN
Qty: 28 TABLET | Refills: 0 | Status: SHIPPED | OUTPATIENT
Start: 2022-02-16 | End: 2022-09-21

## 2022-02-16 NOTE — TELEPHONE ENCOUNTER
----- Message from EleuterioDaynematt Way sent at 2/16/2022 12:08 PM CST -----  Regarding: Call  Contact: Patient  Type: Patient Call Back    Who called:Patient    What is the request in detail: Patient is requesting a call back. She states pharmacy needs to be contacted. Please advise.    Can the clinic reply by [facility]? No    Would the patient rather a call back or a response via [facility]? Call    Best call back number: [phone]     Additional Information:    Thanks

## 2022-02-16 NOTE — TELEPHONE ENCOUNTER
Patient called and identification verified by name and date of birth.  Notified patient that I spoke to the nurse at Dr. Steiner's office.  She told me that he would temporarily discontinue her Norco if she was prescribed a benzodiazepine.  I did notify patient of this.  She states she has not taken Norco in 3 days anyway because it does not help for her back pain.    She is still having significant anxiety for which hydroxyzine has not helped.  Given her history I do think a short course of benzo can help her at this time.  I will send a 14 day supply for until she can get in with her psychiatry appointment which has been scheduled already.    I strongly recommended the patient not to take her Valium with alcohol or her Norco dose.  She verbalized understanding of this and states she will not combine the medications.    I did discuss with the patient that when she sees her pain management physician I cannot guarantee that they will be willing to fill her pain medicine going further.  She states she is okay with this and feels like she needs to get her anxiety under control at this time.    Medication sent for patient.  All questions answered.  Patient verbalized understanding of instructions.

## 2022-02-16 NOTE — TELEPHONE ENCOUNTER
Rt patient call , says she would like to discontinue medication and treatment from Leobardo Steiner -[phone].  Asked if NP Peyton could speak with Dr. Steiner because she prefers the valium over the hydrocodone , feels it does nothing for her . Needs valium until psych apt , understands it would be a temporary fill . Please advise

## 2022-02-21 ENCOUNTER — OFFICE VISIT (OUTPATIENT)
Dept: ENDOCRINOLOGY | Facility: CLINIC | Age: 67
End: 2022-02-21
Payer: MEDICARE

## 2022-02-21 VITALS
DIASTOLIC BLOOD PRESSURE: 82 MMHG | HEART RATE: 82 BPM | SYSTOLIC BLOOD PRESSURE: 126 MMHG | BODY MASS INDEX: 21.26 KG/M2 | HEIGHT: 60 IN | TEMPERATURE: 98 F | WEIGHT: 108.31 LBS

## 2022-02-21 DIAGNOSIS — G89.29 OTHER CHRONIC PAIN: ICD-10-CM

## 2022-02-21 DIAGNOSIS — M50.30 DISC DISEASE, DEGENERATIVE, CERVICAL: Primary | ICD-10-CM

## 2022-02-21 DIAGNOSIS — Z72.0 TOBACCO USE: ICD-10-CM

## 2022-02-21 DIAGNOSIS — M81.0 AGE-RELATED OSTEOPOROSIS WITHOUT CURRENT PATHOLOGICAL FRACTURE: ICD-10-CM

## 2022-02-21 PROCEDURE — 99999 PR PBB SHADOW E&M-EST. PATIENT-LVL V: ICD-10-PCS | Mod: PBBFAC,HCNC,, | Performed by: HOSPITALIST

## 2022-02-21 PROCEDURE — 3074F PR MOST RECENT SYSTOLIC BLOOD PRESSURE < 130 MM HG: ICD-10-PCS | Mod: HCNC,CPTII,S$GLB, | Performed by: HOSPITALIST

## 2022-02-21 PROCEDURE — 99499 RISK ADDL DX/OHS AUDIT: ICD-10-PCS | Mod: S$GLB,,, | Performed by: HOSPITALIST

## 2022-02-21 PROCEDURE — 1160F PR REVIEW ALL MEDS BY PRESCRIBER/CLIN PHARMACIST DOCUMENTED: ICD-10-PCS | Mod: HCNC,CPTII,S$GLB, | Performed by: HOSPITALIST

## 2022-02-21 PROCEDURE — 99999 PR PBB SHADOW E&M-EST. PATIENT-LVL V: CPT | Mod: PBBFAC,HCNC,, | Performed by: HOSPITALIST

## 2022-02-21 PROCEDURE — 1111F PR DISCHARGE MEDS RECONCILED W/ CURRENT OUTPATIENT MED LIST: ICD-10-PCS | Mod: HCNC,CPTII,S$GLB, | Performed by: HOSPITALIST

## 2022-02-21 PROCEDURE — 1125F PR PAIN SEVERITY QUANTIFIED, PAIN PRESENT: ICD-10-PCS | Mod: HCNC,CPTII,S$GLB, | Performed by: HOSPITALIST

## 2022-02-21 PROCEDURE — 1159F MED LIST DOCD IN RCRD: CPT | Mod: HCNC,CPTII,S$GLB, | Performed by: HOSPITALIST

## 2022-02-21 PROCEDURE — 99204 OFFICE O/P NEW MOD 45 MIN: CPT | Mod: HCNC,S$GLB,, | Performed by: HOSPITALIST

## 2022-02-21 PROCEDURE — 3074F SYST BP LT 130 MM HG: CPT | Mod: HCNC,CPTII,S$GLB, | Performed by: HOSPITALIST

## 2022-02-21 PROCEDURE — 99204 PR OFFICE/OUTPT VISIT, NEW, LEVL IV, 45-59 MIN: ICD-10-PCS | Mod: HCNC,S$GLB,, | Performed by: HOSPITALIST

## 2022-02-21 PROCEDURE — 1111F DSCHRG MED/CURRENT MED MERGE: CPT | Mod: HCNC,CPTII,S$GLB, | Performed by: HOSPITALIST

## 2022-02-21 PROCEDURE — 1159F PR MEDICATION LIST DOCUMENTED IN MEDICAL RECORD: ICD-10-PCS | Mod: HCNC,CPTII,S$GLB, | Performed by: HOSPITALIST

## 2022-02-21 PROCEDURE — 3288F FALL RISK ASSESSMENT DOCD: CPT | Mod: HCNC,CPTII,S$GLB, | Performed by: HOSPITALIST

## 2022-02-21 PROCEDURE — 1101F PT FALLS ASSESS-DOCD LE1/YR: CPT | Mod: HCNC,CPTII,S$GLB, | Performed by: HOSPITALIST

## 2022-02-21 PROCEDURE — 99499 UNLISTED E&M SERVICE: CPT | Mod: S$GLB,,, | Performed by: HOSPITALIST

## 2022-02-21 PROCEDURE — 3008F PR BODY MASS INDEX (BMI) DOCUMENTED: ICD-10-PCS | Mod: HCNC,CPTII,S$GLB, | Performed by: HOSPITALIST

## 2022-02-21 PROCEDURE — 3079F DIAST BP 80-89 MM HG: CPT | Mod: HCNC,CPTII,S$GLB, | Performed by: HOSPITALIST

## 2022-02-21 PROCEDURE — 3079F PR MOST RECENT DIASTOLIC BLOOD PRESSURE 80-89 MM HG: ICD-10-PCS | Mod: HCNC,CPTII,S$GLB, | Performed by: HOSPITALIST

## 2022-02-21 PROCEDURE — 1101F PR PT FALLS ASSESS DOC 0-1 FALLS W/OUT INJ PAST YR: ICD-10-PCS | Mod: HCNC,CPTII,S$GLB, | Performed by: HOSPITALIST

## 2022-02-21 PROCEDURE — 1160F RVW MEDS BY RX/DR IN RCRD: CPT | Mod: HCNC,CPTII,S$GLB, | Performed by: HOSPITALIST

## 2022-02-21 PROCEDURE — 3008F BODY MASS INDEX DOCD: CPT | Mod: HCNC,CPTII,S$GLB, | Performed by: HOSPITALIST

## 2022-02-21 PROCEDURE — 1125F AMNT PAIN NOTED PAIN PRSNT: CPT | Mod: HCNC,CPTII,S$GLB, | Performed by: HOSPITALIST

## 2022-02-21 PROCEDURE — 3288F PR FALLS RISK ASSESSMENT DOCUMENTED: ICD-10-PCS | Mod: HCNC,CPTII,S$GLB, | Performed by: HOSPITALIST

## 2022-02-21 RX ORDER — SODIUM CHLORIDE 0.9 % (FLUSH) 0.9 %
10 SYRINGE (ML) INJECTION
Status: CANCELLED | OUTPATIENT
Start: 2022-02-22

## 2022-02-21 RX ORDER — HEPARIN 100 UNIT/ML
500 SYRINGE INTRAVENOUS
Status: CANCELLED | OUTPATIENT
Start: 2022-02-22

## 2022-02-21 RX ORDER — ZOLEDRONIC ACID 5 MG/100ML
5 INJECTION, SOLUTION INTRAVENOUS
Status: CANCELLED | OUTPATIENT
Start: 2022-02-22
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
## 2022-02-21 NOTE — ASSESSMENT & PLAN NOTE
- Patient present to clinic for evaluation and treatment of osteoporosis  - DXA scan: personal reviewed by me and discussed with patient in clinic.  Osteopenia with High FRAX  - Risk factors include:  Early Postmenopausal,  small stature, family history of osteoporosis, smoker  - previously on Fosamax, stopping 5 months ago, recent gastritis unable to tolerate oral bisphosphonate in the future  - For treatment options discussed with patient in clinic today: Bisphosphonate: IV, Prolia  - long discussion with patient, will plan to start IV Reclast once a year, side effect profile discussed.  Patient comfortable in pursuing this plan  - advised patient to continue vitamin-D 5000 IU daily, advised patient to increase calcium carbonate to b.i.d., continue multivitamin to help prevent hypocalcemia post infusion  - Next DXA:  2 years from current, 3/2023  - Fall precautions/Exercise regimen: advised

## 2022-02-21 NOTE — PATIENT INSTRUCTIONS
Thank you for completing the visit with me    Per our conversation your medication changes are as follows:    Calcium:  take 2 calcium tab daily, the week after infusion  Vitamin D: 5000 IU daily    Continue Multivitamin      - Bisphosphonates:         - IV form (Reclast) - given intravenously once yearly    Long-term use of the medication is associated with rare side effects:  There is a very rare risk of a jaw complication known as osteonecrosis of the jaw.  We advise you see the dentist on a regular basis and let him/her know you are taking this medication.  You should avoid non-emergent dental implants or extractions if possible while you are on this medication.  Also, very rarely, the medication has been associated with a stress fracture that can occur in the side of your thigh bone.  A warning sign of this is pain - please let us know if you develop any new, persistent throbbing pain in the sides of your thighs (femur bones).  Again, we feel that the benefit of this medication out weight the risk of the side effect     Remember to get bone density (DXA) in 3/8/2023 years.   Your Primary care doctor can order it, or we can as well.  Let us know if you develop any bone fractures before the next visit.     Please CALL MY OFFICE and leave a message if your insurance does not cover the medications I prescribed, so I can prescribed alternative medications.     We will plan a telemedicine or an in-clinic visit in 12 months with labs prior to that appointment.      Contact information:    Milton Hooks M.D  [facility] [address] [facility] [address], [address]  [place] [address]    Office:  [phone]  Fax:  [phone]  ------------------------------------------------------------------------------------------------------

## 2022-03-04 ENCOUNTER — OFFICE VISIT (OUTPATIENT)
Dept: PSYCHIATRY | Facility: CLINIC | Age: 67
End: 2022-03-04
Payer: MEDICARE

## 2022-03-04 VITALS
OXYGEN SATURATION: 94 % | SYSTOLIC BLOOD PRESSURE: 145 MMHG | WEIGHT: 111.88 LBS | DIASTOLIC BLOOD PRESSURE: 92 MMHG | HEART RATE: 84 BPM | BODY MASS INDEX: 21.97 KG/M2 | HEIGHT: 60 IN

## 2022-03-04 DIAGNOSIS — Z72.0 TOBACCO USE: ICD-10-CM

## 2022-03-04 DIAGNOSIS — F31.9 BIPOLAR 1 DISORDER: Primary | ICD-10-CM

## 2022-03-04 PROCEDURE — 3288F PR FALLS RISK ASSESSMENT DOCUMENTED: ICD-10-PCS | Mod: CPTII,S$GLB,, | Performed by: PHYSICIAN ASSISTANT

## 2022-03-04 PROCEDURE — 1125F PR PAIN SEVERITY QUANTIFIED, PAIN PRESENT: ICD-10-PCS | Mod: CPTII,S$GLB,, | Performed by: PHYSICIAN ASSISTANT

## 2022-03-04 PROCEDURE — 3077F SYST BP >= 140 MM HG: CPT | Mod: CPTII,S$GLB,, | Performed by: PHYSICIAN ASSISTANT

## 2022-03-04 PROCEDURE — 1101F PT FALLS ASSESS-DOCD LE1/YR: CPT | Mod: CPTII,S$GLB,, | Performed by: PHYSICIAN ASSISTANT

## 2022-03-04 PROCEDURE — 3008F BODY MASS INDEX DOCD: CPT | Mod: CPTII,S$GLB,, | Performed by: PHYSICIAN ASSISTANT

## 2022-03-04 PROCEDURE — 90833 PR PSYCHOTHERAPY W/PATIENT W/E&M, 30 MIN (ADD ON): ICD-10-PCS | Mod: S$GLB,,, | Performed by: PHYSICIAN ASSISTANT

## 2022-03-04 PROCEDURE — 1160F PR REVIEW ALL MEDS BY PRESCRIBER/CLIN PHARMACIST DOCUMENTED: ICD-10-PCS | Mod: CPTII,S$GLB,, | Performed by: PHYSICIAN ASSISTANT

## 2022-03-04 PROCEDURE — 3077F PR MOST RECENT SYSTOLIC BLOOD PRESSURE >= 140 MM HG: ICD-10-PCS | Mod: CPTII,S$GLB,, | Performed by: PHYSICIAN ASSISTANT

## 2022-03-04 PROCEDURE — 1111F DSCHRG MED/CURRENT MED MERGE: CPT | Mod: CPTII,S$GLB,, | Performed by: PHYSICIAN ASSISTANT

## 2022-03-04 PROCEDURE — 1125F AMNT PAIN NOTED PAIN PRSNT: CPT | Mod: CPTII,S$GLB,, | Performed by: PHYSICIAN ASSISTANT

## 2022-03-04 PROCEDURE — 1160F RVW MEDS BY RX/DR IN RCRD: CPT | Mod: CPTII,S$GLB,, | Performed by: PHYSICIAN ASSISTANT

## 2022-03-04 PROCEDURE — 99999 PR PBB SHADOW E&M-EST. PATIENT-LVL III: ICD-10-PCS | Mod: PBBFAC,,, | Performed by: PHYSICIAN ASSISTANT

## 2022-03-04 PROCEDURE — 1159F PR MEDICATION LIST DOCUMENTED IN MEDICAL RECORD: ICD-10-PCS | Mod: CPTII,S$GLB,, | Performed by: PHYSICIAN ASSISTANT

## 2022-03-04 PROCEDURE — 3080F DIAST BP >= 90 MM HG: CPT | Mod: CPTII,S$GLB,, | Performed by: PHYSICIAN ASSISTANT

## 2022-03-04 PROCEDURE — 99214 OFFICE O/P EST MOD 30 MIN: CPT | Mod: S$GLB,,, | Performed by: PHYSICIAN ASSISTANT

## 2022-03-04 PROCEDURE — 3080F PR MOST RECENT DIASTOLIC BLOOD PRESSURE >= 90 MM HG: ICD-10-PCS | Mod: CPTII,S$GLB,, | Performed by: PHYSICIAN ASSISTANT

## 2022-03-04 PROCEDURE — 90833 PSYTX W PT W E/M 30 MIN: CPT | Mod: S$GLB,,, | Performed by: PHYSICIAN ASSISTANT

## 2022-03-04 PROCEDURE — 3008F PR BODY MASS INDEX (BMI) DOCUMENTED: ICD-10-PCS | Mod: CPTII,S$GLB,, | Performed by: PHYSICIAN ASSISTANT

## 2022-03-04 PROCEDURE — 99999 PR PBB SHADOW E&M-EST. PATIENT-LVL III: CPT | Mod: PBBFAC,,, | Performed by: PHYSICIAN ASSISTANT

## 2022-03-04 PROCEDURE — 1101F PR PT FALLS ASSESS DOC 0-1 FALLS W/OUT INJ PAST YR: ICD-10-PCS | Mod: CPTII,S$GLB,, | Performed by: PHYSICIAN ASSISTANT

## 2022-03-04 PROCEDURE — 1111F PR DISCHARGE MEDS RECONCILED W/ CURRENT OUTPATIENT MED LIST: ICD-10-PCS | Mod: CPTII,S$GLB,, | Performed by: PHYSICIAN ASSISTANT

## 2022-03-04 PROCEDURE — 99214 PR OFFICE/OUTPT VISIT, EST, LEVL IV, 30-39 MIN: ICD-10-PCS | Mod: S$GLB,,, | Performed by: PHYSICIAN ASSISTANT

## 2022-03-04 PROCEDURE — 1159F MED LIST DOCD IN RCRD: CPT | Mod: CPTII,S$GLB,, | Performed by: PHYSICIAN ASSISTANT

## 2022-03-04 PROCEDURE — 3288F FALL RISK ASSESSMENT DOCD: CPT | Mod: CPTII,S$GLB,, | Performed by: PHYSICIAN ASSISTANT

## 2022-03-04 RX ORDER — BUPROPION HYDROCHLORIDE 150 MG/1
150 TABLET ORAL DAILY
Qty: 90 TABLET | Refills: 0 | Status: SHIPPED | OUTPATIENT
Start: 2022-03-04 | End: 2022-08-19 | Stop reason: SDUPTHER

## 2022-03-04 NOTE — PROGRESS NOTES
"Outpatient Psychiatry Follow-Up Visit (MD/NP)    3/4/2022    Clinical Status of Patient:  Outpatient (Ambulatory)    Chief Complaint:  Rossi Mcneal is a 66 y.o. female who presents today for follow-up of mood disorder and anxiety.  Met with patient.      Interval History and Content of Current Session:  Interim Events/Subjective Report/Content of Current Session: She is currently on Abilify 10 mg daily and said she feels her mood is stable but still has some depression.  She is starting to feel more in control and is working to improve her health.  She reports no improvement with hydroxyzine, so she stopped taking it.  She is also taking Valium 5 mg PRN.    She was hospitalized and put on PEC for accidental aspirin overdose from 2/1-2/3.  She thought she had COVID, and she was taking BC powders and Tylenol for fever.  She had no intentions to overdose or harm herself.  She hadn't slept for 2 days and was manic.  She reports difficulty sleeping, but her sleep has increased from 2 hours/night to 4 hours/night.  She's been taking melatonin 5 mg, which helps.    She has many doctors' appointments coming up, including pain management for her chronic pain.  She reports that she no longer gets relief from Norco, and she's wanting help.  She denies taking Valium and Norco at the same time.  She has cirrhosis and said she doesn't crave alcohol anymore, although she will have a glass of wine "once in a while".  She smokes a pack of cigarettes a day.  She wants to stop smoking, so she can walk better and farther.     She lives alone, doesn't have a car, and doesn't see her friends often.  She's thinking about getting a dog for some company.  One son is able to bring her to the grocery store and appointments although it would be too much for her to start psychotherapy at this time.  She has another son that lives in [place].       Psychotherapy:  · Target symptoms: depression, mood disorder  · Why chosen therapy is " appropriate versus another modality: relevant to diagnosis  · Outcome monitoring methods: self-report  · Therapeutic intervention type: insight oriented psychotherapy, behavior modifying psychotherapy, supportive psychotherapy  · Topics discussed/themes: stress related to medical comorbidities  · The patient's response to the intervention is accepting. The patient's progress toward treatment goals is good.   · Duration of intervention: 18 minutes.      Review of Systems   · PSYCHIATRIC: Pertinant items are noted in the narrative.  · CONSTITUTIONAL: No weight gain or loss.   · MUSCULOSKELETAL: Positive for pain.  · NEUROLOGIC: No weakness, sensory changes, seizures, confusion, memory loss, tremor or other abnormal movements.    Past Medical, Family and Social History: The patient's past medical, family and social history have been reviewed and updated as appropriate within the electronic medical record - see encounter notes.    Compliance: yes    Side effects: None    Risk Parameters:  Patient reports no suicidal ideation  Patient reports no homicidal ideation  Patient reports no self-injurious behavior  Patient reports no violent behavior    Exam (detailed: at least 9 elements; comprehensive: all 15 elements)   Constitutional  Vitals:  Most recent vital signs, dated less than 90 days prior to this appointment, were reviewed.   Vitals:    03/04/22 0850   BP: (!) 145/92   Pulse: 84   SpO2: (!) 94%   Weight: 50.8 kg (111 lb 14.1 oz)   Height: 5' (1.524 m)        General:  unremarkable, age appropriate, well dressed     Musculoskeletal  Muscle Strength/Tone:  no tremor, no tic   Gait & Station:  non-ataxic     Psychiatric  Speech:  no latency; no press   Mood & Affect:  steady, euthymic  congruent and appropriate   Thought Process:  normal and logical   Associations:  intact   Thought Content:  normal, no suicidality, no homicidality, delusions, or paranoia   Insight:  intact, has awareness of illness   Judgement:  behavior is adequate to circumstances   Orientation:  grossly intact   Memory: intact for content of interview   Language: grossly intact   Attention Span & Concentration:  able to focus   Fund of Knowledge:  intact and appropriate to age and level of education     Assessment and Diagnosis   Status/Progress: Based on the examination today, the patient's problem(s) is/are adequately but not ideally controlled.  New problems have not been presented today.   Lack of compliance are not complicating management of the primary condition.  There are no active rule-out diagnoses for this patient at this time.     General Impression: Patient with bipolar I disorder and anxiety currently taking Abilify 10 mg daily and Valium 5 mg PRN.  Lanette resolved, still with some depression.  Patient interested in smoking cessation.  Prescribed Wellbutrin  mg qAM for its antidepressant and smoking cessation effects.  Risks/benefits/side effects discussed, including irritability.  Increase melatonin to 10 mg every night as sleep aid.  Patient does not wish to pursue psychotherapy at this time due to lack of transportation.  Informed patient that Valium would not be refilled.  Patient expressed understanding.      ICD-10-CM ICD-9-CM   1. Bipolar 1 disorder  F31.9 296.7   2. Tobacco use  Z72.0 305.1       Intervention/Counseling/Treatment Plan    Medication Management: The risks and benefits of medication were discussed with the patient.  1. Continue Abilify 10 mg daily.  2. Prescribed Wellbutrin  mg qAM for its antidepressant and smoking cessation effects.  Risks/benefits/side effects discussed, including irritability.  3. Discontinue hydroxyzine.  4. Increase melatonin from 5 mg to 10 mg every night.  5. Consider psychotherapy at a later time when transportation is more available.  6. F/u 1m.    Return to Clinic: 1 month

## 2022-03-07 DIAGNOSIS — R51.9 CHRONIC DAILY HEADACHE: ICD-10-CM

## 2022-03-07 RX ORDER — BUTALBITAL, ACETAMINOPHEN AND CAFFEINE 50; 325; 40 MG/1; MG/1; MG/1
TABLET ORAL
Qty: 60 TABLET | Refills: 0 | Status: SHIPPED | OUTPATIENT
Start: 2022-03-07 | End: 2022-03-31

## 2022-03-07 NOTE — TELEPHONE ENCOUNTER
No new care gaps identified.  Powered by [employer]. Reference number: [id number].   3/07/2022 8:54:17 AM CST

## 2022-03-12 NOTE — PROGRESS NOTES
"Rossi your EGD biopsies   Was benign no H pylori no dysplasia but shows a focal point of gastric "intestinal metaplasia" which is a "risk factor" for gastric cancer.   If had several EGDs over the past several years which have not showed anything you of worry.    There is No evidence of stomach cancer and No evidence of dysplasia.  It's only a risk factor.      No H. Pylori on histology from the biopsies.     So consider avoiding BBQ foods, Pickled foods and Salty foods and to eat more fresh fruits and vegetables.     1. Duodenum, biopsy:   Duodenal mucosa with preserved villous architecture and no intraepithelial   lymphocytosis; no diagnostic abnormality.   2. Stomach, biopsy:   Gastric antral mucosa with mild reactive gastropathy and focal intestinal   metaplasia.   Negative for dysplasia.   Gastric oxyntic mucosa with mild reactive changes.   Negative for Helicobacter pylori.   Comment: Interp By Dejuan Figueroa M.D., Signed on 02/08/2022       "

## 2022-03-18 ENCOUNTER — TELEPHONE (OUTPATIENT)
Dept: GASTROENTEROLOGY | Facility: CLINIC | Age: 67
End: 2022-03-18
Payer: MEDICARE

## 2022-03-18 NOTE — TELEPHONE ENCOUNTER
"Contact patient per Rossi Tanner your EGD biopsies   Was benign no H pylori no dysplasia but shows a focal point of gastric "intestinal metaplasia" which is a "risk factor" for gastric cancer.   If had several EGDs over the past several years which have not showed anything you of worry.     There is No evidence of stomach cancer and No evidence of dysplasia.  It's only a risk factor.       No H. Pylori on histology from the biopsies.     So consider avoiding BBQ foods, Pickled foods and Salty foods and to eat more fresh fruits and vegetables.     No answer.   "

## 2022-03-18 NOTE — TELEPHONE ENCOUNTER
"----- Message from Pramod Casanova MD sent at 3/12/2022  3:47 PM CST -----  Rossi your EGD biopsies   Was benign no H pylori no dysplasia but shows a focal point of gastric "intestinal metaplasia" which is a "risk factor" for gastric cancer.   If had several EGDs over the past several years which have not showed anything you of worry.    There is No evidence of stomach cancer and No evidence of dysplasia.  It's only a risk factor.      No H. Pylori on histology from the biopsies.     So consider avoiding BBQ foods, Pickled foods and Salty foods and to eat more fresh fruits and vegetables.     1. Duodenum, biopsy:   Duodenal mucosa with preserved villous architecture and no intraepithelial   lymphocytosis; no diagnostic abnormality.   2. Stomach, biopsy:   Gastric antral mucosa with mild reactive gastropathy and focal intestinal   metaplasia.   Negative for dysplasia.   Gastric oxyntic mucosa with mild reactive changes.   Negative for Helicobacter pylori.   Comment: Interp By Dejuan Figueroa M.D., Signed on 02/08/2022          "

## 2022-03-25 ENCOUNTER — PATIENT OUTREACH (OUTPATIENT)
Dept: ADMINISTRATIVE | Facility: OTHER | Age: 67
End: 2022-03-25
Payer: MEDICARE

## 2022-03-25 DIAGNOSIS — Z12.31 SCREENING MAMMOGRAM FOR BREAST CANCER: Primary | ICD-10-CM

## 2022-03-25 NOTE — PROGRESS NOTES
LINKS immunization registry updated  Care Everywhere updated  Health Maintenance updated  DIS/Chart reviewed for overdue Proactive [facility] health maintenance testing (CRS, Breast Ca, Diabetic Eye Exam)   Orders entered: screening mammogram

## 2022-03-31 DIAGNOSIS — R51.9 CHRONIC DAILY HEADACHE: ICD-10-CM

## 2022-03-31 RX ORDER — BUTALBITAL, ACETAMINOPHEN AND CAFFEINE 50; 325; 40 MG/1; MG/1; MG/1
TABLET ORAL
Qty: 60 TABLET | Refills: 0 | Status: SHIPPED | OUTPATIENT
Start: 2022-04-04 | End: 2022-04-28

## 2022-04-18 ENCOUNTER — PES CALL (OUTPATIENT)
Dept: ADMINISTRATIVE | Facility: CLINIC | Age: 67
End: 2022-04-18
Payer: MEDICARE

## 2022-04-28 DIAGNOSIS — R51.9 CHRONIC DAILY HEADACHE: ICD-10-CM

## 2022-04-28 DIAGNOSIS — I10 HYPERTENSION, ESSENTIAL: ICD-10-CM

## 2022-04-28 RX ORDER — LISINOPRIL 10 MG/1
TABLET ORAL
Qty: 90 TABLET | Refills: 3 | Status: SHIPPED | OUTPATIENT
Start: 2022-04-28

## 2022-04-28 RX ORDER — BUTALBITAL, ACETAMINOPHEN AND CAFFEINE 50; 325; 40 MG/1; MG/1; MG/1
TABLET ORAL
Qty: 60 TABLET | Refills: 0 | Status: SHIPPED | OUTPATIENT
Start: 2022-04-28 | End: 2022-05-23

## 2022-04-28 NOTE — TELEPHONE ENCOUNTER
No new care gaps identified.  Powered by [employer]. Reference number: [id number].   4/28/2022 10:54:20 AM CDT

## 2022-05-13 ENCOUNTER — PES CALL (OUTPATIENT)
Dept: ADMINISTRATIVE | Facility: CLINIC | Age: 67
End: 2022-05-13
Payer: MEDICARE

## 2022-05-23 ENCOUNTER — TELEPHONE (OUTPATIENT)
Dept: PSYCHIATRY | Facility: CLINIC | Age: 67
End: 2022-05-23
Payer: MEDICARE

## 2022-05-23 DIAGNOSIS — R51.9 CHRONIC DAILY HEADACHE: ICD-10-CM

## 2022-05-23 RX ORDER — BUTALBITAL, ACETAMINOPHEN AND CAFFEINE 50; 325; 40 MG/1; MG/1; MG/1
TABLET ORAL
Qty: 60 TABLET | Refills: 0 | Status: SHIPPED | OUTPATIENT
Start: 2022-05-23 | End: 2022-06-27

## 2022-05-23 NOTE — TELEPHONE ENCOUNTER
Rossi left a message asking for a call back. I called patient back on the number requested however there was no answer. I left a voicemail asking her to call back when she was available.

## 2022-05-23 NOTE — TELEPHONE ENCOUNTER
Care Due:                  Date            Visit Type   Department     Provider  --------------------------------------------------------------------------------                                [facility]                              PRIMARY      MEDICINE/  Last Visit: 03-      CARE ([facility])   INTERNAL MED   Leobardo Corcoran  Next Visit: None Scheduled  None         None Found                                                            Last  Test          Frequency    Reason                     Performed    Due Date  --------------------------------------------------------------------------------    Office Visit  12 months..  lisinopriL...............  03-   03-    [facility] Embedded Care Gaps. Reference number: [id number]. 5/23/2022   4:29:50 PM CDT

## 2022-05-24 ENCOUNTER — TELEPHONE (OUTPATIENT)
Dept: ENDOCRINOLOGY | Facility: CLINIC | Age: 67
End: 2022-05-24
Payer: MEDICARE

## 2022-05-24 NOTE — LETTER
May 24, 2022    Rossi Mcneal  [address]  [address]  [place] [address]         [facility] Endocrinology  [address], [address]  [place] [address]  Office:  [phone]  Fax:  [phone]   May 24, 2022        Hello   I was contacted by the infusion center, that you did not get the Reclast infusion treatment on 4/5/22 or  5/6/22.    Like we discussed in clinic visit.  In order to treat your osteoporosis I recommend that you get this infusion once a year.    Please contact [facility]: [phone], to reschedule the Reclast infusion appointment         Sincerely,            Milton Hooks MD  Endocrinology  5/24/2022

## 2022-05-31 ENCOUNTER — PATIENT MESSAGE (OUTPATIENT)
Dept: ADMINISTRATIVE | Facility: HOSPITAL | Age: 67
End: 2022-05-31
Payer: MEDICARE

## 2022-06-27 DIAGNOSIS — R51.9 CHRONIC DAILY HEADACHE: ICD-10-CM

## 2022-06-27 RX ORDER — BUTALBITAL, ACETAMINOPHEN AND CAFFEINE 50; 325; 40 MG/1; MG/1; MG/1
TABLET ORAL
Qty: 60 TABLET | Refills: 0 | Status: SHIPPED | OUTPATIENT
Start: 2022-06-27 | End: 2022-07-25

## 2022-06-27 NOTE — TELEPHONE ENCOUNTER
No new care gaps identified.  [facility] Embedded Care Gaps. Reference number: [id number]. 6/27/2022   9:13:34 AM JOSÉ MANEULT   no

## 2022-07-12 ENCOUNTER — INFUSION (OUTPATIENT)
Dept: INFUSION THERAPY | Facility: HOSPITAL | Age: 67
End: 2022-07-12
Attending: HOSPITALIST
Payer: MEDICARE

## 2022-07-12 VITALS
TEMPERATURE: 98 F | DIASTOLIC BLOOD PRESSURE: 84 MMHG | RESPIRATION RATE: 16 BRPM | SYSTOLIC BLOOD PRESSURE: 121 MMHG | OXYGEN SATURATION: 95 % | HEART RATE: 88 BPM

## 2022-07-12 DIAGNOSIS — M81.0 AGE-RELATED OSTEOPOROSIS WITHOUT CURRENT PATHOLOGICAL FRACTURE: Primary | ICD-10-CM

## 2022-07-12 PROCEDURE — 96374 THER/PROPH/DIAG INJ IV PUSH: CPT

## 2022-07-12 PROCEDURE — 63600175 PHARM REV CODE 636 W HCPCS: Performed by: HOSPITALIST

## 2022-07-12 PROCEDURE — 25000003 PHARM REV CODE 250: Performed by: HOSPITALIST

## 2022-07-12 RX ORDER — SODIUM CHLORIDE 0.9 % (FLUSH) 0.9 %
10 SYRINGE (ML) INJECTION
Status: DISCONTINUED | OUTPATIENT
Start: 2022-07-12 | End: 2022-07-12 | Stop reason: HOSPADM

## 2022-07-12 RX ORDER — ZOLEDRONIC ACID 5 MG/100ML
5 INJECTION, SOLUTION INTRAVENOUS
Status: COMPLETED | OUTPATIENT
Start: 2022-07-12 | End: 2022-07-12

## 2022-07-12 RX ORDER — ZOLEDRONIC ACID 5 MG/100ML
5 INJECTION, SOLUTION INTRAVENOUS
Status: CANCELLED | OUTPATIENT
Start: 2022-07-12

## 2022-07-12 RX ORDER — HEPARIN 100 UNIT/ML
500 SYRINGE INTRAVENOUS
Status: CANCELLED | OUTPATIENT
Start: 2022-07-12

## 2022-07-12 RX ORDER — SODIUM CHLORIDE 0.9 % (FLUSH) 0.9 %
10 SYRINGE (ML) INJECTION
Status: CANCELLED | OUTPATIENT
Start: 2022-07-12

## 2022-07-12 RX ADMIN — ZOLEDRONIC ACID 5 MG: 0.05 INJECTION, SOLUTION INTRAVENOUS at 09:07

## 2022-07-12 RX ADMIN — SODIUM CHLORIDE: 9 INJECTION, SOLUTION INTRAVENOUS at 08:07

## 2022-07-25 DIAGNOSIS — R51.9 CHRONIC DAILY HEADACHE: ICD-10-CM

## 2022-07-25 RX ORDER — BUTALBITAL, ACETAMINOPHEN AND CAFFEINE 50; 325; 40 MG/1; MG/1; MG/1
TABLET ORAL
Qty: 60 TABLET | Refills: 0 | OUTPATIENT
Start: 2022-07-25 | End: 2022-08-20

## 2022-07-25 NOTE — TELEPHONE ENCOUNTER
Care Due:                  Date            Visit Type   Department     Provider  --------------------------------------------------------------------------------                                [facility]                              PRIMARY      MEDICINE/  Last Visit: 03-      CARE ([facility])   INTERNAL MED   Leobardo Corcoran  Next Visit: None Scheduled  None         None Found                                                            Last  Test          Frequency    Reason                     Performed    Due Date  --------------------------------------------------------------------------------    Office Visit  12 months..  lisinopriL...............  03-   03-    [facility] Embedded Care Gaps. Reference number: [id number]. 7/25/2022   8:41:03 AM CDT

## 2022-07-25 NOTE — TELEPHONE ENCOUNTER
Fed 3/4 yogurt and half of glucerna   denies pain  says breathing feels \"rough\"  slightly labored  respirations 24/min  faint wheezes Left message on answering machine to return call to clinic.

## 2022-08-19 ENCOUNTER — TELEPHONE (OUTPATIENT)
Dept: PSYCHIATRY | Facility: CLINIC | Age: 67
End: 2022-08-19
Payer: MEDICARE

## 2022-08-19 RX ORDER — BUPROPION HYDROCHLORIDE 150 MG/1
150 TABLET ORAL DAILY
Qty: 90 TABLET | Refills: 0 | Status: SHIPPED | OUTPATIENT
Start: 2022-08-19 | End: 2022-08-20

## 2022-08-20 ENCOUNTER — HOSPITAL ENCOUNTER (EMERGENCY)
Facility: HOSPITAL | Age: 67
Discharge: HOME | End: 2022-08-20
Attending: EMERGENCY MEDICINE
Payer: MEDICARE

## 2022-08-20 VITALS
OXYGEN SATURATION: 95 % | WEIGHT: 105 LBS | BODY MASS INDEX: 19.32 KG/M2 | HEIGHT: 62 IN | DIASTOLIC BLOOD PRESSURE: 95 MMHG | HEART RATE: 73 BPM | SYSTOLIC BLOOD PRESSURE: 147 MMHG | TEMPERATURE: 98 F | RESPIRATION RATE: 20 BRPM

## 2022-08-20 DIAGNOSIS — R06.02 SOB (SHORTNESS OF BREATH): ICD-10-CM

## 2022-08-20 DIAGNOSIS — G43.909 MIGRAINE WITHOUT STATUS MIGRAINOSUS, NOT INTRACTABLE, UNSPECIFIED MIGRAINE TYPE: Primary | ICD-10-CM

## 2022-08-20 LAB
ALBUMIN SERPL BCP-MCNC: 4.1 G/DL (ref 3.5–5.2)
ALP SERPL-CCNC: 50 U/L (ref 55–135)
ALT SERPL W/O P-5'-P-CCNC: 15 U/L (ref 10–44)
ANION GAP SERPL CALC-SCNC: 11 MMOL/L (ref 8–16)
AST SERPL-CCNC: 22 U/L (ref 10–40)
BASOPHILS # BLD AUTO: 0.04 K/UL (ref 0–0.2)
BASOPHILS NFR BLD: 0.5 % (ref 0–1.9)
BILIRUB SERPL-MCNC: 0.5 MG/DL (ref 0.1–1)
BUN SERPL-MCNC: 12 MG/DL (ref 8–23)
CALCIUM SERPL-MCNC: 9.5 MG/DL (ref 8.7–10.5)
CHLORIDE SERPL-SCNC: 107 MMOL/L (ref 95–110)
CO2 SERPL-SCNC: 22 MMOL/L (ref 23–29)
CREAT SERPL-MCNC: 0.7 MG/DL (ref 0.5–1.4)
CTP QC/QA: YES
DIFFERENTIAL METHOD: ABNORMAL
EOSINOPHIL # BLD AUTO: 0.1 K/UL (ref 0–0.5)
EOSINOPHIL NFR BLD: 0.9 % (ref 0–8)
ERYTHROCYTE [DISTWIDTH] IN BLOOD BY AUTOMATED COUNT: 12.2 % (ref 11.5–14.5)
EST. GFR  (NO RACE VARIABLE): >60 ML/MIN/1.73 M^2
GLUCOSE SERPL-MCNC: 104 MG/DL (ref 70–110)
HCT VFR BLD AUTO: 40.9 % (ref 37–48.5)
HGB BLD-MCNC: 14.4 G/DL (ref 12–16)
IMM GRANULOCYTES # BLD AUTO: 0.03 K/UL (ref 0–0.04)
IMM GRANULOCYTES NFR BLD AUTO: 0.4 % (ref 0–0.5)
LYMPHOCYTES # BLD AUTO: 1.8 K/UL (ref 1–4.8)
LYMPHOCYTES NFR BLD: 20.9 % (ref 18–48)
MCH RBC QN AUTO: 31.6 PG (ref 27–31)
MCHC RBC AUTO-ENTMCNC: 35.2 G/DL (ref 32–36)
MCV RBC AUTO: 90 FL (ref 82–98)
MONOCYTES # BLD AUTO: 0.8 K/UL (ref 0.3–1)
MONOCYTES NFR BLD: 8.9 % (ref 4–15)
NEUTROPHILS # BLD AUTO: 5.8 K/UL (ref 1.8–7.7)
NEUTROPHILS NFR BLD: 68.4 % (ref 38–73)
NRBC BLD-RTO: 0 /100 WBC
PLATELET # BLD AUTO: 237 K/UL (ref 150–450)
PMV BLD AUTO: 9.2 FL (ref 9.2–12.9)
POTASSIUM SERPL-SCNC: 3.5 MMOL/L (ref 3.5–5.1)
PROT SERPL-MCNC: 7.3 G/DL (ref 6–8.4)
RBC # BLD AUTO: 4.55 M/UL (ref 4–5.4)
SARS-COV-2 RDRP RESP QL NAA+PROBE: NEGATIVE
SODIUM SERPL-SCNC: 140 MMOL/L (ref 136–145)
TSH SERPL DL<=0.005 MIU/L-ACNC: 1.16 UIU/ML (ref 0.4–4)
WBC # BLD AUTO: 8.47 K/UL (ref 3.9–12.7)

## 2022-08-20 PROCEDURE — 85025 COMPLETE CBC W/AUTO DIFF WBC: CPT | Performed by: EMERGENCY MEDICINE

## 2022-08-20 PROCEDURE — 63600175 PHARM REV CODE 636 W HCPCS: Performed by: EMERGENCY MEDICINE

## 2022-08-20 PROCEDURE — 25000003 PHARM REV CODE 250: Performed by: EMERGENCY MEDICINE

## 2022-08-20 PROCEDURE — 96361 HYDRATE IV INFUSION ADD-ON: CPT

## 2022-08-20 PROCEDURE — 84443 ASSAY THYROID STIM HORMONE: CPT | Performed by: EMERGENCY MEDICINE

## 2022-08-20 PROCEDURE — 80053 COMPREHEN METABOLIC PANEL: CPT | Performed by: EMERGENCY MEDICINE

## 2022-08-20 PROCEDURE — 96375 TX/PRO/DX INJ NEW DRUG ADDON: CPT

## 2022-08-20 PROCEDURE — U0002 COVID-19 LAB TEST NON-CDC: HCPCS | Performed by: EMERGENCY MEDICINE

## 2022-08-20 PROCEDURE — 96374 THER/PROPH/DIAG INJ IV PUSH: CPT

## 2022-08-20 PROCEDURE — 99284 EMERGENCY DEPT VISIT MOD MDM: CPT | Mod: 25

## 2022-08-20 RX ORDER — BUTALBITAL, ACETAMINOPHEN AND CAFFEINE 50; 325; 40 MG/1; MG/1; MG/1
1 TABLET ORAL
Status: COMPLETED | OUTPATIENT
Start: 2022-08-20 | End: 2022-08-20

## 2022-08-20 RX ORDER — PROCHLORPERAZINE EDISYLATE 5 MG/ML
5 INJECTION INTRAMUSCULAR; INTRAVENOUS EVERY 6 HOURS PRN
Status: DISCONTINUED | OUTPATIENT
Start: 2022-08-20 | End: 2022-08-20 | Stop reason: HOSPADM

## 2022-08-20 RX ORDER — BUTALBITAL, ACETAMINOPHEN AND CAFFEINE 50; 325; 40 MG/1; MG/1; MG/1
1 TABLET ORAL EVERY 4 HOURS PRN
Qty: 20 TABLET | Refills: 0 | Status: SHIPPED | OUTPATIENT
Start: 2022-08-20 | End: 2022-08-26 | Stop reason: SDUPTHER

## 2022-08-20 RX ORDER — KETOROLAC TROMETHAMINE 30 MG/ML
15 INJECTION, SOLUTION INTRAMUSCULAR; INTRAVENOUS
Status: COMPLETED | OUTPATIENT
Start: 2022-08-20 | End: 2022-08-20

## 2022-08-20 RX ORDER — SODIUM CHLORIDE 9 MG/ML
1000 INJECTION, SOLUTION INTRAVENOUS
Status: COMPLETED | OUTPATIENT
Start: 2022-08-20 | End: 2022-08-20

## 2022-08-20 RX ORDER — DIPHENHYDRAMINE HYDROCHLORIDE 50 MG/ML
12.5 INJECTION INTRAMUSCULAR; INTRAVENOUS
Status: COMPLETED | OUTPATIENT
Start: 2022-08-20 | End: 2022-08-20

## 2022-08-20 RX ADMIN — BUTALBITAL, ACETAMINOPHEN AND CAFFEINE 1 TABLET: 50; 325; 40 TABLET ORAL at 01:08

## 2022-08-20 RX ADMIN — SODIUM CHLORIDE 1000 ML: 0.9 INJECTION, SOLUTION INTRAVENOUS at 11:08

## 2022-08-20 RX ADMIN — KETOROLAC TROMETHAMINE 15 MG: 30 INJECTION, SOLUTION INTRAMUSCULAR; INTRAVENOUS at 11:08

## 2022-08-20 RX ADMIN — DIPHENHYDRAMINE HYDROCHLORIDE 12.5 MG: 50 INJECTION INTRAMUSCULAR; INTRAVENOUS at 11:08

## 2022-08-20 NOTE — ED PROVIDER NOTES
"Encounter Date: 8/20/2022    SCRIBE #1 NOTE: I, Roxyjc Camargo, am scribing for, and in the presence of,  Yumiko Eckert MD. I have scribed the following portions of the note - Other sections scribed: HPI, ROS, PE.     History     Chief Complaint   Patient presents with    Fatigue     Patient reports generalized weakness, fatigue, and headache x 2 days. Pt reports she feels like she "is going to pass", reports feeling like that x 2 days. Reports left-sided headache. EMS reports 94% on RA and placed on 2 L NC. EMS reports she was reporting SOB at home. Pt denies SOB at the moment.      This 66 y.o female smoker (1ppd), with a medical history of Addiction to drug, Bipolar 1 disorder, Emphysema, Hepatitis C, Hypertension, and Scoliosis, presents to the ED via EMS transportation c/o an acute, constant, severe (9.5) left sided headache and generalized weakness for the last 2 days. Pt reports that also feeling as though she is going to pass out. She states that she has a history of migraine headaches and is currently taking Fioricet for treatment. Pt notes that the present episode feels like her typical headaches. She reports that she is concerned as the headaches are now occurring more frequently (5x episodes per week) and are worsening. She is not followed by neurology. Of note, pt reports also experiencing shortness of breath, fatigue, a decreased appetite, back spasms (to spine) and tingling sensation to the bilateral feet. Additionally, she states that she recently called her psychiatrist and informed them that she is feeling very depressed. Pt is not on any medications for treatment of depression at this time. She has a follow up appointment with her psychiatrist scheduled for next month. Denies SI/HI/AH/VH.  No alcohol use. Pt denies nausea, emesis, abdominal pain, numbness, or cough. No other associated symptoms.    The history is provided by the patient.   Review of patient's allergies indicates:  No Known " Allergies  Past Medical History:   Diagnosis Date    Addiction to drug     History of prescription pill addiction    Bipolar 1 disorder     Emphysema     Hepatitis C     History of psychiatric hospitalization     Hx of psychiatric care     Hypertension     Psychiatric problem     Scoliosis     Therapy      Past Surgical History:   Procedure Laterality Date    ESOPHAGOGASTRODUODENOSCOPY N/A 9/19/2019    Procedure: ESOPHAGOGASTRODUODENOSCOPY (EGD);  Surgeon: Jose Luis Hyman MD;  Location: [facility];  Service: Endoscopy;  Laterality: N/A;    ESOPHAGOGASTRODUODENOSCOPY N/A 2/3/2022    Procedure: EGD (ESOPHAGOGASTRODUODENOSCOPY);  Surgeon: Pramod Casanova MD;  Location: [facility] ([address] [place] [address]);  Service: Endoscopy;  Laterality: N/A;    HYSTERECTOMY      NECK SURGERY  1990    PARTIAL HYSTERECTOMY  1995     Family History   Problem Relation Age of Onset    Hypertension Mother     Cancer Father     Parkinsonism Father     Breast cancer Maternal Aunt      Social History     Tobacco Use    Smoking status: Every Day     Packs/day: 1.00     Years: 40.00     Pack years: 40.00     Types: Cigarettes    Smokeless tobacco: Never   Substance Use Topics    Alcohol use: No     Comment: Alcoholism and rehab in her 20s    Drug use: No     Comment: History of prescription pill addiction, mostly benzos/opioids/muscle relaxers     Review of Systems   Constitutional:  Positive for appetite change (decreased) and fatigue.   Eyes:  Negative for visual disturbance.   Respiratory:  Positive for shortness of breath.    Gastrointestinal:  Negative for abdominal pain, nausea and vomiting.   Musculoskeletal:  Positive for back pain (spasms to the spine).   Neurological:  Positive for weakness and headaches (left sided). Negative for numbness.        (+) tingling to the bilateral feet   Psychiatric/Behavioral:  Positive for dysphoric mood.      Physical Exam     Initial Vitals [08/20/22 1017]   BP Pulse Resp Temp SpO2   (!) 156/102 89 20 98.1 °F  (36.7 °C) 98 %      MAP       --         Physical Exam    Nursing note and vitals reviewed.  Constitutional: She appears well-developed and well-nourished. She is not diaphoretic. No distress.   She is thin.   HENT:   Head: Normocephalic.   Right Ear: External ear normal.   Left Ear: External ear normal.   Nose: Nose normal.   Mouth/Throat: Oropharynx is clear and moist.   There are abrasions present to the nose.   Eyes: Conjunctivae are normal.   Neck: No thyromegaly present. No tracheal deviation present.   Normal range of motion.  Cardiovascular:  Normal rate and regular rhythm.     Exam reveals no friction rub.       No murmur heard.  Pulmonary/Chest: Breath sounds normal. No stridor. No respiratory distress. She has no wheezes. She has no rhonchi. She has no rales.   Abdominal: Abdomen is soft. Bowel sounds are normal. She exhibits no distension and no mass. There is no abdominal tenderness. There is no rebound and no guarding.   Musculoskeletal:         General: No tenderness or edema. Normal range of motion.      Cervical back: Normal range of motion.     Neurological: She is alert and oriented to person, place, and time. She has normal strength. She displays normal reflexes. No cranial nerve deficit. GCS score is 15. GCS eye subscore is 4. GCS verbal subscore is 5. GCS motor subscore is 6.   Skin: Skin is warm and dry. Capillary refill takes less than 2 seconds. No rash noted. No erythema.   Psychiatric: She has a normal mood and affect.   She has a depressed affect.       ED Course   Procedures  Labs Reviewed   COMPREHENSIVE METABOLIC PANEL - Abnormal; Notable for the following components:       Result Value    CO2 22 (*)     Alkaline Phosphatase 50 (*)     All other components within normal limits   CBC W/ AUTO DIFFERENTIAL - Abnormal; Notable for the following components:    MCH 31.6 (*)     All other components within normal limits   TSH   SARS-COV-2 RDRP GENE          Imaging Results              X-Ray  Chest AP Portable (Final result)  Result time 22 12:55:38      Final result by Yannick Mena MD (22 12:55:38)                   Impression:      1. Chronic appearing coarse interstitial attenuation however less conspicuous than on the previous exam.  No large focal consolidation.      Electronically signed by: Yannick Mena MD  Date:    2022  Time:    12:55               Narrative:    EXAMINATION:  XR CHEST AP PORTABLE    CLINICAL HISTORY:  Shortness of breath    TECHNIQUE:  Single frontal view of the chest was performed.    COMPARISON:  2022    FINDINGS:  The cardiomediastinal silhouette is not enlarged noting calcification of the aorta..  There is no pleural effusion.  The trachea is midline.  The lungs are symmetrically expanded bilaterally with coarse interstitial attenuation, less conspicuous than on the previous exam.  There is left basilar subsegmental atelectasis..  No large focal consolidation seen.  There is no pneumothorax.  The osseous structures are remarkable for degenerative changes noting spinal stabilization hardware..                                       Medications   ketorolac injection 15 mg (15 mg Intravenous Given 22 1108)   diphenhydrAMINE injection 12.5 mg (12.5 mg Intravenous Given 22 1104)   0.9%  NaCl infusion (0 mLs Intravenous Stopped 22 1257)   butalbital-acetaminophen-caffeine -40 mg per tablet 1 tablet (1 tablet Oral Given 22 1323)     Medical Decision Makinyo F with h/o multiple medical problems presents to ED c/o headache and generalized weakness x 2 days. States similar to past HA. Also c/o depression. No Si/HI. Denies trauma. NVI on exam. Has healing abrasions to her nose. DDx includes but not limited to depression, migraine ha, tension ha, dehydration, electrolyte abnl. I doubt CVA, sepsis, meningitis, SAH. No indication for PEC. She has f/u with psychiatrist and is looking to the future. Labs and imaging reviewed.  No acute findings. HA resolved with IVF,  fioricet, benadryl and ketoralac. She is smiling and sitting in a brightly lit room prior to discharge. No red flags of warning signs at this time to suggest acute IC abnl. She will f/u with PCP and mental health. Return precautions given. Stable for discharge home. She agrees with plan  Yumiko Eckert M.D.  5:46 PM 2022          Scribe Attestation:   Scribe #1: I performed the above scribed service and the documentation accurately describes the services I performed. I attest to the accuracy of the note.               Clinical Impression:   Final diagnoses:  [R06.02] SOB (shortness of breath)  [G43.909] Migraine without status migrainosus, not intractable, unspecified migraine type (Primary)        ED Disposition Condition    Discharge Stable          ED Prescriptions       Medication Sig Dispense Start Date End Date Auth. Provider    butalbital-acetaminophen-caffeine -40 mg (FIORICET, ESGIC) -40 mg per tablet () Take 1 tablet by mouth every 4 (four) hours as needed for Pain. 20 tablet 2022 Yumiko Eckert MD          Follow-up Information       Follow up With Specialties Details Why Contact Info    Leobardo Corcoran MD Internal Medicine, Wound Care Schedule an appointment as soon as possible for a visit in 1 week  [address] [place] [id number]  [phone]               I personally performed the services described in this documentation. All medical record entries made by the scribe were at my direction and in my presence. I have reviewed the chart and agree that the record reflects my personal performance and is accurate and complete.     Yumiko Eckert MD  22 [id number]

## 2022-08-24 ENCOUNTER — PATIENT MESSAGE (OUTPATIENT)
Dept: ADMINISTRATIVE | Facility: HOSPITAL | Age: 67
End: 2022-08-24
Payer: MEDICARE

## 2022-08-26 RX ORDER — BUTALBITAL, ACETAMINOPHEN AND CAFFEINE 50; 325; 40 MG/1; MG/1; MG/1
1 TABLET ORAL EVERY 4 HOURS PRN
Qty: 60 TABLET | Refills: 0 | Status: SHIPPED | OUTPATIENT
Start: 2022-08-26 | End: 2022-09-20

## 2022-08-26 NOTE — TELEPHONE ENCOUNTER
No new care gaps identified.  [facility] Embedded Care Gaps. Reference number: [id number]. 8/26/2022   1:38:00 PM CDT

## 2022-09-20 DIAGNOSIS — R51.9 CHRONIC DAILY HEADACHE: Primary | ICD-10-CM

## 2022-09-20 RX ORDER — BUTALBITAL, ACETAMINOPHEN AND CAFFEINE 50; 325; 40 MG/1; MG/1; MG/1
1 TABLET ORAL EVERY 6 HOURS PRN
Qty: 60 TABLET | Refills: 0 | Status: SHIPPED | OUTPATIENT
Start: 2022-09-20 | End: 2022-11-07

## 2022-09-20 NOTE — TELEPHONE ENCOUNTER
No new care gaps identified.  [facility] Embedded Care Gaps. Reference number: [id number]. 9/20/2022   11:07:06 AM SHARDA

## 2022-09-21 ENCOUNTER — OFFICE VISIT (OUTPATIENT)
Dept: PSYCHIATRY | Facility: CLINIC | Age: 67
End: 2022-09-21
Payer: MEDICARE

## 2022-09-21 VITALS — BODY MASS INDEX: 19.52 KG/M2 | HEIGHT: 62 IN

## 2022-09-21 DIAGNOSIS — F41.9 ANXIETY: ICD-10-CM

## 2022-09-21 DIAGNOSIS — F31.9 BIPOLAR 1 DISORDER: Primary | ICD-10-CM

## 2022-09-21 PROCEDURE — 4010F ACE/ARB THERAPY RXD/TAKEN: CPT | Mod: CPTII,S$GLB,, | Performed by: PHYSICIAN ASSISTANT

## 2022-09-21 PROCEDURE — 99999 PR PBB SHADOW E&M-EST. PATIENT-LVL II: ICD-10-PCS | Mod: PBBFAC,,, | Performed by: PHYSICIAN ASSISTANT

## 2022-09-21 PROCEDURE — 4010F PR ACE/ARB THEARPY RXD/TAKEN: ICD-10-PCS | Mod: CPTII,S$GLB,, | Performed by: PHYSICIAN ASSISTANT

## 2022-09-21 PROCEDURE — 99215 PR OFFICE/OUTPT VISIT, EST, LEVL V, 40-54 MIN: ICD-10-PCS | Mod: S$GLB,,, | Performed by: PHYSICIAN ASSISTANT

## 2022-09-21 PROCEDURE — 99999 PR PBB SHADOW E&M-EST. PATIENT-LVL II: CPT | Mod: PBBFAC,,, | Performed by: PHYSICIAN ASSISTANT

## 2022-09-21 PROCEDURE — 3008F PR BODY MASS INDEX (BMI) DOCUMENTED: ICD-10-PCS | Mod: CPTII,S$GLB,, | Performed by: PHYSICIAN ASSISTANT

## 2022-09-21 PROCEDURE — 90833 PSYTX W PT W E/M 30 MIN: CPT | Mod: S$GLB,,, | Performed by: PHYSICIAN ASSISTANT

## 2022-09-21 PROCEDURE — 99215 OFFICE O/P EST HI 40 MIN: CPT | Mod: S$GLB,,, | Performed by: PHYSICIAN ASSISTANT

## 2022-09-21 PROCEDURE — 1159F MED LIST DOCD IN RCRD: CPT | Mod: CPTII,S$GLB,, | Performed by: PHYSICIAN ASSISTANT

## 2022-09-21 PROCEDURE — 1159F PR MEDICATION LIST DOCUMENTED IN MEDICAL RECORD: ICD-10-PCS | Mod: CPTII,S$GLB,, | Performed by: PHYSICIAN ASSISTANT

## 2022-09-21 PROCEDURE — 90833 PR PSYCHOTHERAPY W/PATIENT W/E&M, 30 MIN (ADD ON): ICD-10-PCS | Mod: S$GLB,,, | Performed by: PHYSICIAN ASSISTANT

## 2022-09-21 PROCEDURE — 3008F BODY MASS INDEX DOCD: CPT | Mod: CPTII,S$GLB,, | Performed by: PHYSICIAN ASSISTANT

## 2022-09-21 PROCEDURE — 1160F PR REVIEW ALL MEDS BY PRESCRIBER/CLIN PHARMACIST DOCUMENTED: ICD-10-PCS | Mod: CPTII,S$GLB,, | Performed by: PHYSICIAN ASSISTANT

## 2022-09-21 PROCEDURE — 1160F RVW MEDS BY RX/DR IN RCRD: CPT | Mod: CPTII,S$GLB,, | Performed by: PHYSICIAN ASSISTANT

## 2022-09-21 RX ORDER — OXYCODONE HYDROCHLORIDE 5 MG/1
5 CAPSULE ORAL DAILY
COMMUNITY

## 2022-09-21 RX ORDER — PAROXETINE HYDROCHLORIDE HEMIHYDRATE 12.5 MG/1
12.5 TABLET, FILM COATED, EXTENDED RELEASE ORAL EVERY MORNING
Qty: 90 TABLET | Refills: 0 | Status: SHIPPED | OUTPATIENT
Start: 2022-09-21 | End: 2022-12-12 | Stop reason: SDUPTHER

## 2022-09-21 NOTE — PROGRESS NOTES
"Outpatient Psychiatry Follow-Up Visit (ANGÉLICA)    9/21/2022    Clinical Status of Patient:  Outpatient (Ambulatory)    Chief Complaint:  Rossi Mcneal is a 66 y.o. female who presents today for follow-up of mood disorder and anxiety.  Met with patient and sister Enriqueta .      Interval History and Content of Current Session:  Interim Events/Subjective Report/Content of Current Session:   Patient with bipolar I disorder and anxiety currently taking Abilify 10 mg daily. She is also taking medications prescribed by other providers including Oxycodone 5 mg daily, Fioricet -40 mg up to TID, and gabapentin 300 mg daily. She reports the Wellbutrin  mg prescribed at our last visit made her feel anxious so she stopped it. She endorses having been depressed for a long time. She is not eating or sleeping well (2-4 hours/night), cries a lot and doesn't smile anymore. She lives alone, doesn't have a car, and doesn't see her friends often. She has been alone for so long, that whenever she is in public (grocery store), she has panic attacks. She states "I'm alone and don't have a purpose." She denies SI, but prays every day in case she doesn't wake up. She has had thoughts of "Lord just take me now." She states "I'm not going to end my life because I'm not going to hell."She is very grateful that her sister calls her everyday. She applied to a senior living facility and plans to move in. She is hopeful that living in a community will help a lot with the loneliness. She repeatedly asked about Klonipin prn for her anxiety. She also wants to start Paxil since it worked for her in the past.     Psychotherapy:  Target symptoms: depression, mood disorder  Why chosen therapy is appropriate versus another modality: relevant to diagnosis  Outcome monitoring methods: self-report  Therapeutic intervention type: insight oriented psychotherapy, behavior modifying psychotherapy, supportive psychotherapy  Topics discussed/themes: " "stress related to medical comorbidities  The patient's response to the intervention is accepting. The patient's progress toward treatment goals is fair.   Duration of intervention: 17 minutes.      Review of Systems   PSYCHIATRIC: Pertinant items are noted in the narrative.  CONSTITUTIONAL: No weight gain or loss.   MUSCULOSKELETAL: Positive for pain.  NEUROLOGIC: No weakness, sensory changes, seizures, confusion, memory loss, tremor or other abnormal movements.    Past Medical, Family and Social History: The patient's past medical, family and social history have been reviewed and updated as appropriate within the electronic medical record - see encounter notes.    Compliance: yes    Side effects: None    Risk Parameters:  Patient reports no suicidal ideation  Patient reports no homicidal ideation  Patient reports no self-injurious behavior  Patient reports no violent behavior    Exam (detailed: at least 9 elements; comprehensive: all 15 elements)   Constitutional  Vitals:  Most recent vital signs, dated less than 90 days prior to this appointment, were reviewed.   Vitals:    09/21/22 1053   Height: 5' 1.5" (1.562 m)        General:  unremarkable, age appropriate, well dressed     Musculoskeletal  Muscle Strength/Tone:  no tremor, no tic   Gait & Station:  non-ataxic     Psychiatric  Speech:  no latency; no press   Mood & Affect:  anxious, sad  congruent and appropriate, increased in intensity   Thought Process:  normal and logical   Associations:  intact   Thought Content:  suicidal thoughts: (passive-yes)   Insight:  has awareness of illness, lack of insight to polypharmacy use   Judgement: behavior is adequate to circumstances   Orientation:  grossly intact   Memory: intact for content of interview   Language: grossly intact   Attention Span & Concentration:  able to focus   Fund of Knowledge:  intact and appropriate to age and level of education     Assessment and Diagnosis   Status/Progress: Based on the " examination today, the patient's problem(s) is/are inadequately controlled.  New problems have not been presented today.   Lack of compliance are not complicating management of the primary condition.  There are no active rule-out diagnoses for this patient at this time.     General Impression: Patient with bipolar I disorder and anxiety currently taking Abilify 10 mg daily. Depression and anxiety is not well controlled. No robb noted. Will continue Abilify and add Paxil to target anxiety and depression. Discussed with patient that the current medications she is taking for pain and migraines (Oxycodone 5 mg daily and Fioricet -40 mg TID daily) puts her at an increased risk for overdose and she should try to find different options with a lower risk. Pt seems to lack insight in the dangers of her multiple controlled substance use by continuing to bargain with me for 0.5 mg Klonopin. Will not prescribe other controlled substances to her. Will follow up in 1 month.       ICD-10-CM ICD-9-CM   1. Bipolar 1 disorder  F31.9 296.7   2. Anxiety  F41.9 300.00       Start Paxil CR 12.5 mg. Will increase to two tablets (25 mg) daily after one week. Risk and benefits discussed which include but are not limited to sexual dysfunction, GI upset, sedation, insomnia, HA, tremors, and weight gain  Continue Abilify 10 mg daily  Follow up in 1 month    Intervention/Counseling/Treatment Plan    Medication Management: The risks and benefits of medication were discussed with the patient.    Return to Clinic: 1 month    I have personally reviewed records from multiple other providers as noted in the patient's chart.  I have independently reviewed and interpreted multiple diagnostic results as listed in the chart.  History was provided by and additional independent source.

## 2022-09-27 ENCOUNTER — PATIENT MESSAGE (OUTPATIENT)
Dept: NEUROSURGERY | Facility: CLINIC | Age: 67
End: 2022-09-27
Payer: MEDICARE

## 2022-09-27 ENCOUNTER — TELEPHONE (OUTPATIENT)
Dept: NEUROSURGERY | Facility: CLINIC | Age: 67
End: 2022-09-27
Payer: MEDICARE

## 2022-09-27 DIAGNOSIS — M54.12 CERVICAL MYELOPATHY WITH CERVICAL RADICULOPATHY: Primary | ICD-10-CM

## 2022-09-27 DIAGNOSIS — M48.02 CERVICAL STENOSIS OF SPINE: Chronic | ICD-10-CM

## 2022-09-27 DIAGNOSIS — M48.02 CERVICAL STENOSIS OF SPINE: Primary | Chronic | ICD-10-CM

## 2022-09-27 DIAGNOSIS — G95.9 CERVICAL MYELOPATHY WITH CERVICAL RADICULOPATHY: Primary | ICD-10-CM

## 2022-09-27 NOTE — PROGRESS NOTES
Patient with known cervical stenosis with worsening parathesia of bilateral hands over due for neurosurgery follow up referral placed

## 2022-10-10 ENCOUNTER — PATIENT MESSAGE (OUTPATIENT)
Dept: ADMINISTRATIVE | Facility: HOSPITAL | Age: 67
End: 2022-10-10
Payer: MEDICARE

## 2022-11-07 DIAGNOSIS — R51.9 CHRONIC DAILY HEADACHE: ICD-10-CM

## 2022-11-07 RX ORDER — BUTALBITAL, ACETAMINOPHEN AND CAFFEINE 50; 325; 40 MG/1; MG/1; MG/1
TABLET ORAL
Qty: 60 TABLET | Refills: 0 | Status: SHIPPED | OUTPATIENT
Start: 2022-11-07 | End: 2022-11-25 | Stop reason: SDUPTHER

## 2022-11-07 NOTE — TELEPHONE ENCOUNTER
Care Due:                  Date            Visit Type   Department     Provider  --------------------------------------------------------------------------------                                [facility]                              PRIMARY      MEDICINE/  Last Visit: 03-      CARE ([facility])   INTERNAL MED   Leobardo Corcoran  Next Visit: None Scheduled  None         None Found                                                            Last  Test          Frequency    Reason                     Performed    Due Date  --------------------------------------------------------------------------------    Office Visit  12 months..  lisinopriL...............  03-   03-    [facility] Embedded Care Gaps. Reference number: [id number]. 11/07/2022   9:38:56 AM CST

## 2022-11-11 ENCOUNTER — TELEPHONE (OUTPATIENT)
Dept: HEPATOLOGY | Facility: CLINIC | Age: 67
End: 2022-11-11
Payer: MEDICARE

## 2022-11-11 ENCOUNTER — OFFICE VISIT (OUTPATIENT)
Dept: FAMILY MEDICINE | Facility: CLINIC | Age: 67
End: 2022-11-11
Payer: MEDICARE

## 2022-11-11 VITALS
WEIGHT: 96.31 LBS | DIASTOLIC BLOOD PRESSURE: 80 MMHG | SYSTOLIC BLOOD PRESSURE: 136 MMHG | HEART RATE: 84 BPM | TEMPERATURE: 98 F | HEIGHT: 62 IN | BODY MASS INDEX: 17.72 KG/M2 | OXYGEN SATURATION: 95 % | RESPIRATION RATE: 16 BRPM

## 2022-11-11 DIAGNOSIS — K74.69 COMPENSATED CIRRHOSIS RELATED TO HEPATITIS C VIRUS (HCV): ICD-10-CM

## 2022-11-11 DIAGNOSIS — Z72.0 TOBACCO USE: ICD-10-CM

## 2022-11-11 DIAGNOSIS — R49.0 HOARSENESS: ICD-10-CM

## 2022-11-11 DIAGNOSIS — K13.70 ORAL LESION: Primary | ICD-10-CM

## 2022-11-11 DIAGNOSIS — B19.20 COMPENSATED CIRRHOSIS RELATED TO HEPATITIS C VIRUS (HCV): ICD-10-CM

## 2022-11-11 DIAGNOSIS — K31.A0 GASTRIC INTESTINAL METAPLASIA: ICD-10-CM

## 2022-11-11 PROBLEM — R93.3 ABNORMAL FINDING ON GI TRACT IMAGING: Status: RESOLVED | Noted: 2017-11-13 | Resolved: 2022-11-11

## 2022-11-11 PROBLEM — F30.9 MANIC EPISODE: Status: RESOLVED | Noted: 2022-02-01 | Resolved: 2022-11-11

## 2022-11-11 PROBLEM — G95.9 CERVICAL MYELOPATHY WITH CERVICAL RADICULOPATHY: Status: RESOLVED | Noted: 2018-06-28 | Resolved: 2022-11-11

## 2022-11-11 PROBLEM — J96.01 ACUTE HYPOXEMIC RESPIRATORY FAILURE: Status: RESOLVED | Noted: 2020-06-16 | Resolved: 2022-11-11

## 2022-11-11 PROBLEM — A41.9 SEPSIS: Status: RESOLVED | Noted: 2022-02-01 | Resolved: 2022-11-11

## 2022-11-11 PROBLEM — M54.12 CERVICAL MYELOPATHY WITH CERVICAL RADICULOPATHY: Status: RESOLVED | Noted: 2018-06-28 | Resolved: 2022-11-11

## 2022-11-11 PROBLEM — K92.1 MELENA: Status: RESOLVED | Noted: 2022-02-02 | Resolved: 2022-11-11

## 2022-11-11 PROBLEM — M75.82 ROTATOR CUFF TENDINITIS, LEFT: Status: RESOLVED | Noted: 2018-12-15 | Resolved: 2022-11-11

## 2022-11-11 PROBLEM — M54.2 PAINFUL CERVICAL ROM: Status: RESOLVED | Noted: 2018-07-27 | Resolved: 2022-11-11

## 2022-11-11 PROCEDURE — 3008F BODY MASS INDEX DOCD: CPT | Mod: CPTII,S$GLB,, | Performed by: NURSE PRACTITIONER

## 2022-11-11 PROCEDURE — 3079F DIAST BP 80-89 MM HG: CPT | Mod: CPTII,S$GLB,, | Performed by: NURSE PRACTITIONER

## 2022-11-11 PROCEDURE — 1101F PT FALLS ASSESS-DOCD LE1/YR: CPT | Mod: CPTII,S$GLB,, | Performed by: NURSE PRACTITIONER

## 2022-11-11 PROCEDURE — 3079F PR MOST RECENT DIASTOLIC BLOOD PRESSURE 80-89 MM HG: ICD-10-PCS | Mod: CPTII,S$GLB,, | Performed by: NURSE PRACTITIONER

## 2022-11-11 PROCEDURE — 3288F FALL RISK ASSESSMENT DOCD: CPT | Mod: CPTII,S$GLB,, | Performed by: NURSE PRACTITIONER

## 2022-11-11 PROCEDURE — 1101F PR PT FALLS ASSESS DOC 0-1 FALLS W/OUT INJ PAST YR: ICD-10-PCS | Mod: CPTII,S$GLB,, | Performed by: NURSE PRACTITIONER

## 2022-11-11 PROCEDURE — 3075F SYST BP GE 130 - 139MM HG: CPT | Mod: CPTII,S$GLB,, | Performed by: NURSE PRACTITIONER

## 2022-11-11 PROCEDURE — 1125F AMNT PAIN NOTED PAIN PRSNT: CPT | Mod: CPTII,S$GLB,, | Performed by: NURSE PRACTITIONER

## 2022-11-11 PROCEDURE — 3008F PR BODY MASS INDEX (BMI) DOCUMENTED: ICD-10-PCS | Mod: CPTII,S$GLB,, | Performed by: NURSE PRACTITIONER

## 2022-11-11 PROCEDURE — 99999 PR PBB SHADOW E&M-EST. PATIENT-LVL V: CPT | Mod: PBBFAC,,, | Performed by: NURSE PRACTITIONER

## 2022-11-11 PROCEDURE — 1160F PR REVIEW ALL MEDS BY PRESCRIBER/CLIN PHARMACIST DOCUMENTED: ICD-10-PCS | Mod: CPTII,S$GLB,, | Performed by: NURSE PRACTITIONER

## 2022-11-11 PROCEDURE — 4010F PR ACE/ARB THEARPY RXD/TAKEN: ICD-10-PCS | Mod: CPTII,S$GLB,, | Performed by: NURSE PRACTITIONER

## 2022-11-11 PROCEDURE — 1159F PR MEDICATION LIST DOCUMENTED IN MEDICAL RECORD: ICD-10-PCS | Mod: CPTII,S$GLB,, | Performed by: NURSE PRACTITIONER

## 2022-11-11 PROCEDURE — 99214 PR OFFICE/OUTPT VISIT, EST, LEVL IV, 30-39 MIN: ICD-10-PCS | Mod: S$GLB,,, | Performed by: NURSE PRACTITIONER

## 2022-11-11 PROCEDURE — 99999 PR PBB SHADOW E&M-EST. PATIENT-LVL V: ICD-10-PCS | Mod: PBBFAC,,, | Performed by: NURSE PRACTITIONER

## 2022-11-11 PROCEDURE — 3075F PR MOST RECENT SYSTOLIC BLOOD PRESS GE 130-139MM HG: ICD-10-PCS | Mod: CPTII,S$GLB,, | Performed by: NURSE PRACTITIONER

## 2022-11-11 PROCEDURE — 4010F ACE/ARB THERAPY RXD/TAKEN: CPT | Mod: CPTII,S$GLB,, | Performed by: NURSE PRACTITIONER

## 2022-11-11 PROCEDURE — 1160F RVW MEDS BY RX/DR IN RCRD: CPT | Mod: CPTII,S$GLB,, | Performed by: NURSE PRACTITIONER

## 2022-11-11 PROCEDURE — 3288F PR FALLS RISK ASSESSMENT DOCUMENTED: ICD-10-PCS | Mod: CPTII,S$GLB,, | Performed by: NURSE PRACTITIONER

## 2022-11-11 PROCEDURE — 1125F PR PAIN SEVERITY QUANTIFIED, PAIN PRESENT: ICD-10-PCS | Mod: CPTII,S$GLB,, | Performed by: NURSE PRACTITIONER

## 2022-11-11 PROCEDURE — 99214 OFFICE O/P EST MOD 30 MIN: CPT | Mod: S$GLB,,, | Performed by: NURSE PRACTITIONER

## 2022-11-11 PROCEDURE — 1159F MED LIST DOCD IN RCRD: CPT | Mod: CPTII,S$GLB,, | Performed by: NURSE PRACTITIONER

## 2022-11-11 NOTE — PROGRESS NOTES
Subjective:      Patient ID: Rossi Mcneal is a 66 y.o. female.  New to me but seen previously in clinic by a fellow provider. Pt presents to clinic with tongue pain with gum swelling and hoarse voice that began over 2 weeks ago with worsening. Also has hx of hep C and needs to f/u with hematology. States she is moving to [place] in a few days and wants to see a MD on the [facility].     Review of Systems   Constitutional:  Positive for appetite change. Negative for activity change, chills, diaphoresis, fatigue, fever and unexpected weight change.   HENT:  Positive for dental problem, mouth sores, trouble swallowing and voice change. Negative for nasal congestion, drooling, facial swelling, mouth dryness, postnasal drip, rhinorrhea, sinus pressure/congestion, sneezing and sore throat.    Respiratory:  Negative for cough, chest tightness, shortness of breath and wheezing.    Cardiovascular:  Negative for chest pain, palpitations, leg swelling and claudication.   Gastrointestinal:  Negative for abdominal pain, change in bowel habit, constipation, diarrhea, nausea, vomiting and change in bowel habit.   Genitourinary: Negative.  Negative for difficulty urinating and dysuria.   Musculoskeletal:  Positive for arthralgias, back pain, gait problem, neck pain, neck stiffness and joint deformity. Negative for joint swelling and myalgias.        Chronic pain under the care of pain management    Integumentary:  Negative for rash.   Neurological:  Negative for headaches.   Psychiatric/Behavioral:  Positive for agitation and dysphoric mood. Negative for behavioral problems, confusion, decreased concentration, hallucinations, self-injury, sleep disturbance and suicidal ideas. The patient is nervous/anxious. The patient is not hyperactive.         Under the care of psychiatry without acute exacerbation, pt expressed excitement about upcoming move   All other systems reviewed and are negative.      Objective:     Vitals:     "11/11/22 1048   BP: 136/80   Pulse: 84   Resp: 16   Temp: 98.4 °F (36.9 °C)   TempSrc: Oral   SpO2: 95%   Weight: 43.7 kg (96 lb 5.5 oz)   Height: 5' 1.5" (1.562 m)     Physical Exam  Vitals and nursing note reviewed.   Constitutional:       General: She is not in acute distress.     Appearance: Normal appearance. She is well-developed, well-groomed and underweight. She is not ill-appearing.   HENT:      Head: Normocephalic and atraumatic.      Salivary Glands: Right salivary gland is not diffusely enlarged or tender. Left salivary gland is not diffusely enlarged or tender.      Right Ear: External ear normal.      Left Ear: External ear normal.      Nose: Nose normal.      Mouth/Throat:      Lips: Pink.      Mouth: Mucous membranes are dry.      Dentition: Abnormal dentition. Has dentures. Gingival swelling present. No dental abscesses.      Tongue: Lesions present. Tongue does not deviate from midline.      Pharynx: Oropharynx is clear. Uvula midline.     Eyes:      General: Lids are normal. Vision grossly intact. Gaze aligned appropriately.      Conjunctiva/sclera: Conjunctivae normal.      Pupils: Pupils are equal, round, and reactive to light.   Neck:      Trachea: Trachea normal.      Comments: Voice hoarse and raspy  Cardiovascular:      Rate and Rhythm: Normal rate and regular rhythm.      Heart sounds: Normal heart sounds.   Pulmonary:      Effort: Pulmonary effort is normal. No accessory muscle usage or respiratory distress.      Breath sounds: Normal breath sounds and air entry.   Abdominal:      General: Abdomen is flat. Bowel sounds are normal. There is no distension.      Palpations: Abdomen is soft.      Tenderness: There is no abdominal tenderness.   Musculoskeletal:      Cervical back: Neck supple.      Right lower leg: No edema.      Left lower leg: No edema.   Lymphadenopathy:      Cervical: No cervical adenopathy.   Skin:     General: Skin is warm and dry.      Findings: No rash.   Neurological: "      General: No focal deficit present.      Mental Status: She is alert and oriented to person, place, and time. Mental status is at baseline.      Sensory: Sensation is intact.      Motor: Motor function is intact.      Coordination: Coordination is intact.      Gait: Gait is intact.   Psychiatric:         Attention and Perception: Attention and perception normal.         Mood and Affect: Mood and affect normal.         Speech: Speech normal.         Behavior: Behavior normal. Behavior is cooperative.         Thought Content: Thought content normal.         Cognition and Memory: Cognition and memory normal.         Judgment: Judgment normal.     Assessment and Plan:     1. Oral lesion  No signs of infection  - Ambulatory referral/consult to ENT; Future  - diphenhydrAMINE-aluminum-magnesium hydroxide-simethicone-LIDOcaine HCl 2%; Swish and spit 15 mLs every 4 (four) hours as needed.  Dispense: 450 each; Refill: 0    2. Hoarseness  Worsening with new oral manifestation, f/u with ENT for further evaluation   - Ambulatory referral/consult to ENT; Future  - diphenhydrAMINE-aluminum-magnesium hydroxide-simethicone-LIDOcaine HCl 2%; Swish and spit 15 mLs every 4 (four) hours as needed.  Dispense: 450 each; Refill: 0    3. Gastric intestinal metaplasia  Without acute dyspepsia, f/u with GI for further eval  - Ambulatory referral/consult to ENT; Future    4. 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 Tobacco use  Continues to smoke cigarettes daily with no plans to quit            KIERSTEN Salgado, FNP-C  Family/Internal Medicine  [facility]

## 2022-11-11 NOTE — TELEPHONE ENCOUNTER
Justine Petty NP ordered that that patient be seen again in hepatology.  Patient last seen by ORLANDO Berg in 2021 for cirrhosis.  Attempt made to reach her for scheduling.  LVM and sent letter asking that she call hepatology.

## 2022-11-15 NOTE — TELEPHONE ENCOUNTER
I spoke with patient and attempted to setup f/u visit with NP Morena.  She states that she knows she's overdue for hcc screening but will call hepatology back for scheduling.  Letter already sent to her reminding her to call back on 11/11/22.

## 2022-11-16 ENCOUNTER — TELEPHONE (OUTPATIENT)
Dept: HEPATOLOGY | Facility: CLINIC | Age: 67
End: 2022-11-16
Payer: MEDICARE

## 2022-11-16 DIAGNOSIS — B19.20 COMPENSATED CIRRHOSIS RELATED TO HEPATITIS C VIRUS (HCV): Primary | ICD-10-CM

## 2022-11-16 DIAGNOSIS — K74.69 COMPENSATED CIRRHOSIS RELATED TO HEPATITIS C VIRUS (HCV): Primary | ICD-10-CM

## 2022-11-16 NOTE — TELEPHONE ENCOUNTER
Pt overdue for f/u, please contact pt to schedule f/u with me soon with labs and US 1 week before her appt     Thanks

## 2022-11-16 NOTE — TELEPHONE ENCOUNTER
Called patient to get an appointment. Patient stated that she is moving and she can't set a good date yet to schedule an appointment. She will call us whenever she is ready to get an appointment.

## 2022-11-25 DIAGNOSIS — R51.9 CHRONIC DAILY HEADACHE: ICD-10-CM

## 2022-11-25 RX ORDER — BUTALBITAL, ACETAMINOPHEN AND CAFFEINE 50; 325; 40 MG/1; MG/1; MG/1
1 TABLET ORAL EVERY 6 HOURS PRN
Qty: 60 TABLET | Refills: 0 | Status: SHIPPED | OUTPATIENT
Start: 2022-12-05 | End: 2022-11-25 | Stop reason: SDUPTHER

## 2022-11-25 RX ORDER — BUTALBITAL, ACETAMINOPHEN AND CAFFEINE 50; 325; 40 MG/1; MG/1; MG/1
1 TABLET ORAL EVERY 6 HOURS PRN
Qty: 60 TABLET | Refills: 0 | Status: SHIPPED | OUTPATIENT
Start: 2022-12-05

## 2022-11-25 NOTE — TELEPHONE ENCOUNTER
No new care gaps identified.  [facility] Embedded Care Gaps. Reference number: [id number]. 11/25/2022   11:13:26 AM CST

## 2022-11-25 NOTE — TELEPHONE ENCOUNTER
----- Message from Lynette Lake sent at 11/25/2022 11:06 AM CST -----  Regarding: self [phone]  Type: RX Refill Request    Who Called:  self     Have you contacted your pharmacy: yes    Refill or New Rx: refill     RX Name and Strength:butalbital-acetaminophen-caffeine -40 mg (FIORICET, ESGIC) -40 mg per tablet    Preferred Pharmacy with phone number: [facility] pharmacy  [phone] (new pharmacy pt moved )    Local or Mail Order: local     Would the patient rather a call back or a response via [facility]?  Call back    Best Call Back Number: [phone]

## 2022-11-28 ENCOUNTER — TELEPHONE (OUTPATIENT)
Dept: NEUROSURGERY | Facility: CLINIC | Age: 67
End: 2022-11-28
Payer: MEDICARE

## 2022-11-28 ENCOUNTER — OFFICE VISIT (OUTPATIENT)
Dept: PAIN MEDICINE | Facility: CLINIC | Age: 67
End: 2022-11-28
Payer: MEDICARE

## 2022-11-28 VITALS
BODY MASS INDEX: 17.87 KG/M2 | HEART RATE: 70 BPM | WEIGHT: 97.13 LBS | SYSTOLIC BLOOD PRESSURE: 157 MMHG | DIASTOLIC BLOOD PRESSURE: 99 MMHG | HEIGHT: 62 IN

## 2022-11-28 DIAGNOSIS — G89.29 CHRONIC BILATERAL LOW BACK PAIN WITH BILATERAL SCIATICA: ICD-10-CM

## 2022-11-28 DIAGNOSIS — M54.6 PAIN IN THORACIC SPINE: ICD-10-CM

## 2022-11-28 DIAGNOSIS — M54.41 CHRONIC BILATERAL LOW BACK PAIN WITH BILATERAL SCIATICA: ICD-10-CM

## 2022-11-28 DIAGNOSIS — M54.12 CERVICAL RADICULOPATHY: ICD-10-CM

## 2022-11-28 DIAGNOSIS — M54.2 CERVICALGIA: Primary | ICD-10-CM

## 2022-11-28 DIAGNOSIS — M41.9 SCOLIOSIS OF THORACOLUMBAR SPINE, UNSPECIFIED SCOLIOSIS TYPE: ICD-10-CM

## 2022-11-28 DIAGNOSIS — M54.42 CHRONIC BILATERAL LOW BACK PAIN WITH BILATERAL SCIATICA: ICD-10-CM

## 2022-11-28 DIAGNOSIS — M54.9 DORSALGIA, UNSPECIFIED: ICD-10-CM

## 2022-11-28 PROCEDURE — 1160F RVW MEDS BY RX/DR IN RCRD: CPT | Mod: CPTII,S$GLB,, | Performed by: PHYSICIAN ASSISTANT

## 2022-11-28 PROCEDURE — 4010F ACE/ARB THERAPY RXD/TAKEN: CPT | Mod: CPTII,S$GLB,, | Performed by: PHYSICIAN ASSISTANT

## 2022-11-28 PROCEDURE — 1101F PT FALLS ASSESS-DOCD LE1/YR: CPT | Mod: CPTII,S$GLB,, | Performed by: PHYSICIAN ASSISTANT

## 2022-11-28 PROCEDURE — 1101F PR PT FALLS ASSESS DOC 0-1 FALLS W/OUT INJ PAST YR: ICD-10-PCS | Mod: CPTII,S$GLB,, | Performed by: PHYSICIAN ASSISTANT

## 2022-11-28 PROCEDURE — 1159F MED LIST DOCD IN RCRD: CPT | Mod: CPTII,S$GLB,, | Performed by: PHYSICIAN ASSISTANT

## 2022-11-28 PROCEDURE — 1160F PR REVIEW ALL MEDS BY PRESCRIBER/CLIN PHARMACIST DOCUMENTED: ICD-10-PCS | Mod: CPTII,S$GLB,, | Performed by: PHYSICIAN ASSISTANT

## 2022-11-28 PROCEDURE — 3080F PR MOST RECENT DIASTOLIC BLOOD PRESSURE >= 90 MM HG: ICD-10-PCS | Mod: CPTII,S$GLB,, | Performed by: PHYSICIAN ASSISTANT

## 2022-11-28 PROCEDURE — 99215 PR OFFICE/OUTPT VISIT, EST, LEVL V, 40-54 MIN: ICD-10-PCS | Mod: S$GLB,,, | Performed by: PHYSICIAN ASSISTANT

## 2022-11-28 PROCEDURE — 1125F PR PAIN SEVERITY QUANTIFIED, PAIN PRESENT: ICD-10-PCS | Mod: CPTII,S$GLB,, | Performed by: PHYSICIAN ASSISTANT

## 2022-11-28 PROCEDURE — 3008F BODY MASS INDEX DOCD: CPT | Mod: CPTII,S$GLB,, | Performed by: PHYSICIAN ASSISTANT

## 2022-11-28 PROCEDURE — 4010F PR ACE/ARB THEARPY RXD/TAKEN: ICD-10-PCS | Mod: CPTII,S$GLB,, | Performed by: PHYSICIAN ASSISTANT

## 2022-11-28 PROCEDURE — 3008F PR BODY MASS INDEX (BMI) DOCUMENTED: ICD-10-PCS | Mod: CPTII,S$GLB,, | Performed by: PHYSICIAN ASSISTANT

## 2022-11-28 PROCEDURE — 3080F DIAST BP >= 90 MM HG: CPT | Mod: CPTII,S$GLB,, | Performed by: PHYSICIAN ASSISTANT

## 2022-11-28 PROCEDURE — 99999 PR PBB SHADOW E&M-EST. PATIENT-LVL V: CPT | Mod: PBBFAC,,, | Performed by: PHYSICIAN ASSISTANT

## 2022-11-28 PROCEDURE — 99215 OFFICE O/P EST HI 40 MIN: CPT | Mod: S$GLB,,, | Performed by: PHYSICIAN ASSISTANT

## 2022-11-28 PROCEDURE — 1159F PR MEDICATION LIST DOCUMENTED IN MEDICAL RECORD: ICD-10-PCS | Mod: CPTII,S$GLB,, | Performed by: PHYSICIAN ASSISTANT

## 2022-11-28 PROCEDURE — 3077F PR MOST RECENT SYSTOLIC BLOOD PRESSURE >= 140 MM HG: ICD-10-PCS | Mod: CPTII,S$GLB,, | Performed by: PHYSICIAN ASSISTANT

## 2022-11-28 PROCEDURE — 3288F PR FALLS RISK ASSESSMENT DOCUMENTED: ICD-10-PCS | Mod: CPTII,S$GLB,, | Performed by: PHYSICIAN ASSISTANT

## 2022-11-28 PROCEDURE — 1125F AMNT PAIN NOTED PAIN PRSNT: CPT | Mod: CPTII,S$GLB,, | Performed by: PHYSICIAN ASSISTANT

## 2022-11-28 PROCEDURE — 3288F FALL RISK ASSESSMENT DOCD: CPT | Mod: CPTII,S$GLB,, | Performed by: PHYSICIAN ASSISTANT

## 2022-11-28 PROCEDURE — 3077F SYST BP >= 140 MM HG: CPT | Mod: CPTII,S$GLB,, | Performed by: PHYSICIAN ASSISTANT

## 2022-11-28 PROCEDURE — 99999 PR PBB SHADOW E&M-EST. PATIENT-LVL V: ICD-10-PCS | Mod: PBBFAC,,, | Performed by: PHYSICIAN ASSISTANT

## 2022-11-28 RX ORDER — TIZANIDINE 4 MG/1
4 TABLET ORAL EVERY 12 HOURS PRN
Qty: 40 TABLET | Refills: 0 | Status: SHIPPED | OUTPATIENT
Start: 2022-11-28

## 2022-11-28 NOTE — TELEPHONE ENCOUNTER
"Spoke w/ pt via telephone to schedule appointment from referral. Explained that Dr. Toledo' next available appointment would be late Jan early Feb. Offered to schedule w/ E. ORLANDO Duong. Pt declined to schedule any appointment @ this time as she is "out and about". Pt to call back to schedule.    ----- Message from Mary Negron sent at 11/28/2022 10:02 AM CST -----  Contact: pt  Pt requesting a callback to get appt pt has referral in the system           Confirmed contact below:  Contact Name:Rossi Mcneal  Phone Number: [phone]       "

## 2022-11-29 ENCOUNTER — TELEPHONE (OUTPATIENT)
Dept: PAIN MEDICINE | Facility: CLINIC | Age: 67
End: 2022-11-29
Payer: MEDICARE

## 2022-11-29 NOTE — TELEPHONE ENCOUNTER
----- Message from Natalie Carmona sent at 11/29/2022 12:50 PM CST -----  Type: Needs Medical Advice  Who Called:  pt  Symptoms (please be specific):  pt said she called the pharmacy and they do not have her tiZANidine (ZANAFLEX) 4 MG tablet said she wanted to know was it going to be sent in today--please call and advise  Pharmacy name and phone #:    [facility] - [place] - [address]  [address]  [place] [address]  Phone: [phone] Fax: [phone]      Best Call Back Number: [phone] (home)     Additional Information: thank you

## 2022-11-29 NOTE — TELEPHONE ENCOUNTER
I notified Ms. Mcneal that I checked with the pharmacy and the Tizanidine is ready for her to , she indicated understanding.

## 2022-11-29 NOTE — PROGRESS NOTES
Back and Spine New Patient    Patient ID: Rossi Mcneal is a 66 y.o. female.    Chief Complaint   Patient presents with    Neck Pain    Low-back Pain       Review of Systems   Constitutional:  Negative for activity change, appetite change, chills, fever and unexpected weight change.   HENT:  Negative for tinnitus, trouble swallowing and voice change.    Respiratory:  Negative for apnea, cough, chest tightness and shortness of breath.    Cardiovascular:  Negative for chest pain and palpitations.   Gastrointestinal:  Negative for constipation, diarrhea, nausea and vomiting.   Genitourinary:  Negative for difficulty urinating, dysuria, frequency and urgency.   Musculoskeletal:  Positive for back pain, myalgias and neck pain. Negative for gait problem and neck stiffness.   Skin:  Negative for wound.   Neurological:  Positive for numbness. Negative for dizziness, tremors, seizures, facial asymmetry, speech difficulty, weakness, light-headedness and headaches.   Psychiatric/Behavioral:  Negative for confusion and decreased concentration.      Past Medical History:   Diagnosis Date    Addiction to drug     History of prescription pill addiction    Bipolar 1 disorder     Emphysema     Hepatitis C     History of psychiatric hospitalization     Hx of psychiatric care     Hypertension     Psychiatric problem     Scoliosis     Therapy      Social History     Socioeconomic History    Marital status:     Number of children: 2   Tobacco Use    Smoking status: Every Day     Packs/day: 1.00     Years: 40.00     Pack years: 40.00     Types: Cigarettes    Smokeless tobacco: Never   Substance and Sexual Activity    Alcohol use: No     Comment: Alcoholism and rehab in her 20s    Drug use: No     Comment: History of prescription pill addiction, mostly benzos/opioids/muscle relaxers    Sexual activity: Not Currently     Partners: Male     Family History   Problem Relation Age of Onset    Hypertension Mother     Cancer Father      "Parkinsonism Father     Breast cancer Maternal Aunt      Review of patient's allergies indicates:  No Known Allergies    Current Outpatient Medications:     albuterol (VENTOLIN HFA) 90 mcg/actuation inhaler, inhale TWO puffs INTO THE LUNGS EVERY 6 HOURS AS NEEDED FOR WHEEZING, Disp: 18 g, Rfl: 2    ARIPiprazole (ABILIFY) 10 MG Tab, Take 1 tablet (10 mg total) by mouth once daily., Disp: 30 tablet, Rfl: 11    B-complex with vitamin C (Z-BEC OR EQUIV) tablet, Take 1 tablet by mouth once daily., Disp: 30 tablet, Rfl: 11    [START ON 12/5/2022] butalbital-acetaminophen-caffeine -40 mg (FIORICET, ESGIC) -40 mg per tablet, Take 1 tablet by mouth every 6 (six) hours as needed for Headaches., Disp: 60 tablet, Rfl: 0    diclofenac sodium (VOLTAREN) 1 % Gel, Apply 2 g topically 2 (two) times daily., Disp: 100 g, Rfl: 0    diphenhydrAMINE-aluminum-magnesium hydroxide-simethicone-LIDOcaine HCl 2%, Swish and spit 15 mLs every 4 (four) hours as needed., Disp: 450 each, Rfl: 0    fluticasone propionate (FLONASE) 50 mcg/actuation nasal spray, 2 sprays (100 mcg total) by Each Nostril route once daily., Disp: 16 mL, Rfl: 3    gabapentin (NEURONTIN) 300 MG capsule, Take 1 capsule (300 mg total) by mouth 3 (three) times daily., Disp: 90 capsule, Rfl: 11    lisinopriL 10 MG tablet, TAKE ONE TABLET BY MOUTH DAILY, Disp: 90 tablet, Rfl: 3    oxyCODONE (OXY-IR) 5 mg Cap, Take 5 mg by mouth once daily., Disp: , Rfl:     paroxetine (PAXIL-CR) 12.5 MG 24 hr tablet, Take 1 tablet (12.5 mg total) by mouth every morning., Disp: 90 tablet, Rfl: 0    tiZANidine (ZANAFLEX) 4 MG tablet, Take 1 tablet (4 mg total) by mouth every 12 (twelve) hours as needed (muscle spasms/ pain)., Disp: 40 tablet, Rfl: 0    Vitals:    11/28/22 0844   BP: (!) 157/99   BP Location: Right arm   Patient Position: Sitting   BP Method: Medium (Automatic)   Pulse: 70   Weight: 44 kg (97 lb 1.8 oz)   Height: 5' 1.5" (1.562 m)       Physical Exam  Vitals and " nursing note reviewed.   Constitutional:       Appearance: She is well-developed.   HENT:      Head: Normocephalic and atraumatic.   Eyes:      Pupils: Pupils are equal, round, and reactive to light.   Cardiovascular:      Rate and Rhythm: Normal rate.   Pulmonary:      Effort: Pulmonary effort is normal.   Musculoskeletal:         General: Normal range of motion.      Cervical back: Normal range of motion and neck supple.   Skin:     General: Skin is warm and dry.   Neurological:      Mental Status: She is alert and oriented to person, place, and time.      Coordination: Finger-Nose-Finger Test normal.      Gait: Gait is intact. Tandem walk normal.      Deep Tendon Reflexes:      Reflex Scores:       Tricep reflexes are 3+ on the right side and 2+ on the left side.       Bicep reflexes are 3+ on the right side and 2+ on the left side.       Brachioradialis reflexes are 3+ on the right side and 3+ on the left side.       Patellar reflexes are 3+ on the right side and 3+ on the left side.       Achilles reflexes are 3+ on the right side and 3+ on the left side.  Psychiatric:         Speech: Speech normal.         Behavior: Behavior normal.         Thought Content: Thought content normal.         Judgment: Judgment normal.       Neurologic Exam     Mental Status   Oriented to person, place, and time.   Oriented to person.   Oriented to place.   Oriented to time.   Attention: normal. Concentration: normal.   Speech: speech is normal   Level of consciousness: alert  Knowledge: consistent with education.     Cranial Nerves     CN III, IV, VI   Pupils are equal, round, and reactive to light.    CN XI   Right sternocleidomastoid strength: normal  Left sternocleidomastoid strength: normal  Right trapezius strength: normal  Left trapezius strength: normal    Motor Exam   Muscle bulk: normal  Overall muscle tone: normal  Right arm tone: normal  Left arm tone: normal  Right arm pronator drift: absent  Left arm pronator drift:  absent  Right leg tone: normal  Left leg tone: normal    Strength   Right neck flexion: 5/5  Left neck flexion: 5/5  Right neck extension: 5/5  Left neck extension: 5/5  Right deltoid: 5/5  Left deltoid: 5/5  Right biceps: 5/5  Left biceps: 5/5  Right triceps: 5/5  Left triceps: 5/5  Right wrist flexion: 5/5  Left wrist flexion: 5/5  Right wrist extension: 5/5  Left wrist extension: 5/5  Right interossei: 5/5  Left interossei: 5/5  Right abdominals: 5/5  Left abdominals: 5/5  Right iliopsoas: 5/5  Left iliopsoas: 5/5  Right quadriceps: 5/5  Left quadriceps: 5/5  Right hamstrin/5  Left hamstrin/5  Right glutei: 5/5  Left glutei: 5/  Right anterior tibial: 5/  Left anterior tibial: 5/  Right posterior tibial: 5/  Left posterior tibial: 5/  Right peroneal: 5/5  Left peroneal: 5/5  Right gastroc: 5/5  Left gastroc: 5/5    Sensory Exam   Right arm light touch: normal  Left arm light touch: normal  Right leg light touch: normal  Left leg light touch: normal    Gait, Coordination, and Reflexes     Gait  Gait: normal    Coordination   Finger to nose coordination: normal  Tandem walking coordination: normal    Tremor   Resting tremor: absent  Intention tremor: absent  Action tremor: absent    Reflexes   Right brachioradialis: 3+  Left brachioradialis: 3+  Right biceps: 3+  Left biceps: 2+  Right triceps: 3+  Left triceps: 2+  Right patellar: 3+  Left patellar: 3+  Right achilles: 3+  Left achilles: 3+  Right Feliciano: present  Left Feliciano: present  Right ankle clonus: absent  Left ankle clonus: absent    Provider dictation:    66 year old female smoker with COPD, osteoporosis, bipolar, depression, history of rx drug addiction, Hepatitis C, hypertension, scoliosis presents to discuss neck and back pain.  She underwent Sykes shaun surgery in  for scoliosis.  She had C5/6 ACDF from hip bone graft in  which did help some at the time.  Recalls past coccyx fracture.    She describes chronic pain in the  posterior neck and bilateral arms.  It is associated with frontal headaches and numbness in the hands/ fingers.  Overall pain is worsening.      She has pain from the mid thoracic region to the lowr lumbar area.  Pain radiates to the bilateral posterior legs to the calves.  It is associated with numbness, tingling, burning.    She was being managed on oxycodone by Dr. Steiner, pain management in [place], but moved to the [facility] and is no longer seeing him.    Current medications:  fiorocet for headaches, diclofenac gel, lidocaine, gabapentin  Medications tried:  oxycodone IR 5mg  Physical therapy:   recalls PT for neck and back in the past with last trial about 2 years ago  Interventional Procedures:  describes multiple interventional procedures through the years      On exam, there is 5/5 strength with 3+ DTR and no sensory deficits.  (+) Feliciano's bilaterally. Gait and station fluid.  Denies bowel/ bladder dysfunction.  Full range of motion of the upper and lower extremities. No pain with axial facet loading.  No SI joint pain on palpation.  No pain with lumbar flexion/ extension.    MRI cervical spine from 7-5-18 reviewed.  C5/6 anterior osseous fusion.  C4/5 and C6/7 adjacent level degenerative changes, disk space narrowing, central canal narrowing and bilateral foraminal narrowing.    CT abdomen and pelvis from 6/2020 revelas singh shaun, thoracolumbar scoliosis degenerative changes.    She did have MRI in March 2020 and has a disc with her today, but I am not able to load the images.    Ms. Richey has history of cervical spine surgery and [place] shaun surgery for scoliosis.  She has chronic pain in the neck with radiating arm pain, thoracic region and lumbar region.  No recent imaging and myelopathic findings on exam.  Need to rule out current cord compression from adjacent segment disease in the neck causing neck/ arm pain.  Need to assess thoracic and lumbar spine for adjacnet segment disease  as well.  We will obtain cervical spine, lumbar spine, and scoliosis xrays.  Will obtain cervical, thoracic, lumbar MRI.  She should follow up with Dr. Toledo in complex spine neurosurgery.   I am prescribing zanaflex for spsmas and pain.  She does request stronger opiod pain medication as she was on these long term in the past.  I explained that our pain management team are interventional pain management and not medication management.  I provided her with a list of medication management physicians in the area.  Follow up with me as needed.   She is agreeable to plan.    The total time spent for evaluation and management on 11/28/2022 including reviewing separately obtained history, performing a medically appropriate exam and evaluation, documenting clinical information in the health record, independently interpreting results and communicating them to the patient/family/caregiver, and ordering medications/tests/procedures was between 60-74 minutes.      Visit Diagnosis:  Cervicalgia  -     X-Ray Cervical Spine 5 View W Flex Extxt; Future; Expected date: 11/28/2022  -     MRI Cervical Spine Without Contrast; Future; Expected date: 11/28/2022  -     Ambulatory referral/consult to Neurosurgery; Future; Expected date: 12/05/2022  -     tiZANidine (ZANAFLEX) 4 MG tablet; Take 1 tablet (4 mg total) by mouth every 12 (twelve) hours as needed (muscle spasms/ pain).  Dispense: 40 tablet; Refill: 0    Scoliosis of thoracolumbar spine, unspecified scoliosis type  -     X-Ray Scoliosis Complete Including Supine And Erect; Future; Expected date: 11/28/2022  -     MRI Cervical Spine Without Contrast; Future; Expected date: 11/28/2022  -     MRI Thoracic Spine Without Contrast; Future; Expected date: 11/28/2022  -     MRI Lumbar Spine Without Contrast; Future; Expected date: 11/28/2022  -     Ambulatory referral/consult to Neurosurgery; Future; Expected date: 12/05/2022  -     tiZANidine (ZANAFLEX) 4 MG tablet; Take 1 tablet (4  mg total) by mouth every 12 (twelve) hours as needed (muscle spasms/ pain).  Dispense: 40 tablet; Refill: 0    Pain in thoracic spine  -     X-Ray Scoliosis Complete Including Supine And Erect; Future; Expected date: 11/28/2022  -     MRI Thoracic Spine Without Contrast; Future; Expected date: 11/28/2022  -     tiZANidine (ZANAFLEX) 4 MG tablet; Take 1 tablet (4 mg total) by mouth every 12 (twelve) hours as needed (muscle spasms/ pain).  Dispense: 40 tablet; Refill: 0    Dorsalgia, unspecified  -     X-Ray Scoliosis Complete Including Supine And Erect; Future; Expected date: 11/28/2022  -     MRI Thoracic Spine Without Contrast; Future; Expected date: 11/28/2022  -     MRI Lumbar Spine Without Contrast; Future; Expected date: 11/28/2022  -     tiZANidine (ZANAFLEX) 4 MG tablet; Take 1 tablet (4 mg total) by mouth every 12 (twelve) hours as needed (muscle spasms/ pain).  Dispense: 40 tablet; Refill: 0    Cervical radiculopathy  -     X-Ray Cervical Spine 5 View W Flex Extxt; Future; Expected date: 11/28/2022  -     MRI Cervical Spine Without Contrast; Future; Expected date: 11/28/2022  -     tiZANidine (ZANAFLEX) 4 MG tablet; Take 1 tablet (4 mg total) by mouth every 12 (twelve) hours as needed (muscle spasms/ pain).  Dispense: 40 tablet; Refill: 0    Chronic bilateral low back pain with bilateral sciatica  -     X-Ray Lumbar Complete Including Flex And Ext; Future; Expected date: 11/28/2022  -     MRI Lumbar Spine Without Contrast; Future; Expected date: 11/28/2022  -     Ambulatory referral/consult to Neurosurgery; Future; Expected date: 12/05/2022  -     tiZANidine (ZANAFLEX) 4 MG tablet; Take 1 tablet (4 mg total) by mouth every 12 (twelve) hours as needed (muscle spasms/ pain).  Dispense: 40 tablet; Refill: 0      Total time spent counseling greater than fifty percent of total visit time.  Counseling included discussion regarding imaging findings, diagnosis possibilities, treatment options, risks and benefits.    The patient had many questions regarding the options and long-term effects.

## 2022-11-30 ENCOUNTER — TELEPHONE (OUTPATIENT)
Dept: FAMILY MEDICINE | Facility: CLINIC | Age: 67
End: 2022-11-30

## 2022-11-30 ENCOUNTER — TELEPHONE (OUTPATIENT)
Dept: FAMILY MEDICINE | Facility: CLINIC | Age: 67
End: 2022-11-30
Payer: MEDICARE

## 2022-11-30 NOTE — TELEPHONE ENCOUNTER
----- Message from Crys Currie sent at 11/30/2022  4:43 PM CST -----  Type: Patient Call Back    Who called: self     What is the request in detail: request a call back from only Dr Corcoran     Can the clinic reply by [facility]?    Would the patient rather a call back or a response via [facility]?     Best call back number: [phone] (home)       Additional Information:

## 2022-11-30 NOTE — TELEPHONE ENCOUNTER
----- Message from Katy Zambrano sent at 11/30/2022 10:43 AM CST -----  Regarding: Refill Request  Type: RX Refill Request      Who Called: Called       Refill or New Rx: Refill       RX Name and Strength: Said is asking for either Valium or Klonopin. She is also running low on her  oxyCODONE (OXY-IR) 5 mg Cap        Preferred Pharmacy with phone number:    [facility]    [address] [facility] [address] [place] [place] [facility]  [phone]      Would the patient rather a call back or a response via [facility]? Call       Best Call Back Number: .[phone]          Additional Information: She would also like to a recommdation for a new PCP on the [facility].

## 2022-11-30 NOTE — TELEPHONE ENCOUNTER
She needs to establish with new PCP for these medications. I have not prescribed either in > 1 year and she has not seen me in almost two years.

## 2022-12-09 ENCOUNTER — HOSPITAL ENCOUNTER (OUTPATIENT)
Dept: RADIOLOGY | Facility: HOSPITAL | Age: 67
Discharge: HOME | End: 2022-12-09
Attending: PHYSICIAN ASSISTANT
Payer: MEDICARE

## 2022-12-12 ENCOUNTER — TELEPHONE (OUTPATIENT)
Dept: PSYCHIATRY | Facility: CLINIC | Age: 67
End: 2022-12-12
Payer: MEDICARE

## 2022-12-12 DIAGNOSIS — R51.9 CHRONIC DAILY HEADACHE: ICD-10-CM

## 2022-12-12 RX ORDER — BUTALBITAL, ACETAMINOPHEN AND CAFFEINE 50; 325; 40 MG/1; MG/1; MG/1
1 TABLET ORAL EVERY 6 HOURS PRN
Qty: 60 TABLET | Refills: 0 | OUTPATIENT
Start: 2022-12-12

## 2022-12-12 RX ORDER — PAROXETINE HYDROCHLORIDE HEMIHYDRATE 12.5 MG/1
12.5 TABLET, FILM COATED, EXTENDED RELEASE ORAL EVERY MORNING
Qty: 90 TABLET | Refills: 0 | Status: SHIPPED | OUTPATIENT
Start: 2022-12-12 | End: 2023-03-09 | Stop reason: SDUPTHER

## 2022-12-12 NOTE — TELEPHONE ENCOUNTER
----- Message from Kai Talley sent at 12/12/2022  3:45 PM CST -----  Type: Patient Call Back    Who called: Self     What is the request in detail: Pt asking if butalbital-acetaminophen-caffeine -40 mg (FIORICET, ESGIC) -40 mg per tablet 60 tablet  RX can be resent     Can the clinic reply by [facility]? No    Would the patient rather a call back or a response via [facility]? Call     Best call back number: [phone]

## 2022-12-12 NOTE — TELEPHONE ENCOUNTER
Pt called office again in regards to paxil refill. Made pt aware both of her msgs have been sent to [facility] and we are awaiting a response. Pt verbally acknowledged.

## 2022-12-12 NOTE — TELEPHONE ENCOUNTER
Pt called, lvm stating she needed refill on Paxil, she took her last one today. Pt reports she has not been able to find a new provider as she has moved to [place], but needs a refill. Pt stated her Pharmacy has also changed. Pharmacy has been updated in chart .

## 2023-03-09 RX ORDER — PAROXETINE HYDROCHLORIDE HEMIHYDRATE 12.5 MG/1
12.5 TABLET, FILM COATED, EXTENDED RELEASE ORAL EVERY MORNING
Qty: 90 TABLET | Refills: 0 | Status: SHIPPED | OUTPATIENT
Start: 2023-03-09 | End: 2023-06-07

## 2023-08-03 ENCOUNTER — NURSE TRIAGE (OUTPATIENT)
Dept: ADMINISTRATIVE | Facility: CLINIC | Age: 68
End: 2023-08-03
Payer: MEDICARE

## 2023-08-03 NOTE — TELEPHONE ENCOUNTER
Anastacio Carbajal PA-C [place], [place], [place]  Caller: Unspecified (Today, 12:47 PM)  Yes, If Pt refuses to make an appointment with myself or Nahomi I cannot bridge any medications.  She will have to request meds from PCP.   Jarett

## 2023-08-03 NOTE — TELEPHONE ENCOUNTER
Called & spoke to pt again and advised as below per Jarett.       Pt declined appts again as she lives in [place], suggested she call   PCP. She stated she does not have a   PCP yet either. Made pt aware she will have to get established w/ a new provider near [place] area for further refills.    No concerns voiced. Pt verbally acknowledged.

## 2023-08-03 NOTE — TELEPHONE ENCOUNTER
Call;ed and spoke to pt. Informed her its almost been 1 y since she was last seen and she was due for a f/u in 10/22.     Offered to make appt for pt to provide refills, pt declined. Pt stated she is unable to do virtuals w/ Nahomi and is unable to come and see Jarett as she does not have a car.    Made pt aware per dept policy, pts must have an appt for any meds bridges, but will send msg to provider for response.    Advised if provider cant fill bc of an appt, she will have to f/u w/   PCP.    No further concerns voiced.

## 2023-08-03 NOTE — TELEPHONE ENCOUNTER
[place]    PCP:  None    She reports that she goes to [facility].  She states that she was prescribed Paxil-CR.  She needs a refill sent in for Paxil-CR.  She reports that she has been out of medication for 5 or 6 days.  Per protocol, care advised is discuss with PCP and callback by Nurse within 1 hr.  Pt VU.  Advised to call for worsening/questions/concerns.  VU.     Reason for Disposition   Prescription refill request for ESSENTIAL medicine (i.e., likelihood of harm to patient if not taken) and triager unable to refill per department policy    Protocols used: Medication Refill and Renewal Call-A-OH

## 2024-08-23 NOTE — TELEPHONE ENCOUNTER
As these are controlled medications, and patient picked up Oxycodone-acetaminophen on 3/4/19, any changes will need to be addressed when Dr. Corcoran returns.    617

## 2024-09-15 NOTE — PATIENT INSTRUCTIONS
Follow up with primary care provider if not improved  Go to ER for new, worse or concerning symptoms    Kicking the Smoking Habit  If you smoke, quitting is one of the best changes you can make for your heart and your overall health. Your risk of heart attack goes down within one day of putting out that last cigarette. As you go longer without smoking, your risk goes down even more. Quitting isnt easy, but millions of people have done it. You can, too. Its never too late to quit.  Getting started  Boost your chances of success by deciding on your quit plan. Your health care provider and cardiac rehab team can help you develop this plan. Even if youve already quit, its easy to slip back into smoking.  Your plan can help you avoid and recover from relapse.  In any case, start by setting a date to quit within a month, and do it.    Keys to your quit plan  · Talk to your healthcare provider about prescription medicines and nicotine replacement products that help stop the urge to smoke.   · Join a support group or quit smoking program. Talking with others about the challenges of quitting can help you get through them.  · Ask other smokers in your household to quit with you.  · Look for the cues in your life that you associate with smoking and avoid them.  Track your triggers  What gives you that O-kzvx-x-cigarette feeling? List all the situations that make you want a cigarette. Then think of other ways to deal with these situations. Here are some examples:  Situation How I'll handle it   Finishing a meal Get up from the table and take a walk   Having an argument Find a quiet place and breathe deeply   Feeling lonely or bored Call a friend to talk         Tips for quitting successfully  · List the benefits of quitting such as reducing heart risks and saving money. Keep this list and review it whenever you feel like smoking.  · Get support. Let your friends know you may call them to chat when you have an urge to  smoke.  · If youve tried to quit before without success, this time avoid the triggers that may cause the relapse.  · Make the most of slip-ups. Try to learn from them, and then get back on track.  · Be accountable to your friends and your calendar so that you stay on track.  For family and friends  · Be supportive and patient. Quitting smoking can be difficult and stressful.  · If you smoke, nows a great time to quit. Even if you dont quit, never smoke around your loved one. Secondhand smoke is dangerous to his or her heart.  · The best goals are accomplished in teams. Remember that when your loved one states he or she wants to stop smoking.  Date Last Reviewed: 7/1/2016  © 2103-2062 [employer]. [address] [place] [address], [place] [address]. All rights reserved. This information is not intended as a substitute for professional medical care. Always follow your healthcare professional's instructions.         You can access the FollowMyHealth Patient Portal offered by [facility] by registering at the following website: [email or web address]. By joining [email or web address] FollowMyHealth portal, you will also be able to view your health information using other applications (apps) compatible with our system.

## 2025-04-24 NOTE — TELEPHONE ENCOUNTER
Chart reviewed. HCV RNA noted along with CBC and CMP. PCP has scheduled u/s on 8/2.    Spoke to pt. Genotype and INR scheduled on 8/2. Fibroscan and clinic visit on 8/10. Reminders mailed.    Separate prescriptions sent